# Patient Record
Sex: MALE | Race: WHITE | Employment: OTHER | URBAN - METROPOLITAN AREA
[De-identification: names, ages, dates, MRNs, and addresses within clinical notes are randomized per-mention and may not be internally consistent; named-entity substitution may affect disease eponyms.]

---

## 2017-01-05 RX ORDER — ISOSORBIDE MONONITRATE 30 MG/1
TABLET, EXTENDED RELEASE ORAL
Qty: 15 TABLET | Refills: 3 | Status: SHIPPED | OUTPATIENT
Start: 2017-01-05 | End: 2017-05-01 | Stop reason: SDUPTHER

## 2017-03-01 RX ORDER — PRASUGREL 10 MG/1
10 TABLET, FILM COATED ORAL DAILY
Qty: 30 TABLET | Refills: 3 | Status: SHIPPED | OUTPATIENT
Start: 2017-03-01 | End: 2018-01-04 | Stop reason: SDUPTHER

## 2017-03-30 DIAGNOSIS — N40.1 BENIGN NON-NODULAR PROSTATIC HYPERPLASIA WITH LOWER URINARY TRACT SYMPTOMS: ICD-10-CM

## 2017-03-30 RX ORDER — TAMSULOSIN HYDROCHLORIDE 0.4 MG/1
CAPSULE ORAL
Qty: 30 CAPSULE | Refills: 0 | Status: ON HOLD | OUTPATIENT
Start: 2017-03-30 | End: 2017-11-12

## 2017-05-01 RX ORDER — ISOSORBIDE MONONITRATE 30 MG/1
TABLET, EXTENDED RELEASE ORAL
Qty: 15 TABLET | Refills: 3 | Status: SHIPPED | OUTPATIENT
Start: 2017-05-01 | End: 2018-03-05 | Stop reason: SDUPTHER

## 2017-05-01 RX ORDER — RANOLAZINE 500 MG/1
TABLET, FILM COATED, EXTENDED RELEASE ORAL
Qty: 180 TABLET | Refills: 3 | Status: SHIPPED | OUTPATIENT
Start: 2017-05-01 | End: 2017-05-03 | Stop reason: SDUPTHER

## 2017-05-03 ENCOUNTER — OFFICE VISIT (OUTPATIENT)
Dept: CARDIOLOGY | Age: 62
End: 2017-05-03
Payer: MEDICARE

## 2017-05-03 VITALS
HEART RATE: 76 BPM | HEIGHT: 74 IN | SYSTOLIC BLOOD PRESSURE: 112 MMHG | BODY MASS INDEX: 27.72 KG/M2 | WEIGHT: 216 LBS | DIASTOLIC BLOOD PRESSURE: 73 MMHG

## 2017-05-03 DIAGNOSIS — I25.2 HISTORY OF MYOCARDIAL INFARCTION: ICD-10-CM

## 2017-05-03 DIAGNOSIS — I25.10 ASHD (ARTERIOSCLEROTIC HEART DISEASE): ICD-10-CM

## 2017-05-03 DIAGNOSIS — I73.9 PVD (PERIPHERAL VASCULAR DISEASE) (HCC): Primary | ICD-10-CM

## 2017-05-03 DIAGNOSIS — Z95.820 S/P ANGIOPLASTY WITH STENT: ICD-10-CM

## 2017-05-03 DIAGNOSIS — E78.5 DYSLIPIDEMIA: ICD-10-CM

## 2017-05-03 DIAGNOSIS — I10 ESSENTIAL HYPERTENSION: ICD-10-CM

## 2017-05-03 PROCEDURE — 99214 OFFICE O/P EST MOD 30 MIN: CPT | Performed by: INTERNAL MEDICINE

## 2017-05-03 RX ORDER — ROSUVASTATIN CALCIUM 40 MG/1
40 TABLET, COATED ORAL NIGHTLY
Qty: 90 TABLET | Refills: 3 | Status: SHIPPED | OUTPATIENT
Start: 2017-05-03 | End: 2018-06-04 | Stop reason: SDUPTHER

## 2017-05-03 RX ORDER — RANOLAZINE 500 MG/1
500 TABLET, EXTENDED RELEASE ORAL 2 TIMES DAILY
Qty: 180 TABLET | Refills: 3 | Status: SHIPPED | OUTPATIENT
Start: 2017-05-03 | End: 2018-06-11 | Stop reason: CLARIF

## 2017-05-12 ENCOUNTER — HOSPITAL ENCOUNTER (OUTPATIENT)
Dept: VASCULAR LAB | Age: 62
Discharge: HOME | End: 2017-05-12
Payer: MEDICARE

## 2017-05-12 ENCOUNTER — TELEPHONE (OUTPATIENT)
Dept: CARDIOLOGY | Age: 62
End: 2017-05-12

## 2017-05-12 DIAGNOSIS — I73.9 PVD (PERIPHERAL VASCULAR DISEASE) (HCC): ICD-10-CM

## 2017-05-12 PROCEDURE — 93923 UPR/LXTR ART STDY 3+ LVLS: CPT

## 2017-05-16 ENCOUNTER — TELEPHONE (OUTPATIENT)
Dept: CARDIOLOGY | Age: 62
End: 2017-05-16

## 2017-09-06 ENCOUNTER — HOSPITAL ENCOUNTER (OUTPATIENT)
Age: 62
Discharge: HOME | End: 2017-09-06
Payer: MEDICARE

## 2017-09-06 LAB
ABSOLUTE EOS #: 0.2 K/UL (ref 0–0.4)
ABSOLUTE LYMPH #: 1.5 K/UL (ref 1–4.8)
ABSOLUTE MONO #: 0.6 K/UL (ref 0–1)
ALBUMIN SERPL-MCNC: 3.9 G/DL (ref 3.5–5.2)
ALBUMIN/GLOBULIN RATIO: 1.3 (ref 1–2.5)
ALP BLD-CCNC: 86 U/L (ref 40–129)
ALT SERPL-CCNC: 9 U/L (ref 5–41)
ANION GAP SERPL CALCULATED.3IONS-SCNC: 10 MMOL/L (ref 9–17)
AST SERPL-CCNC: 14 U/L
BASOPHILS # BLD: 0 %
BASOPHILS ABSOLUTE: 0 K/UL (ref 0–0.2)
BILIRUB SERPL-MCNC: 0.64 MG/DL (ref 0.3–1.2)
BUN BLDV-MCNC: 12 MG/DL (ref 8–23)
BUN/CREAT BLD: 15 (ref 9–20)
CALCIUM SERPL-MCNC: 8.9 MG/DL (ref 8.6–10.4)
CHLORIDE BLD-SCNC: 103 MMOL/L (ref 98–107)
CHOLESTEROL/HDL RATIO: 2.4
CHOLESTEROL: 134 MG/DL
CO2: 29 MMOL/L (ref 20–31)
CREAT SERPL-MCNC: 0.8 MG/DL (ref 0.7–1.2)
CREATININE URINE: 109.3 MG/DL (ref 39–259)
DIFFERENTIAL TYPE: NORMAL
EOSINOPHILS RELATIVE PERCENT: 2 %
ESTIMATED AVERAGE GLUCOSE: 114 MG/DL
GFR AFRICAN AMERICAN: >60 ML/MIN
GFR NON-AFRICAN AMERICAN: >60 ML/MIN
GFR SERPL CREATININE-BSD FRML MDRD: ABNORMAL ML/MIN/{1.73_M2}
GFR SERPL CREATININE-BSD FRML MDRD: ABNORMAL ML/MIN/{1.73_M2}
GLUCOSE BLD-MCNC: 102 MG/DL (ref 70–99)
HBA1C MFR BLD: 5.6 % (ref 4.8–5.9)
HCT VFR BLD CALC: 46.6 % (ref 41–53)
HDLC SERPL-MCNC: 55 MG/DL
HEMOGLOBIN: 15.6 G/DL (ref 13.5–17)
LDL CHOLESTEROL: 68 MG/DL (ref 0–130)
LYMPHOCYTES # BLD: 20 %
MCH RBC QN AUTO: 32.1 PG (ref 26–34)
MCHC RBC AUTO-ENTMCNC: 33.4 G/DL (ref 31–37)
MCV RBC AUTO: 96.1 FL (ref 80–100)
MICROALBUMIN/CREAT 24H UR: 37 MG/L
MICROALBUMIN/CREAT UR-RTO: 34 MCG/MG CREAT
MONOCYTES # BLD: 8 %
PDW BLD-RTO: 14.3 % (ref 12.1–15.2)
PLATELET # BLD: 204 K/UL (ref 140–450)
PLATELET ESTIMATE: NORMAL
PMV BLD AUTO: 8.1 FL (ref 6–12)
POTASSIUM SERPL-SCNC: 5.2 MMOL/L (ref 3.7–5.3)
RBC # BLD: 4.85 M/UL (ref 4.5–5.9)
RBC # BLD: NORMAL 10*6/UL
SEG NEUTROPHILS: 70 %
SEGMENTED NEUTROPHILS ABSOLUTE COUNT: 5.3 K/UL (ref 1.8–7.7)
SODIUM BLD-SCNC: 142 MMOL/L (ref 135–144)
TOTAL PROTEIN: 7 G/DL (ref 6.4–8.3)
TRIGL SERPL-MCNC: 55 MG/DL
VLDLC SERPL CALC-MCNC: NORMAL MG/DL (ref 1–30)
WBC # BLD: 7.7 K/UL (ref 3.5–11)
WBC # BLD: NORMAL 10*3/UL

## 2017-09-06 PROCEDURE — 83036 HEMOGLOBIN GLYCOSYLATED A1C: CPT

## 2017-09-06 PROCEDURE — 85025 COMPLETE CBC W/AUTO DIFF WBC: CPT

## 2017-09-06 PROCEDURE — 36415 COLL VENOUS BLD VENIPUNCTURE: CPT

## 2017-09-06 PROCEDURE — 82043 UR ALBUMIN QUANTITATIVE: CPT

## 2017-09-06 PROCEDURE — 82570 ASSAY OF URINE CREATININE: CPT

## 2017-09-06 PROCEDURE — 80061 LIPID PANEL: CPT

## 2017-09-06 PROCEDURE — 80053 COMPREHEN METABOLIC PANEL: CPT

## 2017-10-02 ENCOUNTER — HOSPITAL ENCOUNTER (OUTPATIENT)
Age: 62
Discharge: HOME | End: 2017-10-02
Payer: MEDICARE

## 2017-10-02 ENCOUNTER — HOSPITAL ENCOUNTER (OUTPATIENT)
Dept: GENERAL RADIOLOGY | Age: 62
Discharge: HOME | End: 2017-10-02
Payer: MEDICARE

## 2017-10-02 DIAGNOSIS — R05.9 COUGH: ICD-10-CM

## 2017-10-02 PROCEDURE — 71020 XR CHEST STANDARD TWO VW: CPT

## 2017-10-18 ENCOUNTER — HOSPITAL ENCOUNTER (OUTPATIENT)
Dept: NON INVASIVE DIAGNOSTICS | Age: 62
Discharge: HOME | End: 2017-10-18
Payer: MEDICARE

## 2017-11-07 ENCOUNTER — HOSPITAL ENCOUNTER (OUTPATIENT)
Dept: VASCULAR LAB | Age: 62
Discharge: HOME | End: 2017-11-07
Payer: MEDICARE

## 2017-11-07 DIAGNOSIS — I77.9 BILATERAL CAROTID ARTERY DISEASE (HCC): ICD-10-CM

## 2017-11-07 DIAGNOSIS — Z98.890 STATUS POST CAROTID SURGERY: ICD-10-CM

## 2017-11-07 DIAGNOSIS — R22.1 PULSATILE NECK MASS: ICD-10-CM

## 2017-11-07 PROCEDURE — 93880 EXTRACRANIAL BILAT STUDY: CPT

## 2017-11-08 ENCOUNTER — TELEPHONE (OUTPATIENT)
Dept: CARDIOLOGY | Age: 62
End: 2017-11-08

## 2017-11-08 NOTE — TELEPHONE ENCOUNTER
Dr. Rob Weeks wanted me to call Kiara Esteban to see how he is doing and if they are going to do surgery? Spoke with Kiara Esteban and he stated he is doing pretty good,surgery is going to be later today with Dr. Jaimie Crowe. Dr. Randa Aguilar and he also stated that Dr. Randa Aguilar told him this morning that he will be contacting Dr. Rob Weeks. I gave Dr. Rob Weeks the information that was given to me by Kiara Esteban.

## 2017-11-08 NOTE — PROGRESS NOTES
Re: Preoperative cardiovascular evaluation    Venida Section is 57 y.o. male with past medical history of bilateral carotid artery disease status post CEA 1995 and 2002 and history of prior stroke who initially presented for evaluation of abnormal stress test back in 2015. This was followed by cardiac catheterization which showed severe obstructive disease of the right coronary artery. Patient was sent to [facility] for intervention, unfortunately the procedure complicated by inferior wall myocardial infarction and transient heart block on 8/24/2015.       He had a stress test on 8/3/2016 which showed evidence of old myocardial infarction with a very small hayden-infarction ischemia, medical management. He did ok since his cardiac cath. No evidence of unstable CAD, heart failure or significant heart rhythm problems. His last reported ejection fraction on gated SPECT was 44%. 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 I was asked to assess his preoperative cardiac risk. Based on my evaluation of Mr. Jarad Carrero , I believe that his risk for developing a perioperative cardiac complications is intermediate to high but not prohibitive of the planned surgery. I not believe that any further testing or intervention would be likely to decrease this risk and therefore recommend that you proceed with surgery as clinically indicated. Ideally, I would also suggest continuing his aspirin, beta-blocker and statin throughout the perioperative period to help decrease his risk of stroke and heart attack. However, if any of these medications need to be stopped, I would suggest that they be restarted as soon as safety possible. Marelyn Fabry MD, [facility]. [facility] Cardiology.

## 2017-11-12 ENCOUNTER — ANESTHESIA EVENT (OUTPATIENT)
Dept: OPERATING ROOM | Age: 62
DRG: 254 | End: 2017-11-12
Payer: MEDICARE

## 2017-11-12 ENCOUNTER — HOSPITAL ENCOUNTER (INPATIENT)
Age: 62
LOS: 3 days | Discharge: HOME | DRG: 254 | End: 2017-11-15
Attending: SURGERY | Admitting: SURGERY
Payer: MEDICARE

## 2017-11-12 PROCEDURE — 06BQ0ZZ EXCISION OF LEFT SAPHENOUS VEIN, OPEN APPROACH: ICD-10-PCS | Performed by: SURGERY

## 2017-11-12 PROCEDURE — 6370000000 HC RX 637 (ALT 250 FOR IP): Performed by: STUDENT IN AN ORGANIZED HEALTH CARE EDUCATION/TRAINING PROGRAM

## 2017-11-12 PROCEDURE — 2580000003 HC RX 258: Performed by: STUDENT IN AN ORGANIZED HEALTH CARE EDUCATION/TRAINING PROGRAM

## 2017-11-12 PROCEDURE — 1200000000 HC SEMI PRIVATE

## 2017-11-12 PROCEDURE — 031J09K BYPASS LEFT COMMON CAROTID ARTERY TO LEFT EXTRACRANIAL ARTERY WITH AUTOLOGOUS VENOUS TISSUE, OPEN APPROACH: ICD-10-PCS | Performed by: SURGERY

## 2017-11-12 PROCEDURE — 03UJ0JZ SUPPLEMENT LEFT COMMON CAROTID ARTERY WITH SYNTHETIC SUBSTITUTE, OPEN APPROACH: ICD-10-PCS | Performed by: SURGERY

## 2017-11-12 RX ORDER — ISOSORBIDE MONONITRATE 30 MG/1
30 TABLET, EXTENDED RELEASE ORAL DAILY
Status: DISCONTINUED | OUTPATIENT
Start: 2017-11-12 | End: 2017-11-15 | Stop reason: HOSPADM

## 2017-11-12 RX ORDER — LISINOPRIL 10 MG/1
10 TABLET ORAL DAILY
Status: DISCONTINUED | OUTPATIENT
Start: 2017-11-13 | End: 2017-11-15 | Stop reason: HOSPADM

## 2017-11-12 RX ORDER — ACETAMINOPHEN 325 MG/1
650 TABLET ORAL EVERY 4 HOURS PRN
Status: DISCONTINUED | OUTPATIENT
Start: 2017-11-12 | End: 2017-11-15 | Stop reason: HOSPADM

## 2017-11-12 RX ORDER — SODIUM CHLORIDE 0.9 % (FLUSH) 0.9 %
10 SYRINGE (ML) INJECTION EVERY 12 HOURS SCHEDULED
Status: DISCONTINUED | OUTPATIENT
Start: 2017-11-12 | End: 2017-11-15 | Stop reason: HOSPADM

## 2017-11-12 RX ORDER — ONDANSETRON 2 MG/ML
4 INJECTION INTRAMUSCULAR; INTRAVENOUS EVERY 6 HOURS PRN
Status: DISCONTINUED | OUTPATIENT
Start: 2017-11-12 | End: 2017-11-15 | Stop reason: HOSPADM

## 2017-11-12 RX ORDER — ATORVASTATIN CALCIUM 80 MG/1
80 TABLET, FILM COATED ORAL NIGHTLY
Status: DISCONTINUED | OUTPATIENT
Start: 2017-11-12 | End: 2017-11-15 | Stop reason: HOSPADM

## 2017-11-12 RX ORDER — CARVEDILOL 6.25 MG/1
6.25 TABLET ORAL 2 TIMES DAILY WITH MEALS
Status: DISCONTINUED | OUTPATIENT
Start: 2017-11-12 | End: 2017-11-15 | Stop reason: HOSPADM

## 2017-11-12 RX ORDER — SODIUM CHLORIDE 9 MG/ML
INJECTION, SOLUTION INTRAVENOUS CONTINUOUS
Status: DISCONTINUED | OUTPATIENT
Start: 2017-11-12 | End: 2017-11-15 | Stop reason: HOSPADM

## 2017-11-12 RX ORDER — SODIUM CHLORIDE 0.9 % (FLUSH) 0.9 %
10 SYRINGE (ML) INJECTION PRN
Status: DISCONTINUED | OUTPATIENT
Start: 2017-11-12 | End: 2017-11-15 | Stop reason: HOSPADM

## 2017-11-12 RX ADMIN — Medication 10 ML: at 22:28

## 2017-11-12 RX ADMIN — ATORVASTATIN CALCIUM 80 MG: 80 TABLET, FILM COATED ORAL at 22:20

## 2017-11-12 RX ADMIN — ISOSORBIDE MONONITRATE 30 MG: 30 TABLET ORAL at 22:20

## 2017-11-12 RX ADMIN — CARVEDILOL 6.25 MG: 6.25 TABLET, FILM COATED ORAL at 22:26

## 2017-11-12 ASSESSMENT — PAIN SCALES - GENERAL
PAINLEVEL_OUTOF10: 0

## 2017-11-13 ENCOUNTER — ANESTHESIA (OUTPATIENT)
Dept: OPERATING ROOM | Age: 62
DRG: 254 | End: 2017-11-13
Payer: MEDICARE

## 2017-11-13 ENCOUNTER — APPOINTMENT (OUTPATIENT)
Dept: GENERAL RADIOLOGY | Age: 62
DRG: 254 | End: 2017-11-13
Attending: SURGERY
Payer: MEDICARE

## 2017-11-13 VITALS — TEMPERATURE: 97 F | DIASTOLIC BLOOD PRESSURE: 53 MMHG | OXYGEN SATURATION: 94 % | SYSTOLIC BLOOD PRESSURE: 83 MMHG

## 2017-11-13 LAB
ABSOLUTE EOS #: 0.24 K/UL (ref 0–0.44)
ABSOLUTE IMMATURE GRANULOCYTE: <0.03 K/UL (ref 0–0.3)
ABSOLUTE LYMPH #: 1.75 K/UL (ref 1.1–3.7)
ABSOLUTE MONO #: 0.67 K/UL (ref 0.1–1.2)
ALLEN TEST: ABNORMAL
ANION GAP SERPL CALCULATED.3IONS-SCNC: 13 MMOL/L (ref 9–17)
BASOPHILS # BLD: 1 % (ref 0–2)
BASOPHILS ABSOLUTE: 0.05 K/UL (ref 0–0.2)
BUN BLDV-MCNC: 15 MG/DL (ref 8–23)
BUN/CREAT BLD: ABNORMAL (ref 9–20)
CALCIUM IONIZED: 1.16 MMOL/L (ref 1.13–1.33)
CALCIUM SERPL-MCNC: 8.5 MG/DL (ref 8.6–10.4)
CARBOXYHEMOGLOBIN: 3.8 % (ref 0–5)
CHLORIDE BLD-SCNC: 106 MMOL/L (ref 98–107)
CHLORIDE, WHOLE BLOOD: 107 MMOL/L (ref 98–110)
CO2: 26 MMOL/L (ref 20–31)
CREAT SERPL-MCNC: 0.81 MG/DL (ref 0.7–1.2)
DIFFERENTIAL TYPE: NORMAL
EKG ATRIAL RATE: 63 BPM
EKG P AXIS: 81 DEGREES
EKG P-R INTERVAL: 166 MS
EKG Q-T INTERVAL: 436 MS
EKG QRS DURATION: 96 MS
EKG QTC CALCULATION (BAZETT): 446 MS
EKG R AXIS: 75 DEGREES
EKG T AXIS: 105 DEGREES
EKG VENTRICULAR RATE: 63 BPM
EOSINOPHILS RELATIVE PERCENT: 4 % (ref 1–4)
FIO2: ABNORMAL
GFR AFRICAN AMERICAN: >60 ML/MIN
GFR NON-AFRICAN AMERICAN: >60 ML/MIN
GFR SERPL CREATININE-BSD FRML MDRD: ABNORMAL ML/MIN/{1.73_M2}
GFR SERPL CREATININE-BSD FRML MDRD: ABNORMAL ML/MIN/{1.73_M2}
GLUCOSE BLD-MCNC: 110 MG/DL (ref 75–110)
GLUCOSE BLD-MCNC: 96 MG/DL (ref 70–99)
HCO3 ARTERIAL: 27.3 MMOL/L (ref 22–27)
HCT VFR BLD CALC: 42 %
HCT VFR BLD CALC: 44.8 % (ref 40.7–50.3)
HEMOGLOBIN: 13.7 GM/DL
HEMOGLOBIN: 14 G/DL (ref 13–17)
IMMATURE GRANULOCYTES: 0 %
LYMPHOCYTES # BLD: 29 % (ref 24–43)
MCH RBC QN AUTO: 31.2 PG (ref 25.2–33.5)
MCHC RBC AUTO-ENTMCNC: 31.3 G/DL (ref 28.4–34.8)
MCV RBC AUTO: 99.8 FL (ref 82.6–102.9)
METHEMOGLOBIN: ABNORMAL % (ref 0–1.5)
MODE: ABNORMAL
MONOCYTES # BLD: 11 % (ref 3–12)
NEGATIVE BASE EXCESS, ART: ABNORMAL MMOL/L (ref 0–2)
NOTIFICATION TIME: ABNORMAL
NOTIFICATION: ABNORMAL
O2 DEVICE/FLOW/%: ABNORMAL
O2 SAT, ARTERIAL: 99.3 % (ref 94–100)
OXYHEMOGLOBIN: ABNORMAL % (ref 95–98)
PATIENT TEMP: 35.5
PCO2 ARTERIAL: 52.4 MMHG (ref 32–45)
PCO2, ART, TEMP ADJ: 48.7 (ref 32–45)
PDW BLD-RTO: 13.2 % (ref 11.8–14.4)
PEEP/CPAP: ABNORMAL
PH ARTERIAL: 7.34 (ref 7.35–7.45)
PH, ART, TEMP ADJ: 7.36 (ref 7.35–7.45)
PLATELET # BLD: 176 K/UL (ref 138–453)
PLATELET ESTIMATE: NORMAL
PMV BLD AUTO: 10.1 FL (ref 8.1–13.5)
PO2 ARTERIAL: 179 MMHG (ref 75–95)
PO2, ART, TEMP ADJ: 171 MMHG (ref 75–95)
POSITIVE BASE EXCESS, ART: 1.1 MMOL/L (ref 0–2)
POTASSIUM SERPL-SCNC: 4.6 MMOL/L (ref 3.7–5.3)
POTASSIUM, WHOLE BLOOD: 4.4 MMOL/L (ref 3.6–5)
PSV: ABNORMAL
PT. POSITION: ABNORMAL
RBC # BLD: 4.49 M/UL (ref 4.21–5.77)
RBC # BLD: NORMAL 10*6/UL
RESPIRATORY RATE: ABNORMAL
SAMPLE SITE: ABNORMAL
SEG NEUTROPHILS: 55 % (ref 36–65)
SEGMENTED NEUTROPHILS ABSOLUTE COUNT: 3.33 K/UL (ref 1.5–8.1)
SET RATE: ABNORMAL
SODIUM BLD-SCNC: 145 MMOL/L (ref 135–144)
SODIUM, WHOLE BLOOD: 137 MMOL/L (ref 136–145)
TEXT FOR RESPIRATORY: ABNORMAL
TOTAL HB: ABNORMAL G/DL (ref 12–16)
TOTAL RATE: ABNORMAL
VT: ABNORMAL
WBC # BLD: 6.1 K/UL (ref 3.5–11.3)
WBC # BLD: NORMAL 10*3/UL

## 2017-11-13 PROCEDURE — 87176 TISSUE HOMOGENIZATION CULTR: CPT

## 2017-11-13 PROCEDURE — 2780000010 HC IMPLANT OTHER: Performed by: SURGERY

## 2017-11-13 PROCEDURE — 2580000003 HC RX 258: Performed by: SPECIALIST

## 2017-11-13 PROCEDURE — 86901 BLOOD TYPING SEROLOGIC RH(D): CPT

## 2017-11-13 PROCEDURE — 85025 COMPLETE CBC W/AUTO DIFF WBC: CPT

## 2017-11-13 PROCEDURE — 7100000000 HC PACU RECOVERY - FIRST 15 MIN: Performed by: SURGERY

## 2017-11-13 PROCEDURE — 2720000010 HC SURG SUPPLY STERILE: Performed by: SURGERY

## 2017-11-13 PROCEDURE — 3700000001 HC ADD 15 MINUTES (ANESTHESIA): Performed by: SURGERY

## 2017-11-13 PROCEDURE — 2500000003 HC RX 250 WO HCPCS: Performed by: SURGERY

## 2017-11-13 PROCEDURE — 82330 ASSAY OF CALCIUM: CPT

## 2017-11-13 PROCEDURE — 87070 CULTURE OTHR SPECIMN AEROBIC: CPT

## 2017-11-13 PROCEDURE — 86920 COMPATIBILITY TEST SPIN: CPT

## 2017-11-13 PROCEDURE — 87086 URINE CULTURE/COLONY COUNT: CPT

## 2017-11-13 PROCEDURE — 3600000002 HC SURGERY LEVEL 2 BASE: Performed by: SURGERY

## 2017-11-13 PROCEDURE — 6360000002 HC RX W HCPCS: Performed by: SPECIALIST

## 2017-11-13 PROCEDURE — 2580000003 HC RX 258: Performed by: STUDENT IN AN ORGANIZED HEALTH CARE EDUCATION/TRAINING PROGRAM

## 2017-11-13 PROCEDURE — 2000000000 HC ICU R&B

## 2017-11-13 PROCEDURE — 6360000004 HC RX CONTRAST MEDICATION: Performed by: SURGERY

## 2017-11-13 PROCEDURE — 36415 COLL VENOUS BLD VENIPUNCTURE: CPT

## 2017-11-13 PROCEDURE — 6370000000 HC RX 637 (ALT 250 FOR IP): Performed by: STUDENT IN AN ORGANIZED HEALTH CARE EDUCATION/TRAINING PROGRAM

## 2017-11-13 PROCEDURE — 75774 ARTERY X-RAY EACH VESSEL: CPT

## 2017-11-13 PROCEDURE — 2500000003 HC RX 250 WO HCPCS: Performed by: SPECIALIST

## 2017-11-13 PROCEDURE — 84132 ASSAY OF SERUM POTASSIUM: CPT

## 2017-11-13 PROCEDURE — 86850 RBC ANTIBODY SCREEN: CPT

## 2017-11-13 PROCEDURE — 87075 CULTR BACTERIA EXCEPT BLOOD: CPT

## 2017-11-13 PROCEDURE — 6360000002 HC RX W HCPCS: Performed by: SURGERY

## 2017-11-13 PROCEDURE — 87205 SMEAR GRAM STAIN: CPT

## 2017-11-13 PROCEDURE — 85018 HEMOGLOBIN: CPT

## 2017-11-13 PROCEDURE — 7100000001 HC PACU RECOVERY - ADDTL 15 MIN: Performed by: SURGERY

## 2017-11-13 PROCEDURE — 6360000002 HC RX W HCPCS: Performed by: ANESTHESIOLOGY

## 2017-11-13 PROCEDURE — 3600000012 HC SURGERY LEVEL 2 ADDTL 15MIN: Performed by: SURGERY

## 2017-11-13 PROCEDURE — 3700000000 HC ANESTHESIA ATTENDED CARE: Performed by: SURGERY

## 2017-11-13 PROCEDURE — 86900 BLOOD TYPING SEROLOGIC ABO: CPT

## 2017-11-13 PROCEDURE — 85014 HEMATOCRIT: CPT

## 2017-11-13 PROCEDURE — 82805 BLOOD GASES W/O2 SATURATION: CPT

## 2017-11-13 PROCEDURE — 93005 ELECTROCARDIOGRAM TRACING: CPT

## 2017-11-13 PROCEDURE — 87076 CULTURE ANAEROBE IDENT EACH: CPT

## 2017-11-13 PROCEDURE — 6360000002 HC RX W HCPCS: Performed by: STUDENT IN AN ORGANIZED HEALTH CARE EDUCATION/TRAINING PROGRAM

## 2017-11-13 PROCEDURE — C1874 STENT, COATED/COV W/DEL SYS: HCPCS | Performed by: SURGERY

## 2017-11-13 PROCEDURE — 2580000003 HC RX 258: Performed by: SURGERY

## 2017-11-13 PROCEDURE — 80048 BASIC METABOLIC PNL TOTAL CA: CPT

## 2017-11-13 DEVICE — IMPLANTABLE DEVICE: Type: IMPLANTABLE DEVICE | Site: CAROTID | Status: FUNCTIONAL

## 2017-11-13 DEVICE — STENT PERIPH L5CM DIA7MM CATH L120CM 0.018IN FEM IL ART: Type: IMPLANTABLE DEVICE | Site: CAROTID | Status: FUNCTIONAL

## 2017-11-13 RX ORDER — MAGNESIUM HYDROXIDE 1200 MG/15ML
LIQUID ORAL CONTINUOUS PRN
Status: DISCONTINUED | OUTPATIENT
Start: 2017-11-13 | End: 2017-11-13 | Stop reason: HOSPADM

## 2017-11-13 RX ORDER — FENTANYL CITRATE 50 UG/ML
50 INJECTION, SOLUTION INTRAMUSCULAR; INTRAVENOUS
Status: DISCONTINUED | OUTPATIENT
Start: 2017-11-13 | End: 2017-11-15 | Stop reason: HOSPADM

## 2017-11-13 RX ORDER — DEXAMETHASONE SODIUM PHOSPHATE 10 MG/ML
INJECTION INTRAMUSCULAR; INTRAVENOUS PRN
Status: DISCONTINUED | OUTPATIENT
Start: 2017-11-13 | End: 2017-11-13 | Stop reason: SDUPTHER

## 2017-11-13 RX ORDER — LABETALOL HYDROCHLORIDE 5 MG/ML
10 INJECTION, SOLUTION INTRAVENOUS EVERY 10 MIN PRN
Status: COMPLETED | OUTPATIENT
Start: 2017-11-13 | End: 2017-11-13

## 2017-11-13 RX ORDER — PROTAMINE SULFATE 10 MG/ML
INJECTION, SOLUTION INTRAVENOUS PRN
Status: DISCONTINUED | OUTPATIENT
Start: 2017-11-13 | End: 2017-11-13 | Stop reason: SDUPTHER

## 2017-11-13 RX ORDER — OXYCODONE HYDROCHLORIDE AND ACETAMINOPHEN 5; 325 MG/1; MG/1
1 TABLET ORAL EVERY 4 HOURS PRN
Status: DISCONTINUED | OUTPATIENT
Start: 2017-11-13 | End: 2017-11-15 | Stop reason: HOSPADM

## 2017-11-13 RX ORDER — EPHEDRINE SULFATE 50 MG/ML
INJECTION, SOLUTION INTRAVENOUS PRN
Status: DISCONTINUED | OUTPATIENT
Start: 2017-11-13 | End: 2017-11-13 | Stop reason: SDUPTHER

## 2017-11-13 RX ORDER — ROCURONIUM BROMIDE 10 MG/ML
INJECTION, SOLUTION INTRAVENOUS PRN
Status: DISCONTINUED | OUTPATIENT
Start: 2017-11-13 | End: 2017-11-13 | Stop reason: SDUPTHER

## 2017-11-13 RX ORDER — HEPARIN SODIUM 1000 [USP'U]/ML
INJECTION, SOLUTION INTRAVENOUS; SUBCUTANEOUS PRN
Status: DISCONTINUED | OUTPATIENT
Start: 2017-11-13 | End: 2017-11-13 | Stop reason: HOSPADM

## 2017-11-13 RX ORDER — NITROGLYCERIN 20 MG/100ML
INJECTION INTRAVENOUS PRN
Status: DISCONTINUED | OUTPATIENT
Start: 2017-11-13 | End: 2017-11-13 | Stop reason: SDUPTHER

## 2017-11-13 RX ORDER — HEPARIN SODIUM 1000 [USP'U]/ML
INJECTION, SOLUTION INTRAVENOUS; SUBCUTANEOUS PRN
Status: DISCONTINUED | OUTPATIENT
Start: 2017-11-13 | End: 2017-11-13 | Stop reason: SDUPTHER

## 2017-11-13 RX ORDER — MORPHINE SULFATE 2 MG/ML
2 INJECTION, SOLUTION INTRAMUSCULAR; INTRAVENOUS EVERY 5 MIN PRN
Status: DISCONTINUED | OUTPATIENT
Start: 2017-11-13 | End: 2017-11-13 | Stop reason: HOSPADM

## 2017-11-13 RX ORDER — DIPHENHYDRAMINE HYDROCHLORIDE 50 MG/ML
12.5 INJECTION INTRAMUSCULAR; INTRAVENOUS
Status: DISCONTINUED | OUTPATIENT
Start: 2017-11-13 | End: 2017-11-13 | Stop reason: HOSPADM

## 2017-11-13 RX ORDER — GLYCOPYRROLATE 0.2 MG/ML
INJECTION INTRAMUSCULAR; INTRAVENOUS PRN
Status: DISCONTINUED | OUTPATIENT
Start: 2017-11-13 | End: 2017-11-13 | Stop reason: SDUPTHER

## 2017-11-13 RX ORDER — OXYCODONE HYDROCHLORIDE AND ACETAMINOPHEN 5; 325 MG/1; MG/1
2 TABLET ORAL PRN
Status: DISCONTINUED | OUTPATIENT
Start: 2017-11-13 | End: 2017-11-13 | Stop reason: HOSPADM

## 2017-11-13 RX ORDER — OXYCODONE HYDROCHLORIDE AND ACETAMINOPHEN 5; 325 MG/1; MG/1
1 TABLET ORAL PRN
Status: DISCONTINUED | OUTPATIENT
Start: 2017-11-13 | End: 2017-11-13 | Stop reason: HOSPADM

## 2017-11-13 RX ORDER — LIDOCAINE HYDROCHLORIDE 10 MG/ML
INJECTION, SOLUTION EPIDURAL; INFILTRATION; INTRACAUDAL; PERINEURAL PRN
Status: DISCONTINUED | OUTPATIENT
Start: 2017-11-13 | End: 2017-11-13 | Stop reason: HOSPADM

## 2017-11-13 RX ORDER — ONDANSETRON 2 MG/ML
INJECTION INTRAMUSCULAR; INTRAVENOUS PRN
Status: DISCONTINUED | OUTPATIENT
Start: 2017-11-13 | End: 2017-11-13 | Stop reason: SDUPTHER

## 2017-11-13 RX ORDER — ONDANSETRON 2 MG/ML
4 INJECTION INTRAMUSCULAR; INTRAVENOUS
Status: DISCONTINUED | OUTPATIENT
Start: 2017-11-13 | End: 2017-11-13 | Stop reason: HOSPADM

## 2017-11-13 RX ORDER — PROPOFOL 10 MG/ML
INJECTION, EMULSION INTRAVENOUS PRN
Status: DISCONTINUED | OUTPATIENT
Start: 2017-11-13 | End: 2017-11-13 | Stop reason: SDUPTHER

## 2017-11-13 RX ORDER — SODIUM CHLORIDE, SODIUM LACTATE, POTASSIUM CHLORIDE, CALCIUM CHLORIDE 600; 310; 30; 20 MG/100ML; MG/100ML; MG/100ML; MG/100ML
INJECTION, SOLUTION INTRAVENOUS CONTINUOUS PRN
Status: DISCONTINUED | OUTPATIENT
Start: 2017-11-13 | End: 2017-11-13 | Stop reason: SDUPTHER

## 2017-11-13 RX ORDER — LIDOCAINE HYDROCHLORIDE 10 MG/ML
INJECTION, SOLUTION EPIDURAL; INFILTRATION; INTRACAUDAL; PERINEURAL PRN
Status: DISCONTINUED | OUTPATIENT
Start: 2017-11-13 | End: 2017-11-13 | Stop reason: SDUPTHER

## 2017-11-13 RX ORDER — LABETALOL HYDROCHLORIDE 5 MG/ML
5 INJECTION, SOLUTION INTRAVENOUS EVERY 10 MIN PRN
Status: DISCONTINUED | OUTPATIENT
Start: 2017-11-13 | End: 2017-11-13 | Stop reason: HOSPADM

## 2017-11-13 RX ORDER — FENTANYL CITRATE 50 UG/ML
25 INJECTION, SOLUTION INTRAMUSCULAR; INTRAVENOUS EVERY 5 MIN PRN
Status: COMPLETED | OUTPATIENT
Start: 2017-11-13 | End: 2017-11-13

## 2017-11-13 RX ORDER — FENTANYL CITRATE 50 UG/ML
INJECTION, SOLUTION INTRAMUSCULAR; INTRAVENOUS PRN
Status: DISCONTINUED | OUTPATIENT
Start: 2017-11-13 | End: 2017-11-13 | Stop reason: SDUPTHER

## 2017-11-13 RX ORDER — OXYCODONE HYDROCHLORIDE AND ACETAMINOPHEN 5; 325 MG/1; MG/1
2 TABLET ORAL EVERY 4 HOURS PRN
Status: DISCONTINUED | OUTPATIENT
Start: 2017-11-13 | End: 2017-11-15 | Stop reason: HOSPADM

## 2017-11-13 RX ORDER — NEOSTIGMINE METHYLSULFATE 1 MG/ML
INJECTION, SOLUTION INTRAVENOUS PRN
Status: DISCONTINUED | OUTPATIENT
Start: 2017-11-13 | End: 2017-11-13 | Stop reason: SDUPTHER

## 2017-11-13 RX ADMIN — PROPOFOL 200 MG: 10 INJECTION, EMULSION INTRAVENOUS at 07:41

## 2017-11-13 RX ADMIN — EPHEDRINE SULFATE 5 MG: 50 INJECTION INTRAMUSCULAR; INTRAVENOUS; SUBCUTANEOUS at 10:55

## 2017-11-13 RX ADMIN — NICARDIPINE HYDROCHLORIDE 2.5 MG/HR: 0.1 INJECTION, SOLUTION INTRAVENOUS at 13:27

## 2017-11-13 RX ADMIN — Medication 10 ML: at 19:57

## 2017-11-13 RX ADMIN — HEPARIN SODIUM 4000 UNITS: 1000 INJECTION, SOLUTION INTRAVENOUS; SUBCUTANEOUS at 08:41

## 2017-11-13 RX ADMIN — EPHEDRINE SULFATE 5 MG: 50 INJECTION INTRAMUSCULAR; INTRAVENOUS; SUBCUTANEOUS at 08:29

## 2017-11-13 RX ADMIN — Medication 2 G: at 08:15

## 2017-11-13 RX ADMIN — PHENYLEPHRINE HYDROCHLORIDE 100 MCG: 10 INJECTION INTRAMUSCULAR; INTRAVENOUS; SUBCUTANEOUS at 12:11

## 2017-11-13 RX ADMIN — ROCURONIUM BROMIDE 10 MG: 10 INJECTION INTRAVENOUS at 11:36

## 2017-11-13 RX ADMIN — OXYCODONE HYDROCHLORIDE AND ACETAMINOPHEN 1 TABLET: 5; 325 TABLET ORAL at 19:57

## 2017-11-13 RX ADMIN — PHENYLEPHRINE HYDROCHLORIDE 50 MCG: 10 INJECTION INTRAMUSCULAR; INTRAVENOUS; SUBCUTANEOUS at 11:46

## 2017-11-13 RX ADMIN — EPHEDRINE SULFATE 10 MG: 50 INJECTION INTRAMUSCULAR; INTRAVENOUS; SUBCUTANEOUS at 08:21

## 2017-11-13 RX ADMIN — LABETALOL HYDROCHLORIDE 10 MG: 5 INJECTION, SOLUTION INTRAVENOUS at 22:00

## 2017-11-13 RX ADMIN — FENTANYL CITRATE 25 MCG: 50 INJECTION INTRAMUSCULAR; INTRAVENOUS at 14:01

## 2017-11-13 RX ADMIN — FENTANYL CITRATE 25 MCG: 50 INJECTION INTRAMUSCULAR; INTRAVENOUS at 14:06

## 2017-11-13 RX ADMIN — ROCURONIUM BROMIDE 50 MG: 10 INJECTION INTRAVENOUS at 07:41

## 2017-11-13 RX ADMIN — ISOSORBIDE MONONITRATE 30 MG: 30 TABLET ORAL at 19:55

## 2017-11-13 RX ADMIN — PHENYLEPHRINE HYDROCHLORIDE 50 MCG: 10 INJECTION INTRAMUSCULAR; INTRAVENOUS; SUBCUTANEOUS at 11:47

## 2017-11-13 RX ADMIN — HEPARIN SODIUM 5000 UNITS: 1000 INJECTION, SOLUTION INTRAVENOUS; SUBCUTANEOUS at 10:39

## 2017-11-13 RX ADMIN — FENTANYL CITRATE 50 MCG: 50 INJECTION INTRAMUSCULAR; INTRAVENOUS at 11:39

## 2017-11-13 RX ADMIN — ROCURONIUM BROMIDE 10 MG: 10 INJECTION INTRAVENOUS at 12:00

## 2017-11-13 RX ADMIN — ROCURONIUM BROMIDE 10 MG: 10 INJECTION INTRAVENOUS at 10:17

## 2017-11-13 RX ADMIN — NITROGLYCERIN 10 MCG: 20 INJECTION INTRAVENOUS at 09:03

## 2017-11-13 RX ADMIN — EPHEDRINE SULFATE 10 MG: 50 INJECTION INTRAMUSCULAR; INTRAVENOUS; SUBCUTANEOUS at 08:35

## 2017-11-13 RX ADMIN — FENTANYL CITRATE 100 MCG: 50 INJECTION INTRAMUSCULAR; INTRAVENOUS at 08:50

## 2017-11-13 RX ADMIN — PROPOFOL 30 MG: 10 INJECTION, EMULSION INTRAVENOUS at 12:49

## 2017-11-13 RX ADMIN — PROPOFOL 20 MG: 10 INJECTION, EMULSION INTRAVENOUS at 12:43

## 2017-11-13 RX ADMIN — LISINOPRIL 10 MG: 10 TABLET ORAL at 19:56

## 2017-11-13 RX ADMIN — FENTANYL CITRATE 25 MCG: 50 INJECTION INTRAMUSCULAR; INTRAVENOUS at 14:18

## 2017-11-13 RX ADMIN — PHENYLEPHRINE HYDROCHLORIDE 50 MCG/MIN: 10 INJECTION INTRAMUSCULAR; INTRAVENOUS; SUBCUTANEOUS at 08:45

## 2017-11-13 RX ADMIN — PHENYLEPHRINE HYDROCHLORIDE 50 MCG: 10 INJECTION INTRAMUSCULAR; INTRAVENOUS; SUBCUTANEOUS at 12:54

## 2017-11-13 RX ADMIN — SODIUM CHLORIDE: 9 INJECTION, SOLUTION INTRAVENOUS at 12:23

## 2017-11-13 RX ADMIN — ONDANSETRON 4 MG: 2 INJECTION, SOLUTION INTRAMUSCULAR; INTRAVENOUS at 12:29

## 2017-11-13 RX ADMIN — NEOSTIGMINE METHYLSULFATE 5 MG: 1 INJECTION, SOLUTION INTRAVENOUS at 13:04

## 2017-11-13 RX ADMIN — SODIUM CHLORIDE: 9 INJECTION, SOLUTION INTRAVENOUS at 00:38

## 2017-11-13 RX ADMIN — PROTAMINE SULFATE 20 MG: 10 INJECTION, SOLUTION INTRAVENOUS at 12:44

## 2017-11-13 RX ADMIN — PHENYLEPHRINE HYDROCHLORIDE 100 MCG: 10 INJECTION INTRAMUSCULAR; INTRAVENOUS; SUBCUTANEOUS at 07:51

## 2017-11-13 RX ADMIN — FENTANYL CITRATE 25 MCG: 50 INJECTION INTRAMUSCULAR; INTRAVENOUS at 14:12

## 2017-11-13 RX ADMIN — DEXAMETHASONE SODIUM PHOSPHATE 10 MG: 10 INJECTION INTRAMUSCULAR; INTRAVENOUS at 07:53

## 2017-11-13 RX ADMIN — FENTANYL CITRATE 100 MCG: 50 INJECTION INTRAMUSCULAR; INTRAVENOUS at 08:00

## 2017-11-13 RX ADMIN — FENTANYL CITRATE 50 MCG: 50 INJECTION, SOLUTION INTRAMUSCULAR; INTRAVENOUS at 17:21

## 2017-11-13 RX ADMIN — GLYCOPYRROLATE 0.8 MG: 0.2 INJECTION INTRAMUSCULAR; INTRAVENOUS at 13:04

## 2017-11-13 RX ADMIN — PHENYLEPHRINE HYDROCHLORIDE 100 MCG: 10 INJECTION INTRAMUSCULAR; INTRAVENOUS; SUBCUTANEOUS at 11:57

## 2017-11-13 RX ADMIN — CARVEDILOL 6.25 MG: 6.25 TABLET, FILM COATED ORAL at 17:21

## 2017-11-13 RX ADMIN — ROCURONIUM BROMIDE 30 MG: 10 INJECTION INTRAVENOUS at 08:28

## 2017-11-13 RX ADMIN — PROPOFOL 20 MG: 10 INJECTION, EMULSION INTRAVENOUS at 12:48

## 2017-11-13 RX ADMIN — SODIUM CHLORIDE: 9 INJECTION, SOLUTION INTRAVENOUS at 21:07

## 2017-11-13 RX ADMIN — PROPOFOL 20 MG: 10 INJECTION, EMULSION INTRAVENOUS at 12:32

## 2017-11-13 RX ADMIN — ROCURONIUM BROMIDE 10 MG: 10 INJECTION INTRAVENOUS at 09:54

## 2017-11-13 RX ADMIN — FENTANYL CITRATE 50 MCG: 50 INJECTION INTRAMUSCULAR; INTRAVENOUS at 09:40

## 2017-11-13 RX ADMIN — Medication 2 G: at 11:15

## 2017-11-13 RX ADMIN — ROCURONIUM BROMIDE 10 MG: 10 INJECTION INTRAVENOUS at 09:22

## 2017-11-13 RX ADMIN — LIDOCAINE HYDROCHLORIDE 50 MG: 10 INJECTION, SOLUTION EPIDURAL; INFILTRATION; INTRACAUDAL; PERINEURAL at 07:41

## 2017-11-13 RX ADMIN — LABETALOL HYDROCHLORIDE 10 MG: 5 INJECTION, SOLUTION INTRAVENOUS at 21:06

## 2017-11-13 RX ADMIN — ROCURONIUM BROMIDE 10 MG: 10 INJECTION INTRAVENOUS at 09:09

## 2017-11-13 RX ADMIN — PHENYLEPHRINE HYDROCHLORIDE 100 MCG: 10 INJECTION INTRAMUSCULAR; INTRAVENOUS; SUBCUTANEOUS at 08:10

## 2017-11-13 RX ADMIN — ROCURONIUM BROMIDE 10 MG: 10 INJECTION INTRAVENOUS at 11:12

## 2017-11-13 RX ADMIN — PROPOFOL 20 MG: 10 INJECTION, EMULSION INTRAVENOUS at 13:08

## 2017-11-13 RX ADMIN — ROCURONIUM BROMIDE 10 MG: 10 INJECTION INTRAVENOUS at 10:41

## 2017-11-13 RX ADMIN — SODIUM CHLORIDE, POTASSIUM CHLORIDE, SODIUM LACTATE AND CALCIUM CHLORIDE: 600; 310; 30; 20 INJECTION, SOLUTION INTRAVENOUS at 08:15

## 2017-11-13 RX ADMIN — PHENYLEPHRINE HYDROCHLORIDE 100 MCG: 10 INJECTION INTRAMUSCULAR; INTRAVENOUS; SUBCUTANEOUS at 08:25

## 2017-11-13 RX ADMIN — EPHEDRINE SULFATE 10 MG: 50 INJECTION INTRAMUSCULAR; INTRAVENOUS; SUBCUTANEOUS at 08:45

## 2017-11-13 RX ADMIN — ATORVASTATIN CALCIUM 80 MG: 80 TABLET, FILM COATED ORAL at 19:58

## 2017-11-13 RX ADMIN — FENTANYL CITRATE 50 MCG: 50 INJECTION INTRAMUSCULAR; INTRAVENOUS at 07:41

## 2017-11-13 RX ADMIN — PHENYLEPHRINE HYDROCHLORIDE 100 MCG: 10 INJECTION INTRAMUSCULAR; INTRAVENOUS; SUBCUTANEOUS at 08:15

## 2017-11-13 ASSESSMENT — PAIN SCALES - GENERAL
PAINLEVEL_OUTOF10: 0
PAINLEVEL_OUTOF10: 5
PAINLEVEL_OUTOF10: 0
PAINLEVEL_OUTOF10: 5
PAINLEVEL_OUTOF10: 0
PAINLEVEL_OUTOF10: 0
PAINLEVEL_OUTOF10: 5
PAINLEVEL_OUTOF10: 8
PAINLEVEL_OUTOF10: 0
PAINLEVEL_OUTOF10: 6
PAINLEVEL_OUTOF10: 0
PAINLEVEL_OUTOF10: 0
PAINLEVEL_OUTOF10: 5
PAINLEVEL_OUTOF10: 0
PAINLEVEL_OUTOF10: 5
PAINLEVEL_OUTOF10: 0

## 2017-11-13 ASSESSMENT — PAIN - FUNCTIONAL ASSESSMENT: PAIN_FUNCTIONAL_ASSESSMENT: 0-10

## 2017-11-13 ASSESSMENT — LIFESTYLE VARIABLES: SMOKING_STATUS: 1

## 2017-11-13 NOTE — PROGRESS NOTES
Vascular Surgery Progress Note            PATIENT NAME: Giacomo Dias     TODAY'S DATE: 2017, 5:18 PM    SUBJECTIVE:  POD# 0 s/p left repair of carotid aneurysm with L GSV interposition graft    Pt seen and examined at bedside. No acute events post-op per pt and nurse. Ptc/o pain in left neck. Denies nausea, vomiting, fever, and chills.       OBJECTIVE:   VITALS:  BP (!) 147/82 Comment: cardene gtt  Pulse 87   Temp 97.2 °F (36.2 °C) (Axillary)   Resp 16   Ht 6' 2\" (1.88 m)   Wt 220 lb 10.9 oz (100.1 kg)   SpO2 100%   BMI 28.33 kg/m²  I Temperature Max - Temp  Av °F (35.6 °C)  Min: 94.2 °F (34.6 °C)  Max: 98.3 °F (36.8 °C)    TOTAL INTAKE OVER 24 HOURS:  In: 2471.6 [I.V.:2451.6; Other:20]  Out: 1470   TOTAL OUTPUT OVER 24 HOURS:  In: 2471.6   Out: 1470 [Urine:1150; Drains:20]    URINARY CATHETER OUTPUT (Long):         LABS:  Lab Results   Component Value Date    WBC 6.1 2017    HGB 13.7 2017    HCT 42.0 2017     2017     2012       Lab Results   Component Value Date     2017     2017    K 4.4 2017    K 4.6 2017     2017    CO2 26 2017    BUN 15 2017    CREATININE 0.81 2017    GLUCOSE 96 2017    GLUCOSE 97 2012       Lab Results   Component Value Date    ALKPHOS 86 2017    ALT 9 2017    AST 14 2017    PROT 7.0 2017    BILITOT 0.64 2017    LABALBU 3.9 2017       Lab Results   Component Value Date    PROTIME 10.6 2012    INR 1.0 2012       Calcium:    Lab Results   Component Value Date    CALCIUM 8.5 2017     Ionized Calcium:  No results found for: IONCA  Magnesium:    Lab Results   Component Value Date    MG 1.7 2015     Phosphorus:  No results found for: PHOS  Albumin:    Lab Results   Component Value Date    LABALBU 3.9 2017       PHYSICAL EXAM:  General: AOx3, NAD  Heart: Regular rate and rhythm   Abdomen: Soft, nontender, nondistended. Extremity: No peripheral edema. Neuro:  Cranial nerves grossly in tact, no focal neuro deficits  Neck: incision CDI w/ drain in place. No hematoma, trachea midline      ASSESSMENT       POD# 0 s/p left repair of carotid aneurysm with L GSV interposition graft    PLAN  1. Continue supportive care and medical mgmt   2. Diet:  CLD, advance as tolerated  3. Analgesia:  Percocet PRN, fentanyl PRN  4. Continue TI drain care  5. Continue w/ neuro checks  6.  Page vascular surgery resident w/ any changes      Electronically signed by Johann Bach DO  on 11/13/2017 at 5:18 PM

## 2017-11-13 NOTE — PROGRESS NOTES
PT TO OR WITH RING ON LEFT RING FINGER, REMOVED AFTER INDUCTION. PLACED IN SPECIMEN BAG WITH PT STICKER ON BAG. SPECIMEN BAG PLACED IN CHART, SENT WITH PT POST OP.

## 2017-11-13 NOTE — PROGRESS NOTES
Patient left unit to got to Pre op across the street. Hibiclens shower given last night prior to bedtime. Hair clipped, chlorhexidine wiped this morning. No Consents were signed when patient left he unit. PAC states that those will be obtained across the street. No family members present prior to patient leaving for OR.

## 2017-11-13 NOTE — H&P
evidence of trauma  CVS: Regular rate and rhythm, no murmurs, no rubs or gallops  Resp: Good bilateral air entry, clear to auscultation b/l, no wheeze or rhonchi  Abd: soft, non-tender, non-distended, no hepatosplenomegaly or palpable masses, bowel sounds present. Ext: No clubbing, cyanosis, edema, peripheral pulses 2+ Rad/Fem/DP/PT  CNS: Moves all extremities, no gross focal motor deficits  Skin: No erythema or ulcerations     Labs:   Lab Results   Component Value Date    WBC 7.7 09/06/2017    HGB 15.6 09/06/2017    HCT 46.6 09/06/2017    MCV 96.1 09/06/2017     09/06/2017     03/28/2012     Lab Results   Component Value Date     09/06/2017    K 5.2 09/06/2017     09/06/2017    CO2 29 09/06/2017    BUN 12 09/06/2017    CREATININE 0.80 09/06/2017    GLUCOSE 102 09/06/2017    GLUCOSE 97 03/29/2012    CALCIUM 8.9 09/06/2017     Lab Results   Component Value Date    INR 1.0 03/29/2012       Imaging:        Impression:    1. 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 OR for repair of left carotid aneurysm  2. Will monitor in ICU post operatively      Nico Carmen  11/13/2017     The patient was seen and examined with the resident. Labs and data were reviewed. Agree with resident note as documented.     Sundeep Albrecht DO  8:39 AM, 11/14/2017

## 2017-11-13 NOTE — ANESTHESIA PRE PROCEDURE
Department of Anesthesiology  Preprocedure Note       Name:  Haven Soler   Age:  57 y.o.  :  1955                                          MRN:  [id number]         Date:  2017      Surgeon: Leotis Paget):  Esthela Murguia DO    Procedure: Procedure(s):  REPAIR CAROTID ANEURYSM WITH POSSIBLE SAPHENOUS VEIN BYPASS    Medications prior to admission:   Prior to Admission medications    Medication Sig Start Date End Date Taking? Authorizing Provider   rosuvastatin (CRESTOR) 40 MG tablet Take 1 tablet by mouth nightly 5/3/17  Yes Consuelo Donald MD   ranolazine (RANEXA) 500 MG extended release tablet Take 1 tablet by mouth 2 times daily 5/3/17  Yes Consuelo Donald MD   isosorbide mononitrate (IMDUR) 30 MG extended release tablet TAKE 1/2 TABLET DAILY.  17  Yes Consuelo Donald MD   prasugrel (EFFIENT) 10 MG TABS Take 1 tablet by mouth daily 3/1/17  Yes Consuelo Donald MD   lisinopril (PRINIVIL;ZESTRIL) 10 MG tablet Take 1 tablet by mouth daily 11/23/15  Yes Consuelo Donald MD   carvedilol (COREG) 6.25 MG tablet Take 1 tablet by mouth 2 times daily (with meals) 11/23/15  Yes Consuelo Donald MD   aspirin 81 MG tablet Take 81 mg by mouth daily   Yes Historical Provider, MD   DiphenhydrAMINE HCl (BENADRYL ALLERGY PO) Take by mouth Pt takes at night for his sinuses    Historical Provider, MD   nitroGLYCERIN (NITROSTAT) 0.4 MG SL tablet Place 1 tablet under the tongue every 5 minutes as needed for Chest pain 8/24/15   Stefany Hay MD       Current medications:    Current Facility-Administered Medications   Medication Dose Route Frequency Provider Last Rate Last Dose    [MAR Hold] ceFAZolin (ANCEF) 2 g in sterile water 20 mL IV syringe  2 g Intravenous Once Estheal Murguia DO        [MAR Hold] carvedilol (COREG) tablet 6.25 mg  6.25 mg Oral BID  Nico Draper DO   6.25 mg at 17 2226    [MAR Hold] isosorbide mononitrate (IMDUR) extended release tablet 30 mg  30 mg Oral Daily Nico Draper DO   30 1184 11/13/17 0645   BP:    105/62   Pulse:    65   Resp:    16   Temp:    36.5 °C (97.7 °F)   TempSrc:    Temporal   SpO2: (!) 88% (S) 95%  98%   Weight:   220 lb 10.9 oz (100.1 kg)    Height:                                                  BP Readings from Last 3 Encounters:   11/13/17 105/62   05/03/17 112/73   10/31/16 116/72       NPO Status: Time of last liquid consumption: 2200                        Time of last solid consumption: 2200                        Date of last liquid consumption: 11/12/17                        Date of last solid food consumption: 11/12/17    BMI:   Wt Readings from Last 3 Encounters:   11/13/17 220 lb 10.9 oz (100.1 kg)   05/03/17 216 lb (98 kg)   10/31/16 214 lb 6.4 oz (97.3 kg)     Body mass index is 28.33 kg/m². CBC:   Lab Results   Component Value Date    WBC 6.1 11/13/2017    RBC 4.49 11/13/2017    RBC 4.44 03/28/2012    HGB 14.0 11/13/2017    HCT 44.8 11/13/2017    MCV 99.8 11/13/2017    RDW 13.2 11/13/2017     11/13/2017     03/28/2012       CMP:   Lab Results   Component Value Date     09/06/2017    K 5.2 09/06/2017     09/06/2017    CO2 29 09/06/2017    BUN 12 09/06/2017    CREATININE 0.80 09/06/2017    GFRAA >60 09/06/2017    LABGLOM >60 09/06/2017    GLUCOSE 102 09/06/2017    GLUCOSE 97 03/29/2012    PROT 7.0 09/06/2017    CALCIUM 8.9 09/06/2017    BILITOT 0.64 09/06/2017    ALKPHOS 86 09/06/2017    AST 14 09/06/2017    ALT 9 09/06/2017       POC Tests: No results for input(s): POCGLU, POCNA, POCK, POCCL, POCBUN, POCHEMO, POCHCT in the last 72 hours.     Coags:   Lab Results   Component Value Date    PROTIME 10.6 03/29/2012    INR 1.0 03/29/2012    APTT 42.1 08/21/2015       HCG (If Applicable): No results found for: PREGTESTUR, PREGSERUM, HCG, HCGQUANT     ABGs: No results found for: PHART, PO2ART, IFT5ASC, RON4TDL, BEART, J4ISCUOS     Type & Screen (If Applicable):  No results found for: LABABO, [address]    Anesthesia Evaluation  Patient summary reviewed and Nursing notes reviewed  Airway: Mallampati: II  TM distance: >3 FB   Neck ROM: full  Mouth opening: > = 3 FB Dental:          Pulmonary:   (+) COPD:  rhonchi,  current smoker    (-) sleep apnea          Patient did not smoke on day of surgery. ROS comment: 40+ yr smoking history   Cardiovascular:  Exercise tolerance: good (>4 METS),   (+) hypertension: moderate, CAD: obstructive,     (-) past MI, dysrhythmias and  JOHN    ECG reviewed  Rhythm: regular  Rate: normal  Echocardiogram reviewed  Stress test reviewed       Beta Blocker:  Dose within 24 Hrs      ROS comment: Stress test, ECHO and CATH from 2015    PE comment: Bulging left carotid aneurysm   Neuro/Psych:   (+) CVA: no interval change,    (-) seizures, neuromuscular disease and headaches           GI/Hepatic/Renal: Neg GI/Hepatic/Renal ROS            Endo/Other: Negative Endo/Other ROS                    Abdominal:           Vascular:   + PVD, aortic or cerebral, .  - DVT and PE. Anesthesia Plan      general     ASA 4       Induction: intravenous. arterial line  MIPS: Postoperative opioids intended and Prophylactic antiemetics administered. Anesthetic plan and risks discussed with patient. Use of blood products discussed with patient whom consented to blood products. Plan discussed with CRNA and attending.                   Ana Adorno CRNA   11/13/2017

## 2017-11-14 LAB
ABO/RH: NORMAL
ABSOLUTE EOS #: <0.03 K/UL (ref 0–0.44)
ABSOLUTE IMMATURE GRANULOCYTE: 0.04 K/UL (ref 0–0.3)
ABSOLUTE LYMPH #: 0.83 K/UL (ref 1.1–3.7)
ABSOLUTE MONO #: 0.89 K/UL (ref 0.1–1.2)
ANION GAP SERPL CALCULATED.3IONS-SCNC: 10 MMOL/L (ref 9–17)
ANTIBODY SCREEN: NEGATIVE
ARM BAND NUMBER: NORMAL
BASOPHILS # BLD: 0 % (ref 0–2)
BASOPHILS ABSOLUTE: <0.03 K/UL (ref 0–0.2)
BLD PROD TYP BPU: NORMAL
BLD PROD TYP BPU: NORMAL
BUN BLDV-MCNC: 10 MG/DL (ref 8–23)
BUN/CREAT BLD: ABNORMAL (ref 9–20)
CALCIUM SERPL-MCNC: 7.9 MG/DL (ref 8.6–10.4)
CHLORIDE BLD-SCNC: 100 MMOL/L (ref 98–107)
CO2: 27 MMOL/L (ref 20–31)
CREAT SERPL-MCNC: 0.51 MG/DL (ref 0.7–1.2)
CROSSMATCH RESULT: NORMAL
CROSSMATCH RESULT: NORMAL
CULTURE: NO GROWTH
CULTURE: NORMAL
DIFFERENTIAL TYPE: ABNORMAL
DISPENSE STATUS BLOOD BANK: NORMAL
DISPENSE STATUS BLOOD BANK: NORMAL
EOSINOPHILS RELATIVE PERCENT: 0 % (ref 1–4)
EXPIRATION DATE: NORMAL
GFR AFRICAN AMERICAN: >60 ML/MIN
GFR NON-AFRICAN AMERICAN: >60 ML/MIN
GFR SERPL CREATININE-BSD FRML MDRD: ABNORMAL ML/MIN/{1.73_M2}
GFR SERPL CREATININE-BSD FRML MDRD: ABNORMAL ML/MIN/{1.73_M2}
GLUCOSE BLD-MCNC: 136 MG/DL (ref 70–99)
HCT VFR BLD CALC: 37.9 % (ref 40.7–50.3)
HEMOGLOBIN: 12.1 G/DL (ref 13–17)
IMMATURE GRANULOCYTES: 0 %
LYMPHOCYTES # BLD: 8 % (ref 24–43)
Lab: NORMAL
MCH RBC QN AUTO: 31.7 PG (ref 25.2–33.5)
MCHC RBC AUTO-ENTMCNC: 31.9 G/DL (ref 28.4–34.8)
MCV RBC AUTO: 99.2 FL (ref 82.6–102.9)
MONOCYTES # BLD: 8 % (ref 3–12)
PDW BLD-RTO: 13 % (ref 11.8–14.4)
PLATELET # BLD: 177 K/UL (ref 138–453)
PLATELET ESTIMATE: ABNORMAL
PMV BLD AUTO: 9.7 FL (ref 8.1–13.5)
POTASSIUM SERPL-SCNC: 4.3 MMOL/L (ref 3.7–5.3)
RBC # BLD: 3.82 M/UL (ref 4.21–5.77)
RBC # BLD: ABNORMAL 10*6/UL
SEG NEUTROPHILS: 84 % (ref 36–65)
SEGMENTED NEUTROPHILS ABSOLUTE COUNT: 9.35 K/UL (ref 1.5–8.1)
SODIUM BLD-SCNC: 137 MMOL/L (ref 135–144)
SPECIMEN DESCRIPTION: NORMAL
STATUS: NORMAL
TRANSFUSION STATUS: NORMAL
TRANSFUSION STATUS: NORMAL
UNIT DIVISION: 0
UNIT DIVISION: 0
UNIT NUMBER: NORMAL
UNIT NUMBER: NORMAL
WBC # BLD: 11.1 K/UL (ref 3.5–11.3)
WBC # BLD: ABNORMAL 10*3/UL

## 2017-11-14 PROCEDURE — 6360000002 HC RX W HCPCS: Performed by: STUDENT IN AN ORGANIZED HEALTH CARE EDUCATION/TRAINING PROGRAM

## 2017-11-14 PROCEDURE — 6370000000 HC RX 637 (ALT 250 FOR IP): Performed by: STUDENT IN AN ORGANIZED HEALTH CARE EDUCATION/TRAINING PROGRAM

## 2017-11-14 PROCEDURE — 85025 COMPLETE CBC W/AUTO DIFF WBC: CPT

## 2017-11-14 PROCEDURE — 80048 BASIC METABOLIC PNL TOTAL CA: CPT

## 2017-11-14 PROCEDURE — 2580000003 HC RX 258: Performed by: STUDENT IN AN ORGANIZED HEALTH CARE EDUCATION/TRAINING PROGRAM

## 2017-11-14 PROCEDURE — 99406 BEHAV CHNG SMOKING 3-10 MIN: CPT

## 2017-11-14 PROCEDURE — 36415 COLL VENOUS BLD VENIPUNCTURE: CPT

## 2017-11-14 PROCEDURE — 2500000003 HC RX 250 WO HCPCS

## 2017-11-14 PROCEDURE — 2060000000 HC ICU INTERMEDIATE R&B

## 2017-11-14 RX ADMIN — CARVEDILOL 6.25 MG: 6.25 TABLET, FILM COATED ORAL at 08:30

## 2017-11-14 RX ADMIN — OXYCODONE HYDROCHLORIDE AND ACETAMINOPHEN 1 TABLET: 5; 325 TABLET ORAL at 18:56

## 2017-11-14 RX ADMIN — SODIUM CHLORIDE: 9 INJECTION, SOLUTION INTRAVENOUS at 05:46

## 2017-11-14 RX ADMIN — CARVEDILOL 6.25 MG: 6.25 TABLET, FILM COATED ORAL at 17:57

## 2017-11-14 RX ADMIN — ENOXAPARIN SODIUM 40 MG: 40 INJECTION SUBCUTANEOUS at 08:31

## 2017-11-14 RX ADMIN — ATORVASTATIN CALCIUM 80 MG: 80 TABLET, FILM COATED ORAL at 20:26

## 2017-11-14 RX ADMIN — OXYCODONE HYDROCHLORIDE AND ACETAMINOPHEN 1 TABLET: 5; 325 TABLET ORAL at 10:46

## 2017-11-14 RX ADMIN — ISOSORBIDE MONONITRATE 30 MG: 30 TABLET ORAL at 08:30

## 2017-11-14 RX ADMIN — Medication 10 ML: at 20:27

## 2017-11-14 RX ADMIN — LISINOPRIL 10 MG: 10 TABLET ORAL at 08:30

## 2017-11-14 RX ADMIN — OXYCODONE HYDROCHLORIDE AND ACETAMINOPHEN 1 TABLET: 5; 325 TABLET ORAL at 05:36

## 2017-11-14 ASSESSMENT — PAIN SCALES - GENERAL
PAINLEVEL_OUTOF10: 8
PAINLEVEL_OUTOF10: 2
PAINLEVEL_OUTOF10: 7
PAINLEVEL_OUTOF10: 7

## 2017-11-14 NOTE — PROGRESS NOTES
Dr Edgar Munoz notified via perfect serve of pt increased SBP 180s. RN awaiting response from physician at this time.

## 2017-11-14 NOTE — PROGRESS NOTES
Dr Flakita Shah returned call for Dr Evon Valle about pt elevated SBP 180s despite administration of pt home medications (coreg, lisinopril and imdur). Orders received to administer 10mg labetalol IVP, may repeat x1 dose in ten minutes if pt SBP remains over 160; if after 2 doses of labetalol pt SBP remains over 160, start cardene gtt. RN will continue monitor pt status and update physician as needed.

## 2017-11-14 NOTE — PROGRESS NOTES
Smoking Cessation - topics covered   [x]  Health Risks  [x]  Benefits of Quitting   [x]  Smoking Cessation  []  Patient has no history of tobacco use  []  Patient is former smoker. []  No need for tobacco cessation education. [x]  Booklet given  [x]  Patient verbalizes understanding. []  Patient denies need for tobacco cessation education.   Jose Eduardo Segura  10:36 AM

## 2017-11-14 NOTE — PLAN OF CARE
Problem: Risk for Falls  Goal: Able to ambulate independently  Able to ambulate independently     Outcome: Ongoing  Pt remains free of falls this shift. Pt in bed. Bed in lowest position. Bed rails up 2/4. Bed wheels locked. Bed alarm on. Call light with in reach.

## 2017-11-15 VITALS
OXYGEN SATURATION: 94 % | BODY MASS INDEX: 28.43 KG/M2 | HEART RATE: 92 BPM | DIASTOLIC BLOOD PRESSURE: 72 MMHG | WEIGHT: 221.56 LBS | RESPIRATION RATE: 18 BRPM | HEIGHT: 74 IN | TEMPERATURE: 97.5 F | SYSTOLIC BLOOD PRESSURE: 145 MMHG

## 2017-11-15 PROCEDURE — 6370000000 HC RX 637 (ALT 250 FOR IP): Performed by: STUDENT IN AN ORGANIZED HEALTH CARE EDUCATION/TRAINING PROGRAM

## 2017-11-15 PROCEDURE — 2580000003 HC RX 258: Performed by: STUDENT IN AN ORGANIZED HEALTH CARE EDUCATION/TRAINING PROGRAM

## 2017-11-15 PROCEDURE — 6360000002 HC RX W HCPCS: Performed by: STUDENT IN AN ORGANIZED HEALTH CARE EDUCATION/TRAINING PROGRAM

## 2017-11-15 RX ORDER — CEPHALEXIN 500 MG/1
500 CAPSULE ORAL 3 TIMES DAILY
Qty: 15 CAPSULE | Refills: 0 | Status: SHIPPED | OUTPATIENT
Start: 2017-11-15 | End: 2017-11-20

## 2017-11-15 RX ORDER — OXYCODONE HYDROCHLORIDE AND ACETAMINOPHEN 5; 325 MG/1; MG/1
1 TABLET ORAL EVERY 6 HOURS PRN
Qty: 28 TABLET | Refills: 0 | Status: SHIPPED | OUTPATIENT
Start: 2017-11-15 | End: 2017-11-22

## 2017-11-15 RX ADMIN — CARVEDILOL 6.25 MG: 6.25 TABLET, FILM COATED ORAL at 08:39

## 2017-11-15 RX ADMIN — ISOSORBIDE MONONITRATE 30 MG: 30 TABLET ORAL at 08:39

## 2017-11-15 RX ADMIN — Medication 10 ML: at 08:42

## 2017-11-15 RX ADMIN — ENOXAPARIN SODIUM 40 MG: 40 INJECTION SUBCUTANEOUS at 08:39

## 2017-11-15 RX ADMIN — LISINOPRIL 10 MG: 10 TABLET ORAL at 08:39

## 2017-11-15 NOTE — PROGRESS NOTES
results found for: PHOS  Albumin:    Lab Results   Component Value Date    LABALBU 3.9 09/06/2017       PHYSICAL EXAM:  General: AOx3, NAD  Heart: Regular rate and rhythm  Abdomen: Soft, nontender, nondistended. Extremity: No peripheral edema. Neuro:  Cranial nerves grossly in tact, no focal neuro deficits  Neck: incision CDI w/ drain in place (serosang drainage). No hematoma, trachea midline. Edema improved. ASSESSMENT       POD# 2 s/p left repair of carotid aneurysm with L GSV interposition graft    PLAN  1. Continue supportive care and medical mgmt   2. Diet: general as tolerated  3. Analgesia:  Percocet PRN  4. Continue TI drain care  5. Continue home meds  6.  Ambulate   7. DC planning for home     Electronically signed by Ramona Stallings DO  on 11/15/2017 at 6:53 AM

## 2017-11-15 NOTE — PLAN OF CARE
Problem: Risk for Falls  Goal: Able to ambulate independently  Able to ambulate independently     Outcome: Ongoing      Problem: Pain:  Goal: Pain level will decrease  Pain level will decrease   Outcome: Ongoing

## 2017-11-18 LAB
CULTURE: ABNORMAL
DIRECT EXAM: ABNORMAL
DIRECT EXAM: ABNORMAL
Lab: ABNORMAL
SPECIMEN DESCRIPTION: ABNORMAL
STATUS: ABNORMAL

## 2017-11-18 NOTE — OP NOTE
[address] [facility] [address]                                 OPERATIVE REPORT    PATIENT NAME: Ricarda Lesch                  :        1955  MED REC NO:   [id number]                             ROOM:       [id number]  ACCOUNT NO:   [de-identified]                           ADMIT DATE: 2017  PROVIDER:     Brown Serve    ADDENDUM    DATE OF PROCEDURE:  2017    OPERATIVE FINDINGS:  The pseudoaneurysm was freed on all sides to  completely mobilize this. The internal jugular vein was identified and  skeletonized. We could palpate the internal carotid pulse just medial to  this and sharp dissection in this area allowed us to visualize the internal  carotid artery above the pseudoaneurysm. There was a short segment, but  enough room to work with the internal carotid artery above the  pseudoaneurysm. The artery here appeared healthy. I was able to get a  vessel loop around this level. At this time, we then had proximal and  distal control above and below the pseudoaneurysm. An additional dose of  heparin was administered, and the pseudoaneurysm was opened. There was no  back bleeding because of the VIABAHN stent in place. A significant amount  of laminated thrombus was removed from the pseudoaneurysm sac. Reconstruction was planned because we had adequate healthy vessels above  and below. A longitudinal incision was made in the left groin and  dissection through the subcutaneous tissue was carried out with  electrocautery. The saphenous vein was identified and sharply  skeletonized. Branches were ligated. A long enough segment was used to  bridge the length needed to replace the carotid artery. It was ligated  proximally and distally, and this was removed, flushed with heparinized  saline, there was no leakage from any side branches, and it appeared to be  of adequate size.   I did elect not to reverse the vein so that we could use  the larger end for the common carotid and the distal end was a good size  match for the internal carotid. At this point, the carotid was occluded,  the VIABAHN stent was cut in half, and we placed an [place] shunt to shunt  the blood around this area, and flow through the shunt was confirmed with  Doppler. We did feed the vein graft over the 12 [place] shunt, and the  proximal anastomosis was completed end-to-end using running 5-0 Prolene  suture. At the distal end, I had some difficult visualization. He did  have good backbleeding from the distal internal carotid artery, so I  elected to have Anesthesia increase his blood pressure to the 140 range,  and we removed the shunt. There was a LeMaitre valvulotome passed into the  saphenous vein graft, and 1 section of valves were lysed completely and  there was good flow through the vein graft. The graft and native artery  were then trimmed and spatulated to length and an end-to-end anastomosis  was performed with running 6-0 Prolene suture. Just prior to completion of  the suture line, the arteries were allowed to back bleed from both  directions flushing any air or debris. The suture line was then completed,  and flow was restored to the distal internal carotid. At this point, we  performed an angiogram which showed excellent flow through the replacement  graft and good flow distally into the internal carotid artery. I was very  satisfied with our results. The pseudoaneurysm sac was debrided, this was  partially closed over the graft using interrupted Vicryl sutures. A 10-mm  flat TI drain was then placed in the space above the pseudoaneurysm cavity,  and the neck was closed in multiple layers using Vicryl sutures, and a 4-0  Monocryl was used to close the skin. The drain was sutured in place with  3-0 nylon sutures, and the drain placed to bulb suction. A sterile bandage  was applied. The patient tolerated the procedure well. He was awoken in  the operating room and extubated. He was moving all 4 extremities, and his  tongue was midline. He was taken to an intensive care for postoperative  recovery. I directly performed and/or supervised the procedure.         Bailey James    D: 11/18/2017 12:34:28       T: 11/18/2017 17:47:46     [email or web address]  Job#: [id number]     Doc#: [id number]

## 2017-11-20 NOTE — OP NOTE
angiogram shows  good exclusion of the pseudoaneurysm with good position of the stent. We then deepened our incision on the more distal aspect and freed up the  pseudoaneurysm. The pseudoaneurysm cavity itself was quite large. We were  able to dissect free without getting into this, and the pseudoaneurysm was  mobilized. We stayed directly on the pseudoaneurysm itself and dissected  the surrounding tissue free from the pseudoaneurysm. The distal internal  ... dictation end. Report completed in document# [id number] by Dr. Silvia Vargas.         Luther Zhao    D: 11/18/2017 12:27:43       T: 11/18/2017 17:22:19     [email or web address]  Job#: [id number]     Doc#: [id number]

## 2017-11-21 NOTE — DISCHARGE SUMMARY
VASCULAR SURGERY DISCHARGE SUMMARY:    Disposition: DISCHARGE TO home    PATIENT NAME: Harshad Magaña    YOB: 1955  MEDICAL RECORD NO. [id number]  DATE: 11/21/2017  ADMITTING PHYSICIAN: Dr. Levin Public: Shelia Adams  DISCHARGE DATE:  11/15/2017  2:00 PM  DISPOSITION: to home  ADMITTING DIAGNOSIS: L carotid artery pseudoaneurysm  DISCHARGE DIAGNOSIS:   Patient Active Problem List   Diagnosis Code    Gross hematuria R31.0    Benign non-nodular prostatic hyperplasia with lower urinary tract symptoms N40.1     CONSULTANTS:  none    PROCEDURES:  11/13/17:    1. Left common carotid artery covered stent placement. 2.  Excision of left carotid artery pseudoaneurysm. 3.  Reconstruction of left common carotid to left internal carotid artery  with left saphenous vein graft. 4.  Completion arteriogram of left common carotid artery. HOSPITAL COURSE:   Harshad Magaña is a 58 yo male w/ h/o L carotid artery pseudoaneurysm who underwent excision and reconstruction of left carotid artery on 11/13/17. Pt was admitted post-operatively for further evaluation and mgmt. Labs and images were followed daily throughout his admission. Post-operatively pt's pain was controlled w/ IV pain medications and transitioned to PO as tolerated. CLD started and advanced as tolerated. Pt's  BP initially controlled w/ cardene gtt that was weaned to DC once PO home meds were restarted. Pt was ambulatory on own and was able to urinate s/p Long removal.      Pt was deemed medically stable for DC on 11/15/17. He was DC home w/ instructions to f/u in office in 1-2 wks. Pt expressed understanding of and agreement w/ DC instructions. At time of discharge, Harshad Magaña was tolerating a regular diet, ambulating on his own accord, had adequate analgesia on oral pain medications, and had no signs of symptoms of complications.       LABS:     No results for input(s): WBC, HGB, HCT, PLT, NA, K, CL, CO2, BUN, CREATININE in the last 72 hours. DIAGNOSTIC TESTS:     Fl Angio Each Add Vessel S&i    Result Date: 11/13/2017  EXAMINATION: ANGIOGRAM IN SURGERY 11/13/2017 1:36 pm COMPARISON: None. HISTORY: ORDERING SYSTEM PROVIDED HISTORY: left carotid aneurysm repair TECHNOLOGIST PROVIDED HISTORY: Reason for exam:->left carotid aneurysm repair FINDINGS: 2 MINUTES 35.3 SECONDS OF FLUOROSCOPY TIME UTILIZED IN SURGERY. D AP 20.71. 4 spot views obtained in surgery show multiple clips on both sides of the neck. The patient is intubated. Protractor in the left neck. Aneurysmal dilatation of the left carotid artery with subsequent placement of stents. Please correlate with intraoperative procedure report for additional details. Intraoperative fluoroscopy for left carotid surgery. DISCHARGE INSTRUCTIONS:      Discharge Medications:        Medication List      START taking these medications    oxyCODONE-acetaminophen 5-325 MG per tablet  Commonly known as:  PERCOCET  Take 1 tablet by mouth every 6 hours as needed for Pain . CONTINUE taking these medications    aspirin 81 MG tablet     BENADRYL ALLERGY PO     carvedilol 6.25 MG tablet  Commonly known as:  COREG  Take 1 tablet by mouth 2 times daily (with meals)     isosorbide mononitrate 30 MG extended release tablet  Commonly known as:  IMDUR  TAKE 1/2 TABLET DAILY.      lisinopril 10 MG tablet  Commonly known as:  PRINIVIL;ZESTRIL  Take 1 tablet by mouth daily     nitroGLYCERIN 0.4 MG SL tablet  Commonly known as:  NITROSTAT  Place 1 tablet under the tongue every 5 minutes as needed for Chest pain     prasugrel 10 MG Tabs  Commonly known as:  EFFIENT  Take 1 tablet by mouth daily     ranolazine 500 MG extended release tablet  Commonly known as:  RANEXA  Take 1 tablet by mouth 2 times daily     rosuvastatin 40 MG tablet  Commonly known as:  CRESTOR  Take 1 tablet by mouth nightly        STOP taking these medications    tamsulosin 0.4 MG capsule  Commonly known asNieves Sample        ASK your doctor about these medications    cephALEXin 500 MG capsule  Commonly known as:  KEFLEX  Take 1 capsule by mouth 3 times daily for 5 days  Ask about: Should I take this medication? Where to Get Your Medications      You can get these medications from any pharmacy    Bring a paper prescription for each of these medications  · cephALEXin 500 MG capsule  · oxyCODONE-acetaminophen 5-325 MG per tablet       Diet:   diet as tolerated  Activity: - Avoid strenuous activity or exercise until cleared during follow-up appointment  - No driving or operating heavy machinery while taking narcotics   Wound Care: Daily and as needed  Follow-up:   1. [facility] [phone] for follow up appointment with Dr. Johnson Apgar in: 1-2 weeks  2.  Follow up in the next few weeks with PCP: Jane Hayward Maffucci  2017, 2:43 PM

## 2017-12-20 ENCOUNTER — OFFICE VISIT (OUTPATIENT)
Dept: CARDIOLOGY | Age: 62
End: 2017-12-20
Payer: MEDICARE

## 2017-12-20 VITALS
HEART RATE: 80 BPM | RESPIRATION RATE: 20 BRPM | BODY MASS INDEX: 28.88 KG/M2 | OXYGEN SATURATION: 93 % | SYSTOLIC BLOOD PRESSURE: 122 MMHG | WEIGHT: 225 LBS | HEIGHT: 74 IN | DIASTOLIC BLOOD PRESSURE: 75 MMHG

## 2017-12-20 DIAGNOSIS — E78.5 DYSLIPIDEMIA: ICD-10-CM

## 2017-12-20 DIAGNOSIS — R06.02 SOB (SHORTNESS OF BREATH) ON EXERTION: Primary | ICD-10-CM

## 2017-12-20 DIAGNOSIS — I77.9 BILATERAL CAROTID ARTERY DISEASE (HCC): ICD-10-CM

## 2017-12-20 DIAGNOSIS — I25.10 ASHD (ARTERIOSCLEROTIC HEART DISEASE): ICD-10-CM

## 2017-12-20 DIAGNOSIS — I25.2 HISTORY OF MYOCARDIAL INFARCTION: ICD-10-CM

## 2017-12-20 DIAGNOSIS — I10 ESSENTIAL HYPERTENSION: ICD-10-CM

## 2017-12-20 PROCEDURE — 99214 OFFICE O/P EST MOD 30 MIN: CPT | Performed by: INTERNAL MEDICINE

## 2017-12-20 PROCEDURE — 93000 ELECTROCARDIOGRAM COMPLETE: CPT | Performed by: INTERNAL MEDICINE

## 2017-12-20 RX ORDER — METOPROLOL SUCCINATE 25 MG/1
12.5 TABLET, EXTENDED RELEASE ORAL DAILY
Qty: 30 TABLET | Refills: 3 | Status: SHIPPED | OUTPATIENT
Start: 2017-12-20 | End: 2018-09-24 | Stop reason: SDUPTHER

## 2017-12-21 NOTE — PROGRESS NOTES
and distal disease. Successful PCI with stent to the proximal  lesion and balloon angioplasty of the distal lesion. Procedure complicated by dissection, total distal occlusion and transient heart block requiring temporary pacemaker placement. Past Medical History:    Past Medical History:   Diagnosis Date    CAD (coronary artery disease)     Carotid artery stenosis     H/O cardiac catheterization 8/20/15    LMCA:Lesion on LMCA: Distal subsection. 40% stenosis. LCx:Lesion on Prox CX: Ostial. 50% stenosis. RCA: Lesion on Prox RCA: Ostial. 95% stenosis. Lesion on Prox RCA: Proximal subsection. 75% stenosis. Lesion on R PDA: Ostial. 705 stenosis. EF 60%.  H/O cardiovascular stress test 8/17/15    Abnormal. Moderate perfusion defect of moderate intensity in the inferolateral, inferior, and inferoapical regions during stress imaging, most consistent with ischemia. Global LV systolic function was abnormal with EF: 42% w/regional wall motion abnormalities. Results are most consistent with an intermediate to high risk for significant CAD.     H/O cardiovascular stress test 08/03/2016    Abnormal myocardial perfusion study, large perfusion defect of moderate-severe intensity in inferolateral, inferior, inferoseptal, apical and inferoapical regions during stress and rest imaging, most consistant with old myocardial infarction with hayden-infarct ischemia, EF 44%, Overall results most consistent with intermediate-high risk for CAD, Add. testing including cardiac cath maybe indicated     H/O echocardiogram 12/4/15    EF 55%. Mildly increased LV wall thickness. Left atrium is mildly dilated (29-33) left atrial volume index of 32 ml/m2. aortic leaflet calcification with mild aortic stenosis. Moderate aortic regurgitation.  Mild (grade l) diastolic dysfunction    History of Holter monitoring 10/30/2017    Rare PAC's with 2 atrial runs, the longest being 7 beats in duration at 171 bpm and most consistent with a short run of atrial fibrillation.  Hyperlipidemia     Hypertension     Myocardial infarction     Peripheral vascular disease ([facility] [address]     S/P angioplasty with stent 8/24/15    PCI to ostial lesion and balloon angioplasy to proximal lesion. Distal RCA lesion had total occlusion after balloon angioplasty    Unspecified cerebral artery occlusion with cerebral infarction     TIA     Past Surgical History:  Past Surgical History:   Procedure Laterality Date    CARDIAC CATHETERIZATION Left 8/20/2015    right radial/Kimberly Munizfin/Dr Cohen    CAROTID ENDARTERECTOMY Bilateral 1995, 2002    CAROTID ENDARTERECTOMY Left 11/13/2017    LEFT CAROTID PSEUDOANEURYSM REPAIR WITH [id number] AND [id number] VIABAHN STENTS, LEFT CAROTID ANGIOGRAM, SAPHENOUS VEIN GRAFT BYPASS performed by Esthela Murguia DO at [place] [address]  08/24/15    balloon, stentx1    DIAGNOSTIC CARDIAC CATH LAB PROCEDURE  08/20/15    KNEE SURGERY  2013    LEG TENDON SURGERY      R    OTHER SURGICAL HISTORY Left 11/13/2017    REPAIR CAROTID ANEURYSM WITH LT SAPHENOUS VEIN GRAFT     Social History:    History   Smoking Status    Current Every Day Smoker    Packs/day: 0.50    Years: 37.1    Start date: 8/17/1970   Smokeless Tobacco    Former User    Types: Hattie Moy Quit date: 8/17/1975     Current Medications:  Outpatient Prescriptions Marked as Taking for the 12/20/17 encounter (Office Visit) with Consuelo Donald MD   Medication Sig Dispense Refill    tiotropium ([place] ) 18 MCG inhalation capsule Inhale 1 capsule into the lungs daily 30 capsule 3    metoprolol succinate (TOPROL XL) 25 MG extended release tablet Take 0.5 tablets by mouth daily 30 tablet 3    rosuvastatin (CRESTOR) 40 MG tablet Take 1 tablet by mouth nightly 90 tablet 3    ranolazine (RANEXA) 500 MG extended release tablet Take 1 tablet by mouth 2 times daily 180 tablet 3    isosorbide mononitrate (IMDUR) 30 MG extended release tablet TAKE 1/2 TABLET DAILY.  15 Disease:  Antiplatelet Agent: Continue Aspirin 81 mg daily. and effient  · Beta Blocker Therapy: Continue Carvedilol 6.25 mg twice daily I discussed the potential side effects of this medication including lightheadedness and dizziness and told him to call the office if this occurs. · Other Anti-anginal medications: Continue Isosorbide mononitrate (Imdur) 30 mg once daily I discussed the potential side effects of this medication including lightheadedness and dizziness and told him to call the office if this occurs. · Continue Ranexa  · Statin Therapy: Continue rosuvastatin (Crestor) 40 mg nightly. I discussed the potential benefits of statin therapy as well as the potential risks including myalgia as well as the rare but potentially serious complication of liver or kidney damage. Although rare, I told him that this could be serious and therefore told him to stop the medication immediately and call if he developed any severe muscle aches or pains and he agreed to do so. · Additional Testing: I ordered a Lexiscan Stress test with [facility] imaging to help risk stratify for the likelihood of cardiac ischemia as the source of his symptoms. · Pulmonary function testing with and without bronchodilation. · I will start him on Spiriva for now giving clinical picture suggestive of COPD. I plan to see him back after 6 months or sooner if needed. Return in about 6 months (around 6/20/2018) for Follow up.     Electronically signed by Consuelo Donald MD on 12/20/2017

## 2017-12-21 NOTE — PATIENT INSTRUCTIONS
your doctor advises it, take 1 low-dose aspirin a day to prevent heart attack. ? · Be safe with medicines. Take your medicines exactly as prescribed. Call your doctor if you think you are having a problem with your medicine. You will get more details on the specific medicines your doctor prescribes. ? Lifestyle changes  ? · Do not smoke. If you need help quitting, talk to your doctor about stop-smoking programs and medicines. These can increase your chances of quitting for good. ? · Eat a heart-healthy diet that is low in saturated fat and salt, and is high in fiber. Talk to your doctor or a dietitian about healthy eating. ? · Stay at a healthy weight. Or lose weight if you need to. Activity  ? · Talk to your doctor about a level of activity that is safe for you. ? · If an activity causes angina symptoms, stop and rest.   When should you call for help? Call 911 anytime you think you may need emergency care. For example, call if:  ? · You passed out (lost consciousness). ? · You have symptoms of a heart attack. These may include:  ¨ Chest pain or pressure, or a strange feeling in the chest.  ¨ Sweating. ¨ Shortness of breath. ¨ Nausea or vomiting. ¨ Pain, pressure, or a strange feeling in the back, neck, jaw, or upper belly or in one or both shoulders or arms. ¨ Lightheadedness or sudden weakness. ¨ A fast or irregular heartbeat. After you call 911, the  may tell you to chew 1 adult-strength or 2 to 4 low-dose aspirin. Wait for an ambulance. Do not try to drive yourself. ? · You have angina symptoms that do not go away with rest or are not getting better within 5 minutes after you take a dose of nitroglycerin. ?Call your doctor now or seek immediate medical care if:  ? · You are having angina symptoms more often than usual, or they are different or worse than usual.   ? · You feel dizzy or lightheaded, or you feel like you may faint. ? Watch closely for changes in your health, and be

## 2017-12-26 ENCOUNTER — HOSPITAL ENCOUNTER (OUTPATIENT)
Dept: PULMONOLOGY | Age: 62
Discharge: HOME | End: 2017-12-26
Payer: MEDICARE

## 2017-12-26 DIAGNOSIS — R06.02 SOB (SHORTNESS OF BREATH) ON EXERTION: Primary | ICD-10-CM

## 2017-12-26 LAB
ALLEN TEST: ABNORMAL
CARBOXYHEMOGLOBIN: ABNORMAL % (ref 0–5)
FIO2: ABNORMAL
HCO3 ARTERIAL: 29.5 MMOL/L (ref 22–26)
METHEMOGLOBIN: ABNORMAL % (ref 0–1.9)
MODE: ABNORMAL
NEGATIVE BASE EXCESS, ART: ABNORMAL MMOL/L (ref 0–2)
NOTIFICATION TIME: ABNORMAL
NOTIFICATION: ABNORMAL
O2 DEVICE/FLOW/%: ABNORMAL
O2 SAT, ARTERIAL: 83 % (ref 95–98)
OXYHEMOGLOBIN: ABNORMAL % (ref 95–98)
PATIENT TEMP: 37
PCO2 ARTERIAL: 48.3 MMHG (ref 35–45)
PCO2, ART, TEMP ADJ: ABNORMAL
PEEP/CPAP: ABNORMAL
PH ARTERIAL: 7.4 (ref 7.35–7.45)
PH, ART, TEMP ADJ: ABNORMAL (ref 7.35–7.45)
PO2 ARTERIAL: 47.4 MMHG (ref 80–100)
PO2, ART, TEMP ADJ: ABNORMAL MMHG (ref 80–100)
POSITIVE BASE EXCESS, ART: 3.7 MMOL/L (ref 0–2)
PSV: ABNORMAL
PT. POSITION: ABNORMAL
RESPIRATORY RATE: ABNORMAL
SAMPLE SITE: ABNORMAL
SET RATE: ABNORMAL
TEXT FOR RESPIRATORY: ABNORMAL
TOTAL HB: ABNORMAL G/DL (ref 12–16)
TOTAL RATE: ABNORMAL
VT: ABNORMAL

## 2017-12-26 PROCEDURE — 94664 DEMO&/EVAL PT USE INHALER: CPT

## 2017-12-26 PROCEDURE — 36600 WITHDRAWAL OF ARTERIAL BLOOD: CPT

## 2017-12-26 PROCEDURE — 6360000002 HC RX W HCPCS: Performed by: INTERNAL MEDICINE

## 2017-12-26 PROCEDURE — 94060 EVALUATION OF WHEEZING: CPT

## 2017-12-26 PROCEDURE — 82805 BLOOD GASES W/O2 SATURATION: CPT

## 2017-12-26 PROCEDURE — 94726 PLETHYSMOGRAPHY LUNG VOLUMES: CPT

## 2017-12-26 PROCEDURE — 94729 DIFFUSING CAPACITY: CPT

## 2017-12-26 RX ORDER — ALBUTEROL SULFATE 2.5 MG/3ML
2.5 SOLUTION RESPIRATORY (INHALATION) ONCE
Status: COMPLETED | OUTPATIENT
Start: 2017-12-26 | End: 2017-12-26

## 2017-12-26 RX ADMIN — ALBUTEROL SULFATE 2.5 MG: 2.5 SOLUTION RESPIRATORY (INHALATION) at 09:30

## 2017-12-29 ENCOUNTER — TELEPHONE (OUTPATIENT)
Dept: CARDIOLOGY | Age: 62
End: 2017-12-29

## 2017-12-29 NOTE — TELEPHONE ENCOUNTER
----- Message from Cabrera Merlos MD sent at 12/26/2017 12:20 PM EST -----  I want him to see pulmonologist for follow up. I will put an order.  Thank you

## 2018-01-04 RX ORDER — LISINOPRIL 10 MG/1
10 TABLET ORAL DAILY
Qty: 30 TABLET | Refills: 3 | Status: SHIPPED | OUTPATIENT
Start: 2018-01-04 | End: 2018-06-11 | Stop reason: SDUPTHER

## 2018-01-04 RX ORDER — CARVEDILOL 6.25 MG/1
6.25 TABLET ORAL 2 TIMES DAILY WITH MEALS
Qty: 60 TABLET | Refills: 3 | Status: SHIPPED | OUTPATIENT
Start: 2018-01-04 | End: 2018-09-07 | Stop reason: ALTCHOICE

## 2018-01-04 RX ORDER — PRASUGREL 10 MG/1
10 TABLET, FILM COATED ORAL DAILY
Qty: 30 TABLET | Refills: 3 | Status: SHIPPED | OUTPATIENT
Start: 2018-01-04 | End: 2018-07-10 | Stop reason: SDUPTHER

## 2018-02-07 ENCOUNTER — HOSPITAL ENCOUNTER (OUTPATIENT)
Dept: VASCULAR LAB | Age: 63
Discharge: HOME | End: 2018-02-09
Payer: MEDICARE

## 2018-02-07 DIAGNOSIS — I65.23 BILATERAL CAROTID ARTERY STENOSIS: ICD-10-CM

## 2018-02-07 PROCEDURE — 93880 EXTRACRANIAL BILAT STUDY: CPT

## 2018-02-09 ENCOUNTER — HOSPITAL ENCOUNTER (OUTPATIENT)
Dept: LAB | Age: 63
Discharge: HOME | End: 2018-02-09
Payer: MEDICARE

## 2018-02-09 ENCOUNTER — HOSPITAL ENCOUNTER (OUTPATIENT)
Dept: CT IMAGING | Age: 63
Discharge: HOME | End: 2018-02-11
Payer: MEDICARE

## 2018-02-09 DIAGNOSIS — I65.23 BILATERAL CAROTID ARTERY STENOSIS: ICD-10-CM

## 2018-02-09 LAB
CREAT SERPL-MCNC: 0.88 MG/DL (ref 0.7–1.2)
GFR AFRICAN AMERICAN: >60 ML/MIN
GFR NON-AFRICAN AMERICAN: >60 ML/MIN
GFR SERPL CREATININE-BSD FRML MDRD: NORMAL ML/MIN/{1.73_M2}
GFR SERPL CREATININE-BSD FRML MDRD: NORMAL ML/MIN/{1.73_M2}

## 2018-02-09 PROCEDURE — 6360000004 HC RX CONTRAST MEDICATION: Performed by: SURGERY

## 2018-02-09 PROCEDURE — 36415 COLL VENOUS BLD VENIPUNCTURE: CPT

## 2018-02-09 PROCEDURE — 82565 ASSAY OF CREATININE: CPT

## 2018-02-09 PROCEDURE — 70498 CT ANGIOGRAPHY NECK: CPT

## 2018-02-09 RX ADMIN — IOPAMIDOL 100 ML: 612 INJECTION, SOLUTION INTRAVENOUS at 11:06

## 2018-02-15 ENCOUNTER — OFFICE VISIT (OUTPATIENT)
Dept: PULMONOLOGY | Age: 63
End: 2018-02-15
Payer: MEDICARE

## 2018-02-15 VITALS
HEART RATE: 92 BPM | RESPIRATION RATE: 16 BRPM | BODY MASS INDEX: 28.23 KG/M2 | WEIGHT: 220 LBS | DIASTOLIC BLOOD PRESSURE: 70 MMHG | SYSTOLIC BLOOD PRESSURE: 126 MMHG | OXYGEN SATURATION: 91 % | HEIGHT: 74 IN | TEMPERATURE: 98.2 F

## 2018-02-15 DIAGNOSIS — J01.90 ACUTE NON-RECURRENT SINUSITIS, UNSPECIFIED LOCATION: ICD-10-CM

## 2018-02-15 DIAGNOSIS — J44.9 CHRONIC OBSTRUCTIVE PULMONARY DISEASE, UNSPECIFIED COPD TYPE (HCC): Primary | ICD-10-CM

## 2018-02-15 DIAGNOSIS — R05.3 CHRONIC COUGH: ICD-10-CM

## 2018-02-15 DIAGNOSIS — R06.09 DYSPNEA ON EXERTION: ICD-10-CM

## 2018-02-15 PROCEDURE — 99205 OFFICE O/P NEW HI 60 MIN: CPT | Performed by: INTERNAL MEDICINE

## 2018-02-15 RX ORDER — BUDESONIDE AND FORMOTEROL FUMARATE DIHYDRATE 160; 4.5 UG/1; UG/1
2 AEROSOL RESPIRATORY (INHALATION) 2 TIMES DAILY
Qty: 1 INHALER | Refills: 5 | Status: ON HOLD | OUTPATIENT
Start: 2018-02-15 | End: 2018-07-23

## 2018-02-15 RX ORDER — ALBUTEROL SULFATE 90 UG/1
2 AEROSOL, METERED RESPIRATORY (INHALATION) EVERY 6 HOURS PRN
Qty: 1 INHALER | Refills: 11 | Status: SHIPPED | OUTPATIENT
Start: 2018-02-15 | End: 2020-01-01

## 2018-02-15 RX ORDER — PREDNISONE 20 MG/1
40 TABLET ORAL DAILY
Qty: 10 TABLET | Refills: 0 | Status: SHIPPED | OUTPATIENT
Start: 2018-02-15 | End: 2018-02-20

## 2018-02-15 RX ORDER — LEVOFLOXACIN 500 MG/1
500 TABLET, FILM COATED ORAL DAILY
Qty: 7 TABLET | Refills: 0 | Status: SHIPPED | OUTPATIENT
Start: 2018-02-15 | End: 2018-02-22

## 2018-02-15 NOTE — PROGRESS NOTES
OUTPATIENT PULMONARY CONSULT NOTE      Patient:  Inez Valencia  MRN: [id number]    Consulting Physician: Louise Pappas  Reason for Consult: COPD, dyspnea, chronic cough  Primacy Care Physician: Casey Adams    HISTORY OF PRESENT ILLNESS:   The patient is a 57 y.o. male   He has history of shortness of breath which has been gradually getting worse for last 3 years or more, he claims that initially when shortness of breath is started he was able to do more activities and able to do his regular activities and now up to the point that his shortness of breath is interfering with his regular activities he gets short of breath by going up stairs and down and also with regular activities, he was seen by his primary care physician and was diagnosed with COPD and about a month ago he was started on his Spiriva once a day he is not on any other inhalers as did not been taking inhalers before and was never diagnosed with asthma or COPD in the past.  He is complaining of cough which is therefore a month or more he claimed that he has a sputum which is productive of yellow to greenish color, he denies any chest pain denies dizziness weakness also of appetite or loss of weight. He also complained of wheezing associated with this cough and it is more for about a month  He also complained of postnasal dripping and nasal congestion shin and nasal obstruction which is there also for a month she denies chronic postnasal dripping and nasal obstruction or epistaxis. His long history of his smoking and he used to smoke one and half pack per day and now is smoking 8 cigarettes a day and trying to quit smoking. 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   He has history of coronary artery disease history of stent placement and history of carotid stenosis and had 5 carotid surgery done in the past.  Denies acid reflux symptoms and heartburn    Past Medical History:        Diagnosis Date    CAD (coronary artery disease)     Carotid artery stenosis     H/O cardiac catheterization 8/20/15    LMCA:Lesion on LMCA: Distal subsection. 40% stenosis. LCx:Lesion on Prox CX: Ostial. 50% stenosis. RCA: Lesion on Prox RCA: Ostial. 95% stenosis. Lesion on Prox RCA: Proximal subsection. 75% stenosis. Lesion on R PDA: Ostial. 705 stenosis. EF 60%.  H/O cardiovascular stress test 8/17/15    Abnormal. Moderate perfusion defect of moderate intensity in the inferolateral, inferior, and inferoapical regions during stress imaging, most consistent with ischemia. Global LV systolic function was abnormal with EF: 42% w/regional wall motion abnormalities. Results are most consistent with an intermediate to high risk for significant CAD.     H/O cardiovascular stress test 08/03/2016    Abnormal myocardial perfusion study, large perfusion defect of moderate-severe intensity in inferolateral, inferior, inferoseptal, apical and inferoapical regions during stress and rest imaging, most consistant with old myocardial infarction with hayden-infarct ischemia, EF 44%, Overall results most consistent with intermediate-high risk for CAD, Add. testing including cardiac cath maybe indicated     H/O echocardiogram 12/4/15    EF 55%. Mildly increased LV wall thickness. Left atrium is mildly dilated (29-33) left atrial volume index of 32 ml/m2. aortic leaflet calcification with mild aortic stenosis. Moderate aortic regurgitation. Mild (grade l) diastolic dysfunction    History of Holter monitoring 10/30/2017    Rare PAC's with 2 atrial runs, the longest being 7 beats in duration at 171 bpm and most consistent with a short run of atrial fibrillation.  Hyperlipidemia     Hypertension     Myocardial infarction     Peripheral vascular disease ([address]     S/P angioplasty with stent 8/24/15    PCI to ostial lesion and balloon angioplasy to proximal lesion.  Distal RCA lesion had total occlusion after balloon angioplasty    Unspecified tablet Take 1 tablet by mouth 2 times daily 180 tablet 3    isosorbide mononitrate (IMDUR) 30 MG extended release tablet TAKE 1/2 TABLET DAILY. 15 tablet 3    DiphenhydrAMINE HCl (BENADRYL ALLERGY PO) Take by mouth Pt takes at night for his sinuses      nitroGLYCERIN (NITROSTAT) 0.4 MG SL tablet Place 1 tablet under the tongue every 5 minutes as needed for Chest pain 25 tablet 3    aspirin 81 MG tablet Take 81 mg by mouth daily       No facility-administered encounter medications on file as of 2/15/2018. Social History:   TOBACCO:   reports that he has been smoking. He started smoking about 47 years ago and smoked 1.5 PPD for 47 years. . He quit smokeless tobacco use about 42 years ago. His smokeless tobacco use included Chew. ETOH:   reports that he does not drink alcohol. 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       REVIEW OF SYSTEMS:  CONSTITUTIONAL:  negative for  fevers, chills, sweats, fatigue, malaise, anorexia and weight loss  EYES:  negative for  double vision, blurred vision, dry eyes, blind spots, eye discharge, visual disturbance, irritation, redness and icterus  HEENT:  negative for  tinnitus, ear drainage, earaches, epistaxis, sore mouth, sore throat, hoarseness and voice change  RESPIRATORY:  positive for  cough with sputum, dyspnea and wheezing  negative for  hemoptysis, chest pain, pleuritic pain and cyanosis  CARDIOVASCULAR:  negative for  chest pain, palpitations, orthopnea, PND, exertional chest pressure/discomfort, fatigue, early saiety, edema, syncope  GASTROINTESTINAL:  negative for nausea, vomiting, change in bowel habits, diarrhea, constipation, abdominal pain, abdominal distention, jaundice, dysphagia, reflux, regurgitation, odynophagia, hematemesis and have crackles present mostly at the right base and they are dry and respiratory difficulty hearing any crackles on the left side  Heart - normal rate, regular rhythm, normal S1, S2, no murmurs, rubs, clicks or gallops  Abdomen - soft, nontender, nondistended, no masses or organomegaly  Neurological - alert, oriented, normal speech, no focal findings or movement disorder noted}  Extremities - peripheral pulses normal, no pedal edema, no clubbing or cyanosis  Skin - normal coloration and turgor, no rashes, no suspicious skin lesions noted       LABS:    CBC:   WBC   Date Value Ref Range Status   11/14/2017 11.1 3.5 - 11.3 k/uL Final   11/13/2017 6.1 3.5 - 11.3 k/uL Final   09/06/2017 7.7 3.5 - 11.0 k/uL Final     Hemoglobin   Date Value Ref Range Status   11/14/2017 12.1 (L) 13.0 - 17.0 g/dL Final   11/13/2017 13.7 gm/dL Final   11/13/2017 14.0 13.0 - 17.0 g/dL Final     Platelet Count   Date Value Ref Range Status   03/28/2012 232 140 - 450 k/uL Final   03/28/2012 274 140 - 450 k/uL Final     Platelets   Date Value Ref Range Status   11/14/2017 177 138 - 453 k/uL Final   11/13/2017 176 138 - 453 k/uL Final   09/06/2017 204 140 - 450 k/uL Final     BMP:   Sodium   Date Value Ref Range Status   11/14/2017 137 135 - 144 mmol/L Final   11/13/2017 145 (H) 135 - 144 mmol/L Final   09/06/2017 142 135 - 144 mmol/L Final     Sodium, Whole Blood   Date Value Ref Range Status   11/13/2017 137 136 - 145 mmol/L Final     Potassium   Date Value Ref Range Status   11/14/2017 4.3 3.7 - 5.3 mmol/L Final   11/13/2017 4.6 3.7 - 5.3 mmol/L Final   09/06/2017 5.2 3.7 - 5.3 mmol/L Final     Potassium, Whole Blood   Date Value Ref Range Status   11/13/2017 4.4 3.6 - 5.0 mmol/L Final     Chloride   Date Value Ref Range Status   11/14/2017 100 98 - 107 mmol/L Final   11/13/2017 106 98 - 107 mmol/L Final   09/06/2017 103 98 - 107 mmol/L Final     CO2   Date Value Ref Range Status   11/14/2017 27 20 - 31 mmol/L Final   11/13/2017 26 20 - 31 mmol/L Final   09/06/2017 29 20 - 31 mmol/L Final     BUN   Date Value Ref Range Status   11/14/2017 10 8 - 23 mg/dL Final   11/13/2017 15 8 - 23 mg/dL Final   09/06/2017 12 8 - 23 mg/dL Final     CREATININE   Date Value Ref Range Status   02/09/2018 0.88 0.70 - 1.20 mg/dL Final   11/14/2017 0.51 (L) 0.70 - 1.20 mg/dL Final   11/13/2017 0.81 0.70 - 1.20 mg/dL Final     Glucose   Date Value Ref Range Status   11/14/2017 136 (H) 70 - 99 mg/dL Final   11/13/2017 96 70 - 99 mg/dL Final   09/06/2017 102 (H) 70 - 99 mg/dL Final   03/29/2012 97 74 - 106 mg/dL Final   03/28/2012 99 74 - 100 mg/dL Final     Hepatic:   AST   Date Value Ref Range Status   09/06/2017 14 <40 U/L Final   01/02/2017 15 <40 U/L Final   11/23/2015 15 <40 U/L Final     ALT   Date Value Ref Range Status   09/06/2017 9 5 - 41 U/L Final   01/02/2017 10 5 - 41 U/L Final   11/23/2015 11 5 - 41 U/L Final     Total Bilirubin   Date Value Ref Range Status   09/06/2017 0.64 0.3 - 1.2 mg/dL Final   01/02/2017 0.64 0.3 - 1.2 mg/dL Final   11/23/2015 0.68 0.3 - 1.2 mg/dL Final     Alkaline Phosphatase   Date Value Ref Range Status   09/06/2017 86 40 - 129 U/L Final   01/02/2017 88 40 - 129 U/L Final   11/23/2015 100 40 - 129 U/L Final     Amylase: No results found for: AMYLASE  Lipase: No results found for: LIPASE  CARDIAC ENZYMES:   Total CK   Date Value Ref Range Status   08/24/2015 158 39 - 308 U/L Final   08/23/2015 178 39 - 308 U/L Final   08/23/2015 217 39 - 308 U/L Final     CK-MB   Date Value Ref Range Status   08/24/2015 6.7 <10.5 ng/mL Final   08/23/2015 8.4 <10.5 ng/mL Final   08/23/2015 13.1 (H) <10.5 ng/mL Final     BNP: No results found for: BNP  Lipids:   Cholesterol   Date Value Ref Range Status   09/06/2017 134 <200 mg/dL Final     Comment:        Cholesterol Guidelines:      <200  Desirable   200-240  Borderline      >240  Undesirable          HDL   Date Value Ref Range Status   09/06/2017 55 >40 mg/dL Final     Comment:        HDL

## 2018-02-27 ENCOUNTER — HOSPITAL ENCOUNTER (OUTPATIENT)
Dept: PULMONOLOGY | Age: 63
Discharge: HOME | End: 2018-02-27
Payer: MEDICARE

## 2018-02-27 DIAGNOSIS — J44.9 CHRONIC OBSTRUCTIVE PULMONARY DISEASE, UNSPECIFIED COPD TYPE (HCC): ICD-10-CM

## 2018-02-27 DIAGNOSIS — R06.09 DYSPNEA ON EXERTION: ICD-10-CM

## 2018-02-27 PROCEDURE — 94618 PULMONARY STRESS TESTING: CPT

## 2018-03-05 RX ORDER — ISOSORBIDE MONONITRATE 30 MG/1
30 TABLET, EXTENDED RELEASE ORAL DAILY
Qty: 45 TABLET | Refills: 3 | Status: SHIPPED | OUTPATIENT
Start: 2018-03-05 | End: 2018-07-11 | Stop reason: SDUPTHER

## 2018-05-15 ENCOUNTER — HOSPITAL ENCOUNTER (OUTPATIENT)
Dept: VASCULAR LAB | Age: 63
Discharge: HOME | End: 2018-05-17
Payer: MEDICARE

## 2018-05-15 DIAGNOSIS — I65.22 CAROTID STENOSIS, ASYMPTOMATIC, LEFT: ICD-10-CM

## 2018-05-15 PROCEDURE — 93880 EXTRACRANIAL BILAT STUDY: CPT

## 2018-05-24 RX ORDER — ROSUVASTATIN CALCIUM 40 MG/1
40 TABLET, COATED ORAL NIGHTLY
Qty: 90 TABLET | Refills: 3 | OUTPATIENT
Start: 2018-05-24

## 2018-06-04 RX ORDER — ROSUVASTATIN CALCIUM 40 MG/1
40 TABLET, COATED ORAL NIGHTLY
Qty: 90 TABLET | Refills: 3 | Status: SHIPPED | OUTPATIENT
Start: 2018-06-04 | End: 2019-06-12 | Stop reason: SDUPTHER

## 2018-06-11 RX ORDER — LISINOPRIL 10 MG/1
TABLET ORAL
Qty: 30 TABLET | Refills: 0 | Status: SHIPPED | OUTPATIENT
Start: 2018-06-11 | End: 2018-06-26 | Stop reason: SDUPTHER

## 2018-06-11 RX ORDER — RANOLAZINE 500 MG/1
TABLET, FILM COATED, EXTENDED RELEASE ORAL
Qty: 60 TABLET | Refills: 0 | Status: SHIPPED | OUTPATIENT
Start: 2018-06-11 | End: 2018-09-14 | Stop reason: SDUPTHER

## 2018-06-26 ENCOUNTER — OFFICE VISIT (OUTPATIENT)
Dept: CARDIOLOGY | Age: 63
End: 2018-06-26
Payer: MEDICARE

## 2018-06-26 VITALS
HEIGHT: 74 IN | RESPIRATION RATE: 20 BRPM | DIASTOLIC BLOOD PRESSURE: 82 MMHG | WEIGHT: 224 LBS | BODY MASS INDEX: 28.75 KG/M2 | OXYGEN SATURATION: 96 % | HEART RATE: 82 BPM | SYSTOLIC BLOOD PRESSURE: 161 MMHG

## 2018-06-26 DIAGNOSIS — E78.5 DYSLIPIDEMIA: ICD-10-CM

## 2018-06-26 DIAGNOSIS — I25.10 ASHD (ARTERIOSCLEROTIC HEART DISEASE): ICD-10-CM

## 2018-06-26 DIAGNOSIS — Z71.6 TOBACCO ABUSE COUNSELING: ICD-10-CM

## 2018-06-26 DIAGNOSIS — Z01.810 PREOPERATIVE CARDIOVASCULAR EXAMINATION: Primary | ICD-10-CM

## 2018-06-26 DIAGNOSIS — I25.2 HISTORY OF MYOCARDIAL INFARCTION: ICD-10-CM

## 2018-06-26 DIAGNOSIS — I10 UNCONTROLLED HYPERTENSION: ICD-10-CM

## 2018-06-26 DIAGNOSIS — I77.9 BILATERAL CAROTID ARTERY DISEASE (HCC): ICD-10-CM

## 2018-06-26 DIAGNOSIS — I73.9 PERIPHERAL VASCULAR DISEASE (HCC): ICD-10-CM

## 2018-06-26 DIAGNOSIS — Z72.0 TOBACCO ABUSE: ICD-10-CM

## 2018-06-26 PROCEDURE — 99214 OFFICE O/P EST MOD 30 MIN: CPT | Performed by: INTERNAL MEDICINE

## 2018-06-26 PROCEDURE — 93000 ELECTROCARDIOGRAM COMPLETE: CPT | Performed by: INTERNAL MEDICINE

## 2018-06-26 RX ORDER — LISINOPRIL 20 MG/1
20 TABLET ORAL DAILY
Qty: 30 TABLET | Refills: 3 | Status: SHIPPED | OUTPATIENT
Start: 2018-06-26 | End: 2018-09-24 | Stop reason: SDUPTHER

## 2018-07-05 ENCOUNTER — HOSPITAL ENCOUNTER (OUTPATIENT)
Dept: NON INVASIVE DIAGNOSTICS | Age: 63
Discharge: HOME | End: 2018-07-05
Payer: MEDICARE

## 2018-07-05 DIAGNOSIS — Z01.810 PREOPERATIVE CARDIOVASCULAR EXAMINATION: ICD-10-CM

## 2018-07-05 LAB
LV EF: 55 %
LVEF MODALITY: NORMAL

## 2018-07-05 PROCEDURE — A9500 TC99M SESTAMIBI: HCPCS | Performed by: INTERNAL MEDICINE

## 2018-07-05 PROCEDURE — 93306 TTE W/DOPPLER COMPLETE: CPT

## 2018-07-05 PROCEDURE — 3430000000 HC RX DIAGNOSTIC RADIOPHARMACEUTICAL: Performed by: INTERNAL MEDICINE

## 2018-07-05 PROCEDURE — 6360000002 HC RX W HCPCS: Performed by: INTERNAL MEDICINE

## 2018-07-05 PROCEDURE — 78452 HT MUSCLE IMAGE SPECT MULT: CPT

## 2018-07-05 PROCEDURE — 93017 CV STRESS TEST TRACING ONLY: CPT

## 2018-07-05 RX ADMIN — REGADENOSON 0.4 MG: 0.08 INJECTION, SOLUTION INTRAVENOUS at 10:46

## 2018-07-05 RX ADMIN — Medication 30 MILLICURIE: at 10:46

## 2018-07-05 RX ADMIN — Medication 10 MILLICURIE: at 09:15

## 2018-07-09 NOTE — PROCEDURES
disease. Depending on the patient symptoms and level of clinical suspicion,  aggressive medical management vs. additional testing by coronary  angiography may be indicated. The sensitivity for detecting ischemia on this test may have been reduced  due to the patient being on a beta blocker. Kassi East    D: 07/09/2018 6:36:31       T: 07/09/2018 6:37:52     [email or web address]  Job#: [id number]     Doc#: Unknown    CC:  Chet Bustillo.  Raven Adams

## 2018-07-10 RX ORDER — PRASUGREL 10 MG/1
10 TABLET, FILM COATED ORAL DAILY
Qty: 90 TABLET | Refills: 3 | Status: SHIPPED | OUTPATIENT
Start: 2018-07-10 | End: 2019-06-17 | Stop reason: SDUPTHER

## 2018-07-11 ENCOUNTER — OFFICE VISIT (OUTPATIENT)
Dept: CARDIOLOGY | Age: 63
End: 2018-07-11
Payer: MEDICARE

## 2018-07-11 VITALS
OXYGEN SATURATION: 96 % | HEIGHT: 74 IN | HEART RATE: 75 BPM | RESPIRATION RATE: 20 BRPM | BODY MASS INDEX: 28.62 KG/M2 | WEIGHT: 223 LBS | DIASTOLIC BLOOD PRESSURE: 91 MMHG | SYSTOLIC BLOOD PRESSURE: 179 MMHG

## 2018-07-11 DIAGNOSIS — Z71.6 TOBACCO ABUSE COUNSELING: ICD-10-CM

## 2018-07-11 DIAGNOSIS — I25.2 HISTORY OF MYOCARDIAL INFARCTION: ICD-10-CM

## 2018-07-11 DIAGNOSIS — I10 UNCONTROLLED HYPERTENSION: ICD-10-CM

## 2018-07-11 DIAGNOSIS — Z72.0 TOBACCO ABUSE: ICD-10-CM

## 2018-07-11 DIAGNOSIS — E78.5 DYSLIPIDEMIA: ICD-10-CM

## 2018-07-11 DIAGNOSIS — I77.9 BILATERAL CAROTID ARTERY DISEASE (HCC): ICD-10-CM

## 2018-07-11 DIAGNOSIS — I25.10 ASHD (ARTERIOSCLEROTIC HEART DISEASE): Primary | ICD-10-CM

## 2018-07-11 DIAGNOSIS — I51.9 LEFT VENTRICULAR SYSTOLIC DYSFUNCTION: ICD-10-CM

## 2018-07-11 PROCEDURE — 99214 OFFICE O/P EST MOD 30 MIN: CPT | Performed by: INTERNAL MEDICINE

## 2018-07-11 RX ORDER — AMLODIPINE BESYLATE 10 MG/1
10 TABLET ORAL DAILY
Qty: 30 TABLET | Refills: 3 | Status: SHIPPED | OUTPATIENT
Start: 2018-07-11 | End: 2018-09-24 | Stop reason: SDUPTHER

## 2018-07-11 RX ORDER — ISOSORBIDE MONONITRATE 60 MG/1
60 TABLET, EXTENDED RELEASE ORAL DAILY
Qty: 30 TABLET | Refills: 3 | Status: SHIPPED | OUTPATIENT
Start: 2018-07-11 | End: 2019-03-29 | Stop reason: SDUPTHER

## 2018-07-11 NOTE — PATIENT INSTRUCTIONS
SURVEY:    You may be receiving a survey from [employer] system regarding your visit today. Please complete the survey to enable us to provide the highest quality of care to you and your family. If you cannot score us a very good on any question, please call the office to discuss how we could have made your experience a very good one. Thank you.

## 2018-07-12 NOTE — PROGRESS NOTES
Subjective:     CHIEF COMPLAINT / HPI:    Chief Complaint   Patient presents with    Follow-up     HX: CAD, PVD, MI, HTN, Dyslipidemia. Pt had a stress test done on 7/5 and an Echo done on 7/5. Pt states he is doing good. C/o: SOB has COPD. Denies: CP, Palpitatinos, Lighteaded/dizziness. Dear  Kerrie Parker had the pleasure of seeing Mr. Giles Rubinstein Prenzlin for follow up today. Shikha Newton is 70 y.o. male with past medical history of bilateral carotid artery disease status post CEA and history of prior stroke who initially presented for evaluation of abnormal stress test. This was followed by cardiac catheterization which showed severe obstructive disease of the right coronary artery. Patient was sent to [facility] for intervention, unfortunately the procedure was complicated by inferior wall myocardial infarction and transient heart block on 8/24/2015. He had a stress test on 8/3/2016 which showed evidence of old myocardial infarction with a very small hayden-infarction ischemia, medical management. 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 Sushma Nunez on 11/15/2017, patient tolerated the surgery very well. No complications. Patient is here today for follow-up and requesting cardiac clearance prior to his planned carotid artery surgery. He tells me that he is doing well overall. No significant chest pain, pressure or tightness. He has stable chronic shortness of breath that has not gotten any worse. He attributes this to his COPD. He continues to smoke. No fever or cough. No orthopnea or PND. No palpitations but admit having occasional dizziness. No history of loss of consciousness. No significant change in weight. No problems with medications.     As far as his functional capacity, he considers himself fairly active but limited by bilateral lower extremity burning calf pain ( he has moderate bilateral lower extremity arterial insufficiency by noninvasive testing in May 2017) and shortness of breath. Overall he can walk for 2-3 blocks without significant symptoms if he takes his time. His blood pressure is uncontrolled today. Recent stress test again showed inferior and inferoseptal infarction with hayden-infarction ischemia, ejection fraction is 43% on gated SPECT. Echo showed preserved with ejection fraction, mild hypokinesis of the inferolateral wall. Mild aortic stenosis and mild to moderate aortic regurgitation. No significant pulmonary hypertension. Past Medical History:    Past Medical History:   Diagnosis Date    CAD (coronary artery disease)     Carotid artery stenosis     H/O cardiac catheterization 8/20/15    LMCA:Lesion on LMCA: Distal subsection. 40% stenosis. LCx:Lesion on Prox CX: Ostial. 50% stenosis. RCA: Lesion on Prox RCA: Ostial. 95% stenosis. Lesion on Prox RCA: Proximal subsection. 75% stenosis. Lesion on R PDA: Ostial. 705 stenosis. EF 60%.  H/O cardiovascular stress test 8/17/15    Abnormal. Moderate perfusion defect of moderate intensity in the inferolateral, inferior, and inferoapical regions during stress imaging, most consistent with ischemia. Global LV systolic function was abnormal with EF: 42% w/regional wall motion abnormalities.  Results are most consistent with an intermediate to high risk for significant CAD.     H/O cardiovascular stress test 08/03/2016    Abnormal myocardial perfusion study, large perfusion defect of moderate-severe intensity in inferolateral, inferior, inferoseptal, apical and inferoapical regions during stress and rest imaging, most consistant with old myocardial infarction with hayden-infarct ischemia, EF 44%, Overall results most consistent with intermediate-high risk for CAD, Add. testing including cardiac cath maybe indicated     H/O cardiovascular stress test 07/05/2018    Abnormal 40 mg nightly. I discussed the potential benefits of statin therapy as well as the potential risks including myalgia as well as the rare but potentially serious complication of liver or kidney damage. Although rare, I told him that this could be serious and therefore told him to stop the medication immediately and call if he developed any severe muscle aches or pains and he agreed to do so. Essential Hypertension: Uncontrolled  ACE Inibitor/ARB: Continue lisinopril 20 mg once daily. I also discussed the potential side effects of this medication including lightheadedness and dizziness and told him to stop the medication of this occurs and call our office if this occurs. Beta Blocker: Continue carvedilol (Coreg) As above     Increase Imdur to 60 mg daily. Start amlodipine 10 mg daily, I asked him to come back to his office on Friday to have his blood pressure rechecked. Counseled regarding low salt diet. · Hyperlipidemia: Mixed  · Statin Therapy: Continue rosuvastatin (Crestor) 40 mg nightly. I discussed the potential benefits of statin therapy as well as the potential risks including myalgia as well as the rare but potentially serious complication of liver or kidney damage. Although rare, I told him that this could be serious and therefore told him to stop the medication immediately and call if he developed any severe muscle aches or pains and he agreed to do so. · PCSK9 inhibitors: Not indicated at this time    · Pre-Op Clearance:   · There is no evidence of unstable CAD, heart failure or significant arrhythmia. · No significant valvular abnormalities. · Acceptable functional capacity > 4 METs  · Discussed the results of his echo and stress test, stress test is largely unchanged  Compared to the one in 2016 ( inferior infarction with hayden-infarction ischemia). This is consistent with history of RCA dissection and chronic total occlusion.   · I don't think repeat cardiac cath will change his

## 2018-07-13 ENCOUNTER — TELEPHONE (OUTPATIENT)
Dept: CARDIOLOGY | Age: 63
End: 2018-07-13

## 2018-07-19 RX ORDER — FLUTICASONE FUROATE AND VILANTEROL 100; 25 UG/1; UG/1
1 POWDER RESPIRATORY (INHALATION) DAILY
COMMUNITY

## 2018-07-20 ENCOUNTER — ANESTHESIA EVENT (OUTPATIENT)
Dept: OPERATING ROOM | Age: 63
DRG: 254 | End: 2018-07-20
Payer: MEDICARE

## 2018-07-23 ENCOUNTER — ANESTHESIA (OUTPATIENT)
Dept: OPERATING ROOM | Age: 63
DRG: 254 | End: 2018-07-23
Payer: MEDICARE

## 2018-07-23 ENCOUNTER — HOSPITAL ENCOUNTER (INPATIENT)
Age: 63
LOS: 2 days | Discharge: HOME | DRG: 254 | End: 2018-07-25
Attending: SURGERY | Admitting: SURGERY
Payer: MEDICARE

## 2018-07-23 VITALS — DIASTOLIC BLOOD PRESSURE: 64 MMHG | TEMPERATURE: 96.4 F | OXYGEN SATURATION: 99 % | SYSTOLIC BLOOD PRESSURE: 85 MMHG

## 2018-07-23 DIAGNOSIS — I72.0 CAROTID PSEUDOANEURYSM (HCC): Primary | ICD-10-CM

## 2018-07-23 LAB
ABO/RH: NORMAL
ANTIBODY SCREEN: NEGATIVE
ARM BAND NUMBER: NORMAL
EXPIRATION DATE: NORMAL
GFR NON-AFRICAN AMERICAN: >60 ML/MIN
GFR SERPL CREATININE-BSD FRML MDRD: >60 ML/MIN
GFR SERPL CREATININE-BSD FRML MDRD: NORMAL ML/MIN/{1.73_M2}
GLUCOSE BLD-MCNC: 99 MG/DL (ref 74–100)
POC CREATININE: 0.98 MG/DL (ref 0.51–1.19)

## 2018-07-23 PROCEDURE — 94640 AIRWAY INHALATION TREATMENT: CPT

## 2018-07-23 PROCEDURE — 2000000000 HC ICU R&B

## 2018-07-23 PROCEDURE — 6370000000 HC RX 637 (ALT 250 FOR IP): Performed by: STUDENT IN AN ORGANIZED HEALTH CARE EDUCATION/TRAINING PROGRAM

## 2018-07-23 PROCEDURE — 2580000003 HC RX 258: Performed by: SURGERY

## 2018-07-23 PROCEDURE — 2580000003 HC RX 258: Performed by: NURSE ANESTHETIST, CERTIFIED REGISTERED

## 2018-07-23 PROCEDURE — 3700000000 HC ANESTHESIA ATTENDED CARE: Performed by: SURGERY

## 2018-07-23 PROCEDURE — 2500000003 HC RX 250 WO HCPCS: Performed by: SURGERY

## 2018-07-23 PROCEDURE — 7100000001 HC PACU RECOVERY - ADDTL 15 MIN: Performed by: SURGERY

## 2018-07-23 PROCEDURE — 86900 BLOOD TYPING SEROLOGIC ABO: CPT

## 2018-07-23 PROCEDURE — 94664 DEMO&/EVAL PT USE INHALER: CPT

## 2018-07-23 PROCEDURE — 6360000002 HC RX W HCPCS: Performed by: STUDENT IN AN ORGANIZED HEALTH CARE EDUCATION/TRAINING PROGRAM

## 2018-07-23 PROCEDURE — 88304 TISSUE EXAM BY PATHOLOGIST: CPT

## 2018-07-23 PROCEDURE — 03BK0ZZ EXCISION OF RIGHT INTERNAL CAROTID ARTERY, OPEN APPROACH: ICD-10-PCS | Performed by: SURGERY

## 2018-07-23 PROCEDURE — 2709999900 HC NON-CHARGEABLE SUPPLY: Performed by: SURGERY

## 2018-07-23 PROCEDURE — 2500000003 HC RX 250 WO HCPCS: Performed by: NURSE ANESTHETIST, CERTIFIED REGISTERED

## 2018-07-23 PROCEDURE — 7100000000 HC PACU RECOVERY - FIRST 15 MIN: Performed by: SURGERY

## 2018-07-23 PROCEDURE — 3600000002 HC SURGERY LEVEL 2 BASE: Performed by: SURGERY

## 2018-07-23 PROCEDURE — 6360000002 HC RX W HCPCS: Performed by: NURSE ANESTHETIST, CERTIFIED REGISTERED

## 2018-07-23 PROCEDURE — 87205 SMEAR GRAM STAIN: CPT

## 2018-07-23 PROCEDURE — 82565 ASSAY OF CREATININE: CPT

## 2018-07-23 PROCEDURE — 05QM0ZZ REPAIR RIGHT INTERNAL JUGULAR VEIN, OPEN APPROACH: ICD-10-PCS | Performed by: SURGERY

## 2018-07-23 PROCEDURE — 86850 RBC ANTIBODY SCREEN: CPT

## 2018-07-23 PROCEDURE — 87070 CULTURE OTHR SPECIMN AEROBIC: CPT

## 2018-07-23 PROCEDURE — 87075 CULTR BACTERIA EXCEPT BLOOD: CPT

## 2018-07-23 PROCEDURE — 03PY0JZ REMOVAL OF SYNTHETIC SUBSTITUTE FROM UPPER ARTERY, OPEN APPROACH: ICD-10-PCS | Performed by: SURGERY

## 2018-07-23 PROCEDURE — 03UK0KZ SUPPLEMENT RIGHT INTERNAL CAROTID ARTERY WITH NONAUTOLOGOUS TISSUE SUBSTITUTE, OPEN APPROACH: ICD-10-PCS | Performed by: SURGERY

## 2018-07-23 PROCEDURE — 2780000010 HC IMPLANT OTHER: Performed by: SURGERY

## 2018-07-23 PROCEDURE — C1768 GRAFT, VASCULAR: HCPCS | Performed by: SURGERY

## 2018-07-23 PROCEDURE — 87076 CULTURE ANAEROBE IDENT EACH: CPT

## 2018-07-23 PROCEDURE — 3600000012 HC SURGERY LEVEL 2 ADDTL 15MIN: Performed by: SURGERY

## 2018-07-23 PROCEDURE — 86901 BLOOD TYPING SEROLOGIC RH(D): CPT

## 2018-07-23 PROCEDURE — 3700000001 HC ADD 15 MINUTES (ANESTHESIA): Performed by: SURGERY

## 2018-07-23 PROCEDURE — 82947 ASSAY GLUCOSE BLOOD QUANT: CPT

## 2018-07-23 PROCEDURE — 2580000003 HC RX 258: Performed by: ANESTHESIOLOGY

## 2018-07-23 PROCEDURE — 2580000003 HC RX 258: Performed by: STUDENT IN AN ORGANIZED HEALTH CARE EDUCATION/TRAINING PROGRAM

## 2018-07-23 PROCEDURE — 03CK0ZZ EXTIRPATION OF MATTER FROM RIGHT INTERNAL CAROTID ARTERY, OPEN APPROACH: ICD-10-PCS | Performed by: SURGERY

## 2018-07-23 PROCEDURE — 88311 DECALCIFY TISSUE: CPT

## 2018-07-23 PROCEDURE — 87176 TISSUE HOMOGENIZATION CULTR: CPT

## 2018-07-23 DEVICE — XENOSURE BIOLOGIC PATCH, 0.8CM X 8CM, EIFU
Type: IMPLANTABLE DEVICE | Site: NECK | Status: FUNCTIONAL
Brand: XENOSURE BIOLOGIC PATCH

## 2018-07-23 RX ORDER — LIDOCAINE HYDROCHLORIDE 10 MG/ML
INJECTION, SOLUTION EPIDURAL; INFILTRATION; INTRACAUDAL; PERINEURAL PRN
Status: DISCONTINUED | OUTPATIENT
Start: 2018-07-23 | End: 2018-07-23 | Stop reason: SDUPTHER

## 2018-07-23 RX ORDER — MORPHINE SULFATE 2 MG/ML
INJECTION, SOLUTION INTRAMUSCULAR; INTRAVENOUS
Status: DISPENSED
Start: 2018-07-23 | End: 2018-07-24

## 2018-07-23 RX ORDER — ALBUTEROL SULFATE 90 UG/1
2 AEROSOL, METERED RESPIRATORY (INHALATION) EVERY 6 HOURS PRN
Status: DISCONTINUED | OUTPATIENT
Start: 2018-07-23 | End: 2018-07-25 | Stop reason: HOSPADM

## 2018-07-23 RX ORDER — EPHEDRINE SULFATE/0.9% NACL/PF 50 MG/5 ML
SYRINGE (ML) INTRAVENOUS PRN
Status: DISCONTINUED | OUTPATIENT
Start: 2018-07-23 | End: 2018-07-23 | Stop reason: SDUPTHER

## 2018-07-23 RX ORDER — METOPROLOL SUCCINATE 25 MG/1
12.5 TABLET, EXTENDED RELEASE ORAL DAILY
Status: DISCONTINUED | OUTPATIENT
Start: 2018-07-24 | End: 2018-07-25 | Stop reason: HOSPADM

## 2018-07-23 RX ORDER — OXYCODONE HYDROCHLORIDE 5 MG/1
10 TABLET ORAL EVERY 4 HOURS PRN
Status: DISCONTINUED | OUTPATIENT
Start: 2018-07-23 | End: 2018-07-25 | Stop reason: HOSPADM

## 2018-07-23 RX ORDER — ONDANSETRON 2 MG/ML
4 INJECTION INTRAMUSCULAR; INTRAVENOUS EVERY 6 HOURS PRN
Status: DISCONTINUED | OUTPATIENT
Start: 2018-07-23 | End: 2018-07-25 | Stop reason: HOSPADM

## 2018-07-23 RX ORDER — LIDOCAINE HYDROCHLORIDE 10 MG/ML
1 INJECTION, SOLUTION EPIDURAL; INFILTRATION; INTRACAUDAL; PERINEURAL
Status: DISCONTINUED | OUTPATIENT
Start: 2018-07-23 | End: 2018-07-23 | Stop reason: HOSPADM

## 2018-07-23 RX ORDER — CEFAZOLIN SODIUM 1 G/3ML
INJECTION, POWDER, FOR SOLUTION INTRAMUSCULAR; INTRAVENOUS PRN
Status: DISCONTINUED | OUTPATIENT
Start: 2018-07-23 | End: 2018-07-23 | Stop reason: SDUPTHER

## 2018-07-23 RX ORDER — LABETALOL HYDROCHLORIDE 5 MG/ML
INJECTION, SOLUTION INTRAVENOUS PRN
Status: DISCONTINUED | OUTPATIENT
Start: 2018-07-23 | End: 2018-07-23 | Stop reason: SDUPTHER

## 2018-07-23 RX ORDER — ROCURONIUM BROMIDE 10 MG/ML
INJECTION, SOLUTION INTRAVENOUS PRN
Status: DISCONTINUED | OUTPATIENT
Start: 2018-07-23 | End: 2018-07-23 | Stop reason: SDUPTHER

## 2018-07-23 RX ORDER — ATORVASTATIN CALCIUM 80 MG/1
80 TABLET, FILM COATED ORAL NIGHTLY
Status: DISCONTINUED | OUTPATIENT
Start: 2018-07-23 | End: 2018-07-25 | Stop reason: HOSPADM

## 2018-07-23 RX ORDER — ACETAMINOPHEN 325 MG/1
650 TABLET ORAL
Status: DISCONTINUED | OUTPATIENT
Start: 2018-07-23 | End: 2018-07-25 | Stop reason: HOSPADM

## 2018-07-23 RX ORDER — SODIUM CHLORIDE 0.9 % (FLUSH) 0.9 %
10 SYRINGE (ML) INJECTION PRN
Status: DISCONTINUED | OUTPATIENT
Start: 2018-07-23 | End: 2018-07-25 | Stop reason: HOSPADM

## 2018-07-23 RX ORDER — PROPOFOL 10 MG/ML
INJECTION, EMULSION INTRAVENOUS PRN
Status: DISCONTINUED | OUTPATIENT
Start: 2018-07-23 | End: 2018-07-23 | Stop reason: SDUPTHER

## 2018-07-23 RX ORDER — ISOSORBIDE MONONITRATE 60 MG/1
60 TABLET, EXTENDED RELEASE ORAL DAILY
Status: DISCONTINUED | OUTPATIENT
Start: 2018-07-24 | End: 2018-07-25 | Stop reason: HOSPADM

## 2018-07-23 RX ORDER — FENTANYL CITRATE 50 UG/ML
INJECTION, SOLUTION INTRAMUSCULAR; INTRAVENOUS PRN
Status: DISCONTINUED | OUTPATIENT
Start: 2018-07-23 | End: 2018-07-23 | Stop reason: SDUPTHER

## 2018-07-23 RX ORDER — SODIUM CHLORIDE, SODIUM LACTATE, POTASSIUM CHLORIDE, CALCIUM CHLORIDE 600; 310; 30; 20 MG/100ML; MG/100ML; MG/100ML; MG/100ML
INJECTION, SOLUTION INTRAVENOUS CONTINUOUS
Status: DISCONTINUED | OUTPATIENT
Start: 2018-07-23 | End: 2018-07-24

## 2018-07-23 RX ORDER — MORPHINE SULFATE 2 MG/ML
2 INJECTION, SOLUTION INTRAMUSCULAR; INTRAVENOUS EVERY 5 MIN PRN
Status: DISCONTINUED | OUTPATIENT
Start: 2018-07-23 | End: 2018-07-23 | Stop reason: HOSPADM

## 2018-07-23 RX ORDER — MIDAZOLAM HYDROCHLORIDE 1 MG/ML
2 INJECTION INTRAMUSCULAR; INTRAVENOUS
Status: CANCELLED | OUTPATIENT
Start: 2018-07-23 | End: 2018-07-23

## 2018-07-23 RX ORDER — MAGNESIUM HYDROXIDE 1200 MG/15ML
LIQUID ORAL CONTINUOUS PRN
Status: COMPLETED | OUTPATIENT
Start: 2018-07-23 | End: 2018-07-23

## 2018-07-23 RX ORDER — ONDANSETRON 2 MG/ML
4 INJECTION INTRAMUSCULAR; INTRAVENOUS
Status: DISCONTINUED | OUTPATIENT
Start: 2018-07-23 | End: 2018-07-23 | Stop reason: HOSPADM

## 2018-07-23 RX ORDER — LABETALOL HYDROCHLORIDE 5 MG/ML
10 INJECTION, SOLUTION INTRAVENOUS EVERY 6 HOURS PRN
Status: DISCONTINUED | OUTPATIENT
Start: 2018-07-23 | End: 2018-07-25 | Stop reason: HOSPADM

## 2018-07-23 RX ORDER — OXYCODONE HYDROCHLORIDE 5 MG/1
5 TABLET ORAL EVERY 4 HOURS PRN
Status: DISCONTINUED | OUTPATIENT
Start: 2018-07-23 | End: 2018-07-25 | Stop reason: HOSPADM

## 2018-07-23 RX ORDER — ONDANSETRON 2 MG/ML
INJECTION INTRAMUSCULAR; INTRAVENOUS PRN
Status: DISCONTINUED | OUTPATIENT
Start: 2018-07-23 | End: 2018-07-23 | Stop reason: SDUPTHER

## 2018-07-23 RX ORDER — DEXAMETHASONE SODIUM PHOSPHATE 10 MG/ML
INJECTION INTRAMUSCULAR; INTRAVENOUS PRN
Status: DISCONTINUED | OUTPATIENT
Start: 2018-07-23 | End: 2018-07-23 | Stop reason: SDUPTHER

## 2018-07-23 RX ORDER — SODIUM CHLORIDE, SODIUM LACTATE, POTASSIUM CHLORIDE, CALCIUM CHLORIDE 600; 310; 30; 20 MG/100ML; MG/100ML; MG/100ML; MG/100ML
INJECTION, SOLUTION INTRAVENOUS CONTINUOUS
Status: DISCONTINUED | OUTPATIENT
Start: 2018-07-23 | End: 2018-07-23

## 2018-07-23 RX ORDER — HEPARIN SODIUM 1000 [USP'U]/ML
INJECTION, SOLUTION INTRAVENOUS; SUBCUTANEOUS PRN
Status: DISCONTINUED | OUTPATIENT
Start: 2018-07-23 | End: 2018-07-23 | Stop reason: SDUPTHER

## 2018-07-23 RX ORDER — CARVEDILOL 6.25 MG/1
6.25 TABLET ORAL 2 TIMES DAILY WITH MEALS
Status: DISCONTINUED | OUTPATIENT
Start: 2018-07-23 | End: 2018-07-25 | Stop reason: HOSPADM

## 2018-07-23 RX ORDER — FENTANYL CITRATE 50 UG/ML
50 INJECTION, SOLUTION INTRAMUSCULAR; INTRAVENOUS EVERY 5 MIN PRN
Status: DISCONTINUED | OUTPATIENT
Start: 2018-07-23 | End: 2018-07-23 | Stop reason: HOSPADM

## 2018-07-23 RX ORDER — MORPHINE SULFATE 2 MG/ML
2 INJECTION, SOLUTION INTRAMUSCULAR; INTRAVENOUS
Status: DISCONTINUED | OUTPATIENT
Start: 2018-07-23 | End: 2018-07-24

## 2018-07-23 RX ORDER — LABETALOL HYDROCHLORIDE 5 MG/ML
5 INJECTION, SOLUTION INTRAVENOUS EVERY 10 MIN PRN
Status: DISCONTINUED | OUTPATIENT
Start: 2018-07-23 | End: 2018-07-23 | Stop reason: HOSPADM

## 2018-07-23 RX ORDER — SODIUM CHLORIDE 0.9 % (FLUSH) 0.9 %
10 SYRINGE (ML) INJECTION EVERY 12 HOURS SCHEDULED
Status: DISCONTINUED | OUTPATIENT
Start: 2018-07-23 | End: 2018-07-25 | Stop reason: HOSPADM

## 2018-07-23 RX ORDER — SODIUM CHLORIDE 0.9 % (FLUSH) 0.9 %
10 SYRINGE (ML) INJECTION EVERY 12 HOURS SCHEDULED
Status: DISCONTINUED | OUTPATIENT
Start: 2018-07-23 | End: 2018-07-23 | Stop reason: HOSPADM

## 2018-07-23 RX ORDER — ASPIRIN 81 MG/1
81 TABLET, CHEWABLE ORAL DAILY
Status: DISCONTINUED | OUTPATIENT
Start: 2018-07-23 | End: 2018-07-25 | Stop reason: HOSPADM

## 2018-07-23 RX ORDER — LIDOCAINE HYDROCHLORIDE 10 MG/ML
INJECTION, SOLUTION EPIDURAL; INFILTRATION; INTRACAUDAL; PERINEURAL PRN
Status: DISCONTINUED | OUTPATIENT
Start: 2018-07-23 | End: 2018-07-23 | Stop reason: HOSPADM

## 2018-07-23 RX ORDER — SODIUM CHLORIDE, SODIUM LACTATE, POTASSIUM CHLORIDE, CALCIUM CHLORIDE 600; 310; 30; 20 MG/100ML; MG/100ML; MG/100ML; MG/100ML
INJECTION, SOLUTION INTRAVENOUS CONTINUOUS PRN
Status: DISCONTINUED | OUTPATIENT
Start: 2018-07-23 | End: 2018-07-23 | Stop reason: SDUPTHER

## 2018-07-23 RX ORDER — PROPOFOL 10 MG/ML
INJECTION, EMULSION INTRAVENOUS CONTINUOUS PRN
Status: DISCONTINUED | OUTPATIENT
Start: 2018-07-23 | End: 2018-07-23 | Stop reason: SDUPTHER

## 2018-07-23 RX ORDER — SODIUM CHLORIDE 0.9 % (FLUSH) 0.9 %
10 SYRINGE (ML) INJECTION PRN
Status: DISCONTINUED | OUTPATIENT
Start: 2018-07-23 | End: 2018-07-23 | Stop reason: HOSPADM

## 2018-07-23 RX ORDER — FLUTICASONE FUROATE AND VILANTEROL 100; 25 UG/1; UG/1
1 POWDER RESPIRATORY (INHALATION) DAILY
Status: DISCONTINUED | OUTPATIENT
Start: 2018-07-23 | End: 2018-07-23 | Stop reason: CLARIF

## 2018-07-23 RX ORDER — CEFAZOLIN SODIUM 1 G/50ML
1 INJECTION, SOLUTION INTRAVENOUS EVERY 8 HOURS
Status: DISCONTINUED | OUTPATIENT
Start: 2018-07-23 | End: 2018-07-25 | Stop reason: HOSPADM

## 2018-07-23 RX ADMIN — PHENYLEPHRINE HYDROCHLORIDE 100 MCG: 10 INJECTION INTRAVENOUS at 09:02

## 2018-07-23 RX ADMIN — SODIUM CHLORIDE, POTASSIUM CHLORIDE, SODIUM LACTATE AND CALCIUM CHLORIDE: 600; 310; 30; 20 INJECTION, SOLUTION INTRAVENOUS at 11:44

## 2018-07-23 RX ADMIN — PHENYLEPHRINE HYDROCHLORIDE 100 MCG: 10 INJECTION INTRAVENOUS at 08:05

## 2018-07-23 RX ADMIN — LABETALOL HYDROCHLORIDE 10 MG: 5 INJECTION, SOLUTION INTRAVENOUS at 12:00

## 2018-07-23 RX ADMIN — PROPOFOL 50 MG: 10 INJECTION, EMULSION INTRAVENOUS at 08:24

## 2018-07-23 RX ADMIN — ATORVASTATIN CALCIUM 80 MG: 80 TABLET, FILM COATED ORAL at 20:39

## 2018-07-23 RX ADMIN — MOMETASONE FUROATE AND FORMOTEROL FUMARATE DIHYDRATE 2 PUFF: 200; 5 AEROSOL RESPIRATORY (INHALATION) at 20:00

## 2018-07-23 RX ADMIN — Medication 10 ML: at 20:39

## 2018-07-23 RX ADMIN — ROCURONIUM BROMIDE 10 MG: 10 INJECTION INTRAVENOUS at 08:24

## 2018-07-23 RX ADMIN — LABETALOL HYDROCHLORIDE 7.5 MG: 5 INJECTION, SOLUTION INTRAVENOUS at 11:54

## 2018-07-23 RX ADMIN — LABETALOL HYDROCHLORIDE 10 MG: 5 INJECTION, SOLUTION INTRAVENOUS at 11:58

## 2018-07-23 RX ADMIN — PHENYLEPHRINE HYDROCHLORIDE 100 MCG: 10 INJECTION INTRAVENOUS at 08:16

## 2018-07-23 RX ADMIN — Medication 10 MG: at 08:47

## 2018-07-23 RX ADMIN — Medication 10 MG: at 08:42

## 2018-07-23 RX ADMIN — Medication 10 MG: at 08:45

## 2018-07-23 RX ADMIN — PROPOFOL 100 MCG/KG/MIN: 10 INJECTION, EMULSION INTRAVENOUS at 08:29

## 2018-07-23 RX ADMIN — SODIUM CHLORIDE, POTASSIUM CHLORIDE, SODIUM LACTATE AND CALCIUM CHLORIDE: 600; 310; 30; 20 INJECTION, SOLUTION INTRAVENOUS at 07:24

## 2018-07-23 RX ADMIN — FENTANYL CITRATE 50 MCG: 50 INJECTION INTRAMUSCULAR; INTRAVENOUS at 07:47

## 2018-07-23 RX ADMIN — PHENYLEPHRINE HYDROCHLORIDE 100 MCG: 10 INJECTION INTRAVENOUS at 07:59

## 2018-07-23 RX ADMIN — PHENYLEPHRINE HYDROCHLORIDE 100 MCG: 10 INJECTION INTRAVENOUS at 08:21

## 2018-07-23 RX ADMIN — PHENYLEPHRINE HYDROCHLORIDE 100 MCG: 10 INJECTION INTRAVENOUS at 08:12

## 2018-07-23 RX ADMIN — PROPOFOL 20 MG: 10 INJECTION, EMULSION INTRAVENOUS at 09:57

## 2018-07-23 RX ADMIN — ACETAMINOPHEN 650 MG: 325 TABLET ORAL at 20:39

## 2018-07-23 RX ADMIN — LIDOCAINE HYDROCHLORIDE 50 MG: 10 INJECTION, SOLUTION EPIDURAL; INFILTRATION; INTRACAUDAL at 07:50

## 2018-07-23 RX ADMIN — FENTANYL CITRATE 50 MCG: 50 INJECTION INTRAMUSCULAR; INTRAVENOUS at 07:50

## 2018-07-23 RX ADMIN — PHENYLEPHRINE HYDROCHLORIDE 100 MCG: 10 INJECTION INTRAVENOUS at 08:54

## 2018-07-23 RX ADMIN — CEFAZOLIN 2000 MG: 1 INJECTION, POWDER, FOR SOLUTION INTRAMUSCULAR; INTRAVENOUS at 08:15

## 2018-07-23 RX ADMIN — PHENYLEPHRINE HYDROCHLORIDE 50 MCG: 10 INJECTION INTRAVENOUS at 09:18

## 2018-07-23 RX ADMIN — PHENYLEPHRINE HYDROCHLORIDE 100 MCG: 10 INJECTION INTRAVENOUS at 08:33

## 2018-07-23 RX ADMIN — PHENYLEPHRINE HYDROCHLORIDE 25 MCG/MIN: 10 INJECTION INTRAVENOUS at 09:02

## 2018-07-23 RX ADMIN — ONDANSETRON 4 MG: 2 INJECTION, SOLUTION INTRAMUSCULAR; INTRAVENOUS at 12:05

## 2018-07-23 RX ADMIN — MORPHINE SULFATE 2 MG: 2 INJECTION, SOLUTION INTRAMUSCULAR; INTRAVENOUS at 13:40

## 2018-07-23 RX ADMIN — ROCURONIUM BROMIDE 40 MG: 10 INJECTION INTRAVENOUS at 07:50

## 2018-07-23 RX ADMIN — SODIUM CHLORIDE, POTASSIUM CHLORIDE, SODIUM LACTATE AND CALCIUM CHLORIDE: 600; 310; 30; 20 INJECTION, SOLUTION INTRAVENOUS at 08:00

## 2018-07-23 RX ADMIN — PROPOFOL 150 MG: 10 INJECTION, EMULSION INTRAVENOUS at 07:50

## 2018-07-23 RX ADMIN — Medication 10 MG: at 08:35

## 2018-07-23 RX ADMIN — REMIFENTANIL HYDROCHLORIDE 0.03 MCG/KG/MIN: 1 INJECTION, POWDER, LYOPHILIZED, FOR SOLUTION INTRAVENOUS at 08:18

## 2018-07-23 RX ADMIN — PHENYLEPHRINE HYDROCHLORIDE 50 MCG: 10 INJECTION INTRAVENOUS at 09:21

## 2018-07-23 RX ADMIN — PHENYLEPHRINE HYDROCHLORIDE 100 MCG: 10 INJECTION INTRAVENOUS at 08:50

## 2018-07-23 RX ADMIN — PHENYLEPHRINE HYDROCHLORIDE 200 MCG: 10 INJECTION INTRAVENOUS at 08:30

## 2018-07-23 RX ADMIN — Medication 10 MG: at 08:40

## 2018-07-23 RX ADMIN — ACETAMINOPHEN 650 MG: 325 TABLET ORAL at 15:33

## 2018-07-23 RX ADMIN — OXYCODONE HYDROCHLORIDE 5 MG: 5 TABLET ORAL at 13:45

## 2018-07-23 RX ADMIN — PHENYLEPHRINE HYDROCHLORIDE 100 MCG: 10 INJECTION INTRAVENOUS at 08:59

## 2018-07-23 RX ADMIN — DEXAMETHASONE SODIUM PHOSPHATE 10 MG: 10 INJECTION INTRAMUSCULAR; INTRAVENOUS at 08:42

## 2018-07-23 RX ADMIN — CEFAZOLIN SODIUM 1 G: 1 INJECTION, SOLUTION INTRAVENOUS at 15:33

## 2018-07-23 RX ADMIN — CARVEDILOL 6.25 MG: 6.25 TABLET, FILM COATED ORAL at 16:55

## 2018-07-23 RX ADMIN — HEPARIN SODIUM 6000 UNITS: 1000 INJECTION, SOLUTION INTRAVENOUS; SUBCUTANEOUS at 10:16

## 2018-07-23 RX ADMIN — PROPOFOL 20 MG: 10 INJECTION, EMULSION INTRAVENOUS at 09:59

## 2018-07-23 ASSESSMENT — PULMONARY FUNCTION TESTS
PIF_VALUE: 18
PIF_VALUE: 21
PIF_VALUE: 20
PIF_VALUE: 18
PIF_VALUE: 18
PIF_VALUE: 3
PIF_VALUE: 18
PIF_VALUE: 19
PIF_VALUE: 17
PIF_VALUE: 18
PIF_VALUE: 18
PIF_VALUE: 17
PIF_VALUE: 0
PIF_VALUE: 17
PIF_VALUE: 20
PIF_VALUE: 16
PIF_VALUE: 19
PIF_VALUE: 17
PIF_VALUE: 20
PIF_VALUE: 18
PIF_VALUE: 7
PIF_VALUE: 18
PIF_VALUE: 17
PIF_VALUE: 17
PIF_VALUE: 18
PIF_VALUE: 18
PIF_VALUE: 17
PIF_VALUE: 16
PIF_VALUE: 18
PIF_VALUE: 17
PIF_VALUE: 18
PIF_VALUE: 16
PIF_VALUE: 18
PIF_VALUE: 5
PIF_VALUE: 18
PIF_VALUE: 19
PIF_VALUE: 28
PIF_VALUE: 19
PIF_VALUE: 18
PIF_VALUE: 18
PIF_VALUE: 19
PIF_VALUE: 1
PIF_VALUE: 1
PIF_VALUE: 17
PIF_VALUE: 16
PIF_VALUE: 18
PIF_VALUE: 16
PIF_VALUE: 18
PIF_VALUE: 18
PIF_VALUE: 24
PIF_VALUE: 18
PIF_VALUE: 18
PIF_VALUE: 17
PIF_VALUE: 17
PIF_VALUE: 16
PIF_VALUE: 18
PIF_VALUE: 16
PIF_VALUE: 18
PIF_VALUE: 20
PIF_VALUE: 18
PIF_VALUE: 16
PIF_VALUE: 18
PIF_VALUE: 19
PIF_VALUE: 16
PIF_VALUE: 19
PIF_VALUE: 17
PIF_VALUE: 17
PIF_VALUE: 16
PIF_VALUE: 1
PIF_VALUE: 19
PIF_VALUE: 19
PIF_VALUE: 18
PIF_VALUE: 17
PIF_VALUE: 18
PIF_VALUE: 17
PIF_VALUE: 18
PIF_VALUE: 17
PIF_VALUE: 33
PIF_VALUE: 17
PIF_VALUE: 18
PIF_VALUE: 18
PIF_VALUE: 29
PIF_VALUE: 3
PIF_VALUE: 18
PIF_VALUE: 18
PIF_VALUE: 20
PIF_VALUE: 16
PIF_VALUE: 17
PIF_VALUE: 20
PIF_VALUE: 16
PIF_VALUE: 21
PIF_VALUE: 17
PIF_VALUE: 18
PIF_VALUE: 1
PIF_VALUE: 16
PIF_VALUE: 23
PIF_VALUE: 18
PIF_VALUE: 22
PIF_VALUE: 17
PIF_VALUE: 18
PIF_VALUE: 18
PIF_VALUE: 20
PIF_VALUE: 17
PIF_VALUE: 19
PIF_VALUE: 18
PIF_VALUE: 19
PIF_VALUE: 6
PIF_VALUE: 18
PIF_VALUE: 16
PIF_VALUE: 18
PIF_VALUE: 19
PIF_VALUE: 18
PIF_VALUE: 16
PIF_VALUE: 19
PIF_VALUE: 19
PIF_VALUE: 18
PIF_VALUE: 22
PIF_VALUE: 17
PIF_VALUE: 17
PIF_VALUE: 1
PIF_VALUE: 18
PIF_VALUE: 18
PIF_VALUE: 25
PIF_VALUE: 16
PIF_VALUE: 20
PIF_VALUE: 18
PIF_VALUE: 18
PIF_VALUE: 17
PIF_VALUE: 1
PIF_VALUE: 17
PIF_VALUE: 18
PIF_VALUE: 19
PIF_VALUE: 18
PIF_VALUE: 20
PIF_VALUE: 18
PIF_VALUE: 17
PIF_VALUE: 17
PIF_VALUE: 18
PIF_VALUE: 20
PIF_VALUE: 16
PIF_VALUE: 18
PIF_VALUE: 1
PIF_VALUE: 4
PIF_VALUE: 18
PIF_VALUE: 1
PIF_VALUE: 18
PIF_VALUE: 16
PIF_VALUE: 18
PIF_VALUE: 36
PIF_VALUE: 1
PIF_VALUE: 18
PIF_VALUE: 18
PIF_VALUE: 3
PIF_VALUE: 18
PIF_VALUE: 18
PIF_VALUE: 33
PIF_VALUE: 18
PIF_VALUE: 17
PIF_VALUE: 18
PIF_VALUE: 18
PIF_VALUE: 17
PIF_VALUE: 16
PIF_VALUE: 18
PIF_VALUE: 16
PIF_VALUE: 17
PIF_VALUE: 19
PIF_VALUE: 18
PIF_VALUE: 17
PIF_VALUE: 18
PIF_VALUE: 17
PIF_VALUE: 16
PIF_VALUE: 16
PIF_VALUE: 18
PIF_VALUE: 17
PIF_VALUE: 18
PIF_VALUE: 18
PIF_VALUE: 16
PIF_VALUE: 18
PIF_VALUE: 16
PIF_VALUE: 20
PIF_VALUE: 21
PIF_VALUE: 18
PIF_VALUE: 17
PIF_VALUE: 18
PIF_VALUE: 18
PIF_VALUE: 17
PIF_VALUE: 18
PIF_VALUE: 19
PIF_VALUE: 16
PIF_VALUE: 18
PIF_VALUE: 17
PIF_VALUE: 18
PIF_VALUE: 1
PIF_VALUE: 18
PIF_VALUE: 20
PIF_VALUE: 16
PIF_VALUE: 17
PIF_VALUE: 19
PIF_VALUE: 18
PIF_VALUE: 19
PIF_VALUE: 18
PIF_VALUE: 17
PIF_VALUE: 16
PIF_VALUE: 18
PIF_VALUE: 22
PIF_VALUE: 18
PIF_VALUE: 19
PIF_VALUE: 22
PIF_VALUE: 5
PIF_VALUE: 16
PIF_VALUE: 18
PIF_VALUE: 22
PIF_VALUE: 19
PIF_VALUE: 12
PIF_VALUE: 18
PIF_VALUE: 18
PIF_VALUE: 19

## 2018-07-23 ASSESSMENT — PAIN SCALES - GENERAL
PAINLEVEL_OUTOF10: 0
PAINLEVEL_OUTOF10: 4
PAINLEVEL_OUTOF10: 0
PAINLEVEL_OUTOF10: 2
PAINLEVEL_OUTOF10: 4
PAINLEVEL_OUTOF10: 4
PAINLEVEL_OUTOF10: 0
PAINLEVEL_OUTOF10: 0
PAINLEVEL_OUTOF10: 3

## 2018-07-23 ASSESSMENT — PAIN DESCRIPTION - PAIN TYPE
TYPE: SURGICAL PAIN
TYPE: SURGICAL PAIN

## 2018-07-23 ASSESSMENT — PAIN DESCRIPTION - LOCATION
LOCATION: NECK
LOCATION: NECK

## 2018-07-23 ASSESSMENT — PAIN - FUNCTIONAL ASSESSMENT: PAIN_FUNCTIONAL_ASSESSMENT: 0-10

## 2018-07-23 ASSESSMENT — PAIN DESCRIPTION - PROGRESSION: CLINICAL_PROGRESSION: GRADUALLY WORSENING

## 2018-07-23 ASSESSMENT — COPD QUESTIONNAIRES: CAT_SEVERITY: MODERATE

## 2018-07-23 ASSESSMENT — PAIN DESCRIPTION - DESCRIPTORS: DESCRIPTORS: BURNING

## 2018-07-23 ASSESSMENT — PAIN DESCRIPTION - ONSET: ONSET: PROGRESSIVE

## 2018-07-23 ASSESSMENT — PAIN DESCRIPTION - ORIENTATION
ORIENTATION: RIGHT
ORIENTATION: RIGHT

## 2018-07-23 NOTE — ANESTHESIA POSTPROCEDURE EVALUATION
Department of Anesthesiology  Postprocedure Note    Patient: Millie Morelos  MRN: [id number]  YOB: 1955  Date of evaluation: 2018  Time:  1:18 PM     Procedure Summary     Date:  18 Room / Location:  [address] OR    Anesthesia Start:   Anesthesia Stop:  1214    Procedure:  RSECTION OF RIGHT CAROTID PSEUDOANEURYSM, WITH PATCH GRAFT (Sera Bishop) ANGIOPLASTY (Right Neck) Diagnosis:  (RIGHT CAROTID PSEUDOANEURYSM)    Surgeon:  Cori Multani DO Responsible Provider:  Tiffanie Weir MD    Anesthesia Type:  general ASA Status:  4          Anesthesia Type: general    Matt Phase I: Matt Score: 9    Matt Phase II:      Last vitals: Reviewed and per EMR flowsheets.        Anesthesia Post Evaluation   Vital Signs (Current)   Vitals:    18 1245   BP: 100/61   Pulse: 81   Resp: 12   Temp:    SpO2: 97%     Vital Signs Statistics (for past 48 hrs)     Temp  Av.8 °F (35.4 °C)  Min: 95.4 °F (35.2 °C)   Min taken time: 18 0923  Max: 98.4 °F (36.9 °C)   Max taken time: 18 0711  Pulse  Av  Min: 78   Min taken time: 18 1215  Max: 81   Max taken time: 18 1245  Resp  Avg: 10.7  Min: 0   Min taken time: 18 1204  Max: 20   Max taken time: 18 0711  BP  Min: 85/64   Min taken time: 18 0754  Max: 142/67   Max taken time: 18 0748  ABP (Arterial line BP)  Min: 6/1   Min taken time: 18 0754  Max: 215/110   Max taken time: 18 1154  SpO2  Av.5 %  Min: 92 %   Min taken time: 18 0744  Max: 100 %   Max taken time: 18 1200    BP Readings from Last 3 Encounters:   18 100/61   18 (!) 179/91   18 (!) 161/82       Level of Consciousness:  Awake    Respiratory:  Stable    Airway :   Patent    Oxygen Saturation:  Stable    Cardiovascular:  Stable    Hydration:  Adequate    PONV:  Stable    Post-op Pain:  Adequate analgesia    Post-op Assessment:  No apparent anesthetic complications : patient was trasferred

## 2018-07-23 NOTE — ANESTHESIA PRE PROCEDURE
Department of Anesthesiology  Preprocedure Note       Name:  Kev Benavidez   Age:  70 y.o.  :  1955                                          MRN:  [id number]         Date:  2018      Surgeon: Barry Fragoso):  Marianela Reyes DO    Procedure: Procedure(s):  REPAIR RIGHT CAROTID PSEUDOANEURYSM WITH CAROTID BYPASS    Medications prior to admission:   Prior to Admission medications    Medication Sig Start Date End Date Taking?  Authorizing Provider   fluticasone-vilanterol (BREO ELLIPTA) 100-25 MCG/INH AEPB inhaler Inhale 1 puff into the lungs daily   Yes Historical Provider, MD   isosorbide mononitrate (IMDUR) 60 MG extended release tablet Take 1 tablet by mouth daily 18  Yes Ned Maynard MD   amLODIPine (NORVASC) 10 MG tablet Take 1 tablet by mouth daily 18  Yes Ned Maynard MD   prasugrel (EFFIENT) 10 MG TABS Take 1 tablet by mouth daily 7/10/18  Yes Ned Maynard MD   lisinopril (PRINIVIL;ZESTRIL) 20 MG tablet Take 1 tablet by mouth daily 18  Yes Ned Maynard MD   RANEXA 500 MG extended release tablet TAKE ONE TABLET TWICE A DAY. 18  Yes Ned Maynard MD   rosuvastatin (CRESTOR) 40 MG tablet Take 1 tablet by mouth nightly 18  Yes Ned Maynard MD   albuterol sulfate  (90 Base) MCG/ACT inhaler Inhale 2 puffs into the lungs every 6 hours as needed for Wheezing or Shortness of Breath 2/15/18 7/19/18 Yes Marlen Mariano MD   carvedilol (COREG) 6.25 MG tablet Take 1 tablet by mouth 2 times daily (with meals) 18  Yes Ned Maynard MD   metoprolol succinate (TOPROL XL) 25 MG extended release tablet Take 0.5 tablets by mouth daily 17  Yes Ned Maynard MD   DiphenhydrAMINE HCl (BENADRYL ALLERGY PO) Take by mouth Pt takes at night for his sinuses   Yes Historical Provider, MD   aspirin 81 MG tablet Take 81 mg by mouth daily   Yes Historical Provider, MD   nitroGLYCERIN (NITROSTAT) 0.4 MG SL tablet Place 1 tablet under the tongue every 5 minutes as needed for Chest R    OTHER SURGICAL HISTORY Left 11/13/2017    REPAIR CAROTID ANEURYSM WITH LT SAPHENOUS VEIN GRAFT       Social History:    Social History   Substance Use Topics    Smoking status: Current Every Day Smoker     Packs/day: 0.50     Years: 45.00     Start date: 8/17/1970    Smokeless tobacco: Former User     Types: Chew     Quit date: 8/17/1975    Alcohol use No                                Ready to quit: Not Answered  Counseling given: Not Answered      Vital Signs (Current):   Vitals:    07/19/18 1427 07/23/18 0700 07/23/18 0711   BP:   (!) 116/55   Pulse:   79   Resp:   20   Temp:   98.4 °F (36.9 °C)   TempSrc:   Temporal   SpO2:   96%   Weight: 223 lb (101.2 kg) 223 lb (101.2 kg)    Height: 6' 2\" (1.88 m) 6' 2\" (1.88 m)                                               BP Readings from Last 3 Encounters:   07/23/18 (!) 116/55   07/11/18 (!) 179/91   06/26/18 (!) 161/82       NPO Status: Time of last liquid consumption: 2330                        Time of last solid consumption: 2330                        Date of last liquid consumption: 07/22/18                        Date of last solid food consumption: 07/22/18    BMI:   Wt Readings from Last 3 Encounters:   07/23/18 223 lb (101.2 kg)   07/11/18 223 lb (101.2 kg)   06/26/18 224 lb (101.6 kg)     Body mass index is 28.63 kg/m².     CBC:   Lab Results   Component Value Date    WBC 11.1 11/14/2017    RBC 3.82 11/14/2017    RBC 4.44 03/28/2012    HGB 12.1 11/14/2017    HCT 37.9 11/14/2017    MCV 99.2 11/14/2017    RDW 13.0 11/14/2017     11/14/2017     03/28/2012       CMP:   Lab Results   Component Value Date     11/14/2017    K 4.3 11/14/2017     11/14/2017    CO2 27 11/14/2017    BUN 10 11/14/2017    CREATININE 0.98 07/23/2018    CREATININE 0.88 02/09/2018    GFRAA >60 02/09/2018    LABGLOM >60 07/23/2018    GLUCOSE 136 11/14/2017    GLUCOSE 97 03/29/2012    PROT 7.0 09/06/2017    CALCIUM 7.9 11/14/2017    BILITOT 0.64 09/06/2017 ALKPHOS 86 09/06/2017    AST 14 09/06/2017    ALT 9 09/06/2017       POC Tests:   Recent Labs      07/23/18   0720   POCGLU  99       Coags:   Lab Results   Component Value Date    PROTIME 10.6 03/29/2012    INR 1.0 03/29/2012    APTT 42.1 08/21/2015       HCG (If Applicable): No results found for: PREGTESTUR, PREGSERUM, HCG, HCGQUANT     ABGs:   Lab Results   Component Value Date    PHART 7.403 12/26/2017    PO2ART 47.4 12/26/2017    YTB4DLU 48.3 12/26/2017    QIE2JBH 29.5 12/26/2017    X1JCYOMI 83.0 12/26/2017        Type & Screen (If Applicable):  No results found for: [facility]    Anesthesia Evaluation  Patient summary reviewed no history of anesthetic complications:   Airway: Mallampati: III        Dental:    (+) edentulous      Pulmonary:   (+) COPD: moderate,  decreased breath sounds,                             Cardiovascular:    (+) hypertension:, past MI:, CAD:,         Rhythm: regular                   ROS comment: Significant fixed defect from MI in the past.  Denies rest angina. H/O PVCs on Holter  Activity limited. 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 vertebral good flow. Bilateral carotid stenosis. GI/Hepatic/Renal: Neg GI/Hepatic/Renal ROS            Endo/Other: Negative Endo/Other ROS                    Abdominal:           Vascular:                                        Anesthesia Plan      general     ASA 4     (ASA 4   Markedly elevated CV risks and possible post op vent discussed with the patient)  Induction: intravenous. Anesthetic plan and risks discussed with patient.                       Jagdeep Pedro MD   7/23/2018

## 2018-07-23 NOTE — H&P
Cancer in his brother. Review of Systems:   General: Denies fever, chills, night sweats, weight loss, malaise, fatigue  HEENT: Denies sore throat, sinus problems, allergic rhinosinusitis  Card: Denies chest pain, palpitations, orthopnea/PND  Pulm: Denies shortness of breath  GI: denies history of constipation, diarrhea, hematochezia or melena  : Denies polyuria, dysuria, hematuria  Endo: Denies diabetes, thyroid problems. Heme: Denies anemia, h/o bleeding or clotting problems. Neuro: Denies h/o CVA, TIA  Skin: Denies rashes, ulcers  Musculoskeletal: Denies muscle, joint, back pain. Allergies: Patient has no known allergies. Current Meds:  Current Facility-Administered Medications:     [MAR Hold] lactated ringers infusion, , Intravenous, Continuous, Kacie Mansfield MD, Last Rate: 125 mL/hr at 07/23/18 0724    Vital Signs:  Vitals:    07/23/18 0711   BP: (!) 116/55   Pulse: 79   Resp: 20   Temp: 98.4 °F (36.9 °C)   SpO2: 96%       Physical Exam:  Vital signs and Nurse's note reviewed  Gen:  A&Ox3, NAD  HEENT: PERRLA, EOMI, no scleral icterus, oral mucosa moist  Neck: Supple  Chest: Symmetric rise with inhalation, no evidence of trauma  CVS: Regular rate and rhythm  Resp: Good bilateral air entry, clear to auscultation b/l  Abd: soft, non-tender, non-distended, no guarding or rebound.   Ext: No clubbing, cyanosis, edema, peripheral pulses 2+ Rad  CNS: Moves all extremities, no gross focal motor deficits    Labs:   Lab Results   Component Value Date    WBC 11.1 11/14/2017    HGB 12.1 11/14/2017    HCT 37.9 11/14/2017    MCV 99.2 11/14/2017     11/14/2017     03/28/2012     Lab Results   Component Value Date     11/14/2017    K 4.3 11/14/2017     11/14/2017    CO2 27 11/14/2017    BUN 10 11/14/2017    CREATININE 0.98 07/23/2018    CREATININE 0.88 02/09/2018    GLUCOSE 136 11/14/2017    GLUCOSE 97 03/29/2012    CALCIUM 7.9 11/14/2017     Lab Results   Component Value Date ALKPHOS 86 09/06/2017    ALT 9 09/06/2017    AST 14 09/06/2017    PROT 7.0 09/06/2017    BILITOT 0.64 09/06/2017    LABALBU 3.9 09/06/2017     No results found for: LACTA  No results found for: AMYLASE  No results found for: LIPASE  Lab Results   Component Value Date    INR 1.0 03/29/2012       Imaging:  Carotid duplex:   1. Stable known occlusion RIGHT ICA and trace flow RIGHT Vertebral artery    2. Stable heterogeneous extra-vascular collection surrounding the RIGHT    Carotid bulb that is suspected to be residual hematoma    post CEA. No internal blood flow.    3. Stable severe 80-99% stenosis of the LEFT internal carotid artery.             Impression:  1. Right carotid pseudoaneursym    Recommendation:  1. OR for pseudoaneurysm repair with bypass  2. Admit to ICU post operatively  3. Blood pressure control - SBP > 90 <160  4. Further recommendations to follow      Coby Roche D.O.   Surgery Department  Pager Number: [phone]  7/23/2018  7:26 AM

## 2018-07-24 LAB — SURGICAL PATHOLOGY REPORT: NORMAL

## 2018-07-24 PROCEDURE — 1200000000 HC SEMI PRIVATE

## 2018-07-24 PROCEDURE — 2580000003 HC RX 258: Performed by: STUDENT IN AN ORGANIZED HEALTH CARE EDUCATION/TRAINING PROGRAM

## 2018-07-24 PROCEDURE — 6360000002 HC RX W HCPCS: Performed by: STUDENT IN AN ORGANIZED HEALTH CARE EDUCATION/TRAINING PROGRAM

## 2018-07-24 PROCEDURE — 6370000000 HC RX 637 (ALT 250 FOR IP): Performed by: STUDENT IN AN ORGANIZED HEALTH CARE EDUCATION/TRAINING PROGRAM

## 2018-07-24 PROCEDURE — 99406 BEHAV CHNG SMOKING 3-10 MIN: CPT

## 2018-07-24 PROCEDURE — 94640 AIRWAY INHALATION TREATMENT: CPT

## 2018-07-24 RX ORDER — ACETAMINOPHEN 325 MG/1
650 TABLET ORAL
Qty: 30 TABLET | Refills: 0 | Status: SHIPPED | OUTPATIENT
Start: 2018-07-24 | End: 2020-01-01

## 2018-07-24 RX ORDER — LISINOPRIL 20 MG/1
20 TABLET ORAL DAILY
Status: DISCONTINUED | OUTPATIENT
Start: 2018-07-24 | End: 2018-07-25 | Stop reason: HOSPADM

## 2018-07-24 RX ORDER — AMLODIPINE BESYLATE 10 MG/1
10 TABLET ORAL DAILY
Status: DISCONTINUED | OUTPATIENT
Start: 2018-07-24 | End: 2018-07-25 | Stop reason: HOSPADM

## 2018-07-24 RX ORDER — OXYCODONE HYDROCHLORIDE 5 MG/1
5 TABLET ORAL EVERY 6 HOURS PRN
Qty: 15 TABLET | Refills: 0 | Status: SHIPPED | OUTPATIENT
Start: 2018-07-24 | End: 2018-07-31

## 2018-07-24 RX ORDER — PRASUGREL 10 MG/1
10 TABLET, FILM COATED ORAL DAILY
Status: DISCONTINUED | OUTPATIENT
Start: 2018-07-24 | End: 2018-07-25 | Stop reason: HOSPADM

## 2018-07-24 RX ADMIN — CARVEDILOL 6.25 MG: 6.25 TABLET, FILM COATED ORAL at 16:44

## 2018-07-24 RX ADMIN — MOMETASONE FUROATE AND FORMOTEROL FUMARATE DIHYDRATE 2 PUFF: 200; 5 AEROSOL RESPIRATORY (INHALATION) at 19:54

## 2018-07-24 RX ADMIN — CEFAZOLIN SODIUM 1 G: 1 INJECTION, SOLUTION INTRAVENOUS at 09:18

## 2018-07-24 RX ADMIN — LISINOPRIL 20 MG: 20 TABLET ORAL at 08:31

## 2018-07-24 RX ADMIN — CEFAZOLIN SODIUM 1 G: 1 INJECTION, SOLUTION INTRAVENOUS at 16:44

## 2018-07-24 RX ADMIN — ISOSORBIDE MONONITRATE 60 MG: 60 TABLET ORAL at 08:30

## 2018-07-24 RX ADMIN — ASPIRIN 81 MG: 81 TABLET, CHEWABLE ORAL at 08:30

## 2018-07-24 RX ADMIN — Medication 10 ML: at 09:19

## 2018-07-24 RX ADMIN — CEFAZOLIN SODIUM 1 G: 1 INJECTION, SOLUTION INTRAVENOUS at 00:01

## 2018-07-24 RX ADMIN — ATORVASTATIN CALCIUM 80 MG: 80 TABLET, FILM COATED ORAL at 20:22

## 2018-07-24 RX ADMIN — ACETAMINOPHEN 650 MG: 325 TABLET ORAL at 08:30

## 2018-07-24 RX ADMIN — MOMETASONE FUROATE AND FORMOTEROL FUMARATE DIHYDRATE 2 PUFF: 200; 5 AEROSOL RESPIRATORY (INHALATION) at 08:34

## 2018-07-24 RX ADMIN — ENOXAPARIN SODIUM 40 MG: 40 INJECTION SUBCUTANEOUS at 08:30

## 2018-07-24 RX ADMIN — METOPROLOL SUCCINATE 12.5 MG: 25 TABLET, FILM COATED, EXTENDED RELEASE ORAL at 08:32

## 2018-07-24 RX ADMIN — AMLODIPINE BESYLATE 10 MG: 10 TABLET ORAL at 08:31

## 2018-07-24 RX ADMIN — Medication 10 ML: at 08:32

## 2018-07-24 RX ADMIN — PRASUGREL HYDROCHLORIDE 10 MG: 10 TABLET, FILM COATED ORAL at 09:18

## 2018-07-24 RX ADMIN — CARVEDILOL 6.25 MG: 6.25 TABLET, FILM COATED ORAL at 08:30

## 2018-07-24 RX ADMIN — ACETAMINOPHEN 650 MG: 325 TABLET ORAL at 20:22

## 2018-07-24 ASSESSMENT — PAIN DESCRIPTION - PAIN TYPE
TYPE: SURGICAL PAIN
TYPE: ACUTE PAIN;SURGICAL PAIN
TYPE: SURGICAL PAIN

## 2018-07-24 ASSESSMENT — PAIN SCALES - GENERAL
PAINLEVEL_OUTOF10: 1
PAINLEVEL_OUTOF10: 0
PAINLEVEL_OUTOF10: 2
PAINLEVEL_OUTOF10: 0

## 2018-07-24 ASSESSMENT — PAIN DESCRIPTION - PROGRESSION: CLINICAL_PROGRESSION: GRADUALLY IMPROVING

## 2018-07-24 ASSESSMENT — PAIN DESCRIPTION - ORIENTATION: ORIENTATION: RIGHT

## 2018-07-24 ASSESSMENT — PAIN DESCRIPTION - LOCATION: LOCATION: NECK

## 2018-07-24 ASSESSMENT — PAIN DESCRIPTION - ONSET: ONSET: ON-GOING

## 2018-07-24 ASSESSMENT — PAIN DESCRIPTION - DESCRIPTORS: DESCRIPTORS: ACHING;BURNING

## 2018-07-24 NOTE — PROGRESS NOTES
Smoking Cessation - topics covered   [x]  Health Risks  [x]  Benefits of Quitting   [x]  Smoking Cessation  []  Patient has no history of tobacco use  []  Patient is former smoker. []  No need for tobacco cessation education. [x]  Booklet given  [x]  Patient verbalizes understanding. []  Patient denies need for tobacco cessation education.   JENNIFER MALDONADO  1:32 PM

## 2018-07-24 NOTE — PLAN OF CARE
Problem: Pain:  Goal: Pain level will decrease  Pain level will decrease  Outcome: Ongoing  Receiving pain medication

## 2018-07-25 VITALS
TEMPERATURE: 97.7 F | HEIGHT: 74 IN | HEART RATE: 72 BPM | SYSTOLIC BLOOD PRESSURE: 142 MMHG | OXYGEN SATURATION: 96 % | WEIGHT: 223 LBS | BODY MASS INDEX: 28.62 KG/M2 | RESPIRATION RATE: 17 BRPM | DIASTOLIC BLOOD PRESSURE: 75 MMHG

## 2018-07-25 PROCEDURE — 2500000003 HC RX 250 WO HCPCS: Performed by: STUDENT IN AN ORGANIZED HEALTH CARE EDUCATION/TRAINING PROGRAM

## 2018-07-25 PROCEDURE — 94640 AIRWAY INHALATION TREATMENT: CPT

## 2018-07-25 PROCEDURE — 6370000000 HC RX 637 (ALT 250 FOR IP): Performed by: STUDENT IN AN ORGANIZED HEALTH CARE EDUCATION/TRAINING PROGRAM

## 2018-07-25 PROCEDURE — 94762 N-INVAS EAR/PLS OXIMTRY CONT: CPT

## 2018-07-25 PROCEDURE — 6360000002 HC RX W HCPCS: Performed by: STUDENT IN AN ORGANIZED HEALTH CARE EDUCATION/TRAINING PROGRAM

## 2018-07-25 PROCEDURE — 2580000003 HC RX 258: Performed by: STUDENT IN AN ORGANIZED HEALTH CARE EDUCATION/TRAINING PROGRAM

## 2018-07-25 RX ADMIN — Medication 10 ML: at 03:30

## 2018-07-25 RX ADMIN — ISOSORBIDE MONONITRATE 60 MG: 60 TABLET ORAL at 07:47

## 2018-07-25 RX ADMIN — Medication 10 ML: at 08:25

## 2018-07-25 RX ADMIN — LISINOPRIL 20 MG: 20 TABLET ORAL at 07:46

## 2018-07-25 RX ADMIN — MOMETASONE FUROATE AND FORMOTEROL FUMARATE DIHYDRATE 2 PUFF: 200; 5 AEROSOL RESPIRATORY (INHALATION) at 08:00

## 2018-07-25 RX ADMIN — LABETALOL HYDROCHLORIDE 10 MG: 5 INJECTION, SOLUTION INTRAVENOUS at 03:31

## 2018-07-25 RX ADMIN — CEFAZOLIN SODIUM 1 G: 1 INJECTION, SOLUTION INTRAVENOUS at 00:49

## 2018-07-25 RX ADMIN — METOPROLOL SUCCINATE 12.5 MG: 25 TABLET, FILM COATED, EXTENDED RELEASE ORAL at 07:46

## 2018-07-25 RX ADMIN — ASPIRIN 81 MG: 81 TABLET, CHEWABLE ORAL at 07:46

## 2018-07-25 RX ADMIN — CARVEDILOL 6.25 MG: 6.25 TABLET, FILM COATED ORAL at 07:46

## 2018-07-25 RX ADMIN — ENOXAPARIN SODIUM 40 MG: 40 INJECTION SUBCUTANEOUS at 08:37

## 2018-07-25 RX ADMIN — CEFAZOLIN SODIUM 1 G: 1 INJECTION, SOLUTION INTRAVENOUS at 08:24

## 2018-07-25 RX ADMIN — AMLODIPINE BESYLATE 10 MG: 10 TABLET ORAL at 07:46

## 2018-07-25 RX ADMIN — ACETAMINOPHEN 650 MG: 325 TABLET ORAL at 07:47

## 2018-07-25 RX ADMIN — PRASUGREL HYDROCHLORIDE 10 MG: 10 TABLET, FILM COATED ORAL at 08:27

## 2018-07-25 ASSESSMENT — PAIN SCALES - GENERAL
PAINLEVEL_OUTOF10: 2
PAINLEVEL_OUTOF10: 0

## 2018-07-25 NOTE — OP NOTE
Additional plaque was removed from the back wall of the carotid and the prior repair site was excised completely including all the patch and suture. A Batista-Inahara Shunt was then inserted in the standard fashion taking care to remove any air within the system. Flow was then restored to the distal external carotid via the shunt. This was confirmed with doppler. The plaque was amputated flush with the common carotid artery. The plaque was feathered distally into the external carotid without raising any flaps. All small pieces of plaque tissue were removed form the endarterectomy site. Copious irrigation with heparinized saline was used to clean the site. Once the endarterectomy was completed to my satisfaction, a patch angioplasty was performed using a bovine patch. This was sutured in place using running 6-0 Prolene suture. Just prior to completion of the suture line the shunt was removed. The vessels were allowed to back bleed. The area was flushed with heparinized saline. The suture line was then completed. Flow was restored to the external carotid. Hemostasis was obtained with Fibrillar Surgicell and manual pressure. Flow in the distal external carotid was confirmed with doppler. Once hemostasis was assured, the incision was closed in layers. The platysma was closed with 3-0 Vicryl. The skin was closed with 4-0 Monocryl subcuticular sutures and steri-strips. A sterile bandage was applied. The patient awoke with no gross neurologic deficits and was taken to recovery in stable condition. I directly performed and/or supervised the entire procedure.          El Sorensen DO 7/25/2018 9:51 AM

## 2018-07-25 NOTE — PLAN OF CARE
Problem: RESPIRATORY  Intervention: Respiratory assessment  PRN FOR BRONCHOSPASM/BRONCHOCONSTRICTION     [x]         IMPROVE AERATION/BREATH SOUNDS  [x]   ADMINISTER BRONCHODILATOR THERAPY AS APPROPRIATE  [x]   ASSESS BREATH SOUNDS  []   IMPLEMENT AEROSOL/MDI PROTOCOL  [x]   PATIENT EDUCATION AS NEEDED    PROVIDE ADEQUATE OXYGENATION WITH ACCEPTABLE SP02/ABG'S    [x]  IDENTIFY APPROPRIATE OXYGEN THERAPY  [x]   MONITOR SP02/ABG'S AS NEEDED   [x]   PATIENT EDUCATION AS NEEDED

## 2018-07-25 NOTE — PLAN OF CARE
Problem: Pain:  Goal: Control of acute pain  Control of acute pain   Outcome: Completed Date Met: 07/25/18    Goal: Control of chronic pain  Control of chronic pain   Outcome: Completed Date Met: 07/25/18      Problem: Falls - Risk of:  Goal: Absence of physical injury  Absence of physical injury   Outcome: Completed Date Met: 07/25/18

## 2018-07-25 NOTE — DISCHARGE SUMMARY
Chest pain             oxyCODONE (ROXICODONE) 5 MG immediate release tablet  Take 1 tablet by mouth every 6 hours as needed for Pain for up to 7 days. .             prasugrel (EFFIENT) 10 MG TABS  Take 1 tablet by mouth daily             RANEXA 500 MG extended release tablet  TAKE ONE TABLET TWICE A DAY. rosuvastatin (CRESTOR) 40 MG tablet  Take 1 tablet by mouth nightly                  HPI and Hospital Course:   70 y.o. male presented on 7/23/2018 for Resection and repair of right carotid pseudoaneurysm. Patient underwent a resection of the pseudoaneurysm with repair primarily with the bovine patch. He tolerated the surgery well and was admitted postoperatively for blood pressure control. Postop day 1 his art line was removed and patient was ambulating the walter, tolerating diet, pain was controlled. Patient was treated conservatively and kept 1 additional night for evaluation. He was restarted on his Effient postop day 1. Patient's care was progressed. Antibiotics from cultures sent from the pseudoaneurysm had no growth at day 2. Plan is to continue to follow up cultures and prescribe antibiotics if indicated. Hospital course was unremarkable. On day of discharge pt was tolerating regular diet, pain controlled with oral medications and ambulating without difficulty.       Electronically signed by Vinayak Keenan DO on 7/25/2018 at 1:40 PM

## 2018-07-28 LAB
CULTURE: ABNORMAL
CULTURE: ABNORMAL
DIRECT EXAM: ABNORMAL
DIRECT EXAM: ABNORMAL
Lab: ABNORMAL
SPECIMEN DESCRIPTION: ABNORMAL
STATUS: ABNORMAL

## 2018-09-07 ENCOUNTER — HOSPITAL ENCOUNTER (EMERGENCY)
Age: 63
Discharge: HOME | End: 2018-09-07
Attending: EMERGENCY MEDICINE
Payer: MEDICARE

## 2018-09-07 VITALS
RESPIRATION RATE: 16 BRPM | DIASTOLIC BLOOD PRESSURE: 78 MMHG | HEART RATE: 85 BPM | OXYGEN SATURATION: 99 % | WEIGHT: 220 LBS | TEMPERATURE: 98.4 F | SYSTOLIC BLOOD PRESSURE: 131 MMHG | BODY MASS INDEX: 28.25 KG/M2

## 2018-09-07 DIAGNOSIS — S91.312A LACERATION OF LEFT FOOT, INITIAL ENCOUNTER: Primary | ICD-10-CM

## 2018-09-07 PROCEDURE — 12001 RPR S/N/AX/GEN/TRNK 2.5CM/<: CPT

## 2018-09-07 PROCEDURE — 99282 EMERGENCY DEPT VISIT SF MDM: CPT

## 2018-09-07 RX ORDER — LIDOCAINE HYDROCHLORIDE 10 MG/ML
5 INJECTION, SOLUTION INFILTRATION; PERINEURAL ONCE
Status: DISCONTINUED | OUTPATIENT
Start: 2018-09-07 | End: 2018-09-07 | Stop reason: HOSPADM

## 2018-09-07 ASSESSMENT — PAIN DESCRIPTION - LOCATION: LOCATION: FOOT

## 2018-09-07 ASSESSMENT — PAIN DESCRIPTION - PAIN TYPE: TYPE: ACUTE PAIN

## 2018-09-07 ASSESSMENT — PAIN SCALES - GENERAL: PAINLEVEL_OUTOF10: 3

## 2018-09-07 ASSESSMENT — PAIN DESCRIPTION - DESCRIPTORS: DESCRIPTORS: ACHING

## 2018-09-07 ASSESSMENT — PAIN DESCRIPTION - ORIENTATION: ORIENTATION: LEFT

## 2018-09-07 NOTE — ED PROVIDER NOTES
11/13/2017    LEFT CAROTID PSEUDOANEURYSM REPAIR WITH [id number] AND [id number] VIABAHN STENTS, LEFT CAROTID ANGIOGRAM, SAPHENOUS VEIN GRAFT BYPASS performed by Kulwant Le DO at [place] [address]  08/24/15    balloon, stentx1 dr Rosi Chinchilla 7/11/18    DIAGNOSTIC CARDIAC CATH LAB PROCEDURE  08/20/15    ENDOSCOPY, COLON, DIAGNOSTIC      KNEE SURGERY  2013    LEG TENDON SURGERY      R    OTHER SURGICAL HISTORY Left 11/13/2017    REPAIR CAROTID ANEURYSM WITH LT SAPHENOUS VEIN GRAFT    OTHER SURGICAL HISTORY Right 07/23/2018     CAROTID ARTERY RESECTION OF  PSEUDO ANEURISM    GA THROMBOENDARTECTMY Kyawwilbur Kumar Right 7/23/2018    RSECTION OF RIGHT CAROTID PSEUDOANEURYSM, WITH PATCH GRAFT (Tracey Rodriguez) ANGIOPLASTY performed by Kulwant Le DO at [address] [place] [address]       Previous Medications    ACETAMINOPHEN (TYLENOL) 325 MG TABLET    Take 2 tablets by mouth every 6 hours while awake for 5 days    ALBUTEROL SULFATE  (90 BASE) MCG/ACT INHALER    Inhale 2 puffs into the lungs every 6 hours as needed for Wheezing or Shortness of Breath    AMLODIPINE (NORVASC) 10 MG TABLET    Take 1 tablet by mouth daily    ASPIRIN 81 MG TABLET    Take 81 mg by mouth daily    DIPHENHYDRAMINE HCL (BENADRYL ALLERGY PO)    Take by mouth Pt takes at night for his sinuses    FLUTICASONE-VILANTEROL (BREO ELLIPTA) 100-25 MCG/INH AEPB INHALER    Inhale 1 puff into the lungs daily    ISOSORBIDE MONONITRATE (IMDUR) 60 MG EXTENDED RELEASE TABLET    Take 1 tablet by mouth daily    LISINOPRIL (PRINIVIL;ZESTRIL) 20 MG TABLET    Take 1 tablet by mouth daily    METOPROLOL SUCCINATE (TOPROL XL) 25 MG EXTENDED RELEASE TABLET    Take 0.5 tablets by mouth daily    NITROGLYCERIN (NITROSTAT) 0.4 MG SL TABLET    Place 1 tablet under the tongue every 5 minutes as needed for Chest pain    PRASUGREL (EFFIENT) 10 MG TABS    Take 1 tablet by mouth daily    RANEXA 500 MG EXTENDED RELEASE TABLET    TAKE ONE with no issues of the wrist.   Neurological: He is alert and oriented to person, place, and time. Patient is alert and oriented ×3. Extraocular movements intact. Pupils are equal and reactive to light bilaterally. Speech is normal.  No word finding difficulties noted. Eyebrows raise symmetrically. Patient is able to puff out cheeks symmetrically with no issues. Tongue protrudes to midline. Shoulder shrug is symmetrical.  Right upper extremity: 5/5 strength with finger abduction, flexion and extension of elbow. Left upper extremity: 5/5 strength with finger abduction, flexion and extension of elbow. Sensation intact to upper extremities. Patient able to lift both legs off the bed and hold for 5 seconds without issues. 5/5 strength with big toe dorsiflexion bilaterally. Sensation to light touch intact to bilateral lower extremities. Skin: Skin is warm and dry. No erythema. Psychiatric: He has a normal mood and affect. Nursing note and vitals reviewed. DIAGNOSTIC RESULTS     RADIOLOGY: (none if blank)   Interpretation per the Radiologist below, if available at the time of this note:    No orders to display       LABS:  Labs Reviewed - No data to display    All other labs were within normal range or not returned as of this dictation. EMERGENCY DEPARTMENT COURSE and Medical Decision Making:     Procedure: Laceration Repair  Wound Location left foot. Patient's wound was prepped and draped in a sterile fashion. Wound was anesthetized with 1cc lidocaine 1%. Wound was copiously irrigated with NS. Wound was explored for foreign bodies. Laceration length 1 cm. Wound edges approximated with 2#, 4- 0 sutures. Bacitracin ointment applied. Patient was given specific wound care and follow-up instructions. Patient tolerated procedure well without complication.        ED Medications administered this visit:    Medications   lidocaine 1 % injection 5 mL (not administered)       CLINICAL IMPRESSION      1.

## 2018-09-10 ENCOUNTER — HOSPITAL ENCOUNTER (OUTPATIENT)
Age: 63
Discharge: HOME | End: 2018-09-10
Payer: MEDICARE

## 2018-09-10 LAB
ABSOLUTE EOS #: 0.13 K/UL (ref 0–0.44)
ABSOLUTE IMMATURE GRANULOCYTE: 0.03 K/UL (ref 0–0.3)
ABSOLUTE LYMPH #: 1.3 K/UL (ref 1.1–3.7)
ABSOLUTE MONO #: 0.78 K/UL (ref 0.1–1.2)
ALBUMIN SERPL-MCNC: 4 G/DL (ref 3.5–5.2)
ALBUMIN/GLOBULIN RATIO: 1.1 (ref 1–2.5)
ALP BLD-CCNC: 94 U/L (ref 40–129)
ALT SERPL-CCNC: 12 U/L (ref 5–41)
ANION GAP SERPL CALCULATED.3IONS-SCNC: 11 MMOL/L (ref 9–17)
AST SERPL-CCNC: 19 U/L
BASOPHILS # BLD: 1 % (ref 0–2)
BASOPHILS ABSOLUTE: 0.05 K/UL (ref 0–0.2)
BILIRUB SERPL-MCNC: 0.44 MG/DL (ref 0.3–1.2)
BUN BLDV-MCNC: 12 MG/DL (ref 8–23)
BUN/CREAT BLD: 13 (ref 9–20)
C-REACTIVE PROTEIN: 2.8 MG/L (ref 0–5)
CALCIUM SERPL-MCNC: 9.4 MG/DL (ref 8.6–10.4)
CHLORIDE BLD-SCNC: 100 MMOL/L (ref 98–107)
CHOLESTEROL/HDL RATIO: 2.3
CHOLESTEROL: 123 MG/DL
CO2: 27 MMOL/L (ref 20–31)
CREAT SERPL-MCNC: 0.94 MG/DL (ref 0.7–1.2)
DIFFERENTIAL TYPE: ABNORMAL
EOSINOPHILS RELATIVE PERCENT: 2 % (ref 1–4)
GFR AFRICAN AMERICAN: >60 ML/MIN
GFR NON-AFRICAN AMERICAN: >60 ML/MIN
GFR SERPL CREATININE-BSD FRML MDRD: ABNORMAL ML/MIN/{1.73_M2}
GFR SERPL CREATININE-BSD FRML MDRD: ABNORMAL ML/MIN/{1.73_M2}
GLUCOSE BLD-MCNC: 105 MG/DL (ref 70–99)
HCT VFR BLD CALC: 48.1 % (ref 40.7–50.3)
HDLC SERPL-MCNC: 53 MG/DL
HEMOGLOBIN: 15.7 G/DL (ref 13–17)
IMMATURE GRANULOCYTES: 0 %
LDL CHOLESTEROL: 57 MG/DL (ref 0–130)
LYMPHOCYTES # BLD: 15 % (ref 24–43)
MCH RBC QN AUTO: 31.7 PG (ref 25.2–33.5)
MCHC RBC AUTO-ENTMCNC: 32.6 G/DL (ref 28.4–34.8)
MCV RBC AUTO: 97.2 FL (ref 82.6–102.9)
MONOCYTES # BLD: 9 % (ref 3–12)
NRBC AUTOMATED: 0 PER 100 WBC
PDW BLD-RTO: 13.5 % (ref 11.8–14.4)
PLATELET # BLD: 255 K/UL (ref 138–453)
PLATELET ESTIMATE: ABNORMAL
PMV BLD AUTO: 9.6 FL (ref 8.1–13.5)
POTASSIUM SERPL-SCNC: 5.3 MMOL/L (ref 3.7–5.3)
RBC # BLD: 4.95 M/UL (ref 4.21–5.77)
RBC # BLD: ABNORMAL 10*6/UL
SEDIMENTATION RATE, ERYTHROCYTE: 23 MM (ref 0–20)
SEG NEUTROPHILS: 73 % (ref 36–65)
SEGMENTED NEUTROPHILS ABSOLUTE COUNT: 6.31 K/UL (ref 1.5–8.1)
SODIUM BLD-SCNC: 138 MMOL/L (ref 135–144)
TOTAL PROTEIN: 7.5 G/DL (ref 6.4–8.3)
TRIGL SERPL-MCNC: 67 MG/DL
VLDLC SERPL CALC-MCNC: NORMAL MG/DL (ref 1–30)
WBC # BLD: 8.6 K/UL (ref 3.5–11.3)
WBC # BLD: ABNORMAL 10*3/UL

## 2018-09-10 PROCEDURE — 85025 COMPLETE CBC W/AUTO DIFF WBC: CPT

## 2018-09-10 PROCEDURE — 36415 COLL VENOUS BLD VENIPUNCTURE: CPT

## 2018-09-10 PROCEDURE — 87040 BLOOD CULTURE FOR BACTERIA: CPT

## 2018-09-10 PROCEDURE — 80053 COMPREHEN METABOLIC PANEL: CPT

## 2018-09-10 PROCEDURE — 85651 RBC SED RATE NONAUTOMATED: CPT

## 2018-09-10 PROCEDURE — 80061 LIPID PANEL: CPT

## 2018-09-10 PROCEDURE — 86140 C-REACTIVE PROTEIN: CPT

## 2018-09-14 RX ORDER — RANOLAZINE 500 MG/1
TABLET, FILM COATED, EXTENDED RELEASE ORAL
Qty: 180 TABLET | Refills: 3 | Status: SHIPPED | OUTPATIENT
Start: 2018-09-14 | End: 2019-06-12

## 2018-09-15 LAB
CULTURE: NORMAL
CULTURE: NORMAL
Lab: NORMAL
Lab: NORMAL
SPECIMEN DESCRIPTION: NORMAL
SPECIMEN DESCRIPTION: NORMAL
STATUS: NORMAL
STATUS: NORMAL

## 2018-09-24 RX ORDER — LISINOPRIL 20 MG/1
20 TABLET ORAL DAILY
Qty: 90 TABLET | Refills: 3 | Status: SHIPPED | OUTPATIENT
Start: 2018-09-24 | End: 2019-01-01 | Stop reason: SDUPTHER

## 2018-09-24 RX ORDER — AMLODIPINE BESYLATE 10 MG/1
10 TABLET ORAL DAILY
Qty: 90 TABLET | Refills: 3 | Status: SHIPPED | OUTPATIENT
Start: 2018-09-24 | End: 2019-01-01 | Stop reason: SDUPTHER

## 2018-09-24 RX ORDER — METOPROLOL SUCCINATE 25 MG/1
12.5 TABLET, EXTENDED RELEASE ORAL DAILY
Qty: 90 TABLET | Refills: 3 | Status: SHIPPED | OUTPATIENT
Start: 2018-09-24 | End: 2019-01-01 | Stop reason: SDUPTHER

## 2018-10-03 NOTE — BRIEF OP NOTE
Brief Postoperative Note  ______________________________________________________________    Patient: Sandy Davison  YOB: 1955  MRN: [id number]  Date of Procedure: 11/13/2017    Pre-Op Diagnosis: LEFT CAROTID ANEURSYM     Post-Op Diagnosis: Same       Procedure(s):  LEFT CAROTID PSEUDOANEURYSM REPAIR WITH [id number] AND [id number] VIABAHN STENTS, LEFT CAROTID ANGIOGRAM, SAPHENOUS VEIN GRAFT BYPASS    Anesthesia: General    Surgeon(s):  Art Andrade DO     Assistant:    Akanksha Flores, PGY-4    Staff:  Hui Scrub: Marilynn Wayne RN  Scrub Person First: Shane Barrientos     Estimated Blood Loss: 300 (units unknown)    Complications: None    Specimens:   ID Type Source Tests Collected by Time Destination   1 : TYPE AND SCREEN Body Fluid CSF/Blood TYPE AND SCREEN Art Andrade DO 11/13/2017 0815    2 : URINE Urine Bladder URINE CULTURE Art Andrade DO 11/13/2017 0745    3 : LEFT CAROTID PSEUDOANEURYSM WALL Tissue Carotid Arteries TISSUE CULTURE Art Andrade DO 11/13/2017 1050        Implants:    Implant Name Type Inv.  Item Serial No.  Lot No. LRB No. Used   GRAFT STENT ENDOPROSTH [facility] - [id number] Vascular/Graft/Patch/Filter GRAFT STENT ENDOPROSTH [id number]  [employer]  Left 1   GRAFT STENT ENDOPROSTH [id number] - [id number] Vascular/Graft/Patch/Filter GRAFT STENT ENDOPROSTH [id number]  [employer]   Left 1         Drains:    10F TI drain    Findings: Large left carotid artery pseudoaneurysm repaired with left GSV interposition graft, patency confirmed with completion angiogram    Akanksha Flores DO  Date: 11/13/2017  Time: 1:38 PM none

## 2018-10-11 ENCOUNTER — OFFICE VISIT (OUTPATIENT)
Dept: CARDIOLOGY | Age: 63
End: 2018-10-11
Payer: MEDICARE

## 2018-10-11 VITALS
BODY MASS INDEX: 28.77 KG/M2 | SYSTOLIC BLOOD PRESSURE: 114 MMHG | HEIGHT: 74 IN | WEIGHT: 224.2 LBS | HEART RATE: 70 BPM | DIASTOLIC BLOOD PRESSURE: 61 MMHG | OXYGEN SATURATION: 96 %

## 2018-10-11 DIAGNOSIS — E78.2 MIXED HYPERLIPIDEMIA: ICD-10-CM

## 2018-10-11 DIAGNOSIS — I77.9 BILATERAL CAROTID ARTERY DISEASE, UNSPECIFIED TYPE (HCC): ICD-10-CM

## 2018-10-11 DIAGNOSIS — I25.10 CORONARY ARTERY DISEASE INVOLVING NATIVE CORONARY ARTERY OF NATIVE HEART WITHOUT ANGINA PECTORIS: Primary | ICD-10-CM

## 2018-10-11 DIAGNOSIS — I10 ESSENTIAL HYPERTENSION: ICD-10-CM

## 2018-10-11 DIAGNOSIS — I73.9 PVD (PERIPHERAL VASCULAR DISEASE) (HCC): ICD-10-CM

## 2018-10-11 PROCEDURE — 99214 OFFICE O/P EST MOD 30 MIN: CPT | Performed by: INTERNAL MEDICINE

## 2018-10-11 NOTE — PROGRESS NOTES
I, Tomy Dodson am scribing for and in the presence of Rangel Dominguez MD.    Subjective:     [facility] / HPI:    Chief Complaint   Patient presents with    3 Month Follow-Up     HX: CAD, PVD, MI, HTN, Dyslipidemia, Stent. Pt states he has been doing well since his last visit. He has one sharp CP that he puts at a 9/10 on the pain scale that lasted 20 minutes to which he took 2 Nitos 15 minutes apart that relieved it that occured 3 weeks ago. Minor lightheadedness/dizziness that only had happened a few times. He had fallen down his stairs and had to get stitches in you foot due to slipping. Denies: SOB, palpitations. Dear  Hank Buitrago, DO    I had the pleasure of seeing Mr. Jet Sanabria for follow up today. Neelam Calvin is 70 y.o. male with past medical history of bilateral carotid artery disease status post CEA and history of prior stroke who initially presented for evaluation of abnormal stress test. This was followed by cardiac catheterization which showed severe obstructive disease of the right coronary artery. Patient was sent to [facility] for intervention, unfortunately the procedure was complicated by inferior wall myocardial infarction and transient heart block on 8/24/2015. He had a stress test on 8/3/2016 which showed evidence of old myocardial infarction with a very small hayden-infarction ischemia, medical management. 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 Yariel Bush on 11/15/2017, patient tolerated the surgery very well. No complications. Resection of right carotid pseudoaneurysm done on 07/23/2018. Mr. Bar Lin says this went well and is feeling good now. He did say this took longer to heal than previous surgeries. Mr. Bar Lin is here for a follow up.  He said on one occasion he had an episode of chest pain that was sharp that lasted 20 minutes. He denies any radiation, sweating, or any other issues that accompanied this. He says lightheadedness/dizziness is nothing to talk about. He has stable chronic shortness of breath that has not gotten any worse. He attributes this to his COPD. He continues to smoke, but is ready to quit. He denies any changes in his weight or appetite or any issues with medications. He denies any current chest pain, pressure, or tightness. No palpitations, shortness of breath, orthopnea, or PND. He says he is fairly active and did a lot of walking on vacation in [place]. Stress test on 07/05/2018: showed inferior and inferoseptal infarction with hayden-infarction ischemia, ejection fraction is 43% on gated SPECT. Echo on 07/05/2018: showed preserved with ejection fraction, mild hypokinesis of the inferolateral wall. Mild aortic stenosis and mild to moderate aortic regurgitation. No significant pulmonary hypertension. Past Medical History:    Past Medical History:   Diagnosis Date    CAD (coronary artery disease)     dr Precious Crowe cc 7/11/18    Carotid artery stenosis     COPD (chronic obstructive pulmonary disease) ([facility] [address]     H/O cardiac catheterization 8/20/15    LMCA:Lesion on LMCA: Distal subsection. 40% stenosis. LCx:Lesion on Prox CX: Ostial. 50% stenosis. RCA: Lesion on Prox RCA: Ostial. 95% stenosis. Lesion on Prox RCA: Proximal subsection. 75% stenosis. Lesion on R PDA: Ostial. 705 stenosis. EF 60%.  H/O cardiovascular stress test 8/17/15    Abnormal. Moderate perfusion defect of moderate intensity in the inferolateral, inferior, and inferoapical regions during stress imaging, most consistent with ischemia. Global LV systolic function was abnormal with EF: 42% w/regional wall motion abnormalities.  Results are most consistent with an intermediate to high risk for significant CAD.     H/O cardiovascular stress test 08/03/2016    Abnormal myocardial perfusion study, large perfusion defect of ELLIPTA) 100-25 MCG/INH AEPB inhaler Inhale 1 puff into the lungs daily      isosorbide mononitrate (IMDUR) 60 MG extended release tablet Take 1 tablet by mouth daily (Patient taking differently: Take 30 mg by mouth daily ) 30 tablet 3    prasugrel (EFFIENT) 10 MG TABS Take 1 tablet by mouth daily 90 tablet 3    rosuvastatin (CRESTOR) 40 MG tablet Take 1 tablet by mouth nightly 90 tablet 3    albuterol sulfate  (90 Base) MCG/ACT inhaler Inhale 2 puffs into the lungs every 6 hours as needed for Wheezing or Shortness of Breath 1 Inhaler 11    DiphenhydrAMINE HCl (BENADRYL ALLERGY PO) Take by mouth Pt takes at night for his sinuses      nitroGLYCERIN (NITROSTAT) 0.4 MG SL tablet Place 1 tablet under the tongue every 5 minutes as needed for Chest pain 25 tablet 3    aspirin 81 MG tablet Take 81 mg by mouth daily         REVIEW OF SYSTEMS:    CONSTITUTIONAL: No major weight gain or loss, fatigue, weakness, night sweats or fever. HEENT: No new vision difficulties or ringing in the ears. RESPIRATORY: see HPI  CARDIOVASCULAR: See HPI  GI: No nausea, vomiting, diarrhea, constipation, abdominal pain or changes in bowel habits. : No urinary frequency, urgency, incontinence hematuria or dysuria. SKIN: No cyanosis or skin lesions. MUSCULOSKELETAL: intermittent claudications. NEUROLOGICAL: Occasional dizziness. PSYCHIATRIC: No anxiety, pain, insomnia or depression    Objective:     PHYSICAL EXAM:      VITALS:    /61 (Site: Left Upper Arm, Position: Sitting, Cuff Size: Medium Adult)   Pulse 70   Ht 6' 2\" (1.88 m)   Wt 224 lb 3.2 oz (101.7 kg)   SpO2 96%   BMI 28.79 kg/m²   CONSTITUTIONAL: Cooperative, no apparent distress, and appears well nourished / developed. NEUROLOGIC:  Awake and orientated to person, place and time. PSYCH: Calm affect. SKIN: Warm and dry.   HEENT: Sclera non-icteric, normocephalic, neck supple, no elevation of JVP, normal carotid pulses with no bruits and thyroid normal

## 2018-11-20 RX ORDER — RANOLAZINE 500 MG/1
500 TABLET, EXTENDED RELEASE ORAL 2 TIMES DAILY
COMMUNITY
Start: 2018-11-20 | End: 2019-04-11

## 2019-01-01 ENCOUNTER — HOSPITAL ENCOUNTER (OUTPATIENT)
Dept: VASCULAR LAB | Age: 64
Discharge: HOME | End: 2019-08-14
Payer: MEDICARE

## 2019-01-01 ENCOUNTER — OFFICE VISIT (OUTPATIENT)
Dept: CARDIOLOGY | Age: 64
End: 2019-01-01
Payer: MEDICARE

## 2019-01-01 VITALS
SYSTOLIC BLOOD PRESSURE: 120 MMHG | HEIGHT: 74 IN | WEIGHT: 226 LBS | RESPIRATION RATE: 18 BRPM | OXYGEN SATURATION: 98 % | HEART RATE: 68 BPM | DIASTOLIC BLOOD PRESSURE: 61 MMHG | BODY MASS INDEX: 29 KG/M2

## 2019-01-01 DIAGNOSIS — I73.9 PVD (PERIPHERAL VASCULAR DISEASE) (HCC): ICD-10-CM

## 2019-01-01 DIAGNOSIS — I25.5 ISCHEMIC CARDIOMYOPATHY: ICD-10-CM

## 2019-01-01 DIAGNOSIS — I10 ESSENTIAL HYPERTENSION: ICD-10-CM

## 2019-01-01 DIAGNOSIS — E78.2 MIXED HYPERLIPIDEMIA: ICD-10-CM

## 2019-01-01 DIAGNOSIS — I65.23 BILATERAL CAROTID ARTERY STENOSIS: ICD-10-CM

## 2019-01-01 DIAGNOSIS — I73.9 CLAUDICATION (HCC): ICD-10-CM

## 2019-01-01 DIAGNOSIS — Z71.6 TOBACCO ABUSE COUNSELING: ICD-10-CM

## 2019-01-01 DIAGNOSIS — I25.10 ASHD (ARTERIOSCLEROTIC HEART DISEASE): Primary | ICD-10-CM

## 2019-01-01 PROCEDURE — 4004F PT TOBACCO SCREEN RCVD TLK: CPT | Performed by: INTERNAL MEDICINE

## 2019-01-01 PROCEDURE — 99214 OFFICE O/P EST MOD 30 MIN: CPT | Performed by: INTERNAL MEDICINE

## 2019-01-01 PROCEDURE — G8484 FLU IMMUNIZE NO ADMIN: HCPCS | Performed by: INTERNAL MEDICINE

## 2019-01-01 PROCEDURE — G8598 ASA/ANTIPLAT THER USED: HCPCS | Performed by: INTERNAL MEDICINE

## 2019-01-01 PROCEDURE — 3017F COLORECTAL CA SCREEN DOC REV: CPT | Performed by: INTERNAL MEDICINE

## 2019-01-01 PROCEDURE — 4040F PNEUMOC VAC/ADMIN/RCVD: CPT | Performed by: INTERNAL MEDICINE

## 2019-01-01 PROCEDURE — 93880 EXTRACRANIAL BILAT STUDY: CPT

## 2019-01-01 PROCEDURE — 93923 UPR/LXTR ART STDY 3+ LVLS: CPT

## 2019-01-01 PROCEDURE — G8419 CALC BMI OUT NRM PARAM NOF/U: HCPCS | Performed by: INTERNAL MEDICINE

## 2019-01-01 PROCEDURE — G8427 DOCREV CUR MEDS BY ELIG CLIN: HCPCS | Performed by: INTERNAL MEDICINE

## 2019-01-01 PROCEDURE — 1123F ACP DISCUSS/DSCN MKR DOCD: CPT | Performed by: INTERNAL MEDICINE

## 2019-01-01 RX ORDER — LISINOPRIL 20 MG/1
TABLET ORAL
Qty: 30 TABLET | Refills: 0 | Status: SHIPPED | OUTPATIENT
Start: 2019-01-01 | End: 2019-01-01 | Stop reason: SDUPTHER

## 2019-01-01 RX ORDER — AMLODIPINE BESYLATE 10 MG/1
TABLET ORAL
Qty: 30 TABLET | Refills: 0 | Status: SHIPPED | OUTPATIENT
Start: 2019-01-01 | End: 2019-01-01 | Stop reason: SDUPTHER

## 2019-01-01 RX ORDER — AMLODIPINE BESYLATE 10 MG/1
10 TABLET ORAL DAILY
Qty: 90 TABLET | Refills: 3 | Status: SHIPPED | OUTPATIENT
Start: 2019-01-01

## 2019-01-01 RX ORDER — LISINOPRIL 20 MG/1
20 TABLET ORAL DAILY
Qty: 90 TABLET | Refills: 3 | Status: SHIPPED | OUTPATIENT
Start: 2019-01-01

## 2019-01-01 RX ORDER — METOPROLOL SUCCINATE 25 MG/1
25 TABLET, EXTENDED RELEASE ORAL DAILY
Qty: 90 TABLET | Refills: 3 | Status: ON HOLD | OUTPATIENT
Start: 2019-01-01 | End: 2020-01-01

## 2019-01-01 RX ORDER — METOPROLOL SUCCINATE 25 MG/1
TABLET, EXTENDED RELEASE ORAL
Qty: 15 TABLET | Refills: 0 | Status: SHIPPED | OUTPATIENT
Start: 2019-01-01 | End: 2019-01-01 | Stop reason: SDUPTHER

## 2019-02-05 ENCOUNTER — HOSPITAL ENCOUNTER (OUTPATIENT)
Dept: VASCULAR LAB | Age: 64
Discharge: HOME | End: 2019-02-07
Payer: MEDICARE

## 2019-02-05 DIAGNOSIS — I65.23 BILATERAL CAROTID ARTERY STENOSIS: ICD-10-CM

## 2019-02-05 PROCEDURE — 93880 EXTRACRANIAL BILAT STUDY: CPT

## 2019-03-11 ENCOUNTER — APPOINTMENT (OUTPATIENT)
Dept: CT IMAGING | Age: 64
DRG: 189 | End: 2019-03-11
Payer: MEDICARE

## 2019-03-11 ENCOUNTER — HOSPITAL ENCOUNTER (INPATIENT)
Age: 64
LOS: 4 days | Discharge: HOME | DRG: 189 | End: 2019-03-15
Attending: INTERNAL MEDICINE | Admitting: INTERNAL MEDICINE
Payer: MEDICARE

## 2019-03-11 ENCOUNTER — APPOINTMENT (OUTPATIENT)
Dept: GENERAL RADIOLOGY | Age: 64
DRG: 189 | End: 2019-03-11
Payer: MEDICARE

## 2019-03-11 DIAGNOSIS — I73.9 PERIPHERAL VASCULAR DISEASE (HCC): ICD-10-CM

## 2019-03-11 DIAGNOSIS — J44.1 COPD EXACERBATION (HCC): ICD-10-CM

## 2019-03-11 DIAGNOSIS — J96.01 ACUTE RESPIRATORY FAILURE WITH HYPOXIA (HCC): Primary | ICD-10-CM

## 2019-03-11 DIAGNOSIS — J44.9 CHRONIC OBSTRUCTIVE PULMONARY DISEASE, UNSPECIFIED COPD TYPE (HCC): ICD-10-CM

## 2019-03-11 LAB
ABSOLUTE EOS #: <0.03 K/UL (ref 0–0.44)
ABSOLUTE IMMATURE GRANULOCYTE: 0.05 K/UL (ref 0–0.3)
ABSOLUTE LYMPH #: 0.41 K/UL (ref 1.1–3.7)
ABSOLUTE MONO #: 0.75 K/UL (ref 0.1–1.2)
ALLEN TEST: ABNORMAL
ANION GAP SERPL CALCULATED.3IONS-SCNC: 15 MMOL/L (ref 9–17)
BASOPHILS # BLD: 0 % (ref 0–2)
BASOPHILS ABSOLUTE: <0.03 K/UL (ref 0–0.2)
BNP INTERPRETATION: ABNORMAL
BUN BLDV-MCNC: 26 MG/DL (ref 8–23)
BUN/CREAT BLD: 29 (ref 9–20)
CALCIUM SERPL-MCNC: 8.8 MG/DL (ref 8.6–10.4)
CARBOXYHEMOGLOBIN: ABNORMAL % (ref 0–5)
CHLORIDE BLD-SCNC: 93 MMOL/L (ref 98–107)
CO2: 22 MMOL/L (ref 20–31)
CREAT SERPL-MCNC: 0.91 MG/DL (ref 0.7–1.2)
DIFFERENTIAL TYPE: ABNORMAL
DIRECT EXAM: NORMAL
EOSINOPHILS RELATIVE PERCENT: 0 % (ref 1–4)
FIO2: ABNORMAL
GFR AFRICAN AMERICAN: >60 ML/MIN
GFR NON-AFRICAN AMERICAN: >60 ML/MIN
GFR SERPL CREATININE-BSD FRML MDRD: ABNORMAL ML/MIN/{1.73_M2}
GFR SERPL CREATININE-BSD FRML MDRD: ABNORMAL ML/MIN/{1.73_M2}
GLUCOSE BLD-MCNC: 117 MG/DL (ref 70–99)
HCO3 VENOUS: 22.3 MMOL/L (ref 24–30)
HCT VFR BLD CALC: 43.1 % (ref 40.7–50.3)
HEMOGLOBIN: 14.2 G/DL (ref 13–17)
IMMATURE GRANULOCYTES: 0 %
LYMPHOCYTES # BLD: 4 % (ref 24–43)
Lab: NORMAL
MAGNESIUM: 2 MG/DL (ref 1.6–2.6)
MCH RBC QN AUTO: 31 PG (ref 25.2–33.5)
MCHC RBC AUTO-ENTMCNC: 32.9 G/DL (ref 28.4–34.8)
MCV RBC AUTO: 94.1 FL (ref 82.6–102.9)
METHEMOGLOBIN: ABNORMAL % (ref 0–1.9)
MODE: ABNORMAL
MONOCYTES # BLD: 7 % (ref 3–12)
NEGATIVE BASE EXCESS, VEN: 2.4 MMOL/L (ref 0–2)
NOTIFICATION TIME: ABNORMAL
NOTIFICATION: ABNORMAL
NRBC AUTOMATED: 0 PER 100 WBC
O2 DEVICE/FLOW/%: ABNORMAL
O2 SAT, VEN: 88 % (ref 60–85)
OXYHEMOGLOBIN: ABNORMAL % (ref 95–98)
PATIENT TEMP: 37
PCO2, VEN, TEMP ADJ: ABNORMAL MMHG (ref 39–55)
PCO2, VEN: 38.5 (ref 39–55)
PDW BLD-RTO: 13.7 % (ref 11.8–14.4)
PEEP/CPAP: ABNORMAL
PH VENOUS: 7.38 (ref 7.32–7.42)
PH, VEN, TEMP ADJ: ABNORMAL (ref 7.32–7.42)
PLATELET # BLD: 186 K/UL (ref 138–453)
PLATELET ESTIMATE: ABNORMAL
PMV BLD AUTO: 9.6 FL (ref 8.1–13.5)
PO2, VEN, TEMP ADJ: ABNORMAL MMHG (ref 30–50)
PO2, VEN: 54.5 (ref 30–50)
POSITIVE BASE EXCESS, VEN: ABNORMAL MMOL/L (ref 0–2)
POTASSIUM SERPL-SCNC: 4.6 MMOL/L (ref 3.7–5.3)
PRO-BNP: 1034 PG/ML
PSV: ABNORMAL
PT. POSITION: ABNORMAL
RBC # BLD: 4.58 M/UL (ref 4.21–5.77)
RBC # BLD: ABNORMAL 10*6/UL
RESPIRATORY RATE: 19
SAMPLE SITE: ABNORMAL
SEG NEUTROPHILS: 89 % (ref 36–65)
SEGMENTED NEUTROPHILS ABSOLUTE COUNT: 10.02 K/UL (ref 1.5–8.1)
SET RATE: ABNORMAL
SODIUM BLD-SCNC: 130 MMOL/L (ref 135–144)
SPECIMEN DESCRIPTION: NORMAL
TEXT FOR RESPIRATORY: ABNORMAL
TOTAL HB: ABNORMAL G/DL (ref 12–16)
TOTAL RATE: ABNORMAL
TROPONIN INTERP: NORMAL
TROPONIN T: <0.03 NG/ML
TROPONIN, HIGH SENSITIVITY: NORMAL NG/L (ref 0–22)
VT: ABNORMAL
WBC # BLD: 11.2 K/UL (ref 3.5–11.3)
WBC # BLD: ABNORMAL 10*3/UL

## 2019-03-11 PROCEDURE — 36415 COLL VENOUS BLD VENIPUNCTURE: CPT

## 2019-03-11 PROCEDURE — 84484 ASSAY OF TROPONIN QUANT: CPT

## 2019-03-11 PROCEDURE — 2000000000 HC ICU R&B

## 2019-03-11 PROCEDURE — 80048 BASIC METABOLIC PNL TOTAL CA: CPT

## 2019-03-11 PROCEDURE — 6370000000 HC RX 637 (ALT 250 FOR IP): Performed by: PHYSICIAN ASSISTANT

## 2019-03-11 PROCEDURE — 83735 ASSAY OF MAGNESIUM: CPT

## 2019-03-11 PROCEDURE — 2580000003 HC RX 258: Performed by: PHYSICIAN ASSISTANT

## 2019-03-11 PROCEDURE — 87804 INFLUENZA ASSAY W/OPTIC: CPT

## 2019-03-11 PROCEDURE — 71260 CT THORAX DX C+: CPT

## 2019-03-11 PROCEDURE — 87040 BLOOD CULTURE FOR BACTERIA: CPT

## 2019-03-11 PROCEDURE — 83880 ASSAY OF NATRIURETIC PEPTIDE: CPT

## 2019-03-11 PROCEDURE — 93005 ELECTROCARDIOGRAM TRACING: CPT

## 2019-03-11 PROCEDURE — 6360000002 HC RX W HCPCS: Performed by: PHYSICIAN ASSISTANT

## 2019-03-11 PROCEDURE — 96365 THER/PROPH/DIAG IV INF INIT: CPT

## 2019-03-11 PROCEDURE — 82805 BLOOD GASES W/O2 SATURATION: CPT

## 2019-03-11 PROCEDURE — 85025 COMPLETE CBC W/AUTO DIFF WBC: CPT

## 2019-03-11 PROCEDURE — 94664 DEMO&/EVAL PT USE INHALER: CPT

## 2019-03-11 PROCEDURE — 71045 X-RAY EXAM CHEST 1 VIEW: CPT

## 2019-03-11 PROCEDURE — 94640 AIRWAY INHALATION TREATMENT: CPT

## 2019-03-11 PROCEDURE — 96375 TX/PRO/DX INJ NEW DRUG ADDON: CPT

## 2019-03-11 PROCEDURE — 6360000004 HC RX CONTRAST MEDICATION: Performed by: PHYSICIAN ASSISTANT

## 2019-03-11 PROCEDURE — 99285 EMERGENCY DEPT VISIT HI MDM: CPT

## 2019-03-11 RX ORDER — 0.9 % SODIUM CHLORIDE 0.9 %
1000 INTRAVENOUS SOLUTION INTRAVENOUS ONCE
Status: COMPLETED | OUTPATIENT
Start: 2019-03-11 | End: 2019-03-12

## 2019-03-11 RX ORDER — BUDESONIDE 0.5 MG/2ML
0.5 INHALANT ORAL ONCE
Status: COMPLETED | OUTPATIENT
Start: 2019-03-11 | End: 2019-03-11

## 2019-03-11 RX ORDER — LEVOFLOXACIN 5 MG/ML
750 INJECTION, SOLUTION INTRAVENOUS EVERY 24 HOURS
Status: DISCONTINUED | OUTPATIENT
Start: 2019-03-11 | End: 2019-03-12

## 2019-03-11 RX ORDER — METHYLPREDNISOLONE SODIUM SUCCINATE 125 MG/2ML
125 INJECTION, POWDER, LYOPHILIZED, FOR SOLUTION INTRAMUSCULAR; INTRAVENOUS ONCE
Status: COMPLETED | OUTPATIENT
Start: 2019-03-11 | End: 2019-03-11

## 2019-03-11 RX ORDER — IPRATROPIUM BROMIDE AND ALBUTEROL SULFATE 2.5; .5 MG/3ML; MG/3ML
1 SOLUTION RESPIRATORY (INHALATION) ONCE
Status: COMPLETED | OUTPATIENT
Start: 2019-03-11 | End: 2019-03-11

## 2019-03-11 RX ADMIN — LEVOFLOXACIN 750 MG: 5 INJECTION, SOLUTION INTRAVENOUS at 23:28

## 2019-03-11 RX ADMIN — IPRATROPIUM BROMIDE AND ALBUTEROL SULFATE 1 AMPULE: .5; 3 SOLUTION RESPIRATORY (INHALATION) at 21:21

## 2019-03-11 RX ADMIN — SODIUM CHLORIDE 1000 ML: 9 INJECTION, SOLUTION INTRAVENOUS at 23:28

## 2019-03-11 RX ADMIN — IOPAMIDOL 75 ML: 755 INJECTION, SOLUTION INTRAVENOUS at 22:46

## 2019-03-11 RX ADMIN — METHYLPREDNISOLONE SODIUM SUCCINATE 125 MG: 125 INJECTION, POWDER, FOR SOLUTION INTRAMUSCULAR; INTRAVENOUS at 21:27

## 2019-03-11 RX ADMIN — BUDESONIDE 500 MCG: 0.5 SUSPENSION RESPIRATORY (INHALATION) at 21:21

## 2019-03-11 ASSESSMENT — ENCOUNTER SYMPTOMS
BACK PAIN: 0
SHORTNESS OF BREATH: 1
COUGH: 1
EYE DISCHARGE: 0
SORE THROAT: 0
CONSTIPATION: 0
CHEST TIGHTNESS: 0
ABDOMINAL PAIN: 0
WHEEZING: 1
VOMITING: 0
NAUSEA: 0
EYE REDNESS: 0
RHINORRHEA: 0
DIARRHEA: 0
BLOOD IN STOOL: 0

## 2019-03-12 PROBLEM — J15.9 BACTERIAL PNEUMONIA: Status: ACTIVE | Noted: 2019-03-12

## 2019-03-12 LAB
-: NORMAL
ABSOLUTE EOS #: 0 K/UL (ref 0–0.44)
ABSOLUTE IMMATURE GRANULOCYTE: 0.09 K/UL (ref 0–0.3)
ABSOLUTE LYMPH #: 0.53 K/UL (ref 1.1–3.7)
ABSOLUTE MONO #: 0.27 K/UL (ref 0.1–1.2)
ALLEN TEST: NORMAL
AMORPHOUS: NORMAL
BACTERIA: NORMAL
BASOPHILS # BLD: 0 % (ref 0–2)
BASOPHILS ABSOLUTE: 0 K/UL (ref 0–0.2)
BILIRUBIN URINE: NEGATIVE
CARBOXYHEMOGLOBIN: NORMAL % (ref 0–5)
CASTS UA: NORMAL /LPF
COLOR: YELLOW
COMMENT UA: ABNORMAL
CRYSTALS, UA: NORMAL /HPF
DIFFERENTIAL TYPE: ABNORMAL
EKG ATRIAL RATE: 85 BPM
EKG ATRIAL RATE: 91 BPM
EKG P AXIS: 82 DEGREES
EKG P AXIS: 88 DEGREES
EKG P-R INTERVAL: 152 MS
EKG P-R INTERVAL: 164 MS
EKG Q-T INTERVAL: 360 MS
EKG Q-T INTERVAL: 382 MS
EKG QRS DURATION: 96 MS
EKG QRS DURATION: 98 MS
EKG QTC CALCULATION (BAZETT): 442 MS
EKG QTC CALCULATION (BAZETT): 454 MS
EKG R AXIS: 70 DEGREES
EKG R AXIS: 74 DEGREES
EKG T AXIS: 87 DEGREES
EKG T AXIS: 92 DEGREES
EKG VENTRICULAR RATE: 85 BPM
EKG VENTRICULAR RATE: 91 BPM
EOSINOPHILS RELATIVE PERCENT: 0 % (ref 1–4)
EPITHELIAL CELLS UA: NORMAL /HPF (ref 0–5)
FIO2: NORMAL
GLUCOSE URINE: NEGATIVE
HCO3 ARTERIAL: 25 MMOL/L (ref 22–26)
HCT VFR BLD CALC: 42 % (ref 40.7–50.3)
HEMOGLOBIN: 13.8 G/DL (ref 13–17)
IMMATURE GRANULOCYTES: 1 %
KETONES, URINE: NEGATIVE
LEUKOCYTE ESTERASE, URINE: NEGATIVE
LYMPHOCYTES # BLD: 6 % (ref 24–43)
MCH RBC QN AUTO: 30.7 PG (ref 25.2–33.5)
MCHC RBC AUTO-ENTMCNC: 32.9 G/DL (ref 28.4–34.8)
MCV RBC AUTO: 93.5 FL (ref 82.6–102.9)
METHEMOGLOBIN: NORMAL % (ref 0–1.9)
MODE: NORMAL
MONOCYTES # BLD: 3 % (ref 3–12)
MORPHOLOGY: NORMAL
MUCUS: NORMAL
NEGATIVE BASE EXCESS, ART: 0.3 MMOL/L (ref 0–2)
NITRITE, URINE: NEGATIVE
NOTIFICATION TIME: NORMAL
NOTIFICATION: NORMAL
NRBC AUTOMATED: 0 PER 100 WBC
O2 DEVICE/FLOW/%: NORMAL
O2 SAT, ARTERIAL: 96.3 % (ref 95–98)
OTHER OBSERVATIONS UA: NORMAL
OXYHEMOGLOBIN: NORMAL % (ref 95–98)
PATIENT TEMP: 37
PCO2 ARTERIAL: 43.3 MMHG (ref 35–45)
PCO2, ART, TEMP ADJ: NORMAL
PDW BLD-RTO: 13.5 % (ref 11.8–14.4)
PEEP/CPAP: NORMAL
PH ARTERIAL: 7.38 (ref 7.35–7.45)
PH UA: 6 (ref 5–9)
PH, ART, TEMP ADJ: NORMAL (ref 7.35–7.45)
PLATELET # BLD: 171 K/UL (ref 138–453)
PLATELET ESTIMATE: ABNORMAL
PMV BLD AUTO: 9.5 FL (ref 8.1–13.5)
PO2 ARTERIAL: 85.4 MMHG (ref 80–100)
PO2, ART, TEMP ADJ: NORMAL MMHG (ref 80–100)
POSITIVE BASE EXCESS, ART: NORMAL MMOL/L (ref 0–2)
PROTEIN UA: ABNORMAL
PSV: NORMAL
PT. POSITION: NORMAL
RBC # BLD: 4.49 M/UL (ref 4.21–5.77)
RBC # BLD: ABNORMAL 10*6/UL
RBC UA: NORMAL /HPF (ref 0–2)
RENAL EPITHELIAL, UA: NORMAL /HPF
RESPIRATORY RATE: NORMAL
SAMPLE SITE: NORMAL
SEG NEUTROPHILS: 90 % (ref 36–65)
SEGMENTED NEUTROPHILS ABSOLUTE COUNT: 8.01 K/UL (ref 1.5–8.1)
SET RATE: NORMAL
SPECIFIC GRAVITY UA: 1.01 (ref 1.01–1.02)
TEXT FOR RESPIRATORY: NORMAL
TOTAL HB: NORMAL G/DL (ref 12–16)
TOTAL RATE: NORMAL
TRICHOMONAS: NORMAL
TURBIDITY: CLEAR
URINE HGB: ABNORMAL
UROBILINOGEN, URINE: NORMAL
VT: NORMAL
WBC # BLD: 8.9 K/UL (ref 3.5–11.3)
WBC # BLD: ABNORMAL 10*3/UL
WBC UA: NORMAL /HPF (ref 0–5)
YEAST: NORMAL

## 2019-03-12 PROCEDURE — 94664 DEMO&/EVAL PT USE INHALER: CPT

## 2019-03-12 PROCEDURE — 81001 URINALYSIS AUTO W/SCOPE: CPT

## 2019-03-12 PROCEDURE — 2000000000 HC ICU R&B

## 2019-03-12 PROCEDURE — 93005 ELECTROCARDIOGRAM TRACING: CPT

## 2019-03-12 PROCEDURE — 2580000003 HC RX 258: Performed by: INTERNAL MEDICINE

## 2019-03-12 PROCEDURE — 36415 COLL VENOUS BLD VENIPUNCTURE: CPT

## 2019-03-12 PROCEDURE — 36600 WITHDRAWAL OF ARTERIAL BLOOD: CPT

## 2019-03-12 PROCEDURE — 6370000000 HC RX 637 (ALT 250 FOR IP): Performed by: INTERNAL MEDICINE

## 2019-03-12 PROCEDURE — 85025 COMPLETE CBC W/AUTO DIFF WBC: CPT

## 2019-03-12 PROCEDURE — 97162 PT EVAL MOD COMPLEX 30 MIN: CPT

## 2019-03-12 PROCEDURE — 94640 AIRWAY INHALATION TREATMENT: CPT

## 2019-03-12 PROCEDURE — 6360000002 HC RX W HCPCS: Performed by: INTERNAL MEDICINE

## 2019-03-12 PROCEDURE — 82805 BLOOD GASES W/O2 SATURATION: CPT

## 2019-03-12 PROCEDURE — 2700000000 HC OXYGEN THERAPY PER DAY

## 2019-03-12 PROCEDURE — 94660 CPAP INITIATION&MGMT: CPT

## 2019-03-12 PROCEDURE — 97166 OT EVAL MOD COMPLEX 45 MIN: CPT

## 2019-03-12 RX ORDER — AMLODIPINE BESYLATE 10 MG/1
10 TABLET ORAL DAILY
Status: DISCONTINUED | OUTPATIENT
Start: 2019-03-12 | End: 2019-03-15 | Stop reason: HOSPADM

## 2019-03-12 RX ORDER — ONDANSETRON 2 MG/ML
4 INJECTION INTRAMUSCULAR; INTRAVENOUS EVERY 6 HOURS PRN
Status: DISCONTINUED | OUTPATIENT
Start: 2019-03-12 | End: 2019-03-15 | Stop reason: HOSPADM

## 2019-03-12 RX ORDER — ISOSORBIDE MONONITRATE 30 MG/1
30 TABLET, EXTENDED RELEASE ORAL DAILY
Status: DISCONTINUED | OUTPATIENT
Start: 2019-03-12 | End: 2019-03-15 | Stop reason: HOSPADM

## 2019-03-12 RX ORDER — SODIUM CHLORIDE 0.9 % (FLUSH) 0.9 %
10 SYRINGE (ML) INJECTION PRN
Status: DISCONTINUED | OUTPATIENT
Start: 2019-03-12 | End: 2019-03-15 | Stop reason: HOSPADM

## 2019-03-12 RX ORDER — RANOLAZINE 500 MG/1
500 TABLET, EXTENDED RELEASE ORAL 2 TIMES DAILY
Status: DISCONTINUED | OUTPATIENT
Start: 2019-03-12 | End: 2019-03-15 | Stop reason: HOSPADM

## 2019-03-12 RX ORDER — LISINOPRIL 20 MG/1
20 TABLET ORAL DAILY
Status: DISCONTINUED | OUTPATIENT
Start: 2019-03-12 | End: 2019-03-15 | Stop reason: HOSPADM

## 2019-03-12 RX ORDER — ASPIRIN 81 MG/1
81 TABLET ORAL DAILY
Status: DISCONTINUED | OUTPATIENT
Start: 2019-03-12 | End: 2019-03-15 | Stop reason: HOSPADM

## 2019-03-12 RX ORDER — PREDNISONE 20 MG/1
40 TABLET ORAL DAILY
Status: DISCONTINUED | OUTPATIENT
Start: 2019-03-14 | End: 2019-03-13

## 2019-03-12 RX ORDER — METOPROLOL SUCCINATE 25 MG/1
12.5 TABLET, EXTENDED RELEASE ORAL DAILY
Status: DISCONTINUED | OUTPATIENT
Start: 2019-03-12 | End: 2019-03-15 | Stop reason: HOSPADM

## 2019-03-12 RX ORDER — ROSUVASTATIN CALCIUM 20 MG/1
40 TABLET, COATED ORAL NIGHTLY
Status: DISCONTINUED | OUTPATIENT
Start: 2019-03-12 | End: 2019-03-15 | Stop reason: HOSPADM

## 2019-03-12 RX ORDER — DOXYCYCLINE HYCLATE 100 MG
100 TABLET ORAL 2 TIMES DAILY
Status: ON HOLD | COMMUNITY
End: 2019-03-15 | Stop reason: HOSPADM

## 2019-03-12 RX ORDER — PRASUGREL 10 MG/1
10 TABLET, FILM COATED ORAL DAILY
Status: DISCONTINUED | OUTPATIENT
Start: 2019-03-12 | End: 2019-03-15 | Stop reason: HOSPADM

## 2019-03-12 RX ORDER — IPRATROPIUM BROMIDE AND ALBUTEROL SULFATE 2.5; .5 MG/3ML; MG/3ML
1 SOLUTION RESPIRATORY (INHALATION) 3 TIMES DAILY
Status: DISCONTINUED | OUTPATIENT
Start: 2019-03-12 | End: 2019-03-14

## 2019-03-12 RX ORDER — IPRATROPIUM BROMIDE AND ALBUTEROL SULFATE 2.5; .5 MG/3ML; MG/3ML
1 SOLUTION RESPIRATORY (INHALATION)
Status: DISCONTINUED | OUTPATIENT
Start: 2019-03-12 | End: 2019-03-12

## 2019-03-12 RX ORDER — ACETAMINOPHEN 325 MG/1
650 TABLET ORAL EVERY 4 HOURS PRN
Status: DISCONTINUED | OUTPATIENT
Start: 2019-03-12 | End: 2019-03-15 | Stop reason: HOSPADM

## 2019-03-12 RX ORDER — FAMOTIDINE 20 MG/1
20 TABLET, FILM COATED ORAL 2 TIMES DAILY
Status: DISCONTINUED | OUTPATIENT
Start: 2019-03-12 | End: 2019-03-15 | Stop reason: HOSPADM

## 2019-03-12 RX ORDER — SODIUM CHLORIDE 0.9 % (FLUSH) 0.9 %
10 SYRINGE (ML) INJECTION EVERY 12 HOURS SCHEDULED
Status: DISCONTINUED | OUTPATIENT
Start: 2019-03-12 | End: 2019-03-15 | Stop reason: HOSPADM

## 2019-03-12 RX ORDER — METHYLPREDNISOLONE SODIUM SUCCINATE 40 MG/ML
40 INJECTION, POWDER, LYOPHILIZED, FOR SOLUTION INTRAMUSCULAR; INTRAVENOUS EVERY 6 HOURS
Status: DISCONTINUED | OUTPATIENT
Start: 2019-03-12 | End: 2019-03-13

## 2019-03-12 RX ORDER — NITROGLYCERIN 0.4 MG/1
0.4 TABLET SUBLINGUAL EVERY 5 MIN PRN
Status: DISCONTINUED | OUTPATIENT
Start: 2019-03-12 | End: 2019-03-15 | Stop reason: HOSPADM

## 2019-03-12 RX ORDER — SODIUM CHLORIDE 9 MG/ML
INJECTION, SOLUTION INTRAVENOUS CONTINUOUS
Status: DISCONTINUED | OUTPATIENT
Start: 2019-03-12 | End: 2019-03-13

## 2019-03-12 RX ADMIN — AZITHROMYCIN MONOHYDRATE 500 MG: 500 INJECTION, POWDER, LYOPHILIZED, FOR SOLUTION INTRAVENOUS at 09:11

## 2019-03-12 RX ADMIN — ENOXAPARIN SODIUM 40 MG: 40 INJECTION SUBCUTANEOUS at 09:11

## 2019-03-12 RX ADMIN — METHYLPREDNISOLONE SODIUM SUCCINATE 40 MG: 40 INJECTION, POWDER, FOR SOLUTION INTRAMUSCULAR; INTRAVENOUS at 20:50

## 2019-03-12 RX ADMIN — LISINOPRIL 20 MG: 20 TABLET ORAL at 09:12

## 2019-03-12 RX ADMIN — AMLODIPINE BESYLATE 10 MG: 10 TABLET ORAL at 09:11

## 2019-03-12 RX ADMIN — RANOLAZINE 500 MG: 500 TABLET, EXTENDED RELEASE ORAL at 11:09

## 2019-03-12 RX ADMIN — IPRATROPIUM BROMIDE AND ALBUTEROL SULFATE 1 AMPULE: .5; 3 SOLUTION RESPIRATORY (INHALATION) at 21:20

## 2019-03-12 RX ADMIN — SODIUM CHLORIDE: 9 INJECTION, SOLUTION INTRAVENOUS at 01:34

## 2019-03-12 RX ADMIN — ROSUVASTATIN CALCIUM 40 MG: 20 TABLET, FILM COATED ORAL at 20:50

## 2019-03-12 RX ADMIN — Medication 10 ML: at 09:13

## 2019-03-12 RX ADMIN — SODIUM CHLORIDE: 9 INJECTION, SOLUTION INTRAVENOUS at 15:56

## 2019-03-12 RX ADMIN — CEFTRIAXONE 1 G: 1 INJECTION, POWDER, FOR SOLUTION INTRAMUSCULAR; INTRAVENOUS at 01:34

## 2019-03-12 RX ADMIN — ASPIRIN 81 MG: 81 TABLET ORAL at 09:12

## 2019-03-12 RX ADMIN — IPRATROPIUM BROMIDE AND ALBUTEROL SULFATE 1 AMPULE: .5; 3 SOLUTION RESPIRATORY (INHALATION) at 10:44

## 2019-03-12 RX ADMIN — RANOLAZINE 500 MG: 500 TABLET, EXTENDED RELEASE ORAL at 20:50

## 2019-03-12 RX ADMIN — Medication 10 ML: at 20:50

## 2019-03-12 RX ADMIN — METHYLPREDNISOLONE SODIUM SUCCINATE 40 MG: 40 INJECTION, POWDER, FOR SOLUTION INTRAMUSCULAR; INTRAVENOUS at 14:34

## 2019-03-12 RX ADMIN — FAMOTIDINE 20 MG: 20 TABLET ORAL at 09:12

## 2019-03-12 RX ADMIN — ISOSORBIDE MONONITRATE 30 MG: 30 TABLET, EXTENDED RELEASE ORAL at 09:12

## 2019-03-12 RX ADMIN — MOMETASONE FUROATE AND FORMOTEROL FUMARATE DIHYDRATE 2 PUFF: 200; 5 AEROSOL RESPIRATORY (INHALATION) at 21:20

## 2019-03-12 RX ADMIN — IPRATROPIUM BROMIDE AND ALBUTEROL SULFATE 1 AMPULE: .5; 3 SOLUTION RESPIRATORY (INHALATION) at 18:13

## 2019-03-12 RX ADMIN — FAMOTIDINE 20 MG: 20 TABLET ORAL at 20:50

## 2019-03-12 RX ADMIN — METHYLPREDNISOLONE SODIUM SUCCINATE 40 MG: 40 INJECTION, POWDER, FOR SOLUTION INTRAMUSCULAR; INTRAVENOUS at 04:27

## 2019-03-12 RX ADMIN — METHYLPREDNISOLONE SODIUM SUCCINATE 40 MG: 40 INJECTION, POWDER, FOR SOLUTION INTRAMUSCULAR; INTRAVENOUS at 09:11

## 2019-03-12 RX ADMIN — PRASUGREL 10 MG: 10 TABLET, FILM COATED ORAL at 09:12

## 2019-03-12 RX ADMIN — METOPROLOL SUCCINATE 12.5 MG: 25 TABLET, EXTENDED RELEASE ORAL at 09:11

## 2019-03-12 RX ADMIN — MOMETASONE FUROATE AND FORMOTEROL FUMARATE DIHYDRATE 2 PUFF: 200; 5 AEROSOL RESPIRATORY (INHALATION) at 10:44

## 2019-03-12 ASSESSMENT — PAIN SCALES - GENERAL
PAINLEVEL_OUTOF10: 0

## 2019-03-13 PROBLEM — E87.1 HYPONATREMIA: Status: ACTIVE | Noted: 2019-03-13

## 2019-03-13 LAB
ABSOLUTE EOS #: 0 K/UL (ref 0–0.44)
ABSOLUTE IMMATURE GRANULOCYTE: 0 K/UL (ref 0–0.3)
ABSOLUTE LYMPH #: 0.1 K/UL (ref 1.1–3.7)
ABSOLUTE MONO #: 0.3 K/UL (ref 0.1–1.2)
ALBUMIN SERPL-MCNC: 2.7 G/DL (ref 3.5–5.2)
ALBUMIN/GLOBULIN RATIO: 0.8 (ref 1–2.5)
ALP BLD-CCNC: 57 U/L (ref 40–129)
ALT SERPL-CCNC: 10 U/L (ref 5–41)
ANION GAP SERPL CALCULATED.3IONS-SCNC: 10 MMOL/L (ref 9–17)
AST SERPL-CCNC: 19 U/L
BASOPHILS # BLD: 0 % (ref 0–2)
BASOPHILS ABSOLUTE: 0 K/UL (ref 0–0.2)
BILIRUB SERPL-MCNC: 0.25 MG/DL (ref 0.3–1.2)
BUN BLDV-MCNC: 19 MG/DL (ref 8–23)
BUN/CREAT BLD: 29 (ref 9–20)
CALCIUM SERPL-MCNC: 8.3 MG/DL (ref 8.6–10.4)
CHLORIDE BLD-SCNC: 103 MMOL/L (ref 98–107)
CO2: 25 MMOL/L (ref 20–31)
CREAT SERPL-MCNC: 0.65 MG/DL (ref 0.7–1.2)
DIFFERENTIAL TYPE: ABNORMAL
EOSINOPHILS RELATIVE PERCENT: 0 % (ref 1–4)
GFR AFRICAN AMERICAN: >60 ML/MIN
GFR NON-AFRICAN AMERICAN: >60 ML/MIN
GFR SERPL CREATININE-BSD FRML MDRD: ABNORMAL ML/MIN/{1.73_M2}
GFR SERPL CREATININE-BSD FRML MDRD: ABNORMAL ML/MIN/{1.73_M2}
GLUCOSE BLD-MCNC: 156 MG/DL (ref 70–99)
HCT VFR BLD CALC: 37.7 % (ref 40.7–50.3)
HEMOGLOBIN: 12.3 G/DL (ref 13–17)
IMMATURE GRANULOCYTES: 0 %
LYMPHOCYTES # BLD: 1 % (ref 24–43)
MCH RBC QN AUTO: 30.8 PG (ref 25.2–33.5)
MCHC RBC AUTO-ENTMCNC: 32.6 G/DL (ref 28.4–34.8)
MCV RBC AUTO: 94.5 FL (ref 82.6–102.9)
MONOCYTES # BLD: 3 % (ref 3–12)
NRBC AUTOMATED: 0 PER 100 WBC
PDW BLD-RTO: 13.4 % (ref 11.8–14.4)
PLATELET # BLD: 195 K/UL (ref 138–453)
PLATELET ESTIMATE: ABNORMAL
PMV BLD AUTO: 9.2 FL (ref 8.1–13.5)
POTASSIUM SERPL-SCNC: 4.2 MMOL/L (ref 3.7–5.3)
RBC # BLD: 3.99 M/UL (ref 4.21–5.77)
RBC # BLD: ABNORMAL 10*6/UL
SEG NEUTROPHILS: 96 % (ref 36–65)
SEGMENTED NEUTROPHILS ABSOLUTE COUNT: 9.6 K/UL (ref 1.5–8.1)
SODIUM BLD-SCNC: 138 MMOL/L (ref 135–144)
TOTAL PROTEIN: 5.9 G/DL (ref 6.4–8.3)
WBC # BLD: 10 K/UL (ref 3.5–11.3)
WBC # BLD: ABNORMAL 10*3/UL

## 2019-03-13 PROCEDURE — 94640 AIRWAY INHALATION TREATMENT: CPT

## 2019-03-13 PROCEDURE — G0009 ADMIN PNEUMOCOCCAL VACCINE: HCPCS | Performed by: INTERNAL MEDICINE

## 2019-03-13 PROCEDURE — 2700000000 HC OXYGEN THERAPY PER DAY

## 2019-03-13 PROCEDURE — 90670 PCV13 VACCINE IM: CPT | Performed by: INTERNAL MEDICINE

## 2019-03-13 PROCEDURE — 85025 COMPLETE CBC W/AUTO DIFF WBC: CPT

## 2019-03-13 PROCEDURE — 6370000000 HC RX 637 (ALT 250 FOR IP): Performed by: INTERNAL MEDICINE

## 2019-03-13 PROCEDURE — 2000000000 HC ICU R&B

## 2019-03-13 PROCEDURE — 97116 GAIT TRAINING THERAPY: CPT

## 2019-03-13 PROCEDURE — 36415 COLL VENOUS BLD VENIPUNCTURE: CPT

## 2019-03-13 PROCEDURE — 97110 THERAPEUTIC EXERCISES: CPT

## 2019-03-13 PROCEDURE — 2580000003 HC RX 258: Performed by: INTERNAL MEDICINE

## 2019-03-13 PROCEDURE — 6360000002 HC RX W HCPCS: Performed by: PHYSICIAN ASSISTANT

## 2019-03-13 PROCEDURE — 80053 COMPREHEN METABOLIC PANEL: CPT

## 2019-03-13 PROCEDURE — 6360000002 HC RX W HCPCS: Performed by: INTERNAL MEDICINE

## 2019-03-13 PROCEDURE — 6370000000 HC RX 637 (ALT 250 FOR IP): Performed by: PHYSICIAN ASSISTANT

## 2019-03-13 RX ORDER — GUAIFENESIN 600 MG/1
600 TABLET, EXTENDED RELEASE ORAL 2 TIMES DAILY
Status: DISCONTINUED | OUTPATIENT
Start: 2019-03-13 | End: 2019-03-15 | Stop reason: HOSPADM

## 2019-03-13 RX ORDER — ZOLPIDEM TARTRATE 5 MG/1
5 TABLET ORAL NIGHTLY PRN
Status: DISCONTINUED | OUTPATIENT
Start: 2019-03-13 | End: 2019-03-15 | Stop reason: HOSPADM

## 2019-03-13 RX ORDER — METHYLPREDNISOLONE SODIUM SUCCINATE 125 MG/2ML
60 INJECTION, POWDER, LYOPHILIZED, FOR SOLUTION INTRAMUSCULAR; INTRAVENOUS EVERY 12 HOURS
Status: DISCONTINUED | OUTPATIENT
Start: 2019-03-13 | End: 2019-03-15 | Stop reason: HOSPADM

## 2019-03-13 RX ADMIN — GUAIFENESIN 600 MG: 600 TABLET, EXTENDED RELEASE ORAL at 09:58

## 2019-03-13 RX ADMIN — IPRATROPIUM BROMIDE AND ALBUTEROL SULFATE 1 AMPULE: .5; 3 SOLUTION RESPIRATORY (INHALATION) at 22:06

## 2019-03-13 RX ADMIN — IPRATROPIUM BROMIDE AND ALBUTEROL SULFATE 1 AMPULE: .5; 3 SOLUTION RESPIRATORY (INHALATION) at 10:32

## 2019-03-13 RX ADMIN — METHYLPREDNISOLONE SODIUM SUCCINATE 40 MG: 40 INJECTION, POWDER, FOR SOLUTION INTRAMUSCULAR; INTRAVENOUS at 02:58

## 2019-03-13 RX ADMIN — RANOLAZINE 500 MG: 500 TABLET, EXTENDED RELEASE ORAL at 20:06

## 2019-03-13 RX ADMIN — PNEUMOCOCCAL 13-VALENT CONJUGATE VACCINE 0.5 ML: 2.2; 2.2; 2.2; 2.2; 2.2; 4.4; 2.2; 2.2; 2.2; 2.2; 2.2; 2.2; 2.2 INJECTION, SUSPENSION INTRAMUSCULAR at 08:31

## 2019-03-13 RX ADMIN — Medication 10 ML: at 08:33

## 2019-03-13 RX ADMIN — IPRATROPIUM BROMIDE AND ALBUTEROL SULFATE 1 AMPULE: .5; 3 SOLUTION RESPIRATORY (INHALATION) at 16:45

## 2019-03-13 RX ADMIN — ISOSORBIDE MONONITRATE 30 MG: 30 TABLET, EXTENDED RELEASE ORAL at 08:33

## 2019-03-13 RX ADMIN — AZITHROMYCIN MONOHYDRATE 500 MG: 500 INJECTION, POWDER, LYOPHILIZED, FOR SOLUTION INTRAVENOUS at 08:32

## 2019-03-13 RX ADMIN — Medication 10 ML: at 20:06

## 2019-03-13 RX ADMIN — FAMOTIDINE 20 MG: 20 TABLET ORAL at 20:06

## 2019-03-13 RX ADMIN — CEFTRIAXONE 1 G: 1 INJECTION, POWDER, FOR SOLUTION INTRAMUSCULAR; INTRAVENOUS at 00:40

## 2019-03-13 RX ADMIN — FAMOTIDINE 20 MG: 20 TABLET ORAL at 08:33

## 2019-03-13 RX ADMIN — RANOLAZINE 500 MG: 500 TABLET, EXTENDED RELEASE ORAL at 08:32

## 2019-03-13 RX ADMIN — ASPIRIN 81 MG: 81 TABLET ORAL at 08:33

## 2019-03-13 RX ADMIN — AMLODIPINE BESYLATE 10 MG: 10 TABLET ORAL at 08:33

## 2019-03-13 RX ADMIN — GUAIFENESIN 600 MG: 600 TABLET, EXTENDED RELEASE ORAL at 20:06

## 2019-03-13 RX ADMIN — MOMETASONE FUROATE AND FORMOTEROL FUMARATE DIHYDRATE 2 PUFF: 200; 5 AEROSOL RESPIRATORY (INHALATION) at 22:12

## 2019-03-13 RX ADMIN — ROSUVASTATIN CALCIUM 40 MG: 20 TABLET, FILM COATED ORAL at 20:06

## 2019-03-13 RX ADMIN — ENOXAPARIN SODIUM 40 MG: 40 INJECTION SUBCUTANEOUS at 08:32

## 2019-03-13 RX ADMIN — MOMETASONE FUROATE AND FORMOTEROL FUMARATE DIHYDRATE 2 PUFF: 200; 5 AEROSOL RESPIRATORY (INHALATION) at 10:33

## 2019-03-13 RX ADMIN — SODIUM CHLORIDE: 9 INJECTION, SOLUTION INTRAVENOUS at 05:04

## 2019-03-13 RX ADMIN — LISINOPRIL 20 MG: 20 TABLET ORAL at 08:33

## 2019-03-13 RX ADMIN — METHYLPREDNISOLONE SODIUM SUCCINATE 60 MG: 125 INJECTION, POWDER, FOR SOLUTION INTRAMUSCULAR; INTRAVENOUS at 20:06

## 2019-03-13 RX ADMIN — METOPROLOL SUCCINATE 12.5 MG: 25 TABLET, EXTENDED RELEASE ORAL at 08:33

## 2019-03-13 RX ADMIN — METHYLPREDNISOLONE SODIUM SUCCINATE 40 MG: 40 INJECTION, POWDER, FOR SOLUTION INTRAMUSCULAR; INTRAVENOUS at 08:32

## 2019-03-13 RX ADMIN — PRASUGREL 10 MG: 10 TABLET, FILM COATED ORAL at 08:33

## 2019-03-14 LAB
ABSOLUTE EOS #: 0 K/UL (ref 0–0.44)
ABSOLUTE IMMATURE GRANULOCYTE: 0 K/UL (ref 0–0.3)
ABSOLUTE LYMPH #: 0 K/UL (ref 1.1–3.7)
ABSOLUTE MONO #: 0.35 K/UL (ref 0.1–1.2)
ALBUMIN SERPL-MCNC: 2.8 G/DL (ref 3.5–5.2)
ALBUMIN/GLOBULIN RATIO: 0.8 (ref 1–2.5)
ALP BLD-CCNC: 60 U/L (ref 40–129)
ALT SERPL-CCNC: 13 U/L (ref 5–41)
ANION GAP SERPL CALCULATED.3IONS-SCNC: 8 MMOL/L (ref 9–17)
AST SERPL-CCNC: 19 U/L
BASOPHILS # BLD: 0 % (ref 0–2)
BASOPHILS ABSOLUTE: 0 K/UL (ref 0–0.2)
BILIRUB SERPL-MCNC: 0.32 MG/DL (ref 0.3–1.2)
BUN BLDV-MCNC: 19 MG/DL (ref 8–23)
BUN/CREAT BLD: 30 (ref 9–20)
CALCIUM SERPL-MCNC: 8.4 MG/DL (ref 8.6–10.4)
CHLORIDE BLD-SCNC: 100 MMOL/L (ref 98–107)
CO2: 28 MMOL/L (ref 20–31)
CREAT SERPL-MCNC: 0.64 MG/DL (ref 0.7–1.2)
DIFFERENTIAL TYPE: ABNORMAL
EOSINOPHILS RELATIVE PERCENT: 0 % (ref 1–4)
GFR AFRICAN AMERICAN: >60 ML/MIN
GFR NON-AFRICAN AMERICAN: >60 ML/MIN
GFR SERPL CREATININE-BSD FRML MDRD: ABNORMAL ML/MIN/{1.73_M2}
GFR SERPL CREATININE-BSD FRML MDRD: ABNORMAL ML/MIN/{1.73_M2}
GLUCOSE BLD-MCNC: 137 MG/DL (ref 70–99)
HCT VFR BLD CALC: 38.6 % (ref 40.7–50.3)
HEMOGLOBIN: 12.6 G/DL (ref 13–17)
IMMATURE GRANULOCYTES: 0 %
LYMPHOCYTES # BLD: 0 % (ref 24–43)
MCH RBC QN AUTO: 31.2 PG (ref 25.2–33.5)
MCHC RBC AUTO-ENTMCNC: 32.6 G/DL (ref 28.4–34.8)
MCV RBC AUTO: 95.5 FL (ref 82.6–102.9)
MONOCYTES # BLD: 3 % (ref 3–12)
MORPHOLOGY: NORMAL
NRBC AUTOMATED: 0.3 PER 100 WBC
PDW BLD-RTO: 13.6 % (ref 11.8–14.4)
PLATELET # BLD: 203 K/UL (ref 138–453)
PLATELET ESTIMATE: ABNORMAL
PMV BLD AUTO: 9.2 FL (ref 8.1–13.5)
POTASSIUM SERPL-SCNC: 4.7 MMOL/L (ref 3.7–5.3)
RBC # BLD: 4.04 M/UL (ref 4.21–5.77)
RBC # BLD: ABNORMAL 10*6/UL
SEG NEUTROPHILS: 97 % (ref 36–65)
SEGMENTED NEUTROPHILS ABSOLUTE COUNT: 11.35 K/UL (ref 1.5–8.1)
SODIUM BLD-SCNC: 136 MMOL/L (ref 135–144)
TOTAL PROTEIN: 6.2 G/DL (ref 6.4–8.3)
WBC # BLD: 11.7 K/UL (ref 3.5–11.3)
WBC # BLD: ABNORMAL 10*3/UL

## 2019-03-14 PROCEDURE — 97535 SELF CARE MNGMENT TRAINING: CPT

## 2019-03-14 PROCEDURE — 1200000000 HC SEMI PRIVATE

## 2019-03-14 PROCEDURE — 97116 GAIT TRAINING THERAPY: CPT

## 2019-03-14 PROCEDURE — 94640 AIRWAY INHALATION TREATMENT: CPT

## 2019-03-14 PROCEDURE — 6360000002 HC RX W HCPCS: Performed by: INTERNAL MEDICINE

## 2019-03-14 PROCEDURE — 2580000003 HC RX 258: Performed by: INTERNAL MEDICINE

## 2019-03-14 PROCEDURE — 97110 THERAPEUTIC EXERCISES: CPT

## 2019-03-14 PROCEDURE — 36415 COLL VENOUS BLD VENIPUNCTURE: CPT

## 2019-03-14 PROCEDURE — 94667 MNPJ CHEST WALL 1ST: CPT

## 2019-03-14 PROCEDURE — 6370000000 HC RX 637 (ALT 250 FOR IP): Performed by: INTERNAL MEDICINE

## 2019-03-14 PROCEDURE — 6370000000 HC RX 637 (ALT 250 FOR IP): Performed by: PHYSICIAN ASSISTANT

## 2019-03-14 PROCEDURE — 2700000000 HC OXYGEN THERAPY PER DAY

## 2019-03-14 PROCEDURE — 85025 COMPLETE CBC W/AUTO DIFF WBC: CPT

## 2019-03-14 PROCEDURE — 94760 N-INVAS EAR/PLS OXIMETRY 1: CPT

## 2019-03-14 PROCEDURE — 80053 COMPREHEN METABOLIC PANEL: CPT

## 2019-03-14 PROCEDURE — 94664 DEMO&/EVAL PT USE INHALER: CPT

## 2019-03-14 PROCEDURE — 6360000002 HC RX W HCPCS: Performed by: PHYSICIAN ASSISTANT

## 2019-03-14 RX ORDER — ALBUTEROL SULFATE 2.5 MG/3ML
2.5 SOLUTION RESPIRATORY (INHALATION) EVERY 4 HOURS PRN
Status: DISCONTINUED | OUTPATIENT
Start: 2019-03-14 | End: 2019-03-15 | Stop reason: HOSPADM

## 2019-03-14 RX ORDER — IPRATROPIUM BROMIDE AND ALBUTEROL SULFATE 2.5; .5 MG/3ML; MG/3ML
1 SOLUTION RESPIRATORY (INHALATION) 4 TIMES DAILY
Status: DISCONTINUED | OUTPATIENT
Start: 2019-03-14 | End: 2019-03-15 | Stop reason: HOSPADM

## 2019-03-14 RX ADMIN — ENOXAPARIN SODIUM 40 MG: 40 INJECTION SUBCUTANEOUS at 08:43

## 2019-03-14 RX ADMIN — Medication 10 ML: at 21:21

## 2019-03-14 RX ADMIN — AMLODIPINE BESYLATE 10 MG: 10 TABLET ORAL at 08:43

## 2019-03-14 RX ADMIN — ISOSORBIDE MONONITRATE 30 MG: 30 TABLET, EXTENDED RELEASE ORAL at 08:42

## 2019-03-14 RX ADMIN — IPRATROPIUM BROMIDE AND ALBUTEROL SULFATE 1 AMPULE: .5; 3 SOLUTION RESPIRATORY (INHALATION) at 20:36

## 2019-03-14 RX ADMIN — GUAIFENESIN 600 MG: 600 TABLET, EXTENDED RELEASE ORAL at 08:42

## 2019-03-14 RX ADMIN — RANOLAZINE 500 MG: 500 TABLET, EXTENDED RELEASE ORAL at 08:42

## 2019-03-14 RX ADMIN — MAGNESIUM HYDROXIDE 30 ML: 400 SUSPENSION ORAL at 10:23

## 2019-03-14 RX ADMIN — FAMOTIDINE 20 MG: 20 TABLET ORAL at 08:43

## 2019-03-14 RX ADMIN — METHYLPREDNISOLONE SODIUM SUCCINATE 60 MG: 125 INJECTION, POWDER, FOR SOLUTION INTRAMUSCULAR; INTRAVENOUS at 21:20

## 2019-03-14 RX ADMIN — PRASUGREL 10 MG: 10 TABLET, FILM COATED ORAL at 08:43

## 2019-03-14 RX ADMIN — MOMETASONE FUROATE AND FORMOTEROL FUMARATE DIHYDRATE 2 PUFF: 200; 5 AEROSOL RESPIRATORY (INHALATION) at 12:03

## 2019-03-14 RX ADMIN — GUAIFENESIN 600 MG: 600 TABLET, EXTENDED RELEASE ORAL at 21:21

## 2019-03-14 RX ADMIN — METOPROLOL SUCCINATE 12.5 MG: 25 TABLET, EXTENDED RELEASE ORAL at 08:43

## 2019-03-14 RX ADMIN — ZOLPIDEM TARTRATE 5 MG: 5 TABLET ORAL at 21:21

## 2019-03-14 RX ADMIN — LISINOPRIL 20 MG: 20 TABLET ORAL at 08:42

## 2019-03-14 RX ADMIN — IPRATROPIUM BROMIDE AND ALBUTEROL SULFATE 1 AMPULE: .5; 3 SOLUTION RESPIRATORY (INHALATION) at 12:03

## 2019-03-14 RX ADMIN — AZITHROMYCIN MONOHYDRATE 500 MG: 500 INJECTION, POWDER, LYOPHILIZED, FOR SOLUTION INTRAVENOUS at 07:48

## 2019-03-14 RX ADMIN — ASPIRIN 81 MG: 81 TABLET ORAL at 08:43

## 2019-03-14 RX ADMIN — MOMETASONE FUROATE AND FORMOTEROL FUMARATE DIHYDRATE 2 PUFF: 200; 5 AEROSOL RESPIRATORY (INHALATION) at 20:36

## 2019-03-14 RX ADMIN — CEFTRIAXONE 1 G: 1 INJECTION, POWDER, FOR SOLUTION INTRAMUSCULAR; INTRAVENOUS at 00:58

## 2019-03-14 RX ADMIN — IPRATROPIUM BROMIDE AND ALBUTEROL SULFATE 1 AMPULE: .5; 3 SOLUTION RESPIRATORY (INHALATION) at 06:10

## 2019-03-14 RX ADMIN — Medication 10 ML: at 08:43

## 2019-03-14 RX ADMIN — ROSUVASTATIN CALCIUM 40 MG: 20 TABLET, FILM COATED ORAL at 21:20

## 2019-03-14 RX ADMIN — METHYLPREDNISOLONE SODIUM SUCCINATE 60 MG: 125 INJECTION, POWDER, FOR SOLUTION INTRAMUSCULAR; INTRAVENOUS at 08:43

## 2019-03-14 RX ADMIN — FAMOTIDINE 20 MG: 20 TABLET ORAL at 21:21

## 2019-03-14 RX ADMIN — RANOLAZINE 500 MG: 500 TABLET, EXTENDED RELEASE ORAL at 21:21

## 2019-03-14 ASSESSMENT — PAIN SCALES - GENERAL
PAINLEVEL_OUTOF10: 0

## 2019-03-15 VITALS
BODY MASS INDEX: 28.42 KG/M2 | WEIGHT: 221.4 LBS | TEMPERATURE: 98.7 F | DIASTOLIC BLOOD PRESSURE: 74 MMHG | HEART RATE: 92 BPM | OXYGEN SATURATION: 85 % | HEIGHT: 74 IN | RESPIRATION RATE: 18 BRPM | SYSTOLIC BLOOD PRESSURE: 129 MMHG

## 2019-03-15 LAB
ALBUMIN SERPL-MCNC: 2.9 G/DL (ref 3.5–5.2)
ALBUMIN/GLOBULIN RATIO: 0.8 (ref 1–2.5)
ALP BLD-CCNC: 65 U/L (ref 40–129)
ALT SERPL-CCNC: 14 U/L (ref 5–41)
ANION GAP SERPL CALCULATED.3IONS-SCNC: 8 MMOL/L (ref 9–17)
AST SERPL-CCNC: 18 U/L
BILIRUB SERPL-MCNC: 0.49 MG/DL (ref 0.3–1.2)
BUN BLDV-MCNC: 16 MG/DL (ref 8–23)
BUN/CREAT BLD: 24 (ref 9–20)
CALCIUM SERPL-MCNC: 8.7 MG/DL (ref 8.6–10.4)
CHLORIDE BLD-SCNC: 96 MMOL/L (ref 98–107)
CO2: 32 MMOL/L (ref 20–31)
CREAT SERPL-MCNC: 0.68 MG/DL (ref 0.7–1.2)
GFR AFRICAN AMERICAN: >60 ML/MIN
GFR NON-AFRICAN AMERICAN: >60 ML/MIN
GFR SERPL CREATININE-BSD FRML MDRD: ABNORMAL ML/MIN/{1.73_M2}
GFR SERPL CREATININE-BSD FRML MDRD: ABNORMAL ML/MIN/{1.73_M2}
GLUCOSE BLD-MCNC: 133 MG/DL (ref 70–99)
POTASSIUM SERPL-SCNC: 4.6 MMOL/L (ref 3.7–5.3)
SODIUM BLD-SCNC: 136 MMOL/L (ref 135–144)
TOTAL PROTEIN: 6.4 G/DL (ref 6.4–8.3)

## 2019-03-15 PROCEDURE — 6370000000 HC RX 637 (ALT 250 FOR IP): Performed by: INTERNAL MEDICINE

## 2019-03-15 PROCEDURE — 97116 GAIT TRAINING THERAPY: CPT

## 2019-03-15 PROCEDURE — 94640 AIRWAY INHALATION TREATMENT: CPT

## 2019-03-15 PROCEDURE — 6360000002 HC RX W HCPCS: Performed by: INTERNAL MEDICINE

## 2019-03-15 PROCEDURE — 36415 COLL VENOUS BLD VENIPUNCTURE: CPT

## 2019-03-15 PROCEDURE — 2700000000 HC OXYGEN THERAPY PER DAY

## 2019-03-15 PROCEDURE — 80053 COMPREHEN METABOLIC PANEL: CPT

## 2019-03-15 PROCEDURE — 2580000003 HC RX 258: Performed by: INTERNAL MEDICINE

## 2019-03-15 RX ORDER — GUAIFENESIN 600 MG/1
600 TABLET, EXTENDED RELEASE ORAL 2 TIMES DAILY
COMMUNITY
Start: 2019-03-15

## 2019-03-15 RX ORDER — ALBUTEROL SULFATE 2.5 MG/3ML
2.5 SOLUTION RESPIRATORY (INHALATION) EVERY 6 HOURS PRN
Qty: 120 EACH | Refills: 0 | Status: SHIPPED | OUTPATIENT
Start: 2019-03-15

## 2019-03-15 RX ORDER — PREDNISONE 10 MG/1
TABLET ORAL
Qty: 40 TABLET | Refills: 0 | Status: SHIPPED | OUTPATIENT
Start: 2019-03-15 | End: 2020-01-01

## 2019-03-15 RX ORDER — AMOXICILLIN AND CLAVULANATE POTASSIUM 875; 125 MG/1; MG/1
1 TABLET, FILM COATED ORAL 2 TIMES DAILY
Qty: 16 TABLET | Refills: 0 | Status: SHIPPED | OUTPATIENT
Start: 2019-03-15 | End: 2019-03-23

## 2019-03-15 RX ADMIN — METHYLPREDNISOLONE SODIUM SUCCINATE 60 MG: 125 INJECTION, POWDER, FOR SOLUTION INTRAMUSCULAR; INTRAVENOUS at 08:28

## 2019-03-15 RX ADMIN — ISOSORBIDE MONONITRATE 30 MG: 30 TABLET, EXTENDED RELEASE ORAL at 08:28

## 2019-03-15 RX ADMIN — ENOXAPARIN SODIUM 40 MG: 40 INJECTION SUBCUTANEOUS at 08:29

## 2019-03-15 RX ADMIN — ASPIRIN 81 MG: 81 TABLET ORAL at 08:28

## 2019-03-15 RX ADMIN — GUAIFENESIN 600 MG: 600 TABLET, EXTENDED RELEASE ORAL at 08:28

## 2019-03-15 RX ADMIN — CEFTRIAXONE 1 G: 1 INJECTION, POWDER, FOR SOLUTION INTRAMUSCULAR; INTRAVENOUS at 01:46

## 2019-03-15 RX ADMIN — IPRATROPIUM BROMIDE AND ALBUTEROL SULFATE 1 AMPULE: .5; 3 SOLUTION RESPIRATORY (INHALATION) at 14:39

## 2019-03-15 RX ADMIN — RANOLAZINE 500 MG: 500 TABLET, EXTENDED RELEASE ORAL at 08:37

## 2019-03-15 RX ADMIN — Medication 10 ML: at 08:29

## 2019-03-15 RX ADMIN — MAGNESIUM HYDROXIDE 30 ML: 400 SUSPENSION ORAL at 10:06

## 2019-03-15 RX ADMIN — AZITHROMYCIN MONOHYDRATE 500 MG: 500 INJECTION, POWDER, LYOPHILIZED, FOR SOLUTION INTRAVENOUS at 08:28

## 2019-03-15 RX ADMIN — PRASUGREL 10 MG: 10 TABLET, FILM COATED ORAL at 08:36

## 2019-03-15 RX ADMIN — LISINOPRIL 20 MG: 20 TABLET ORAL at 08:29

## 2019-03-15 RX ADMIN — AMLODIPINE BESYLATE 10 MG: 10 TABLET ORAL at 08:28

## 2019-03-15 RX ADMIN — FAMOTIDINE 20 MG: 20 TABLET ORAL at 08:29

## 2019-03-15 RX ADMIN — METOPROLOL SUCCINATE 12.5 MG: 25 TABLET, EXTENDED RELEASE ORAL at 08:28

## 2019-03-15 ASSESSMENT — PAIN SCALES - GENERAL: PAINLEVEL_OUTOF10: 0

## 2019-03-16 LAB
CULTURE: NORMAL
CULTURE: NORMAL
Lab: NORMAL
Lab: NORMAL
SPECIMEN DESCRIPTION: NORMAL
SPECIMEN DESCRIPTION: NORMAL

## 2019-03-22 ENCOUNTER — HOSPITAL ENCOUNTER (OUTPATIENT)
Age: 64
Discharge: HOME | End: 2019-03-24
Payer: MEDICARE

## 2019-03-22 ENCOUNTER — HOSPITAL ENCOUNTER (OUTPATIENT)
Dept: GENERAL RADIOLOGY | Age: 64
Discharge: HOME | End: 2019-03-24
Payer: MEDICARE

## 2019-03-22 DIAGNOSIS — J44.0 ACUTE BRONCHITIS WITH CHRONIC OBSTRUCTIVE PULMONARY DISEASE (COPD) (HCC): ICD-10-CM

## 2019-03-22 DIAGNOSIS — J20.9 ACUTE BRONCHITIS WITH CHRONIC OBSTRUCTIVE PULMONARY DISEASE (COPD) (HCC): ICD-10-CM

## 2019-03-22 PROCEDURE — 71046 X-RAY EXAM CHEST 2 VIEWS: CPT

## 2019-03-29 RX ORDER — ISOSORBIDE MONONITRATE 60 MG/1
TABLET, EXTENDED RELEASE ORAL
Qty: 30 TABLET | Refills: 0 | Status: SHIPPED | OUTPATIENT
Start: 2019-03-29 | End: 2019-04-12

## 2019-04-11 ENCOUNTER — OFFICE VISIT (OUTPATIENT)
Dept: CARDIOLOGY | Age: 64
End: 2019-04-11
Payer: MEDICARE

## 2019-04-11 ENCOUNTER — TELEPHONE (OUTPATIENT)
Dept: CARDIOLOGY | Age: 64
End: 2019-04-11

## 2019-04-11 VITALS
HEIGHT: 74 IN | DIASTOLIC BLOOD PRESSURE: 68 MMHG | BODY MASS INDEX: 27.21 KG/M2 | SYSTOLIC BLOOD PRESSURE: 101 MMHG | HEART RATE: 76 BPM | WEIGHT: 212 LBS | OXYGEN SATURATION: 94 % | RESPIRATION RATE: 16 BRPM

## 2019-04-11 DIAGNOSIS — I10 ESSENTIAL HYPERTENSION: ICD-10-CM

## 2019-04-11 DIAGNOSIS — I65.23 BILATERAL CAROTID ARTERY STENOSIS: ICD-10-CM

## 2019-04-11 DIAGNOSIS — E78.2 MIXED HYPERLIPIDEMIA: ICD-10-CM

## 2019-04-11 DIAGNOSIS — Z71.6 TOBACCO ABUSE COUNSELING: ICD-10-CM

## 2019-04-11 DIAGNOSIS — I73.9 PVD (PERIPHERAL VASCULAR DISEASE) (HCC): ICD-10-CM

## 2019-04-11 DIAGNOSIS — I25.10 ASHD (ARTERIOSCLEROTIC HEART DISEASE): Primary | ICD-10-CM

## 2019-04-11 PROCEDURE — 1123F ACP DISCUSS/DSCN MKR DOCD: CPT | Performed by: INTERNAL MEDICINE

## 2019-04-11 PROCEDURE — 4004F PT TOBACCO SCREEN RCVD TLK: CPT | Performed by: INTERNAL MEDICINE

## 2019-04-11 PROCEDURE — 3017F COLORECTAL CA SCREEN DOC REV: CPT | Performed by: INTERNAL MEDICINE

## 2019-04-11 PROCEDURE — G8419 CALC BMI OUT NRM PARAM NOF/U: HCPCS | Performed by: INTERNAL MEDICINE

## 2019-04-11 PROCEDURE — G8427 DOCREV CUR MEDS BY ELIG CLIN: HCPCS | Performed by: INTERNAL MEDICINE

## 2019-04-11 PROCEDURE — 4040F PNEUMOC VAC/ADMIN/RCVD: CPT | Performed by: INTERNAL MEDICINE

## 2019-04-11 PROCEDURE — G8598 ASA/ANTIPLAT THER USED: HCPCS | Performed by: INTERNAL MEDICINE

## 2019-04-11 PROCEDURE — 1111F DSCHRG MED/CURRENT MED MERGE: CPT | Performed by: INTERNAL MEDICINE

## 2019-04-11 PROCEDURE — 99214 OFFICE O/P EST MOD 30 MIN: CPT | Performed by: INTERNAL MEDICINE

## 2019-04-11 RX ORDER — RANOLAZINE 500 MG/1
500 TABLET, EXTENDED RELEASE ORAL 2 TIMES DAILY
Qty: 56 TABLET | Refills: 0 | COMMUNITY
Start: 2019-04-11 | End: 2019-05-09

## 2019-04-11 NOTE — PROGRESS NOTES
I, Larry Lira am scribing for and in the presence of Maryjo Ornelas MD, F.A.C.C..      Subjective:     CHIEF COMPLAINT / HPI:    Chief Complaint   Patient presents with    6 Month Follow-Up     HX:CAD, HTN, Hyperlipidemia, Bilateral Carotid artery disease, PVD, MI Pt is here for 6 month follow up he had recent admission 3-11 to 3-15 for pneumonia COPD, SOB and respitory failure. He states he is doing much better. He does have SOB and lightheaded/dizziness at times. Denies: CP, palpitations     Dear  Constanza Weiss, DO    I had the pleasure of seeing Mr. Jet Sanabria for follow up today. Roberta Bailey is 70 y.o. male with past medical history of bilateral carotid artery disease status post CEA and history of prior stroke who initially presented for evaluation of abnormal stress test. This was followed by cardiac catheterization which showed severe obstructive disease of the right coronary artery. Patient was sent to [facility] for intervention, unfortunately the procedure was complicated by inferior wall myocardial infarction and transient heart block on 8/24/2015. He had a stress test on 8/3/2016 which showed evidence of old myocardial infarction with a very small hayden-infarction ischemia, medical management. 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 Braden Pascal on 11/15/2017, patient tolerated the surgery very well. No complications. Resection of right carotid pseudoaneurysm done on 07/23/2018. Mr. Matthew Davis says this went well and is feeling good now. He did say this took longer to heal than previous surgeries. He had recent admission to [facility] 3/11/19- 3/15/19 for pneumonia, COPD and respiratory failure (Treated with BiPAP, antibiotics and steroids). Mr. Matthew Davis is here for a hospital follow up. He had recent admission for pneumonia.  He still has a cough with little green phlegm. His dizziness is stable. He has stable chronic shortness of breath that has not gotten any worse. He attributes this to his COPD. He continues to smoke, but is ready to quit. He denies any changes in his weight or appetite or any issues with medications. He denies any current chest pain, pressure, or tightness. No palpitations, worsening shortness of breath or orthopnea. No PND. He gets bilateral leg pain on walking and is going to see Dr. Kike Long for his peripheral vascular disease. Echo on 07/05/2018: showed preserved with ejection fraction, mild hypokinesis of the inferolateral wall. Mild aortic stenosis and mild to moderate aortic regurgitation. No significant pulmonary hypertension. Past Medical History:    Past Medical History:   Diagnosis Date    CAD (coronary artery disease)     dr Khoa Zendejas cc 7/11/18    Carotid artery stenosis     COPD (chronic obstructive pulmonary disease) ([facility] [address]     H/O cardiac catheterization 8/20/15    LMCA:Lesion on LMCA: Distal subsection. 40% stenosis. LCx:Lesion on Prox CX: Ostial. 50% stenosis. RCA: Lesion on Prox RCA: Ostial. 95% stenosis. Lesion on Prox RCA: Proximal subsection. 75% stenosis. Lesion on R PDA: Ostial. 705 stenosis. EF 60%.  H/O cardiovascular stress test 8/17/15    Abnormal. Moderate perfusion defect of moderate intensity in the inferolateral, inferior, and inferoapical regions during stress imaging, most consistent with ischemia. Global LV systolic function was abnormal with EF: 42% w/regional wall motion abnormalities.  Results are most consistent with an intermediate to high risk for significant CAD.     H/O cardiovascular stress test 08/03/2016    Abnormal myocardial perfusion study, large perfusion defect of moderate-severe intensity in inferolateral, inferior, inferoseptal, apical and inferoapical regions during stress and rest imaging, most consistant with old myocardial infarction with hayden-infarct ischemia, EF 44%, Overall results most consistent with intermediate-high risk for CAD, Add. testing including cardiac cath maybe indicated     H/O cardiovascular stress test 07/05/2018    Abnormal myocardial perfusion study. There is a moderate perfusion defect of moderaete intensity in the inferolateral, inferior and inferoseptal regions during stress and rest imaging which is most consistent with an old MI with hayden-infact ischema. EF 43%. Results are most consistant with an intermediate risk for reverdaibel CAD    H/O echocardiogram 12/4/15    EF 55%. Mildly increased LV wall thickness. Left atrium is mildly dilated (29-33) left atrial volume index of 32 ml/m2. aortic leaflet calcification with mild aortic stenosis. Moderate aortic regurgitation. Mild (grade l) diastolic dysfunction    H/O echocardiogram 07/05/2018    EF 55%. The LV wall thickness is mildly increased. Mild hopkinesis of the inferolateral wall. The left atrium is mildly dilated (29-33) with a left atrial volume index of 29ml/m2. Mild aortic stenosis, mean gradient 17 mmHg. Mild to moderate aortic regurg. Mild diastiolic dysfucntion.  History of Holter monitoring 10/30/2017    Rare PAC's with 2 atrial runs, the longest being 7 beats in duration at 171 bpm and most consistent with a short run of atrial fibrillation.  Hyperlipidemia     Hypertension     MI (myocardial infarction) ([address]     Peripheral vascular disease ([address]     S/P angioplasty with stent 8/24/15    PCI to ostial lesion and balloon angioplasy to proximal lesion.  Distal RCA lesion had total occlusion after balloon angioplasty    Unspecified cerebral artery occlusion with cerebral infarction     TIA     Past Surgical History:  Past Surgical History:   Procedure Laterality Date    CARDIAC CATHETERIZATION Left 8/20/2015    right radial/Kimberly Salazar/Dr Cohen    CAROTID ENDARTERECTOMY Bilateral 1995, 2002    CAROTID ENDARTERECTOMY Left 11/13/2017    LEFT CAROTID PSEUDOANEURYSM REPAIR WITH [id number] AND [id number] VIABAHN STENTS, LEFT CAROTID ANGIOGRAM, SAPHENOUS VEIN GRAFT BYPASS performed by Galdino Serrano DO at [place] [address]  08/24/15    balloon, stentx1 dr Bong Gifford 7/11/18    DIAGNOSTIC CARDIAC CATH LAB PROCEDURE  08/20/15    ENDOSCOPY, COLON, DIAGNOSTIC      KNEE SURGERY  2013    LEG TENDON SURGERY      R    OTHER SURGICAL HISTORY Left 11/13/2017    REPAIR CAROTID ANEURYSM WITH LT SAPHENOUS VEIN GRAFT    OTHER SURGICAL HISTORY Right 07/23/2018     CAROTID ARTERY RESECTION OF  PSEUDO ANEURISM    [place] Right 7/23/2018    RSECTION OF RIGHT CAROTID PSEUDOANEURYSM, WITH PATCH GRAFT ([place]) ANGIOPLASTY performed by Galdino Serrano DO at [address] [place] [address] History:    Social History     Tobacco Use   Smoking Status Current Every Day Smoker    Packs/day: 0.50    Years: 45.00    Pack years: 22.50    Start date: 8/17/1970   Smokeless Tobacco Never Used     Current Medications:  Outpatient Medications Marked as Taking for the 4/11/19 encounter (Office Visit) with Melissa Cole MD   Medication Sig Dispense Refill    isosorbide mononitrate (IMDUR) 60 MG extended release tablet TAKE ONE TABLET DAILY.  30 tablet 0    albuterol (PROVENTIL) (2.5 MG/3ML) 0.083% nebulizer solution Take 3 mLs by nebulization every 6 hours as needed for Wheezing or Shortness of Breath 120 each 0    guaiFENesin (MUCINEX) 600 MG extended release tablet Take 1 tablet by mouth 2 times daily      predniSONE (DELTASONE) 10 MG tablet Take 3 tabs po bid X 3 days, then 2 tabs po bid X 3 days, then 1 tab po bid X 3 days, then 1 tab po daily until script complete 40 tablet 0    ranolazine (RANEXA) 500 MG extended release tablet Take 500 mg by mouth 2 times daily SAMPLES Ranexa 500 mg Lot #[id number] Exp 4/2021 x 4      metoprolol succinate (TOPROL XL) 25 MG extended release tablet Take 0.5 tablets by mouth daily 90 tablet 3    lisinopril (PRINIVIL;ZESTRIL) 20 MG tablet Take 1 tablet by mouth daily 90 tablet 3    amLODIPine (NORVASC) 10 MG tablet Take 1 tablet by mouth daily 90 tablet 3    RANEXA 500 MG extended release tablet TAKE ONE TABLET TWICE A DAY. 180 tablet 3    tiotropium (SPIRIVA) 18 MCG inhalation capsule Inhale 18 mcg into the lungs daily      acetaminophen (TYLENOL) 325 MG tablet Take 2 tablets by mouth every 6 hours while awake for 5 days 30 tablet 0    fluticasone-vilanterol (BREO ELLIPTA) 100-25 MCG/INH AEPB inhaler Inhale 1 puff into the lungs daily      prasugrel (EFFIENT) 10 MG TABS Take 1 tablet by mouth daily 90 tablet 3    rosuvastatin (CRESTOR) 40 MG tablet Take 1 tablet by mouth nightly 90 tablet 3    albuterol sulfate  (90 Base) MCG/ACT inhaler Inhale 2 puffs into the lungs every 6 hours as needed for Wheezing or Shortness of Breath 1 Inhaler 11    DiphenhydrAMINE HCl (BENADRYL ALLERGY PO) Take by mouth Pt takes at night for his sinuses      nitroGLYCERIN (NITROSTAT) 0.4 MG SL tablet Place 1 tablet under the tongue every 5 minutes as needed for Chest pain 25 tablet 3    aspirin 81 MG tablet Take 81 mg by mouth daily         REVIEW OF SYSTEMS:    CONSTITUTIONAL: No major weight gain or loss, fatigue, weakness, night sweats or fever. HEENT: No new vision difficulties or ringing in the ears. RESPIRATORY: see HPI  CARDIOVASCULAR: See HPI  GI: No nausea, vomiting, diarrhea, constipation, abdominal pain or changes in bowel habits. : No urinary frequency, urgency, incontinence hematuria or dysuria. SKIN: No cyanosis or skin lesions. MUSCULOSKELETAL: intermittent claudications. NEUROLOGICAL: Occasional dizziness.     PSYCHIATRIC: No anxiety, pain, insomnia or depression    Objective:     PHYSICAL EXAM:      VITALS:    /68 (Site: Left Upper Arm, Position: Sitting, Cuff Size: Medium Adult)   Pulse 76   Resp 16   Ht 6' 2\" (1.88 m)   Wt 212 lb (96.2 kg)   SpO2 94%   BMI 27.22 kg/m²   CONSTITUTIONAL: Cooperative, no and follow up with you as previously scheduled. FOLLOW UP:  I told Mr. Aliyah Shelby to call my office if he had any problems, but otherwise told him to Return in about 6 months (around 10/11/2019). However, I would be happy to see him sooner should the need arise. Sincerely,   Doug Ward MD, MS, F.A.C.C. [facility], [address] [place] [address], [address]  Phone: [phone], Fax: [phone]      I believe that the risk of significant morbidity and mortality related to the patient's current medical conditions are: Intermediate. 25 minutes were spent with the patient and all of his questions were answered. The documentation recorded by the scribe, accurately and completely reflects the services I personally performed and the decisions made by me. Doug Ward MD, F.A.C.C.  April 11, 2019

## 2019-04-12 ENCOUNTER — HOSPITAL ENCOUNTER (OUTPATIENT)
Dept: ULTRASOUND IMAGING | Age: 64
Discharge: HOME | End: 2019-04-14
Payer: MEDICARE

## 2019-04-12 DIAGNOSIS — I25.119 ATHEROSCLEROSIS OF NATIVE CORONARY ARTERY WITH ANGINA PECTORIS, UNSPECIFIED WHETHER NATIVE OR TRANSPLANTED HEART (HCC): ICD-10-CM

## 2019-04-12 DIAGNOSIS — Z13.6 SCREENING FOR AAA (ABDOMINAL AORTIC ANEURYSM): ICD-10-CM

## 2019-04-12 PROCEDURE — 76706 US ABDL AORTA SCREEN AAA: CPT

## 2019-04-12 RX ORDER — ISOSORBIDE MONONITRATE 60 MG/1
60 TABLET, EXTENDED RELEASE ORAL DAILY
Qty: 30 TABLET | Refills: 5 | Status: SHIPPED | OUTPATIENT
Start: 2019-04-12 | End: 2020-01-01

## 2019-06-07 ENCOUNTER — TELEPHONE (OUTPATIENT)
Dept: CARDIOLOGY | Age: 64
End: 2019-06-07

## 2019-06-11 NOTE — TELEPHONE ENCOUNTER
I let pt know Ranexa is generic now and we are not getting samples can we send in rx and see if insurance will cover.  Please advise    Thank you Sarah Macedo

## 2019-06-12 RX ORDER — RANOLAZINE 500 MG/1
500 TABLET, EXTENDED RELEASE ORAL 2 TIMES DAILY
Qty: 180 TABLET | Refills: 3 | Status: SHIPPED | OUTPATIENT
Start: 2019-06-12

## 2019-06-12 RX ORDER — ROSUVASTATIN CALCIUM 40 MG/1
TABLET, COATED ORAL
Qty: 30 TABLET | Refills: 0 | Status: SHIPPED | OUTPATIENT
Start: 2019-06-12 | End: 2019-06-17 | Stop reason: SDUPTHER

## 2019-06-17 RX ORDER — PRASUGREL 10 MG/1
10 TABLET, FILM COATED ORAL DAILY
Qty: 30 TABLET | Refills: 11 | Status: SHIPPED | OUTPATIENT
Start: 2019-06-17 | End: 2020-01-01

## 2019-06-17 RX ORDER — ROSUVASTATIN CALCIUM 40 MG/1
40 TABLET, COATED ORAL EVERY EVENING
Qty: 30 TABLET | Refills: 11 | Status: SHIPPED | OUTPATIENT
Start: 2019-06-17 | End: 2020-01-01

## 2020-01-01 ENCOUNTER — ANESTHESIA EVENT (OUTPATIENT)
Dept: SURGICAL ICU | Age: 65
DRG: 982 | End: 2020-01-01
Payer: MEDICARE

## 2020-01-01 ENCOUNTER — HOSPITAL ENCOUNTER (INPATIENT)
Age: 65
LOS: 13 days | DRG: 982 | End: 2020-07-08
Attending: INTERNAL MEDICINE | Admitting: INTERNAL MEDICINE
Payer: MEDICARE

## 2020-01-01 ENCOUNTER — APPOINTMENT (OUTPATIENT)
Dept: CT IMAGING | Age: 65
DRG: 982 | End: 2020-01-01
Attending: INTERNAL MEDICINE
Payer: MEDICARE

## 2020-01-01 ENCOUNTER — APPOINTMENT (OUTPATIENT)
Dept: GENERAL RADIOLOGY | Age: 65
DRG: 982 | End: 2020-01-01
Attending: INTERNAL MEDICINE
Payer: MEDICARE

## 2020-01-01 ENCOUNTER — ANESTHESIA EVENT (OUTPATIENT)
Dept: OPERATING ROOM | Age: 65
DRG: 982 | End: 2020-01-01
Payer: MEDICARE

## 2020-01-01 ENCOUNTER — ANESTHESIA (OUTPATIENT)
Dept: ENDOSCOPY | Age: 65
DRG: 982 | End: 2020-01-01
Payer: MEDICARE

## 2020-01-01 ENCOUNTER — TELEPHONE (OUTPATIENT)
Dept: CARDIOLOGY | Age: 65
End: 2020-01-01

## 2020-01-01 ENCOUNTER — HOSPITAL ENCOUNTER (OUTPATIENT)
Dept: NON INVASIVE DIAGNOSTICS | Age: 65
Discharge: HOME | End: 2020-06-16
Payer: MEDICARE

## 2020-01-01 ENCOUNTER — HOSPITAL ENCOUNTER (EMERGENCY)
Age: 65
Discharge: ANOTHER HOSPITAL | End: 2020-06-25
Attending: EMERGENCY MEDICINE
Payer: MEDICARE

## 2020-01-01 ENCOUNTER — ANESTHESIA (OUTPATIENT)
Dept: OPERATING ROOM | Age: 65
DRG: 982 | End: 2020-01-01
Payer: MEDICARE

## 2020-01-01 ENCOUNTER — HOSPITAL ENCOUNTER (OUTPATIENT)
Dept: VASCULAR LAB | Age: 65
Discharge: HOME | End: 2020-06-06
Payer: MEDICARE

## 2020-01-01 ENCOUNTER — APPOINTMENT (OUTPATIENT)
Dept: CARDIAC CATH/INVASIVE PROCEDURES | Age: 65
DRG: 982 | End: 2020-01-01
Attending: INTERNAL MEDICINE
Payer: MEDICARE

## 2020-01-01 ENCOUNTER — OFFICE VISIT (OUTPATIENT)
Dept: CARDIOLOGY | Age: 65
End: 2020-01-01
Payer: MEDICARE

## 2020-01-01 ENCOUNTER — ANESTHESIA EVENT (OUTPATIENT)
Dept: ENDOSCOPY | Age: 65
DRG: 982 | End: 2020-01-01
Payer: MEDICARE

## 2020-01-01 ENCOUNTER — ANESTHESIA (OUTPATIENT)
Dept: SURGICAL ICU | Age: 65
DRG: 982 | End: 2020-01-01
Payer: MEDICARE

## 2020-01-01 VITALS
HEIGHT: 74 IN | BODY MASS INDEX: 29.85 KG/M2 | HEART RATE: 116 BPM | WEIGHT: 232.6 LBS | OXYGEN SATURATION: 94 % | RESPIRATION RATE: 25 BRPM | DIASTOLIC BLOOD PRESSURE: 56 MMHG | TEMPERATURE: 95 F | SYSTOLIC BLOOD PRESSURE: 87 MMHG

## 2020-01-01 VITALS
RESPIRATION RATE: 13 BRPM | SYSTOLIC BLOOD PRESSURE: 102 MMHG | RESPIRATION RATE: 14 BRPM | TEMPERATURE: 99.1 F | OXYGEN SATURATION: 95 % | DIASTOLIC BLOOD PRESSURE: 70 MMHG | OXYGEN SATURATION: 100 % | DIASTOLIC BLOOD PRESSURE: 64 MMHG | SYSTOLIC BLOOD PRESSURE: 90 MMHG

## 2020-01-01 VITALS
DIASTOLIC BLOOD PRESSURE: 57 MMHG | OXYGEN SATURATION: 95 % | TEMPERATURE: 96.3 F | SYSTOLIC BLOOD PRESSURE: 87 MMHG | RESPIRATION RATE: 10 BRPM

## 2020-01-01 VITALS
SYSTOLIC BLOOD PRESSURE: 96 MMHG | BODY MASS INDEX: 29.77 KG/M2 | RESPIRATION RATE: 16 BRPM | WEIGHT: 232 LBS | DIASTOLIC BLOOD PRESSURE: 49 MMHG | TEMPERATURE: 97.4 F | OXYGEN SATURATION: 100 % | HEART RATE: 76 BPM | HEIGHT: 74 IN

## 2020-01-01 VITALS
BODY MASS INDEX: 29.88 KG/M2 | HEART RATE: 69 BPM | HEIGHT: 74 IN | OXYGEN SATURATION: 98 % | SYSTOLIC BLOOD PRESSURE: 119 MMHG | RESPIRATION RATE: 16 BRPM | WEIGHT: 232.8 LBS | DIASTOLIC BLOOD PRESSURE: 67 MMHG

## 2020-01-01 LAB
-: NORMAL
-: NORMAL
ABO/RH: NORMAL
ABSOLUTE EOS #: 0 K/UL (ref 0–0.4)
ABSOLUTE EOS #: 0 K/UL (ref 0–0.4)
ABSOLUTE EOS #: 0.12 K/UL (ref 0–0.44)
ABSOLUTE EOS #: 0.13 K/UL (ref 0–0.44)
ABSOLUTE EOS #: 0.16 K/UL (ref 0–0.44)
ABSOLUTE EOS #: 0.16 K/UL (ref 0–0.44)
ABSOLUTE EOS #: 0.21 K/UL (ref 0–0.44)
ABSOLUTE EOS #: 0.22 K/UL (ref 0–0.44)
ABSOLUTE EOS #: 0.24 K/UL (ref 0–0.44)
ABSOLUTE EOS #: 0.24 K/UL (ref 0–0.44)
ABSOLUTE EOS #: 0.25 K/UL (ref 0–0.44)
ABSOLUTE IMMATURE GRANULOCYTE: 0 K/UL (ref 0–0.3)
ABSOLUTE IMMATURE GRANULOCYTE: 0 K/UL (ref 0–0.3)
ABSOLUTE IMMATURE GRANULOCYTE: 0.03 K/UL (ref 0–0.3)
ABSOLUTE IMMATURE GRANULOCYTE: 0.04 K/UL (ref 0–0.3)
ABSOLUTE IMMATURE GRANULOCYTE: 0.12 K/UL (ref 0–0.3)
ABSOLUTE IMMATURE GRANULOCYTE: <0.03 K/UL (ref 0–0.3)
ABSOLUTE LYMPH #: 0 K/UL (ref 1–4.8)
ABSOLUTE LYMPH #: 0.57 K/UL (ref 1–4.8)
ABSOLUTE LYMPH #: 0.82 K/UL (ref 1.1–3.7)
ABSOLUTE LYMPH #: 0.92 K/UL (ref 1.1–3.7)
ABSOLUTE LYMPH #: 0.92 K/UL (ref 1.1–3.7)
ABSOLUTE LYMPH #: 1 K/UL (ref 1.1–3.7)
ABSOLUTE LYMPH #: 1.04 K/UL (ref 1.1–3.7)
ABSOLUTE LYMPH #: 1.09 K/UL (ref 1.1–3.7)
ABSOLUTE LYMPH #: 1.17 K/UL (ref 1.1–3.7)
ABSOLUTE LYMPH #: 1.39 K/UL (ref 1.1–3.7)
ABSOLUTE LYMPH #: 1.51 K/UL (ref 1.1–3.7)
ABSOLUTE LYMPH #: 1.59 K/UL (ref 1.1–3.7)
ABSOLUTE LYMPH #: 1.96 K/UL (ref 1.1–3.7)
ABSOLUTE LYMPH #: 1.96 K/UL (ref 1.1–3.7)
ABSOLUTE LYMPH #: 1.99 K/UL (ref 1.1–3.7)
ABSOLUTE MONO #: 0.48 K/UL (ref 0.1–1.2)
ABSOLUTE MONO #: 0.5 K/UL (ref 0.1–1.2)
ABSOLUTE MONO #: 0.55 K/UL (ref 0.1–1.2)
ABSOLUTE MONO #: 0.62 K/UL (ref 0.1–1.2)
ABSOLUTE MONO #: 0.64 K/UL (ref 0.1–1.2)
ABSOLUTE MONO #: 0.64 K/UL (ref 0.1–1.2)
ABSOLUTE MONO #: 0.67 K/UL (ref 0.1–1.2)
ABSOLUTE MONO #: 0.69 K/UL (ref 0.1–1.2)
ABSOLUTE MONO #: 0.72 K/UL (ref 0.1–1.2)
ABSOLUTE MONO #: 0.73 K/UL (ref 0.1–1.2)
ABSOLUTE MONO #: 0.76 K/UL (ref 0.1–0.8)
ABSOLUTE MONO #: 0.76 K/UL (ref 0.1–1.2)
ABSOLUTE MONO #: 0.79 K/UL (ref 0.1–1.2)
ABSOLUTE MONO #: 0.94 K/UL (ref 0.1–1.2)
ABSOLUTE MONO #: 1.03 K/UL (ref 0.1–0.8)
ALBUMIN SERPL-MCNC: 2.9 G/DL (ref 3.5–5.2)
ALBUMIN SERPL-MCNC: 3 G/DL (ref 3.5–5.2)
ALBUMIN SERPL-MCNC: 3.5 G/DL (ref 3.5–5.2)
ALBUMIN/GLOBULIN RATIO: 1.4 (ref 1–2.5)
ALBUMIN/GLOBULIN RATIO: 1.5 (ref 1–2.5)
ALBUMIN/GLOBULIN RATIO: 1.5 (ref 1–2.5)
ALLEN TEST: ABNORMAL
ALP BLD-CCNC: 53 U/L (ref 40–129)
ALP BLD-CCNC: 53 U/L (ref 40–129)
ALP BLD-CCNC: 56 U/L (ref 40–129)
ALT SERPL-CCNC: 5 U/L (ref 5–41)
ALT SERPL-CCNC: 6 U/L (ref 5–41)
ALT SERPL-CCNC: <5 U/L (ref 5–41)
ANGLE TEG W HEPARIN: 68.5 DEG (ref 53–72)
ANGLE TEG W HEPARIN: 73.6 DEG (ref 53–72)
ANION GAP SERPL CALCULATED.3IONS-SCNC: 10 MMOL/L (ref 9–17)
ANION GAP SERPL CALCULATED.3IONS-SCNC: 11 MMOL/L (ref 9–17)
ANION GAP SERPL CALCULATED.3IONS-SCNC: 11 MMOL/L (ref 9–17)
ANION GAP SERPL CALCULATED.3IONS-SCNC: 12 MMOL/L (ref 9–17)
ANION GAP SERPL CALCULATED.3IONS-SCNC: 12 MMOL/L (ref 9–17)
ANION GAP SERPL CALCULATED.3IONS-SCNC: 13 MMOL/L (ref 9–17)
ANION GAP SERPL CALCULATED.3IONS-SCNC: 6 MMOL/L (ref 9–17)
ANION GAP SERPL CALCULATED.3IONS-SCNC: 7 MMOL/L (ref 9–17)
ANION GAP SERPL CALCULATED.3IONS-SCNC: 7 MMOL/L (ref 9–17)
ANION GAP SERPL CALCULATED.3IONS-SCNC: 9 MMOL/L (ref 9–17)
ANION GAP SERPL CALCULATED.3IONS-SCNC: 9 MMOL/L (ref 9–17)
ANION GAP: 10 MMOL/L (ref 7–16)
ANION GAP: 10 MMOL/L (ref 7–16)
ANION GAP: 6 MMOL/L (ref 7–16)
ANTIBODY SCREEN: NEGATIVE
ARM BAND NUMBER: NORMAL
AST SERPL-CCNC: 10 U/L
AST SERPL-CCNC: 10 U/L
AST SERPL-CCNC: 9 U/L
BASOPHILS # BLD: 0 % (ref 0–2)
BASOPHILS # BLD: 1 % (ref 0–2)
BASOPHILS ABSOLUTE: 0 K/UL (ref 0–0.2)
BASOPHILS ABSOLUTE: 0.03 K/UL (ref 0–0.2)
BASOPHILS ABSOLUTE: 0.04 K/UL (ref 0–0.2)
BASOPHILS ABSOLUTE: 0.04 K/UL (ref 0–0.2)
BASOPHILS ABSOLUTE: 0.05 K/UL (ref 0–0.2)
BILIRUB SERPL-MCNC: 0.36 MG/DL (ref 0.3–1.2)
BILIRUB SERPL-MCNC: 0.49 MG/DL (ref 0.3–1.2)
BILIRUB SERPL-MCNC: 0.64 MG/DL (ref 0.3–1.2)
BILIRUBIN DIRECT: 0.13 MG/DL
BILIRUBIN DIRECT: 0.16 MG/DL
BILIRUBIN, INDIRECT: 0.36 MG/DL (ref 0–1)
BILIRUBIN, INDIRECT: 0.48 MG/DL (ref 0–1)
BLD PROD TYP BPU: NORMAL
BLD PROD TYP BPU: NORMAL
BLOOD BANK SPECIMEN: NORMAL
BUN BLDV-MCNC: 10 MG/DL (ref 8–23)
BUN BLDV-MCNC: 11 MG/DL (ref 8–23)
BUN BLDV-MCNC: 13 MG/DL (ref 8–23)
BUN BLDV-MCNC: 13 MG/DL (ref 8–23)
BUN BLDV-MCNC: 15 MG/DL (ref 8–23)
BUN BLDV-MCNC: 20 MG/DL (ref 8–23)
BUN BLDV-MCNC: 32 MG/DL (ref 8–23)
BUN BLDV-MCNC: 50 MG/DL (ref 8–23)
BUN BLDV-MCNC: 6 MG/DL (ref 8–23)
BUN BLDV-MCNC: 6 MG/DL (ref 8–23)
BUN BLDV-MCNC: 64 MG/DL (ref 8–23)
BUN BLDV-MCNC: 7 MG/DL (ref 8–23)
BUN BLDV-MCNC: 8 MG/DL (ref 8–23)
BUN BLDV-MCNC: 9 MG/DL (ref 8–23)
BUN BLDV-MCNC: 9 MG/DL (ref 8–23)
BUN/CREAT BLD: 42 (ref 9–20)
BUN/CREAT BLD: ABNORMAL (ref 9–20)
BUN/CREAT BLD: NORMAL (ref 9–20)
CALCIUM IONIZED: 1.05 MMOL/L (ref 1.13–1.33)
CALCIUM IONIZED: 1.1 MMOL/L (ref 1.13–1.33)
CALCIUM IONIZED: 1.11 MMOL/L (ref 1.13–1.33)
CALCIUM IONIZED: 1.42 MMOL/L (ref 1.13–1.33)
CALCIUM SERPL-MCNC: 7.2 MG/DL (ref 8.6–10.4)
CALCIUM SERPL-MCNC: 7.3 MG/DL (ref 8.6–10.4)
CALCIUM SERPL-MCNC: 7.4 MG/DL (ref 8.6–10.4)
CALCIUM SERPL-MCNC: 7.4 MG/DL (ref 8.6–10.4)
CALCIUM SERPL-MCNC: 7.5 MG/DL (ref 8.6–10.4)
CALCIUM SERPL-MCNC: 7.5 MG/DL (ref 8.6–10.4)
CALCIUM SERPL-MCNC: 7.7 MG/DL (ref 8.6–10.4)
CALCIUM SERPL-MCNC: 7.8 MG/DL (ref 8.6–10.4)
CALCIUM SERPL-MCNC: 7.8 MG/DL (ref 8.6–10.4)
CALCIUM SERPL-MCNC: 8 MG/DL (ref 8.6–10.4)
CALCIUM SERPL-MCNC: 8.2 MG/DL (ref 8.6–10.4)
CALCIUM SERPL-MCNC: 8.3 MG/DL (ref 8.6–10.4)
CALCIUM SERPL-MCNC: 8.4 MG/DL (ref 8.6–10.4)
CALCIUM SERPL-MCNC: 8.4 MG/DL (ref 8.6–10.4)
CALCIUM SERPL-MCNC: 8.6 MG/DL (ref 8.6–10.4)
CALCIUM SERPL-MCNC: 8.6 MG/DL (ref 8.6–10.4)
CALCIUM SERPL-MCNC: 9.4 MG/DL (ref 8.6–10.4)
CHLORIDE BLD-SCNC: 100 MMOL/L (ref 98–107)
CHLORIDE BLD-SCNC: 102 MMOL/L (ref 98–107)
CHLORIDE BLD-SCNC: 103 MMOL/L (ref 98–107)
CHLORIDE BLD-SCNC: 105 MMOL/L (ref 98–107)
CHLORIDE BLD-SCNC: 106 MMOL/L (ref 98–107)
CHLORIDE BLD-SCNC: 107 MMOL/L (ref 98–107)
CHLORIDE BLD-SCNC: 107 MMOL/L (ref 98–107)
CHLORIDE BLD-SCNC: 108 MMOL/L (ref 98–107)
CHLORIDE BLD-SCNC: 108 MMOL/L (ref 98–107)
CHLORIDE BLD-SCNC: 109 MMOL/L (ref 98–107)
CHLORIDE BLD-SCNC: 109 MMOL/L (ref 98–107)
CHLORIDE BLD-SCNC: 98 MMOL/L (ref 98–107)
CO2: 16 MMOL/L (ref 20–31)
CO2: 19 MMOL/L (ref 20–31)
CO2: 20 MMOL/L (ref 20–31)
CO2: 21 MMOL/L (ref 20–31)
CO2: 21 MMOL/L (ref 20–31)
CO2: 22 MMOL/L (ref 20–31)
CO2: 22 MMOL/L (ref 20–31)
CO2: 24 MMOL/L (ref 20–31)
CO2: 25 MMOL/L (ref 20–31)
CO2: 25 MMOL/L (ref 20–31)
CO2: 28 MMOL/L (ref 20–31)
CO2: 28 MMOL/L (ref 20–31)
CO2: 29 MMOL/L (ref 20–31)
CO2: 30 MMOL/L (ref 20–31)
CREAT SERPL-MCNC: 0.51 MG/DL (ref 0.7–1.2)
CREAT SERPL-MCNC: 0.55 MG/DL (ref 0.7–1.2)
CREAT SERPL-MCNC: 0.56 MG/DL (ref 0.7–1.2)
CREAT SERPL-MCNC: 0.58 MG/DL (ref 0.7–1.2)
CREAT SERPL-MCNC: 0.62 MG/DL (ref 0.7–1.2)
CREAT SERPL-MCNC: 0.63 MG/DL (ref 0.7–1.2)
CREAT SERPL-MCNC: 0.68 MG/DL (ref 0.7–1.2)
CREAT SERPL-MCNC: 0.71 MG/DL (ref 0.7–1.2)
CREAT SERPL-MCNC: 0.71 MG/DL (ref 0.7–1.2)
CREAT SERPL-MCNC: 0.75 MG/DL (ref 0.7–1.2)
CREAT SERPL-MCNC: 0.78 MG/DL (ref 0.7–1.2)
CREAT SERPL-MCNC: 0.78 MG/DL (ref 0.7–1.2)
CREAT SERPL-MCNC: 0.88 MG/DL (ref 0.7–1.2)
CREAT SERPL-MCNC: 0.88 MG/DL (ref 0.7–1.2)
CREAT SERPL-MCNC: 0.95 MG/DL (ref 0.7–1.2)
CREAT SERPL-MCNC: 1.09 MG/DL (ref 0.7–1.2)
CREAT SERPL-MCNC: 1.54 MG/DL (ref 0.7–1.2)
CROSSMATCH RESULT: NORMAL
CROSSMATCH RESULT: NORMAL
CULTURE: NO GROWTH
CULTURE: NORMAL
CULTURE: NORMAL
DIFFERENTIAL TYPE: ABNORMAL
DIRECT EXAM: NORMAL
DISPENSE STATUS BLOOD BANK: NORMAL
DISPENSE STATUS BLOOD BANK: NORMAL
EKG ATRIAL RATE: 75 BPM
EKG ATRIAL RATE: 77 BPM
EKG ATRIAL RATE: 79 BPM
EKG ATRIAL RATE: 81 BPM
EKG ATRIAL RATE: 89 BPM
EKG ATRIAL RATE: 95 BPM
EKG P AXIS: 30 DEGREES
EKG P AXIS: 73 DEGREES
EKG P AXIS: 73 DEGREES
EKG P AXIS: 77 DEGREES
EKG P AXIS: 84 DEGREES
EKG P AXIS: 86 DEGREES
EKG P-R INTERVAL: 152 MS
EKG P-R INTERVAL: 166 MS
EKG P-R INTERVAL: 168 MS
EKG P-R INTERVAL: 168 MS
EKG P-R INTERVAL: 178 MS
EKG P-R INTERVAL: 186 MS
EKG Q-T INTERVAL: 378 MS
EKG Q-T INTERVAL: 388 MS
EKG Q-T INTERVAL: 396 MS
EKG Q-T INTERVAL: 396 MS
EKG Q-T INTERVAL: 402 MS
EKG Q-T INTERVAL: 406 MS
EKG QRS DURATION: 100 MS
EKG QRS DURATION: 102 MS
EKG QRS DURATION: 92 MS
EKG QRS DURATION: 94 MS
EKG QRS DURATION: 94 MS
EKG QRS DURATION: 98 MS
EKG QTC CALCULATION (BAZETT): 444 MS
EKG QTC CALCULATION (BAZETT): 448 MS
EKG QTC CALCULATION (BAZETT): 448 MS
EKG QTC CALCULATION (BAZETT): 471 MS
EKG QTC CALCULATION (BAZETT): 475 MS
EKG QTC CALCULATION (BAZETT): 481 MS
EKG R AXIS: 60 DEGREES
EKG R AXIS: 63 DEGREES
EKG R AXIS: 65 DEGREES
EKG R AXIS: 66 DEGREES
EKG R AXIS: 75 DEGREES
EKG R AXIS: 75 DEGREES
EKG T AXIS: 101 DEGREES
EKG T AXIS: 106 DEGREES
EKG T AXIS: 110 DEGREES
EKG T AXIS: 111 DEGREES
EKG T AXIS: 118 DEGREES
EKG T AXIS: 82 DEGREES
EKG VENTRICULAR RATE: 75 BPM
EKG VENTRICULAR RATE: 77 BPM
EKG VENTRICULAR RATE: 79 BPM
EKG VENTRICULAR RATE: 81 BPM
EKG VENTRICULAR RATE: 89 BPM
EKG VENTRICULAR RATE: 95 BPM
EOSINOPHILS RELATIVE PERCENT: 0 % (ref 1–4)
EOSINOPHILS RELATIVE PERCENT: 0 % (ref 1–4)
EOSINOPHILS RELATIVE PERCENT: 1 % (ref 1–4)
EOSINOPHILS RELATIVE PERCENT: 2 % (ref 1–4)
EOSINOPHILS RELATIVE PERCENT: 2 % (ref 1–4)
EOSINOPHILS RELATIVE PERCENT: 3 % (ref 1–4)
EOSINOPHILS RELATIVE PERCENT: 4 % (ref 1–4)
EOSINOPHILS RELATIVE PERCENT: 5 % (ref 1–4)
EOSINOPHILS RELATIVE PERCENT: 5 % (ref 1–4)
EPL TEG, W/HEP: 0.2 % (ref 0–15)
EPL TEG, W/HEP: ABNORMAL % (ref 0–15)
EXPIRATION DATE: NORMAL
FIBRINOGEN: 275 MG/DL (ref 140–420)
FIBRINOGEN: 287 MG/DL (ref 140–420)
FIBRINOGEN: 344 MG/DL (ref 140–420)
FIO2: 40
FIO2: 50
FIO2: 60
FIO2: 60
FIO2: 70
FIO2: 80
FIO2: ABNORMAL
GFR AFRICAN AMERICAN: 55 ML/MIN
GFR AFRICAN AMERICAN: >60 ML/MIN
GFR NON-AFRICAN AMERICAN: 45 ML/MIN
GFR NON-AFRICAN AMERICAN: 51 ML/MIN
GFR NON-AFRICAN AMERICAN: >60 ML/MIN
GFR SERPL CREATININE-BSD FRML MDRD: >60 ML/MIN
GFR SERPL CREATININE-BSD FRML MDRD: ABNORMAL ML/MIN/{1.73_M2}
GFR SERPL CREATININE-BSD FRML MDRD: NORMAL ML/MIN/{1.73_M2}
GLOBULIN: ABNORMAL G/DL (ref 1.5–3.8)
GLOBULIN: ABNORMAL G/DL (ref 1.5–3.8)
GLUCOSE BLD-MCNC: 100 MG/DL (ref 70–99)
GLUCOSE BLD-MCNC: 101 MG/DL (ref 70–99)
GLUCOSE BLD-MCNC: 101 MG/DL (ref 70–99)
GLUCOSE BLD-MCNC: 103 MG/DL (ref 70–99)
GLUCOSE BLD-MCNC: 103 MG/DL (ref 70–99)
GLUCOSE BLD-MCNC: 104 MG/DL (ref 70–99)
GLUCOSE BLD-MCNC: 106 MG/DL (ref 75–110)
GLUCOSE BLD-MCNC: 108 MG/DL (ref 70–99)
GLUCOSE BLD-MCNC: 111 MG/DL (ref 70–99)
GLUCOSE BLD-MCNC: 113 MG/DL (ref 75–110)
GLUCOSE BLD-MCNC: 114 MG/DL (ref 74–100)
GLUCOSE BLD-MCNC: 121 MG/DL (ref 75–110)
GLUCOSE BLD-MCNC: 123 MG/DL (ref 74–100)
GLUCOSE BLD-MCNC: 123 MG/DL (ref 74–100)
GLUCOSE BLD-MCNC: 129 MG/DL (ref 75–110)
GLUCOSE BLD-MCNC: 131 MG/DL (ref 74–100)
GLUCOSE BLD-MCNC: 135 MG/DL (ref 70–99)
GLUCOSE BLD-MCNC: 138 MG/DL (ref 70–99)
GLUCOSE BLD-MCNC: 142 MG/DL (ref 75–110)
GLUCOSE BLD-MCNC: 145 MG/DL (ref 74–100)
GLUCOSE BLD-MCNC: 145 MG/DL (ref 75–110)
GLUCOSE BLD-MCNC: 151 MG/DL (ref 70–99)
GLUCOSE BLD-MCNC: 152 MG/DL (ref 74–100)
GLUCOSE BLD-MCNC: 168 MG/DL (ref 75–110)
GLUCOSE BLD-MCNC: 172 MG/DL (ref 75–110)
GLUCOSE BLD-MCNC: 177 MG/DL (ref 74–100)
GLUCOSE BLD-MCNC: 178 MG/DL (ref 75–110)
GLUCOSE BLD-MCNC: 185 MG/DL (ref 75–110)
GLUCOSE BLD-MCNC: 191 MG/DL (ref 75–110)
GLUCOSE BLD-MCNC: 195 MG/DL (ref 75–110)
GLUCOSE BLD-MCNC: 198 MG/DL (ref 74–100)
GLUCOSE BLD-MCNC: 203 MG/DL (ref 75–110)
GLUCOSE BLD-MCNC: 221 MG/DL (ref 75–110)
GLUCOSE BLD-MCNC: 79 MG/DL (ref 75–110)
GLUCOSE BLD-MCNC: 82 MG/DL (ref 74–100)
GLUCOSE BLD-MCNC: 82 MG/DL (ref 74–100)
GLUCOSE BLD-MCNC: 83 MG/DL (ref 74–100)
GLUCOSE BLD-MCNC: 87 MG/DL (ref 75–110)
GLUCOSE BLD-MCNC: 87 MG/DL (ref 75–110)
GLUCOSE BLD-MCNC: 93 MG/DL (ref 74–100)
GLUCOSE BLD-MCNC: 94 MG/DL (ref 70–99)
GLUCOSE BLD-MCNC: 94 MG/DL (ref 75–110)
GLUCOSE BLD-MCNC: 97 MG/DL (ref 70–99)
GLUCOSE BLD-MCNC: 99 MG/DL (ref 70–99)
GLUCOSE BLD-MCNC: 99 MG/DL (ref 70–99)
HCT VFR BLD CALC: 26.1 % (ref 40.7–50.3)
HCT VFR BLD CALC: 26.2 % (ref 40.7–50.3)
HCT VFR BLD CALC: 26.6 % (ref 40.7–50.3)
HCT VFR BLD CALC: 27.1 % (ref 40.7–50.3)
HCT VFR BLD CALC: 27.1 % (ref 40.7–50.3)
HCT VFR BLD CALC: 27.2 % (ref 40.7–50.3)
HCT VFR BLD CALC: 30.3 % (ref 40.7–50.3)
HCT VFR BLD CALC: 31.7 % (ref 40.7–50.3)
HCT VFR BLD CALC: 32.6 % (ref 40.7–50.3)
HCT VFR BLD CALC: 32.8 % (ref 40.7–50.3)
HCT VFR BLD CALC: 33 % (ref 40.7–50.3)
HCT VFR BLD CALC: 33 % (ref 40.7–50.3)
HCT VFR BLD CALC: 33.8 % (ref 40.7–50.3)
HCT VFR BLD CALC: 34 % (ref 40.7–50.3)
HCT VFR BLD CALC: 34.1 % (ref 40.7–50.3)
HCT VFR BLD CALC: 34.4 % (ref 40.7–50.3)
HCT VFR BLD CALC: 34.7 % (ref 40.7–50.3)
HCT VFR BLD CALC: 35.2 % (ref 40.7–50.3)
HCT VFR BLD CALC: 35.4 % (ref 40.7–50.3)
HCT VFR BLD CALC: 35.4 % (ref 40.7–50.3)
HCT VFR BLD CALC: 35.8 % (ref 40.7–50.3)
HCT VFR BLD CALC: 35.9 % (ref 40.7–50.3)
HCT VFR BLD CALC: 36 % (ref 40.7–50.3)
HCT VFR BLD CALC: 36.1 % (ref 40.7–50.3)
HCT VFR BLD CALC: 36.3 % (ref 40.7–50.3)
HCT VFR BLD CALC: 36.4 % (ref 40.7–50.3)
HCT VFR BLD CALC: 36.9 % (ref 40.7–50.3)
HCT VFR BLD CALC: 37.3 % (ref 40.7–50.3)
HCT VFR BLD CALC: 37.3 % (ref 40.7–50.3)
HCT VFR BLD CALC: 38.9 % (ref 40.7–50.3)
HEMOGLOBIN: 10.2 G/DL (ref 13–17)
HEMOGLOBIN: 10.3 G/DL (ref 13–17)
HEMOGLOBIN: 10.5 G/DL (ref 13–17)
HEMOGLOBIN: 10.5 G/DL (ref 13–17)
HEMOGLOBIN: 10.7 G/DL (ref 13–17)
HEMOGLOBIN: 10.8 G/DL (ref 13–17)
HEMOGLOBIN: 10.9 G/DL (ref 13–17)
HEMOGLOBIN: 11 G/DL (ref 13–17)
HEMOGLOBIN: 11.2 G/DL (ref 13–17)
HEMOGLOBIN: 11.2 G/DL (ref 13–17)
HEMOGLOBIN: 11.3 G/DL (ref 13–17)
HEMOGLOBIN: 11.4 G/DL (ref 13–17)
HEMOGLOBIN: 11.7 G/DL (ref 13–17)
HEMOGLOBIN: 11.9 G/DL (ref 13–17)
HEMOGLOBIN: 12 G/DL (ref 13–17)
HEMOGLOBIN: 12.3 G/DL (ref 13–17)
HEMOGLOBIN: 12.6 G/DL (ref 13–17)
HEMOGLOBIN: 8.2 G/DL (ref 13–17)
HEMOGLOBIN: 8.2 G/DL (ref 13–17)
HEMOGLOBIN: 8.3 G/DL (ref 13–17)
HEMOGLOBIN: 9.8 G/DL (ref 13–17)
HEMOGLOBIN: 9.9 G/DL (ref 13–17)
HEPARIN THERAPY: ABNORMAL
HEPARIN THERAPY: YES
IMMATURE GRANULOCYTES: 0 %
IMMATURE GRANULOCYTES: 1 %
INHIBITION AA TEG W HEPARIN: 35.4 %
INHIBITION AA TEG W HEPARIN: 66.3 %
INHIBITION ADP TEG W HEPARIN: 12.7 %
INHIBITION ADP TEG W HEPARIN: 7.6 %
INR BLD: 1
IRON SATURATION: 82 % (ref 20–55)
IRON: 194 UG/DL (ref 59–158)
KINETICS TEG W HEPARIN: 1.2 MIN (ref 1–3)
KINETICS TEG W HEPARIN: 1.4 MIN (ref 1–3)
LACTIC ACID, WHOLE BLOOD: 0.5 MMOL/L (ref 0.7–2.1)
LACTIC ACID, WHOLE BLOOD: 0.7 MMOL/L (ref 0.7–2.1)
LACTIC ACID, WHOLE BLOOD: 0.8 MMOL/L (ref 0.7–2.1)
LACTIC ACID, WHOLE BLOOD: 1.1 MMOL/L (ref 0.7–2.1)
LACTIC ACID: ABNORMAL MMOL/L
LACTIC ACID: NORMAL MMOL/L
LACTIC ACID: NORMAL MMOL/L
LV EF: 55 %
LVEF MODALITY: NORMAL
LY30(LYSIS) TEG W HEPARIN: 0.2 % (ref 0–8)
LY30(LYSIS) TEG W HEPARIN: ABNORMAL % (ref 0–8)
LYMPHOCYTES # BLD: 0 % (ref 24–44)
LYMPHOCYTES # BLD: 13 % (ref 24–43)
LYMPHOCYTES # BLD: 14 % (ref 24–43)
LYMPHOCYTES # BLD: 17 % (ref 24–43)
LYMPHOCYTES # BLD: 18 % (ref 24–43)
LYMPHOCYTES # BLD: 18 % (ref 24–43)
LYMPHOCYTES # BLD: 23 % (ref 24–43)
LYMPHOCYTES # BLD: 24 % (ref 24–43)
LYMPHOCYTES # BLD: 27 % (ref 24–43)
LYMPHOCYTES # BLD: 6 % (ref 24–44)
Lab: NORMAL
MA (MAX CLOT) TEG W HEPARIN: 67.6 MM (ref 50–70)
MA (MAX CLOT) TEG W HEPARIN: 71.7 MM (ref 50–70)
MA(AA) TEG W HEPARIN: 52 MM
MA(AA) TEG W HEPARIN: ABNORMAL MM
MA(ACTIVAT) TEG W HEPARIN: 21.9 MM
MA(ACTIVAT) TEG W HEPARIN: 23.5 MM
MA(ADP) TEG W HEPARIN: 62 MM
MA(ADP) TEG W HEPARIN: 67.9 MM
MAGNESIUM: 1.7 MG/DL (ref 1.6–2.6)
MAGNESIUM: 1.8 MG/DL (ref 1.6–2.6)
MAGNESIUM: 2.1 MG/DL (ref 1.6–2.6)
MAGNESIUM: 2.2 MG/DL (ref 1.6–2.6)
MAGNESIUM: 2.2 MG/DL (ref 1.6–2.6)
MAGNESIUM: 2.6 MG/DL (ref 1.6–2.6)
MCH RBC QN AUTO: 30.7 PG (ref 25.2–33.5)
MCH RBC QN AUTO: 30.8 PG (ref 25.2–33.5)
MCH RBC QN AUTO: 30.9 PG (ref 25.2–33.5)
MCH RBC QN AUTO: 31 PG (ref 25.2–33.5)
MCH RBC QN AUTO: 31.1 PG (ref 25.2–33.5)
MCH RBC QN AUTO: 31.2 PG (ref 25.2–33.5)
MCH RBC QN AUTO: 31.3 PG (ref 25.2–33.5)
MCH RBC QN AUTO: 31.5 PG (ref 25.2–33.5)
MCH RBC QN AUTO: 31.5 PG (ref 25.2–33.5)
MCH RBC QN AUTO: 32.2 PG (ref 25.2–33.5)
MCHC RBC AUTO-ENTMCNC: 30.3 G/DL (ref 28.4–34.8)
MCHC RBC AUTO-ENTMCNC: 30.3 G/DL (ref 28.4–34.8)
MCHC RBC AUTO-ENTMCNC: 31 G/DL (ref 28.4–34.8)
MCHC RBC AUTO-ENTMCNC: 31.1 G/DL (ref 28.4–34.8)
MCHC RBC AUTO-ENTMCNC: 31.1 G/DL (ref 28.4–34.8)
MCHC RBC AUTO-ENTMCNC: 31.2 G/DL (ref 28.4–34.8)
MCHC RBC AUTO-ENTMCNC: 31.2 G/DL (ref 28.4–34.8)
MCHC RBC AUTO-ENTMCNC: 31.4 G/DL (ref 28.4–34.8)
MCHC RBC AUTO-ENTMCNC: 31.6 G/DL (ref 28.4–34.8)
MCHC RBC AUTO-ENTMCNC: 31.7 G/DL (ref 28.4–34.8)
MCHC RBC AUTO-ENTMCNC: 31.8 G/DL (ref 28.4–34.8)
MCHC RBC AUTO-ENTMCNC: 31.9 G/DL (ref 28.4–34.8)
MCHC RBC AUTO-ENTMCNC: 32.2 G/DL (ref 28.4–34.8)
MCHC RBC AUTO-ENTMCNC: 32.3 G/DL (ref 28.4–34.8)
MCHC RBC AUTO-ENTMCNC: 32.4 G/DL (ref 28.4–34.8)
MCHC RBC AUTO-ENTMCNC: 32.4 G/DL (ref 28.4–34.8)
MCV RBC AUTO: 100 FL (ref 82.6–102.9)
MCV RBC AUTO: 100 FL (ref 82.6–102.9)
MCV RBC AUTO: 100.6 FL (ref 82.6–102.9)
MCV RBC AUTO: 102.3 FL (ref 82.6–102.9)
MCV RBC AUTO: 103 FL (ref 82.6–102.9)
MCV RBC AUTO: 96.5 FL (ref 82.6–102.9)
MCV RBC AUTO: 96.8 FL (ref 82.6–102.9)
MCV RBC AUTO: 96.8 FL (ref 82.6–102.9)
MCV RBC AUTO: 97.1 FL (ref 82.6–102.9)
MCV RBC AUTO: 97.2 FL (ref 82.6–102.9)
MCV RBC AUTO: 98.1 FL (ref 82.6–102.9)
MCV RBC AUTO: 98.1 FL (ref 82.6–102.9)
MCV RBC AUTO: 98.6 FL (ref 82.6–102.9)
MCV RBC AUTO: 98.6 FL (ref 82.6–102.9)
MCV RBC AUTO: 98.9 FL (ref 82.6–102.9)
MCV RBC AUTO: 99.2 FL (ref 82.6–102.9)
MCV RBC AUTO: 99.6 FL (ref 82.6–102.9)
MCV RBC AUTO: 99.7 FL (ref 82.6–102.9)
MODE: ABNORMAL
MONOCYTES # BLD: 10 % (ref 3–12)
MONOCYTES # BLD: 11 % (ref 1–7)
MONOCYTES # BLD: 11 % (ref 3–12)
MONOCYTES # BLD: 12 % (ref 3–12)
MONOCYTES # BLD: 8 % (ref 1–7)
MONOCYTES # BLD: 8 % (ref 3–12)
MONOCYTES # BLD: 9 % (ref 3–12)
MORPHOLOGY: ABNORMAL
NEGATIVE BASE EXCESS, ART: 2 (ref 0–2)
NEGATIVE BASE EXCESS, ART: 23 (ref 0–2)
NEGATIVE BASE EXCESS, ART: 4 (ref 0–2)
NEGATIVE BASE EXCESS, ART: 8 (ref 0–2)
NEGATIVE BASE EXCESS, ART: ABNORMAL (ref 0–2)
NRBC AUTOMATED: 0 PER 100 WBC
NRBC AUTOMATED: 0.3 PER 100 WBC
O2 DEVICE/FLOW/%: ABNORMAL
PARTIAL THROMBOPLASTIN TIME: 21.5 SEC (ref 24–36)
PARTIAL THROMBOPLASTIN TIME: 23.7 SEC (ref 20.5–30.5)
PARTIAL THROMBOPLASTIN TIME: 23.9 SEC (ref 20.5–30.5)
PARTIAL THROMBOPLASTIN TIME: 26.1 SEC (ref 20.5–30.5)
PARTIAL THROMBOPLASTIN TIME: 27.6 SEC (ref 20.5–30.5)
PARTIAL THROMBOPLASTIN TIME: 29 SEC (ref 20.5–30.5)
PARTIAL THROMBOPLASTIN TIME: 30.2 SEC (ref 20.5–30.5)
PARTIAL THROMBOPLASTIN TIME: 33 SEC (ref 20.5–30.5)
PARTIAL THROMBOPLASTIN TIME: 41.6 SEC (ref 20.5–30.5)
PARTIAL THROMBOPLASTIN TIME: 45.8 SEC (ref 20.5–30.5)
PARTIAL THROMBOPLASTIN TIME: 47.1 SEC (ref 20.5–30.5)
PARTIAL THROMBOPLASTIN TIME: 49.4 SEC (ref 20.5–30.5)
PARTIAL THROMBOPLASTIN TIME: 49.5 SEC (ref 20.5–30.5)
PARTIAL THROMBOPLASTIN TIME: 50.6 SEC (ref 20.5–30.5)
PARTIAL THROMBOPLASTIN TIME: 53.4 SEC (ref 20.5–30.5)
PARTIAL THROMBOPLASTIN TIME: 54.9 SEC (ref 20.5–30.5)
PARTIAL THROMBOPLASTIN TIME: 56.1 SEC (ref 20.5–30.5)
PARTIAL THROMBOPLASTIN TIME: 56.2 SEC (ref 20.5–30.5)
PARTIAL THROMBOPLASTIN TIME: 57.1 SEC (ref 20.5–30.5)
PARTIAL THROMBOPLASTIN TIME: 57.4 SEC (ref 20.5–30.5)
PARTIAL THROMBOPLASTIN TIME: 61.4 SEC (ref 20.5–30.5)
PARTIAL THROMBOPLASTIN TIME: 66.2 SEC (ref 20.5–30.5)
PARTIAL THROMBOPLASTIN TIME: 73.2 SEC (ref 20.5–30.5)
PARTIAL THROMBOPLASTIN TIME: 75.5 SEC (ref 20.5–30.5)
PARTIAL THROMBOPLASTIN TIME: 76.3 SEC (ref 20.5–30.5)
PARTIAL THROMBOPLASTIN TIME: 83.2 SEC (ref 20.5–30.5)
PATIENT TEMP: ABNORMAL
PDW BLD-RTO: 13.9 % (ref 11.8–14.4)
PDW BLD-RTO: 14.4 % (ref 11.8–14.4)
PDW BLD-RTO: 14.5 % (ref 11.8–14.4)
PDW BLD-RTO: 14.6 % (ref 11.8–14.4)
PDW BLD-RTO: 14.7 % (ref 11.8–14.4)
PDW BLD-RTO: 14.8 % (ref 11.8–14.4)
PDW BLD-RTO: 14.8 % (ref 11.8–14.4)
PDW BLD-RTO: 14.9 % (ref 11.8–14.4)
PDW BLD-RTO: 14.9 % (ref 11.8–14.4)
PDW BLD-RTO: 15.1 % (ref 11.8–14.4)
PDW BLD-RTO: 15.2 % (ref 11.8–14.4)
PDW BLD-RTO: 15.5 % (ref 11.8–14.4)
PDW BLD-RTO: 15.5 % (ref 11.8–14.4)
PDW BLD-RTO: 15.9 % (ref 11.8–14.4)
PERFORMING LOCATION: ABNORMAL
PERFORMING LOCATION: NORMAL
PLATELET # BLD: 129 K/UL (ref 138–453)
PLATELET # BLD: 136 K/UL (ref 138–453)
PLATELET # BLD: 137 K/UL (ref 138–453)
PLATELET # BLD: 140 K/UL (ref 138–453)
PLATELET # BLD: 143 K/UL (ref 138–453)
PLATELET # BLD: 148 K/UL (ref 138–453)
PLATELET # BLD: 152 K/UL (ref 138–453)
PLATELET # BLD: 167 K/UL (ref 138–453)
PLATELET # BLD: 169 K/UL (ref 138–453)
PLATELET # BLD: 174 K/UL (ref 138–453)
PLATELET # BLD: 197 K/UL (ref 138–453)
PLATELET # BLD: 203 K/UL (ref 138–453)
PLATELET # BLD: 215 K/UL (ref 138–453)
PLATELET # BLD: 225 K/UL (ref 138–453)
PLATELET # BLD: 239 K/UL (ref 138–453)
PLATELET # BLD: 249 K/UL (ref 138–453)
PLATELET # BLD: 256 K/UL (ref 138–453)
PLATELET # BLD: ABNORMAL K/UL (ref 138–453)
PLATELET ESTIMATE: ABNORMAL
PLATELET, FLUORESCENCE: 126 K/UL (ref 138–453)
PLATELET, FLUORESCENCE: 135 K/UL (ref 138–453)
PLATELET, FLUORESCENCE: 194 K/UL (ref 138–453)
PLATELET, IMMATURE FRACTION: 2.8 % (ref 1.1–10.3)
PLATELET, IMMATURE FRACTION: 4.3 % (ref 1.1–10.3)
PLATELET, IMMATURE FRACTION: 6.2 % (ref 1.1–10.3)
PMV BLD AUTO: 10 FL (ref 8.1–13.5)
PMV BLD AUTO: 10.1 FL (ref 8.1–13.5)
PMV BLD AUTO: 10.2 FL (ref 8.1–13.5)
PMV BLD AUTO: 10.3 FL (ref 8.1–13.5)
PMV BLD AUTO: 10.3 FL (ref 8.1–13.5)
PMV BLD AUTO: 11.3 FL (ref 8.1–13.5)
PMV BLD AUTO: 9.7 FL (ref 8.1–13.5)
PMV BLD AUTO: 9.8 FL (ref 8.1–13.5)
PMV BLD AUTO: 9.9 FL (ref 8.1–13.5)
PMV BLD AUTO: ABNORMAL FL (ref 8.1–13.5)
POC CHLORIDE: 105 MMOL/L (ref 98–107)
POC CHLORIDE: 109 MMOL/L (ref 98–107)
POC CHLORIDE: 110 MMOL/L (ref 98–107)
POC CREATININE: 0.88 MG/DL (ref 0.51–1.19)
POC CREATININE: 1.04 MG/DL (ref 0.51–1.19)
POC CREATININE: 1.4 MG/DL (ref 0.51–1.19)
POC HCO3: 12.1 MMOL/L (ref 21–28)
POC HCO3: 18.7 MMOL/L (ref 21–28)
POC HCO3: 22.2 MMOL/L (ref 21–28)
POC HCO3: 22.9 MMOL/L (ref 21–28)
POC HCO3: 23.2 MMOL/L (ref 21–28)
POC HCO3: 23.6 MMOL/L (ref 21–28)
POC HCO3: 25.9 MMOL/L (ref 21–28)
POC HCO3: 26.5 MMOL/L (ref 21–28)
POC HCO3: 27 MMOL/L (ref 21–28)
POC HCO3: 27.2 MMOL/L (ref 21–28)
POC HCO3: 28.2 MMOL/L (ref 21–28)
POC HCO3: 28.8 MMOL/L (ref 21–28)
POC HEMATOCRIT: 22 % (ref 41–53)
POC HEMATOCRIT: 22 % (ref 41–53)
POC HEMATOCRIT: 24 % (ref 41–53)
POC HEMATOCRIT: 25 % (ref 41–53)
POC HEMATOCRIT: 25 % (ref 41–53)
POC HEMATOCRIT: 26 % (ref 41–53)
POC HEMATOCRIT: 28 % (ref 41–53)
POC HEMATOCRIT: 30 % (ref 41–53)
POC HEMATOCRIT: 31 % (ref 41–53)
POC HEMOGLOBIN: 10.3 G/DL (ref 13.5–17.5)
POC HEMOGLOBIN: 10.5 G/DL (ref 13.5–17.5)
POC HEMOGLOBIN: 7.5 G/DL (ref 13.5–17.5)
POC HEMOGLOBIN: 7.6 G/DL (ref 13.5–17.5)
POC HEMOGLOBIN: 8 G/DL (ref 13.5–17.5)
POC HEMOGLOBIN: 8.5 G/DL (ref 13.5–17.5)
POC HEMOGLOBIN: 8.6 G/DL (ref 13.5–17.5)
POC HEMOGLOBIN: 8.9 G/DL (ref 13.5–17.5)
POC HEMOGLOBIN: 9.6 G/DL (ref 13.5–17.5)
POC IONIZED CALCIUM: 1.1 MMOL/L (ref 1.15–1.33)
POC IONIZED CALCIUM: 1.12 MMOL/L (ref 1.15–1.33)
POC IONIZED CALCIUM: 1.14 MMOL/L (ref 1.15–1.33)
POC IONIZED CALCIUM: 1.15 MMOL/L (ref 1.15–1.33)
POC IONIZED CALCIUM: 1.19 MMOL/L (ref 1.15–1.33)
POC IONIZED CALCIUM: 1.2 MMOL/L (ref 1.15–1.33)
POC IONIZED CALCIUM: 1.21 MMOL/L (ref 1.15–1.33)
POC IONIZED CALCIUM: 1.28 MMOL/L (ref 1.15–1.33)
POC IONIZED CALCIUM: 1.4 MMOL/L (ref 1.15–1.33)
POC IONIZED CALCIUM: 1.57 MMOL/L (ref 1.15–1.33)
POC LACTIC ACID: 0.45 MMOL/L (ref 0.56–1.39)
POC LACTIC ACID: 0.59 MMOL/L (ref 0.56–1.39)
POC LACTIC ACID: 0.8 MMOL/L (ref 0.56–1.39)
POC LACTIC ACID: 0.85 MMOL/L (ref 0.56–1.39)
POC LACTIC ACID: 3.21 MMOL/L (ref 0.56–1.39)
POC O2 SATURATION: 100 % (ref 94–98)
POC O2 SATURATION: 56 % (ref 94–98)
POC O2 SATURATION: 87 % (ref 94–98)
POC O2 SATURATION: 91 % (ref 94–98)
POC O2 SATURATION: 92 % (ref 94–98)
POC O2 SATURATION: 92 % (ref 94–98)
POC O2 SATURATION: 93 % (ref 94–98)
POC O2 SATURATION: 94 % (ref 94–98)
POC PCO2 TEMP: ABNORMAL MM HG
POC PCO2: 36.4 MM HG (ref 35–48)
POC PCO2: 36.6 MM HG (ref 35–48)
POC PCO2: 39 MM HG (ref 35–48)
POC PCO2: 39.5 MM HG (ref 35–48)
POC PCO2: 39.7 MM HG (ref 35–48)
POC PCO2: 40.4 MM HG (ref 35–48)
POC PCO2: 44.4 MM HG (ref 35–48)
POC PCO2: 47.2 MM HG (ref 35–48)
POC PCO2: 49.3 MM HG (ref 35–48)
POC PCO2: 55.9 MM HG (ref 35–48)
POC PCO2: 62.8 MM HG (ref 35–48)
POC PCO2: 89.9 MM HG (ref 35–48)
POC PH TEMP: ABNORMAL
POC PH: 6.74 (ref 7.35–7.45)
POC PH: 7.24 (ref 7.35–7.45)
POC PH: 7.27 (ref 7.35–7.45)
POC PH: 7.28 (ref 7.35–7.45)
POC PH: 7.33 (ref 7.35–7.45)
POC PH: 7.36 (ref 7.35–7.45)
POC PH: 7.37 (ref 7.35–7.45)
POC PH: 7.37 (ref 7.35–7.45)
POC PH: 7.39 (ref 7.35–7.45)
POC PH: 7.41 (ref 7.35–7.45)
POC PH: 7.45 (ref 7.35–7.45)
POC PH: 7.48 (ref 7.35–7.45)
POC PO2 TEMP: ABNORMAL MM HG
POC PO2: 239.2 MM HG (ref 83–108)
POC PO2: 269.4 MM HG (ref 83–108)
POC PO2: 357.4 MM HG (ref 83–108)
POC PO2: 428.7 MM HG (ref 83–108)
POC PO2: 474.3 MM HG (ref 83–108)
POC PO2: 58.5 MM HG (ref 83–108)
POC PO2: 59.9 MM HG (ref 83–108)
POC PO2: 63.7 MM HG (ref 83–108)
POC PO2: 65.5 MM HG (ref 83–108)
POC PO2: 67.7 MM HG (ref 83–108)
POC PO2: 67.8 MM HG (ref 83–108)
POC PO2: 82 MM HG (ref 83–108)
POC POTASSIUM: 4.1 MMOL/L (ref 3.5–4.5)
POC POTASSIUM: 4.3 MMOL/L (ref 3.5–4.5)
POC POTASSIUM: 4.8 MMOL/L (ref 3.5–4.5)
POC POTASSIUM: 5 MMOL/L (ref 3.5–4.5)
POC POTASSIUM: 5 MMOL/L (ref 3.5–4.5)
POC POTASSIUM: 5.6 MMOL/L (ref 3.5–4.5)
POC POTASSIUM: 5.8 MMOL/L (ref 3.5–4.5)
POC POTASSIUM: 5.8 MMOL/L (ref 3.5–4.5)
POC POTASSIUM: 6.1 MMOL/L (ref 3.5–4.5)
POC POTASSIUM: 6.3 MMOL/L (ref 3.5–4.5)
POC SODIUM: 139 MMOL/L (ref 138–146)
POC SODIUM: 139 MMOL/L (ref 138–146)
POC SODIUM: 141 MMOL/L (ref 138–146)
POSITIVE BASE EXCESS, ART: 0 (ref 0–3)
POSITIVE BASE EXCESS, ART: 1 (ref 0–3)
POSITIVE BASE EXCESS, ART: 1 (ref 0–3)
POSITIVE BASE EXCESS, ART: 3 (ref 0–3)
POSITIVE BASE EXCESS, ART: ABNORMAL (ref 0–3)
POTASSIUM SERPL-SCNC: 3.8 MMOL/L (ref 3.7–5.3)
POTASSIUM SERPL-SCNC: 3.9 MMOL/L (ref 3.7–5.3)
POTASSIUM SERPL-SCNC: 3.9 MMOL/L (ref 3.7–5.3)
POTASSIUM SERPL-SCNC: 4 MMOL/L (ref 3.7–5.3)
POTASSIUM SERPL-SCNC: 4.2 MMOL/L (ref 3.7–5.3)
POTASSIUM SERPL-SCNC: 4.3 MMOL/L (ref 3.7–5.3)
POTASSIUM SERPL-SCNC: 4.4 MMOL/L (ref 3.7–5.3)
POTASSIUM SERPL-SCNC: 4.4 MMOL/L (ref 3.7–5.3)
POTASSIUM SERPL-SCNC: 4.5 MMOL/L (ref 3.7–5.3)
POTASSIUM SERPL-SCNC: 4.6 MMOL/L (ref 3.7–5.3)
POTASSIUM SERPL-SCNC: 4.7 MMOL/L (ref 3.7–5.3)
POTASSIUM SERPL-SCNC: 4.8 MMOL/L (ref 3.7–5.3)
POTASSIUM SERPL-SCNC: 4.8 MMOL/L (ref 3.7–5.3)
POTASSIUM SERPL-SCNC: 5.5 MMOL/L (ref 3.7–5.3)
POTASSIUM SERPL-SCNC: 5.7 MMOL/L (ref 3.7–5.3)
POTASSIUM SERPL-SCNC: 5.8 MMOL/L (ref 3.7–5.3)
POTASSIUM SERPL-SCNC: 6.1 MMOL/L (ref 3.7–5.3)
PROTHROMBIN TIME: 10.2 SEC (ref 9–12)
PROTHROMBIN TIME: 10.7 SEC (ref 9–12)
PROTHROMBIN TIME: 10.7 SEC (ref 9–12)
PROTHROMBIN TIME: 11 SEC (ref 9–12)
PROTHROMBIN TIME: 13.1 SEC (ref 11.8–14.6)
RBC # BLD: 2.63 M/UL (ref 4.21–5.77)
RBC # BLD: 2.65 M/UL (ref 4.21–5.77)
RBC # BLD: 2.66 M/UL (ref 4.21–5.77)
RBC # BLD: 2.67 M/UL (ref 4.21–5.77)
RBC # BLD: 3.13 M/UL (ref 4.21–5.77)
RBC # BLD: 3.26 M/UL (ref 4.21–5.77)
RBC # BLD: 3.3 M/UL (ref 4.21–5.77)
RBC # BLD: 3.4 M/UL (ref 4.21–5.77)
RBC # BLD: 3.48 M/UL (ref 4.21–5.77)
RBC # BLD: 3.49 M/UL (ref 4.21–5.77)
RBC # BLD: 3.57 M/UL (ref 4.21–5.77)
RBC # BLD: 3.59 M/UL (ref 4.21–5.77)
RBC # BLD: 3.65 M/UL (ref 4.21–5.77)
RBC # BLD: 3.67 M/UL (ref 4.21–5.77)
RBC # BLD: 3.7 M/UL (ref 4.21–5.77)
RBC # BLD: 3.72 M/UL (ref 4.21–5.77)
RBC # BLD: 3.73 M/UL (ref 4.21–5.77)
RBC # BLD: 4.02 M/UL (ref 4.21–5.77)
RBC # BLD: ABNORMAL 10*6/UL
REACTION TIME TEG W HEPARIN: 6.8 MIN (ref 5–10)
REACTION TIME TEG W HEPARIN: 8 MIN (ref 5–10)
REASON FOR REJECTION: NORMAL
REASON FOR REJECTION: NORMAL
SAMPLE SITE: ABNORMAL
SARS-COV-2, PCR: NORMAL
SARS-COV-2, PCR: NORMAL
SARS-COV-2, RAPID: NORMAL
SARS-COV-2, RAPID: NOT DETECTED
SARS-COV-2: NORMAL
SARS-COV-2: NOT DETECTED
SEG NEUTROPHILS: 58 % (ref 36–65)
SEG NEUTROPHILS: 60 % (ref 36–65)
SEG NEUTROPHILS: 62 % (ref 36–65)
SEG NEUTROPHILS: 65 % (ref 36–65)
SEG NEUTROPHILS: 66 % (ref 36–65)
SEG NEUTROPHILS: 66 % (ref 36–65)
SEG NEUTROPHILS: 68 % (ref 36–65)
SEG NEUTROPHILS: 69 % (ref 36–65)
SEG NEUTROPHILS: 70 % (ref 36–65)
SEG NEUTROPHILS: 73 % (ref 36–65)
SEG NEUTROPHILS: 73 % (ref 36–65)
SEG NEUTROPHILS: 86 % (ref 36–66)
SEG NEUTROPHILS: 89 % (ref 36–66)
SEGMENTED NEUTROPHILS ABSOLUTE COUNT: 3.18 K/UL (ref 1.5–8.1)
SEGMENTED NEUTROPHILS ABSOLUTE COUNT: 3.38 K/UL (ref 1.5–8.1)
SEGMENTED NEUTROPHILS ABSOLUTE COUNT: 3.71 K/UL (ref 1.5–8.1)
SEGMENTED NEUTROPHILS ABSOLUTE COUNT: 3.74 K/UL (ref 1.5–8.1)
SEGMENTED NEUTROPHILS ABSOLUTE COUNT: 3.89 K/UL (ref 1.5–8.1)
SEGMENTED NEUTROPHILS ABSOLUTE COUNT: 4.03 K/UL (ref 1.5–8.1)
SEGMENTED NEUTROPHILS ABSOLUTE COUNT: 4.18 K/UL (ref 1.5–8.1)
SEGMENTED NEUTROPHILS ABSOLUTE COUNT: 4.22 K/UL (ref 1.5–8.1)
SEGMENTED NEUTROPHILS ABSOLUTE COUNT: 4.36 K/UL (ref 1.5–8.1)
SEGMENTED NEUTROPHILS ABSOLUTE COUNT: 4.54 K/UL (ref 1.5–8.1)
SEGMENTED NEUTROPHILS ABSOLUTE COUNT: 4.7 K/UL (ref 1.5–8.1)
SEGMENTED NEUTROPHILS ABSOLUTE COUNT: 5.45 K/UL (ref 1.5–8.1)
SEGMENTED NEUTROPHILS ABSOLUTE COUNT: 8.17 K/UL (ref 1.8–7.7)
SEGMENTED NEUTROPHILS ABSOLUTE COUNT: 8.37 K/UL (ref 1.8–7.7)
SEGMENTED NEUTROPHILS ABSOLUTE COUNT: 8.53 K/UL (ref 1.5–8.1)
SODIUM BLD-SCNC: 136 MMOL/L (ref 135–144)
SODIUM BLD-SCNC: 137 MMOL/L (ref 135–144)
SODIUM BLD-SCNC: 138 MMOL/L (ref 135–144)
SODIUM BLD-SCNC: 138 MMOL/L (ref 135–144)
SODIUM BLD-SCNC: 139 MMOL/L (ref 135–144)
SODIUM BLD-SCNC: 140 MMOL/L (ref 135–144)
SODIUM BLD-SCNC: 141 MMOL/L (ref 135–144)
SODIUM BLD-SCNC: 142 MMOL/L (ref 135–144)
SOURCE: NORMAL
SOURCE: NORMAL
SPECIMEN DESCRIPTION: NORMAL
TCO2 (CALC), ART: 15 MMOL/L (ref 22–29)
TCO2 (CALC), ART: 20 MMOL/L (ref 22–29)
TCO2 (CALC), ART: 23 MMOL/L (ref 22–29)
TCO2 (CALC), ART: 24 MMOL/L (ref 22–29)
TCO2 (CALC), ART: 25 MMOL/L (ref 22–29)
TCO2 (CALC), ART: 25 MMOL/L (ref 22–29)
TCO2 (CALC), ART: 27 MMOL/L (ref 22–29)
TCO2 (CALC), ART: 28 MMOL/L (ref 22–29)
TCO2 (CALC), ART: 30 MMOL/L (ref 22–29)
TCO2 (CALC), ART: 31 MMOL/L (ref 22–29)
TEG COMMENT: ABNORMAL
TEG COMMENT: NORMAL
TOTAL IRON BINDING CAPACITY: 237 UG/DL (ref 250–450)
TOTAL PROTEIN: 5 G/DL (ref 6.4–8.3)
TOTAL PROTEIN: 5 G/DL (ref 6.4–8.3)
TOTAL PROTEIN: 5.8 G/DL (ref 6.4–8.3)
TRANSFUSION STATUS: NORMAL
TRANSFUSION STATUS: NORMAL
TROPONIN INTERP: ABNORMAL
TROPONIN T: ABNORMAL NG/ML
TROPONIN, HIGH SENSITIVITY: 130 NG/L (ref 0–22)
TROPONIN, HIGH SENSITIVITY: 138 NG/L (ref 0–22)
TROPONIN, HIGH SENSITIVITY: 160 NG/L (ref 0–22)
TROPONIN, HIGH SENSITIVITY: 195 NG/L (ref 0–22)
TROPONIN, HIGH SENSITIVITY: 204 NG/L (ref 0–22)
TROPONIN, HIGH SENSITIVITY: 34 NG/L (ref 0–22)
TROPONIN, HIGH SENSITIVITY: 34 NG/L (ref 0–22)
TROPONIN, HIGH SENSITIVITY: 35 NG/L (ref 0–22)
UNIT DIVISION: 0
UNIT DIVISION: 0
UNIT NUMBER: NORMAL
UNIT NUMBER: NORMAL
UNSATURATED IRON BINDING CAPACITY: 43 UG/DL (ref 112–347)
WBC # BLD: 10.5 K/UL (ref 3.5–11.3)
WBC # BLD: 11.7 K/UL (ref 3.5–11.3)
WBC # BLD: 11.9 K/UL (ref 3.5–11.3)
WBC # BLD: 5.1 K/UL (ref 3.5–11.3)
WBC # BLD: 5.1 K/UL (ref 3.5–11.3)
WBC # BLD: 5.7 K/UL (ref 3.5–11.3)
WBC # BLD: 5.9 K/UL (ref 3.5–11.3)
WBC # BLD: 6 K/UL (ref 3.5–11.3)
WBC # BLD: 6.3 K/UL (ref 3.5–11.3)
WBC # BLD: 6.4 K/UL (ref 3.5–11.3)
WBC # BLD: 6.6 K/UL (ref 3.5–11.3)
WBC # BLD: 6.6 K/UL (ref 3.5–11.3)
WBC # BLD: 6.7 K/UL (ref 3.5–11.3)
WBC # BLD: 7.2 K/UL (ref 3.5–11.3)
WBC # BLD: 8.4 K/UL (ref 3.5–11.3)
WBC # BLD: 9.4 K/UL (ref 3.5–11.3)
WBC # BLD: 9.5 K/UL (ref 3.5–11.3)
WBC # BLD: 9.9 K/UL (ref 3.5–11.3)
WBC # BLD: ABNORMAL 10*3/UL
ZZ NTE CLEAN UP: ORDERED TEST: NORMAL
ZZ NTE CLEAN UP: ORDERED TEST: NORMAL
ZZ NTE WITH NAME CLEAN UP: SPECIMEN SOURCE: NORMAL
ZZ NTE WITH NAME CLEAN UP: SPECIMEN SOURCE: NORMAL

## 2020-01-01 PROCEDURE — C9113 INJ PANTOPRAZOLE SODIUM, VIA: HCPCS | Performed by: PHYSICIAN ASSISTANT

## 2020-01-01 PROCEDURE — 82803 BLOOD GASES ANY COMBINATION: CPT

## 2020-01-01 PROCEDURE — 86900 BLOOD TYPING SEROLOGIC ABO: CPT

## 2020-01-01 PROCEDURE — 36415 COLL VENOUS BLD VENIPUNCTURE: CPT

## 2020-01-01 PROCEDURE — 2060000000 HC ICU INTERMEDIATE R&B

## 2020-01-01 PROCEDURE — 80048 BASIC METABOLIC PNL TOTAL CA: CPT

## 2020-01-01 PROCEDURE — 6370000000 HC RX 637 (ALT 250 FOR IP): Performed by: INTERNAL MEDICINE

## 2020-01-01 PROCEDURE — 6360000002 HC RX W HCPCS: Performed by: PHYSICIAN ASSISTANT

## 2020-01-01 PROCEDURE — 85730 THROMBOPLASTIN TIME PARTIAL: CPT

## 2020-01-01 PROCEDURE — 85014 HEMATOCRIT: CPT

## 2020-01-01 PROCEDURE — 94664 DEMO&/EVAL PT USE INHALER: CPT

## 2020-01-01 PROCEDURE — 93010 ELECTROCARDIOGRAM REPORT: CPT | Performed by: INTERNAL MEDICINE

## 2020-01-01 PROCEDURE — 85025 COMPLETE CBC W/AUTO DIFF WBC: CPT

## 2020-01-01 PROCEDURE — 94726 PLETHYSMOGRAPHY LUNG VOLUMES: CPT

## 2020-01-01 PROCEDURE — 2500000003 HC RX 250 WO HCPCS: Performed by: NURSE ANESTHETIST, CERTIFIED REGISTERED

## 2020-01-01 PROCEDURE — 4004F PT TOBACCO SCREEN RCVD TLK: CPT | Performed by: INTERNAL MEDICINE

## 2020-01-01 PROCEDURE — P9016 RBC LEUKOCYTES REDUCED: HCPCS

## 2020-01-01 PROCEDURE — 93970 EXTREMITY STUDY: CPT

## 2020-01-01 PROCEDURE — 2580000003 HC RX 258: Performed by: NURSE PRACTITIONER

## 2020-01-01 PROCEDURE — 83605 ASSAY OF LACTIC ACID: CPT

## 2020-01-01 PROCEDURE — 35820 EXPLORE CHEST VESSELS: CPT | Performed by: THORACIC SURGERY (CARDIOTHORACIC VASCULAR SURGERY)

## 2020-01-01 PROCEDURE — G8427 DOCREV CUR MEDS BY ELIG CLIN: HCPCS | Performed by: INTERNAL MEDICINE

## 2020-01-01 PROCEDURE — 97535 SELF CARE MNGMENT TRAINING: CPT

## 2020-01-01 PROCEDURE — 87086 URINE CULTURE/COLONY COUNT: CPT

## 2020-01-01 PROCEDURE — 85390 FIBRINOLYSINS SCREEN I&R: CPT

## 2020-01-01 PROCEDURE — 85384 FIBRINOGEN ACTIVITY: CPT

## 2020-01-01 PROCEDURE — 94761 N-INVAS EAR/PLS OXIMETRY MLT: CPT

## 2020-01-01 PROCEDURE — 96375 TX/PRO/DX INJ NEW DRUG ADDON: CPT

## 2020-01-01 PROCEDURE — 99232 SBSQ HOSP IP/OBS MODERATE 35: CPT | Performed by: INTERNAL MEDICINE

## 2020-01-01 PROCEDURE — C9113 INJ PANTOPRAZOLE SODIUM, VIA: HCPCS | Performed by: STUDENT IN AN ORGANIZED HEALTH CARE EDUCATION/TRAINING PROGRAM

## 2020-01-01 PROCEDURE — 7100000000 HC PACU RECOVERY - FIRST 15 MIN: Performed by: INTERNAL MEDICINE

## 2020-01-01 PROCEDURE — 94003 VENT MGMT INPAT SUBQ DAY: CPT

## 2020-01-01 PROCEDURE — 76937 US GUIDE VASCULAR ACCESS: CPT

## 2020-01-01 PROCEDURE — 85018 HEMOGLOBIN: CPT

## 2020-01-01 PROCEDURE — 6360000002 HC RX W HCPCS

## 2020-01-01 PROCEDURE — 3609017100 HC EGD: Performed by: INTERNAL MEDICINE

## 2020-01-01 PROCEDURE — 3600000005 HC SURGERY LEVEL 5 BASE: Performed by: THORACIC SURGERY (CARDIOTHORACIC VASCULAR SURGERY)

## 2020-01-01 PROCEDURE — 6370000000 HC RX 637 (ALT 250 FOR IP): Performed by: PHYSICIAN ASSISTANT

## 2020-01-01 PROCEDURE — 2580000003 HC RX 258: Performed by: INTERNAL MEDICINE

## 2020-01-01 PROCEDURE — C9113 INJ PANTOPRAZOLE SODIUM, VIA: HCPCS | Performed by: INTERNAL MEDICINE

## 2020-01-01 PROCEDURE — 85055 RETICULATED PLATELET ASSAY: CPT

## 2020-01-01 PROCEDURE — 3700000000 HC ANESTHESIA ATTENDED CARE: Performed by: INTERNAL MEDICINE

## 2020-01-01 PROCEDURE — C1753 CATH, INTRAVAS ULTRASOUND: HCPCS

## 2020-01-01 PROCEDURE — 82330 ASSAY OF CALCIUM: CPT

## 2020-01-01 PROCEDURE — 2000000000 HC ICU R&B

## 2020-01-01 PROCEDURE — 99291 CRITICAL CARE FIRST HOUR: CPT | Performed by: INTERNAL MEDICINE

## 2020-01-01 PROCEDURE — 93005 ELECTROCARDIOGRAM TRACING: CPT | Performed by: EMERGENCY MEDICINE

## 2020-01-01 PROCEDURE — 3600000008 HC SURGERY OHS BASE: Performed by: THORACIC SURGERY (CARDIOTHORACIC VASCULAR SURGERY)

## 2020-01-01 PROCEDURE — 6370000000 HC RX 637 (ALT 250 FOR IP): Performed by: STUDENT IN AN ORGANIZED HEALTH CARE EDUCATION/TRAINING PROGRAM

## 2020-01-01 PROCEDURE — 6360000002 HC RX W HCPCS: Performed by: INTERNAL MEDICINE

## 2020-01-01 PROCEDURE — 6360000002 HC RX W HCPCS: Performed by: NURSE ANESTHETIST, CERTIFIED REGISTERED

## 2020-01-01 PROCEDURE — 93458 L HRT ARTERY/VENTRICLE ANGIO: CPT | Performed by: INTERNAL MEDICINE

## 2020-01-01 PROCEDURE — 3700000001 HC ADD 15 MINUTES (ANESTHESIA): Performed by: THORACIC SURGERY (CARDIOTHORACIC VASCULAR SURGERY)

## 2020-01-01 PROCEDURE — 86920 COMPATIBILITY TEST SPIN: CPT

## 2020-01-01 PROCEDURE — 3700000000 HC ANESTHESIA ATTENDED CARE: Performed by: THORACIC SURGERY (CARDIOTHORACIC VASCULAR SURGERY)

## 2020-01-01 PROCEDURE — 2580000003 HC RX 258: Performed by: THORACIC SURGERY (CARDIOTHORACIC VASCULAR SURGERY)

## 2020-01-01 PROCEDURE — C9113 INJ PANTOPRAZOLE SODIUM, VIA: HCPCS | Performed by: EMERGENCY MEDICINE

## 2020-01-01 PROCEDURE — 83735 ASSAY OF MAGNESIUM: CPT

## 2020-01-01 PROCEDURE — 2700000000 HC OXYGEN THERAPY PER DAY

## 2020-01-01 PROCEDURE — 86901 BLOOD TYPING SEROLOGIC RH(D): CPT

## 2020-01-01 PROCEDURE — 0WJC4ZZ INSPECTION OF MEDIASTINUM, PERCUTANEOUS ENDOSCOPIC APPROACH: ICD-10-PCS | Performed by: THORACIC SURGERY (CARDIOTHORACIC VASCULAR SURGERY)

## 2020-01-01 PROCEDURE — 7100000001 HC PACU RECOVERY - ADDTL 15 MIN

## 2020-01-01 PROCEDURE — 94640 AIRWAY INHALATION TREATMENT: CPT

## 2020-01-01 PROCEDURE — 84132 ASSAY OF SERUM POTASSIUM: CPT

## 2020-01-01 PROCEDURE — 7100000000 HC PACU RECOVERY - FIRST 15 MIN

## 2020-01-01 PROCEDURE — 85347 COAGULATION TIME ACTIVATED: CPT

## 2020-01-01 PROCEDURE — 84295 ASSAY OF SERUM SODIUM: CPT

## 2020-01-01 PROCEDURE — 85576 BLOOD PLATELET AGGREGATION: CPT

## 2020-01-01 PROCEDURE — 6360000002 HC RX W HCPCS: Performed by: STUDENT IN AN ORGANIZED HEALTH CARE EDUCATION/TRAINING PROGRAM

## 2020-01-01 PROCEDURE — 6370000000 HC RX 637 (ALT 250 FOR IP): Performed by: NURSE PRACTITIONER

## 2020-01-01 PROCEDURE — 94002 VENT MGMT INPAT INIT DAY: CPT

## 2020-01-01 PROCEDURE — C1769 GUIDE WIRE: HCPCS

## 2020-01-01 PROCEDURE — 92950 HEART/LUNG RESUSCITATION CPR: CPT

## 2020-01-01 PROCEDURE — 2720000010 HC SURG SUPPLY STERILE: Performed by: THORACIC SURGERY (CARDIOTHORACIC VASCULAR SURGERY)

## 2020-01-01 PROCEDURE — 31500 INSERT EMERGENCY AIRWAY: CPT

## 2020-01-01 PROCEDURE — 86850 RBC ANTIBODY SCREEN: CPT

## 2020-01-01 PROCEDURE — 2500000003 HC RX 250 WO HCPCS: Performed by: THORACIC SURGERY (CARDIOTHORACIC VASCULAR SURGERY)

## 2020-01-01 PROCEDURE — 2500000003 HC RX 250 WO HCPCS: Performed by: INTERNAL MEDICINE

## 2020-01-01 PROCEDURE — 71045 X-RAY EXAM CHEST 1 VIEW: CPT

## 2020-01-01 PROCEDURE — 92978 ENDOLUMINL IVUS OCT C 1ST: CPT | Performed by: INTERNAL MEDICINE

## 2020-01-01 PROCEDURE — C1894 INTRO/SHEATH, NON-LASER: HCPCS

## 2020-01-01 PROCEDURE — 3600000015 HC SURGERY LEVEL 5 ADDTL 15MIN: Performed by: THORACIC SURGERY (CARDIOTHORACIC VASCULAR SURGERY)

## 2020-01-01 PROCEDURE — APPNB45 APP NON BILLABLE 31-45 MINUTES: Performed by: INTERNAL MEDICINE

## 2020-01-01 PROCEDURE — 36430 TRANSFUSION BLD/BLD COMPNT: CPT

## 2020-01-01 PROCEDURE — 2500000003 HC RX 250 WO HCPCS: Performed by: NURSE PRACTITIONER

## 2020-01-01 PROCEDURE — 84484 ASSAY OF TROPONIN QUANT: CPT

## 2020-01-01 PROCEDURE — C1887 CATHETER, GUIDING: HCPCS

## 2020-01-01 PROCEDURE — 2580000003 HC RX 258: Performed by: PHYSICIAN ASSISTANT

## 2020-01-01 PROCEDURE — 2500000003 HC RX 250 WO HCPCS

## 2020-01-01 PROCEDURE — 93308 TTE F-UP OR LMTD: CPT

## 2020-01-01 PROCEDURE — 33517 CABG ARTERY-VEIN SINGLE: CPT | Performed by: THORACIC SURGERY (CARDIOTHORACIC VASCULAR SURGERY)

## 2020-01-01 PROCEDURE — 2580000003 HC RX 258: Performed by: SPECIALIST

## 2020-01-01 PROCEDURE — 97166 OT EVAL MOD COMPLEX 45 MIN: CPT

## 2020-01-01 PROCEDURE — 85610 PROTHROMBIN TIME: CPT

## 2020-01-01 PROCEDURE — 5A12012 PERFORMANCE OF CARDIAC OUTPUT, SINGLE, MANUAL: ICD-10-PCS | Performed by: THORACIC SURGERY (CARDIOTHORACIC VASCULAR SURGERY)

## 2020-01-01 PROCEDURE — C1725 CATH, TRANSLUMIN NON-LASER: HCPCS

## 2020-01-01 PROCEDURE — 96374 THER/PROPH/DIAG INJ IV PUSH: CPT

## 2020-01-01 PROCEDURE — 80076 HEPATIC FUNCTION PANEL: CPT

## 2020-01-01 PROCEDURE — APPSS45 APP SPLIT SHARED TIME 31-45 MINUTES: Performed by: PHYSICIAN ASSISTANT

## 2020-01-01 PROCEDURE — 2580000003 HC RX 258

## 2020-01-01 PROCEDURE — 6360000002 HC RX W HCPCS: Performed by: THORACIC SURGERY (CARDIOTHORACIC VASCULAR SURGERY)

## 2020-01-01 PROCEDURE — 21750 REPAIR OF STERNUM SEPARATION: CPT | Performed by: PHYSICIAN ASSISTANT

## 2020-01-01 PROCEDURE — 36620 INSERTION CATHETER ARTERY: CPT

## 2020-01-01 PROCEDURE — 2709999900 HC NON-CHARGEABLE SUPPLY

## 2020-01-01 PROCEDURE — 94729 DIFFUSING CAPACITY: CPT

## 2020-01-01 PROCEDURE — 2580000003 HC RX 258: Performed by: STUDENT IN AN ORGANIZED HEALTH CARE EDUCATION/TRAINING PROGRAM

## 2020-01-01 PROCEDURE — 82947 ASSAY GLUCOSE BLOOD QUANT: CPT

## 2020-01-01 PROCEDURE — 85027 COMPLETE CBC AUTOMATED: CPT

## 2020-01-01 PROCEDURE — 85049 AUTOMATED PLATELET COUNT: CPT

## 2020-01-01 PROCEDURE — 71046 X-RAY EXAM CHEST 2 VIEWS: CPT

## 2020-01-01 PROCEDURE — 99238 HOSP IP/OBS DSCHRG MGMT 30/<: CPT | Performed by: INTERNAL MEDICINE

## 2020-01-01 PROCEDURE — 93005 ELECTROCARDIOGRAM TRACING: CPT | Performed by: INTERNAL MEDICINE

## 2020-01-01 PROCEDURE — 93306 TTE W/DOPPLER COMPLETE: CPT

## 2020-01-01 PROCEDURE — 3017F COLORECTAL CA SCREEN DOC REV: CPT | Performed by: INTERNAL MEDICINE

## 2020-01-01 PROCEDURE — 99233 SBSQ HOSP IP/OBS HIGH 50: CPT | Performed by: INTERNAL MEDICINE

## 2020-01-01 PROCEDURE — 97162 PT EVAL MOD COMPLEX 30 MIN: CPT

## 2020-01-01 PROCEDURE — 2709999900 HC NON-CHARGEABLE SUPPLY: Performed by: THORACIC SURGERY (CARDIOTHORACIC VASCULAR SURGERY)

## 2020-01-01 PROCEDURE — 82435 ASSAY OF BLOOD CHLORIDE: CPT

## 2020-01-01 PROCEDURE — 2580000003 HC RX 258: Performed by: NURSE ANESTHETIST, CERTIFIED REGISTERED

## 2020-01-01 PROCEDURE — 99024 POSTOP FOLLOW-UP VISIT: CPT | Performed by: THORACIC SURGERY (CARDIOTHORACIC VASCULAR SURGERY)

## 2020-01-01 PROCEDURE — U0003 INFECTIOUS AGENT DETECTION BY NUCLEIC ACID (DNA OR RNA); SEVERE ACUTE RESPIRATORY SYNDROME CORONAVIRUS 2 (SARS-COV-2) (CORONAVIRUS DISEASE [COVID-19]), AMPLIFIED PROBE TECHNIQUE, MAKING USE OF HIGH THROUGHPUT TECHNOLOGIES AS DESCRIBED BY CMS-2020-01-R: HCPCS

## 2020-01-01 PROCEDURE — 3700000001 HC ADD 15 MINUTES (ANESTHESIA): Performed by: INTERNAL MEDICINE

## 2020-01-01 PROCEDURE — 33533 CABG ARTERIAL SINGLE: CPT | Performed by: THORACIC SURGERY (CARDIOTHORACIC VASCULAR SURGERY)

## 2020-01-01 PROCEDURE — B240ZZ3 ULTRASONOGRAPHY OF SINGLE CORONARY ARTERY, INTRAVASCULAR: ICD-10-PCS | Performed by: INTERNAL MEDICINE

## 2020-01-01 PROCEDURE — 82565 ASSAY OF CREATININE: CPT

## 2020-01-01 PROCEDURE — 6370000000 HC RX 637 (ALT 250 FOR IP): Performed by: THORACIC SURGERY (CARDIOTHORACIC VASCULAR SURGERY)

## 2020-01-01 PROCEDURE — 6360000004 HC RX CONTRAST MEDICATION: Performed by: INTERNAL MEDICINE

## 2020-01-01 PROCEDURE — 6370000000 HC RX 637 (ALT 250 FOR IP)

## 2020-01-01 PROCEDURE — P9041 ALBUMIN (HUMAN),5%, 50ML: HCPCS | Performed by: NURSE PRACTITIONER

## 2020-01-01 PROCEDURE — 2580000003 HC RX 258: Performed by: EMERGENCY MEDICINE

## 2020-01-01 PROCEDURE — 31500 INSERT EMERGENCY AIRWAY: CPT | Performed by: ANESTHESIOLOGY

## 2020-01-01 PROCEDURE — 99221 1ST HOSP IP/OBS SF/LOW 40: CPT | Performed by: INTERNAL MEDICINE

## 2020-01-01 PROCEDURE — 93010 ELECTROCARDIOGRAM REPORT: CPT | Performed by: FAMILY MEDICINE

## 2020-01-01 PROCEDURE — C9113 INJ PANTOPRAZOLE SODIUM, VIA: HCPCS | Performed by: NURSE PRACTITIONER

## 2020-01-01 PROCEDURE — 93880 EXTRACRANIAL BILAT STUDY: CPT

## 2020-01-01 PROCEDURE — 0DJ08ZZ INSPECTION OF UPPER INTESTINAL TRACT, VIA NATURAL OR ARTIFICIAL OPENING ENDOSCOPIC: ICD-10-PCS | Performed by: INTERNAL MEDICINE

## 2020-01-01 PROCEDURE — 6360000002 HC RX W HCPCS: Performed by: EMERGENCY MEDICINE

## 2020-01-01 PROCEDURE — U0002 COVID-19 LAB TEST NON-CDC: HCPCS

## 2020-01-01 PROCEDURE — 6360000002 HC RX W HCPCS: Performed by: ANESTHESIOLOGY

## 2020-01-01 PROCEDURE — 83550 IRON BINDING TEST: CPT

## 2020-01-01 PROCEDURE — 02100Z9 BYPASS CORONARY ARTERY, ONE ARTERY FROM LEFT INTERNAL MAMMARY, OPEN APPROACH: ICD-10-PCS | Performed by: THORACIC SURGERY (CARDIOTHORACIC VASCULAR SURGERY)

## 2020-01-01 PROCEDURE — 97530 THERAPEUTIC ACTIVITIES: CPT

## 2020-01-01 PROCEDURE — 87641 MR-STAPH DNA AMP PROBE: CPT

## 2020-01-01 PROCEDURE — 71250 CT THORAX DX C-: CPT

## 2020-01-01 PROCEDURE — 83540 ASSAY OF IRON: CPT

## 2020-01-01 PROCEDURE — 87040 BLOOD CULTURE FOR BACTERIA: CPT

## 2020-01-01 PROCEDURE — 94770 HC ETCO2 MONITOR DAILY: CPT

## 2020-01-01 PROCEDURE — 93923 UPR/LXTR ART STDY 3+ LVLS: CPT

## 2020-01-01 PROCEDURE — 0W3C0ZZ CONTROL BLEEDING IN MEDIASTINUM, OPEN APPROACH: ICD-10-PCS | Performed by: THORACIC SURGERY (CARDIOTHORACIC VASCULAR SURGERY)

## 2020-01-01 PROCEDURE — 7100000001 HC PACU RECOVERY - ADDTL 15 MIN: Performed by: INTERNAL MEDICINE

## 2020-01-01 PROCEDURE — 6370000000 HC RX 637 (ALT 250 FOR IP): Performed by: SPECIALIST

## 2020-01-01 PROCEDURE — 021009W BYPASS CORONARY ARTERY, ONE ARTERY FROM AORTA WITH AUTOLOGOUS VENOUS TISSUE, OPEN APPROACH: ICD-10-PCS | Performed by: THORACIC SURGERY (CARDIOTHORACIC VASCULAR SURGERY)

## 2020-01-01 PROCEDURE — 6360000002 HC RX W HCPCS: Performed by: SPECIALIST

## 2020-01-01 PROCEDURE — 21750 REPAIR OF STERNUM SEPARATION: CPT | Performed by: THORACIC SURGERY (CARDIOTHORACIC VASCULAR SURGERY)

## 2020-01-01 PROCEDURE — 37799 UNLISTED PX VASCULAR SURGERY: CPT

## 2020-01-01 PROCEDURE — B2151ZZ FLUOROSCOPY OF LEFT HEART USING LOW OSMOLAR CONTRAST: ICD-10-PCS | Performed by: INTERNAL MEDICINE

## 2020-01-01 PROCEDURE — 94060 EVALUATION OF WHEEZING: CPT

## 2020-01-01 PROCEDURE — G8417 CALC BMI ABV UP PARAM F/U: HCPCS | Performed by: INTERNAL MEDICINE

## 2020-01-01 PROCEDURE — 43235 EGD DIAGNOSTIC BRUSH WASH: CPT | Performed by: INTERNAL MEDICINE

## 2020-01-01 PROCEDURE — 99215 OFFICE O/P EST HI 40 MIN: CPT | Performed by: INTERNAL MEDICINE

## 2020-01-01 PROCEDURE — P9047 ALBUMIN (HUMAN), 25%, 50ML: HCPCS

## 2020-01-01 PROCEDURE — 1123F ACP DISCUSS/DSCN MKR DOCD: CPT | Performed by: INTERNAL MEDICINE

## 2020-01-01 PROCEDURE — 70498 CT ANGIOGRAPHY NECK: CPT

## 2020-01-01 PROCEDURE — 80053 COMPREHEN METABOLIC PANEL: CPT

## 2020-01-01 PROCEDURE — 6360000002 HC RX W HCPCS: Performed by: NURSE PRACTITIONER

## 2020-01-01 PROCEDURE — B246ZZ4 ULTRASONOGRAPHY OF RIGHT AND LEFT HEART, TRANSESOPHAGEAL: ICD-10-PCS | Performed by: THORACIC SURGERY (CARDIOTHORACIC VASCULAR SURGERY)

## 2020-01-01 PROCEDURE — 6370000000 HC RX 637 (ALT 250 FOR IP): Performed by: ANESTHESIOLOGY

## 2020-01-01 PROCEDURE — 6360000004 HC RX CONTRAST MEDICATION

## 2020-01-01 PROCEDURE — 2500000003 HC RX 250 WO HCPCS: Performed by: SPECIALIST

## 2020-01-01 PROCEDURE — 3600000018 HC SURGERY OHS ADDTL 15MIN: Performed by: THORACIC SURGERY (CARDIOTHORACIC VASCULAR SURGERY)

## 2020-01-01 PROCEDURE — 93005 ELECTROCARDIOGRAM TRACING: CPT | Performed by: STUDENT IN AN ORGANIZED HEALTH CARE EDUCATION/TRAINING PROGRAM

## 2020-01-01 PROCEDURE — 99284 EMERGENCY DEPT VISIT MOD MDM: CPT

## 2020-01-01 PROCEDURE — 4A023N7 MEASUREMENT OF CARDIAC SAMPLING AND PRESSURE, LEFT HEART, PERCUTANEOUS APPROACH: ICD-10-PCS | Performed by: INTERNAL MEDICINE

## 2020-01-01 PROCEDURE — P9045 ALBUMIN (HUMAN), 5%, 250 ML: HCPCS | Performed by: SPECIALIST

## 2020-01-01 PROCEDURE — 4040F PNEUMOC VAC/ADMIN/RCVD: CPT | Performed by: INTERNAL MEDICINE

## 2020-01-01 PROCEDURE — B2111ZZ FLUOROSCOPY OF MULTIPLE CORONARY ARTERIES USING LOW OSMOLAR CONTRAST: ICD-10-PCS | Performed by: INTERNAL MEDICINE

## 2020-01-01 PROCEDURE — 33508 ENDOSCOPIC VEIN HARVEST: CPT | Performed by: THORACIC SURGERY (CARDIOTHORACIC VASCULAR SURGERY)

## 2020-01-01 RX ORDER — ATORVASTATIN CALCIUM 40 MG/1
40 TABLET, FILM COATED ORAL NIGHTLY
Status: DISCONTINUED | OUTPATIENT
Start: 2020-01-01 | End: 2020-01-01 | Stop reason: HOSPADM

## 2020-01-01 RX ORDER — LIDOCAINE HYDROCHLORIDE 10 MG/ML
1 INJECTION, SOLUTION EPIDURAL; INFILTRATION; INTRACAUDAL; PERINEURAL ONCE
Status: DISCONTINUED | OUTPATIENT
Start: 2020-01-01 | End: 2020-01-01 | Stop reason: HOSPADM

## 2020-01-01 RX ORDER — SODIUM CHLORIDE 9 MG/ML
INJECTION, SOLUTION INTRAVENOUS CONTINUOUS
Status: DISCONTINUED | OUTPATIENT
Start: 2020-01-01 | End: 2020-01-01 | Stop reason: SDUPTHER

## 2020-01-01 RX ORDER — CLOPIDOGREL BISULFATE 75 MG/1
225 TABLET ORAL ONCE
Status: COMPLETED | OUTPATIENT
Start: 2020-01-01 | End: 2020-01-01

## 2020-01-01 RX ORDER — ISOSORBIDE MONONITRATE 60 MG/1
TABLET, EXTENDED RELEASE ORAL
Qty: 90 TABLET | Refills: 3 | Status: ON HOLD | OUTPATIENT
Start: 2020-01-01 | End: 2020-01-01

## 2020-01-01 RX ORDER — HYDRALAZINE HYDROCHLORIDE 20 MG/ML
5 INJECTION INTRAMUSCULAR; INTRAVENOUS EVERY 5 MIN PRN
Status: DISCONTINUED | OUTPATIENT
Start: 2020-01-01 | End: 2020-01-01 | Stop reason: HOSPADM

## 2020-01-01 RX ORDER — METOPROLOL TARTRATE 5 MG/5ML
2.5 INJECTION INTRAVENOUS EVERY 10 MIN PRN
Status: DISCONTINUED | OUTPATIENT
Start: 2020-01-01 | End: 2020-01-01 | Stop reason: HOSPADM

## 2020-01-01 RX ORDER — HEPARIN SODIUM 200 [USP'U]/100ML
INJECTION, SOLUTION INTRAVENOUS PRN
Status: DISCONTINUED | OUTPATIENT
Start: 2020-01-01 | End: 2020-01-01 | Stop reason: ALTCHOICE

## 2020-01-01 RX ORDER — ALBUMIN (HUMAN) 12.5 G/50ML
SOLUTION INTRAVENOUS
Status: COMPLETED
Start: 2020-01-01 | End: 2020-01-01

## 2020-01-01 RX ORDER — DEXMEDETOMIDINE HYDROCHLORIDE 4 UG/ML
0.2 INJECTION, SOLUTION INTRAVENOUS CONTINUOUS
Status: DISCONTINUED | OUTPATIENT
Start: 2020-01-01 | End: 2020-01-01 | Stop reason: HOSPADM

## 2020-01-01 RX ORDER — ALBUTEROL SULFATE 90 UG/1
2 AEROSOL, METERED RESPIRATORY (INHALATION) EVERY 6 HOURS PRN
Status: DISCONTINUED | OUTPATIENT
Start: 2020-01-01 | End: 2020-01-01 | Stop reason: HOSPADM

## 2020-01-01 RX ORDER — ALBUTEROL SULFATE 2.5 MG/3ML
2.5 SOLUTION RESPIRATORY (INHALATION) EVERY 6 HOURS PRN
Status: DISCONTINUED | OUTPATIENT
Start: 2020-01-01 | End: 2020-01-01 | Stop reason: HOSPADM

## 2020-01-01 RX ORDER — SODIUM CHLORIDE 9 MG/ML
INJECTION, SOLUTION INTRAVENOUS CONTINUOUS PRN
Status: DISCONTINUED | OUTPATIENT
Start: 2020-01-01 | End: 2020-01-01 | Stop reason: SDUPTHER

## 2020-01-01 RX ORDER — ROCURONIUM BROMIDE 10 MG/ML
INJECTION, SOLUTION INTRAVENOUS PRN
Status: DISCONTINUED | OUTPATIENT
Start: 2020-01-01 | End: 2020-01-01 | Stop reason: SDUPTHER

## 2020-01-01 RX ORDER — PANTOPRAZOLE SODIUM 40 MG/10ML
40 INJECTION, POWDER, LYOPHILIZED, FOR SOLUTION INTRAVENOUS 2 TIMES DAILY
Status: DISCONTINUED | OUTPATIENT
Start: 2020-01-01 | End: 2020-01-01 | Stop reason: HOSPADM

## 2020-01-01 RX ORDER — 0.9 % SODIUM CHLORIDE 0.9 %
1000 INTRAVENOUS SOLUTION INTRAVENOUS ONCE
Status: COMPLETED | OUTPATIENT
Start: 2020-01-01 | End: 2020-01-01

## 2020-01-01 RX ORDER — DOBUTAMINE HYDROCHLORIDE 100 MG/100ML
INJECTION INTRAVENOUS CONTINUOUS PRN
Status: DISCONTINUED | OUTPATIENT
Start: 2020-01-01 | End: 2020-01-01 | Stop reason: SDUPTHER

## 2020-01-01 RX ORDER — DOBUTAMINE HYDROCHLORIDE 400 MG/100ML
2.5 INJECTION INTRAVENOUS CONTINUOUS
Status: DISCONTINUED | OUTPATIENT
Start: 2020-01-01 | End: 2020-01-01 | Stop reason: HOSPADM

## 2020-01-01 RX ORDER — SODIUM CHLORIDE, SODIUM LACTATE, POTASSIUM CHLORIDE, CALCIUM CHLORIDE 600; 310; 30; 20 MG/100ML; MG/100ML; MG/100ML; MG/100ML
INJECTION, SOLUTION INTRAVENOUS CONTINUOUS PRN
Status: DISCONTINUED | OUTPATIENT
Start: 2020-01-01 | End: 2020-01-01 | Stop reason: SDUPTHER

## 2020-01-01 RX ORDER — LIDOCAINE HYDROCHLORIDE 10 MG/ML
INJECTION, SOLUTION INFILTRATION; PERINEURAL
Status: COMPLETED
Start: 2020-01-01 | End: 2020-01-01

## 2020-01-01 RX ORDER — 0.9 % SODIUM CHLORIDE 0.9 %
20 INTRAVENOUS SOLUTION INTRAVENOUS ONCE
Status: COMPLETED | OUTPATIENT
Start: 2020-01-01 | End: 2020-01-01

## 2020-01-01 RX ORDER — MAGNESIUM HYDROXIDE 1200 MG/15ML
LIQUID ORAL CONTINUOUS PRN
Status: COMPLETED | OUTPATIENT
Start: 2020-01-01 | End: 2020-01-01

## 2020-01-01 RX ORDER — CALCIUM CHLORIDE 100 MG/ML
INJECTION INTRAVENOUS; INTRAVENTRICULAR PRN
Status: DISCONTINUED | OUTPATIENT
Start: 2020-01-01 | End: 2020-01-01 | Stop reason: SDUPTHER

## 2020-01-01 RX ORDER — BACITRACIN 50000 [USP'U]/1
INJECTION, POWDER, LYOPHILIZED, FOR SOLUTION INTRAMUSCULAR PRN
Status: DISCONTINUED | OUTPATIENT
Start: 2020-01-01 | End: 2020-01-01 | Stop reason: HOSPADM

## 2020-01-01 RX ORDER — SODIUM PHOSPHATE, DIBASIC AND SODIUM PHOSPHATE, MONOBASIC 7; 19 G/133ML; G/133ML
1 ENEMA RECTAL DAILY PRN
Status: DISCONTINUED | OUTPATIENT
Start: 2020-01-01 | End: 2020-01-01 | Stop reason: HOSPADM

## 2020-01-01 RX ORDER — DEXTROSE MONOHYDRATE 50 MG/ML
100 INJECTION, SOLUTION INTRAVENOUS PRN
Status: DISCONTINUED | OUTPATIENT
Start: 2020-01-01 | End: 2020-01-01 | Stop reason: HOSPADM

## 2020-01-01 RX ORDER — SODIUM CHLORIDE 0.9 % (FLUSH) 0.9 %
10 SYRINGE (ML) INJECTION EVERY 12 HOURS SCHEDULED
Status: DISCONTINUED | OUTPATIENT
Start: 2020-01-01 | End: 2020-01-01

## 2020-01-01 RX ORDER — NITROGLYCERIN 20 MG/100ML
5 INJECTION INTRAVENOUS CONTINUOUS
Status: DISCONTINUED | OUTPATIENT
Start: 2020-01-01 | End: 2020-01-01 | Stop reason: HOSPADM

## 2020-01-01 RX ORDER — EPHEDRINE SULFATE 50 MG/ML
INJECTION, SOLUTION INTRAVENOUS PRN
Status: DISCONTINUED | OUTPATIENT
Start: 2020-01-01 | End: 2020-01-01 | Stop reason: SDUPTHER

## 2020-01-01 RX ORDER — LIDOCAINE HYDROCHLORIDE 10 MG/ML
INJECTION, SOLUTION EPIDURAL; INFILTRATION; INTRACAUDAL; PERINEURAL PRN
Status: DISCONTINUED | OUTPATIENT
Start: 2020-01-01 | End: 2020-01-01 | Stop reason: SDUPTHER

## 2020-01-01 RX ORDER — PROPOFOL 10 MG/ML
10 INJECTION, EMULSION INTRAVENOUS CONTINUOUS
Status: DISCONTINUED | OUTPATIENT
Start: 2020-01-01 | End: 2020-01-01 | Stop reason: HOSPADM

## 2020-01-01 RX ORDER — IPRATROPIUM BROMIDE AND ALBUTEROL SULFATE 2.5; .5 MG/3ML; MG/3ML
1 SOLUTION RESPIRATORY (INHALATION)
Status: DISCONTINUED | OUTPATIENT
Start: 2020-01-01 | End: 2020-01-01 | Stop reason: HOSPADM

## 2020-01-01 RX ORDER — FENTANYL CITRATE 50 UG/ML
50 INJECTION, SOLUTION INTRAMUSCULAR; INTRAVENOUS
Status: DISCONTINUED | OUTPATIENT
Start: 2020-01-01 | End: 2020-01-01 | Stop reason: HOSPADM

## 2020-01-01 RX ORDER — CLOPIDOGREL BISULFATE 75 MG/1
75 TABLET ORAL DAILY
Status: DISCONTINUED | OUTPATIENT
Start: 2020-01-01 | End: 2020-01-01

## 2020-01-01 RX ORDER — DEXTROSE MONOHYDRATE 25 G/50ML
INJECTION, SOLUTION INTRAVENOUS PRN
Status: DISCONTINUED | OUTPATIENT
Start: 2020-01-01 | End: 2020-01-01 | Stop reason: SDUPTHER

## 2020-01-01 RX ORDER — ALBUMIN, HUMAN INJ 5% 5 %
25 SOLUTION INTRAVENOUS PRN
Status: DISCONTINUED | OUTPATIENT
Start: 2020-01-01 | End: 2020-01-01 | Stop reason: HOSPADM

## 2020-01-01 RX ORDER — NOREPINEPHRINE BIT/0.9 % NACL 16MG/250ML
0.02 INFUSION BOTTLE (ML) INTRAVENOUS CONTINUOUS
Status: DISCONTINUED | OUTPATIENT
Start: 2020-01-01 | End: 2020-01-01 | Stop reason: HOSPADM

## 2020-01-01 RX ORDER — SODIUM CHLORIDE 9 MG/ML
10 INJECTION INTRAVENOUS DAILY
Status: DISCONTINUED | OUTPATIENT
Start: 2020-01-01 | End: 2020-01-01

## 2020-01-01 RX ORDER — ASPIRIN 81 MG/1
81 TABLET ORAL DAILY
Status: DISCONTINUED | OUTPATIENT
Start: 2020-01-01 | End: 2020-01-01 | Stop reason: HOSPADM

## 2020-01-01 RX ORDER — PROMETHAZINE HYDROCHLORIDE 25 MG/1
12.5 TABLET ORAL EVERY 6 HOURS PRN
Status: DISCONTINUED | OUTPATIENT
Start: 2020-01-01 | End: 2020-01-01 | Stop reason: SDUPTHER

## 2020-01-01 RX ORDER — FENTANYL CITRATE 50 UG/ML
25 INJECTION, SOLUTION INTRAMUSCULAR; INTRAVENOUS
Status: DISCONTINUED | OUTPATIENT
Start: 2020-01-01 | End: 2020-01-01 | Stop reason: HOSPADM

## 2020-01-01 RX ORDER — SODIUM CHLORIDE 0.9 % (FLUSH) 0.9 %
10 SYRINGE (ML) INJECTION PRN
Status: DISCONTINUED | OUTPATIENT
Start: 2020-01-01 | End: 2020-01-01 | Stop reason: HOSPADM

## 2020-01-01 RX ORDER — FENTANYL CITRATE 0.05 MG/ML
INJECTION, SOLUTION INTRAMUSCULAR; INTRAVENOUS PRN
Status: DISCONTINUED | OUTPATIENT
Start: 2020-01-01 | End: 2020-01-01 | Stop reason: SDUPTHER

## 2020-01-01 RX ORDER — POTASSIUM CHLORIDE 29.8 MG/ML
20 INJECTION INTRAVENOUS PRN
Status: DISCONTINUED | OUTPATIENT
Start: 2020-01-01 | End: 2020-01-01 | Stop reason: HOSPADM

## 2020-01-01 RX ORDER — OXYCODONE HYDROCHLORIDE AND ACETAMINOPHEN 5; 325 MG/1; MG/1
1 TABLET ORAL EVERY 4 HOURS PRN
Status: DISCONTINUED | OUTPATIENT
Start: 2020-01-01 | End: 2020-01-01 | Stop reason: HOSPADM

## 2020-01-01 RX ORDER — NITROGLYCERIN 0.4 MG/1
TABLET SUBLINGUAL
Status: COMPLETED
Start: 2020-01-01 | End: 2020-01-01

## 2020-01-01 RX ORDER — ASPIRIN 81 MG/1
81 TABLET ORAL DAILY
Status: DISCONTINUED | OUTPATIENT
Start: 2020-01-01 | End: 2020-01-01 | Stop reason: SDUPTHER

## 2020-01-01 RX ORDER — PROPOFOL 10 MG/ML
INJECTION, EMULSION INTRAVENOUS PRN
Status: DISCONTINUED | OUTPATIENT
Start: 2020-01-01 | End: 2020-01-01 | Stop reason: SDUPTHER

## 2020-01-01 RX ORDER — OXYCODONE HYDROCHLORIDE AND ACETAMINOPHEN 5; 325 MG/1; MG/1
2 TABLET ORAL EVERY 4 HOURS PRN
Status: DISCONTINUED | OUTPATIENT
Start: 2020-01-01 | End: 2020-01-01 | Stop reason: HOSPADM

## 2020-01-01 RX ORDER — FENTANYL CITRATE 50 UG/ML
INJECTION, SOLUTION INTRAMUSCULAR; INTRAVENOUS PRN
Status: DISCONTINUED | OUTPATIENT
Start: 2020-01-01 | End: 2020-01-01 | Stop reason: SDUPTHER

## 2020-01-01 RX ORDER — SODIUM CHLORIDE 0.9 % (FLUSH) 0.9 %
10 SYRINGE (ML) INJECTION EVERY 12 HOURS SCHEDULED
Status: DISCONTINUED | OUTPATIENT
Start: 2020-01-01 | End: 2020-01-01 | Stop reason: HOSPADM

## 2020-01-01 RX ORDER — PANTOPRAZOLE SODIUM 40 MG/10ML
40 INJECTION, POWDER, LYOPHILIZED, FOR SOLUTION INTRAVENOUS 2 TIMES DAILY
Status: DISCONTINUED | OUTPATIENT
Start: 2020-01-01 | End: 2020-01-01

## 2020-01-01 RX ORDER — MAGNESIUM SULFATE 1 G/100ML
1 INJECTION INTRAVENOUS
Status: COMPLETED | OUTPATIENT
Start: 2020-01-01 | End: 2020-01-01

## 2020-01-01 RX ORDER — VANCOMYCIN HYDROCHLORIDE 1 G/20ML
INJECTION, POWDER, LYOPHILIZED, FOR SOLUTION INTRAVENOUS PRN
Status: DISCONTINUED | OUTPATIENT
Start: 2020-01-01 | End: 2020-01-01 | Stop reason: SDUPTHER

## 2020-01-01 RX ORDER — ONDANSETRON 2 MG/ML
4 INJECTION INTRAMUSCULAR; INTRAVENOUS EVERY 6 HOURS PRN
Status: DISCONTINUED | OUTPATIENT
Start: 2020-01-01 | End: 2020-01-01 | Stop reason: SDUPTHER

## 2020-01-01 RX ORDER — AMIODARONE HYDROCHLORIDE 200 MG/1
200 TABLET ORAL 3 TIMES DAILY
Status: DISCONTINUED | OUTPATIENT
Start: 2020-01-01 | End: 2020-01-01

## 2020-01-01 RX ORDER — MIDAZOLAM HYDROCHLORIDE 1 MG/ML
INJECTION INTRAMUSCULAR; INTRAVENOUS PRN
Status: DISCONTINUED | OUTPATIENT
Start: 2020-01-01 | End: 2020-01-01 | Stop reason: SDUPTHER

## 2020-01-01 RX ORDER — ASPIRIN 81 MG/1
81 TABLET ORAL
Status: DISPENSED | OUTPATIENT
Start: 2020-01-01 | End: 2020-01-01

## 2020-01-01 RX ORDER — NICOTINE POLACRILEX 4 MG
15 LOZENGE BUCCAL PRN
Status: DISCONTINUED | OUTPATIENT
Start: 2020-01-01 | End: 2020-01-01 | Stop reason: HOSPADM

## 2020-01-01 RX ORDER — SODIUM CHLORIDE 9 MG/ML
10 INJECTION INTRAVENOUS DAILY
Status: DISCONTINUED | OUTPATIENT
Start: 2020-01-01 | End: 2020-01-01 | Stop reason: SDUPTHER

## 2020-01-01 RX ORDER — PANTOPRAZOLE SODIUM 40 MG/1
40 TABLET, DELAYED RELEASE ORAL DAILY
Status: DISCONTINUED | OUTPATIENT
Start: 2020-01-01 | End: 2020-01-01 | Stop reason: HOSPADM

## 2020-01-01 RX ORDER — PROPOFOL 10 MG/ML
INJECTION, EMULSION INTRAVENOUS CONTINUOUS PRN
Status: DISCONTINUED | OUTPATIENT
Start: 2020-01-01 | End: 2020-01-01 | Stop reason: SDUPTHER

## 2020-01-01 RX ORDER — POLYETHYLENE GLYCOL 3350 17 G/17G
17 POWDER, FOR SOLUTION ORAL DAILY
Status: DISCONTINUED | OUTPATIENT
Start: 2020-01-01 | End: 2020-01-01 | Stop reason: HOSPADM

## 2020-01-01 RX ORDER — SODIUM CHLORIDE 0.9 % (FLUSH) 0.9 %
10 SYRINGE (ML) INJECTION PRN
Status: DISCONTINUED | OUTPATIENT
Start: 2020-01-01 | End: 2020-01-01

## 2020-01-01 RX ORDER — EPINEPHRINE 0.1 MG/ML
SYRINGE (ML) INJECTION PRN
Status: DISCONTINUED | OUTPATIENT
Start: 2020-01-01 | End: 2020-01-01 | Stop reason: SDUPTHER

## 2020-01-01 RX ORDER — PROTAMINE SULFATE 10 MG/ML
50 INJECTION, SOLUTION INTRAVENOUS
Status: ACTIVE | OUTPATIENT
Start: 2020-01-01 | End: 2020-01-01

## 2020-01-01 RX ORDER — BACITRACIN 50000 [USP'U]/1
INJECTION, POWDER, LYOPHILIZED, FOR SOLUTION INTRAMUSCULAR PRN
Status: DISCONTINUED | OUTPATIENT
Start: 2020-01-01 | End: 2020-01-01 | Stop reason: ALTCHOICE

## 2020-01-01 RX ORDER — DEXTROSE MONOHYDRATE 25 G/50ML
12.5 INJECTION, SOLUTION INTRAVENOUS PRN
Status: DISCONTINUED | OUTPATIENT
Start: 2020-01-01 | End: 2020-01-01 | Stop reason: HOSPADM

## 2020-01-01 RX ORDER — PANTOPRAZOLE SODIUM 40 MG/10ML
40 INJECTION, POWDER, LYOPHILIZED, FOR SOLUTION INTRAVENOUS DAILY
Status: DISCONTINUED | OUTPATIENT
Start: 2020-01-01 | End: 2020-01-01

## 2020-01-01 RX ORDER — HEPARIN SODIUM 1000 [USP'U]/ML
4000 INJECTION, SOLUTION INTRAVENOUS; SUBCUTANEOUS PRN
Status: DISCONTINUED | OUTPATIENT
Start: 2020-01-01 | End: 2020-01-01

## 2020-01-01 RX ORDER — ALBUMIN, HUMAN INJ 5% 5 %
SOLUTION INTRAVENOUS PRN
Status: DISCONTINUED | OUTPATIENT
Start: 2020-01-01 | End: 2020-01-01 | Stop reason: SDUPTHER

## 2020-01-01 RX ORDER — AMIODARONE HYDROCHLORIDE 200 MG/1
200 TABLET ORAL 2 TIMES DAILY
Status: DISCONTINUED | OUTPATIENT
Start: 2020-01-01 | End: 2020-01-01 | Stop reason: HOSPADM

## 2020-01-01 RX ORDER — ONDANSETRON 2 MG/ML
4 INJECTION INTRAMUSCULAR; INTRAVENOUS EVERY 8 HOURS PRN
Status: DISCONTINUED | OUTPATIENT
Start: 2020-01-01 | End: 2020-01-01 | Stop reason: HOSPADM

## 2020-01-01 RX ORDER — LORAZEPAM 2 MG/1
2 TABLET ORAL
Status: COMPLETED | OUTPATIENT
Start: 2020-01-01 | End: 2020-01-01

## 2020-01-01 RX ORDER — MAGNESIUM SULFATE 1 G/100ML
1 INJECTION INTRAVENOUS PRN
Status: DISCONTINUED | OUTPATIENT
Start: 2020-01-01 | End: 2020-01-01 | Stop reason: HOSPADM

## 2020-01-01 RX ORDER — CEFAZOLIN SODIUM 2 G/50ML
SOLUTION INTRAVENOUS PRN
Status: DISCONTINUED | OUTPATIENT
Start: 2020-01-01 | End: 2020-01-01 | Stop reason: SDUPTHER

## 2020-01-01 RX ORDER — UREA 10 %
1 LOTION (ML) TOPICAL NIGHTLY PRN
Status: DISCONTINUED | OUTPATIENT
Start: 2020-01-01 | End: 2020-01-01 | Stop reason: HOSPADM

## 2020-01-01 RX ORDER — HEPARIN SODIUM 10000 [USP'U]/100ML
1000 INJECTION, SOLUTION INTRAVENOUS CONTINUOUS
Status: DISPENSED | OUTPATIENT
Start: 2020-01-01 | End: 2020-01-01

## 2020-01-01 RX ORDER — ATORVASTATIN CALCIUM 20 MG/1
20 TABLET, FILM COATED ORAL NIGHTLY
Status: DISCONTINUED | OUTPATIENT
Start: 2020-01-01 | End: 2020-01-01

## 2020-01-01 RX ORDER — ACETAMINOPHEN 325 MG/1
650 TABLET ORAL EVERY 6 HOURS PRN
Status: DISCONTINUED | OUTPATIENT
Start: 2020-01-01 | End: 2020-01-01 | Stop reason: SDUPTHER

## 2020-01-01 RX ORDER — CALCIUM CHLORIDE 100 MG/ML
INJECTION INTRAVENOUS; INTRAVENTRICULAR
Status: COMPLETED
Start: 2020-01-01 | End: 2020-01-01

## 2020-01-01 RX ORDER — ACETAMINOPHEN 325 MG/1
650 TABLET ORAL EVERY 4 HOURS PRN
Status: DISCONTINUED | OUTPATIENT
Start: 2020-01-01 | End: 2020-01-01 | Stop reason: HOSPADM

## 2020-01-01 RX ORDER — NOREPINEPHRINE BIT/0.9 % NACL 16MG/250ML
0.2 INFUSION BOTTLE (ML) INTRAVENOUS CONTINUOUS PRN
Status: DISCONTINUED | OUTPATIENT
Start: 2020-01-01 | End: 2020-01-01 | Stop reason: HOSPADM

## 2020-01-01 RX ORDER — ATORVASTATIN CALCIUM 80 MG/1
80 TABLET, FILM COATED ORAL NIGHTLY
Status: DISCONTINUED | OUTPATIENT
Start: 2020-01-01 | End: 2020-01-01 | Stop reason: HOSPADM

## 2020-01-01 RX ORDER — METOPROLOL SUCCINATE 25 MG/1
12.5 TABLET, EXTENDED RELEASE ORAL DAILY
COMMUNITY
Start: 2018-09-24

## 2020-01-01 RX ORDER — SODIUM CHLORIDE 9 MG/ML
10 INJECTION INTRAVENOUS 2 TIMES DAILY
Status: DISCONTINUED | OUTPATIENT
Start: 2020-01-01 | End: 2020-01-01 | Stop reason: HOSPADM

## 2020-01-01 RX ORDER — ISOSORBIDE MONONITRATE 60 MG/1
30 TABLET, EXTENDED RELEASE ORAL DAILY
COMMUNITY
Start: 2019-04-12

## 2020-01-01 RX ORDER — NITROGLYCERIN 0.4 MG/1
0.4 TABLET SUBLINGUAL EVERY 5 MIN PRN
Status: DISCONTINUED | OUTPATIENT
Start: 2020-01-01 | End: 2020-01-01 | Stop reason: HOSPADM

## 2020-01-01 RX ORDER — DIPHENHYDRAMINE HCL 25 MG
25 TABLET ORAL NIGHTLY PRN
Status: DISCONTINUED | OUTPATIENT
Start: 2020-01-01 | End: 2020-01-01 | Stop reason: HOSPADM

## 2020-01-01 RX ORDER — DEXTROSE MONOHYDRATE 25 G/50ML
25 INJECTION, SOLUTION INTRAVENOUS PRN
Status: DISCONTINUED | OUTPATIENT
Start: 2020-01-01 | End: 2020-01-01 | Stop reason: HOSPADM

## 2020-01-01 RX ORDER — ACETAMINOPHEN 650 MG/1
650 SUPPOSITORY RECTAL EVERY 6 HOURS PRN
Status: DISCONTINUED | OUTPATIENT
Start: 2020-01-01 | End: 2020-01-01 | Stop reason: SDUPTHER

## 2020-01-01 RX ORDER — CISATRACURIUM BESYLATE 10 MG/ML
2 INJECTION, SOLUTION INTRAVENOUS ONCE
Status: DISCONTINUED | OUTPATIENT
Start: 2020-01-01 | End: 2020-01-01

## 2020-01-01 RX ORDER — CHLORHEXIDINE GLUCONATE 0.12 MG/ML
15 RINSE ORAL ONCE
Status: COMPLETED | OUTPATIENT
Start: 2020-01-01 | End: 2020-01-01

## 2020-01-01 RX ORDER — ISOSORBIDE MONONITRATE 60 MG/1
TABLET, EXTENDED RELEASE ORAL
Qty: 30 TABLET | Refills: 0 | Status: SHIPPED | OUTPATIENT
Start: 2020-01-01 | End: 2020-01-01

## 2020-01-01 RX ORDER — PROTAMINE SULFATE 10 MG/ML
INJECTION, SOLUTION INTRAVENOUS PRN
Status: DISCONTINUED | OUTPATIENT
Start: 2020-01-01 | End: 2020-01-01 | Stop reason: SDUPTHER

## 2020-01-01 RX ORDER — CHLORHEXIDINE GLUCONATE 4 G/100ML
SOLUTION TOPICAL SEE ADMIN INSTRUCTIONS
Status: DISCONTINUED | OUTPATIENT
Start: 2020-01-01 | End: 2020-01-01 | Stop reason: HOSPADM

## 2020-01-01 RX ORDER — CLOPIDOGREL BISULFATE 75 MG/1
75 TABLET ORAL DAILY
Status: DISCONTINUED | OUTPATIENT
Start: 2020-01-01 | End: 2020-01-01 | Stop reason: HOSPADM

## 2020-01-01 RX ORDER — HEPARIN SODIUM 1000 [USP'U]/ML
2000 INJECTION, SOLUTION INTRAVENOUS; SUBCUTANEOUS PRN
Status: DISCONTINUED | OUTPATIENT
Start: 2020-01-01 | End: 2020-01-01

## 2020-01-01 RX ORDER — ACETAMINOPHEN 325 MG/1
650 TABLET ORAL EVERY 4 HOURS PRN
Status: DISCONTINUED | OUTPATIENT
Start: 2020-01-01 | End: 2020-01-01 | Stop reason: SDUPTHER

## 2020-01-01 RX ADMIN — METOPROLOL TARTRATE 12.5 MG: 25 TABLET ORAL at 09:45

## 2020-01-01 RX ADMIN — IPRATROPIUM BROMIDE AND ALBUTEROL SULFATE 1 AMPULE: .5; 3 SOLUTION RESPIRATORY (INHALATION) at 20:42

## 2020-01-01 RX ADMIN — Medication 10 ML: at 19:41

## 2020-01-01 RX ADMIN — LIDOCAINE HYDROCHLORIDE: 10 INJECTION, SOLUTION INFILTRATION; PERINEURAL at 17:53

## 2020-01-01 RX ADMIN — POTASSIUM CHLORIDE 20 MEQ: 29.8 INJECTION, SOLUTION INTRAVENOUS at 14:53

## 2020-01-01 RX ADMIN — EPINEPHRINE 1 MG: 0.1 INJECTION INTRACARDIAC; INTRAVENOUS at 02:41

## 2020-01-01 RX ADMIN — CLOPIDOGREL 75 MG: 75 TABLET, FILM COATED ORAL at 08:25

## 2020-01-01 RX ADMIN — Medication 10 ML: at 16:02

## 2020-01-01 RX ADMIN — FENTANYL CITRATE 100 MCG: 50 INJECTION INTRAVENOUS at 08:18

## 2020-01-01 RX ADMIN — IPRATROPIUM BROMIDE AND ALBUTEROL SULFATE 1 AMPULE: .5; 3 SOLUTION RESPIRATORY (INHALATION) at 11:48

## 2020-01-01 RX ADMIN — IPRATROPIUM BROMIDE AND ALBUTEROL SULFATE 1 AMPULE: .5; 3 SOLUTION RESPIRATORY (INHALATION) at 15:41

## 2020-01-01 RX ADMIN — METOPROLOL TARTRATE 12.5 MG: 25 TABLET ORAL at 10:21

## 2020-01-01 RX ADMIN — IOHEXOL 90 ML: 350 INJECTION, SOLUTION INTRAVENOUS at 13:20

## 2020-01-01 RX ADMIN — Medication 1 MG: at 22:02

## 2020-01-01 RX ADMIN — Medication 0.2 MCG/KG/MIN: at 02:27

## 2020-01-01 RX ADMIN — PROPOFOL 20 MG: 10 INJECTION, EMULSION INTRAVENOUS at 10:49

## 2020-01-01 RX ADMIN — PROPOFOL 20 MG: 10 INJECTION, EMULSION INTRAVENOUS at 10:52

## 2020-01-01 RX ADMIN — SODIUM CHLORIDE 8 MG/HR: 9 INJECTION, SOLUTION INTRAVENOUS at 16:15

## 2020-01-01 RX ADMIN — PROPOFOL 20 MG: 10 INJECTION, EMULSION INTRAVENOUS at 10:44

## 2020-01-01 RX ADMIN — PHENYLEPHRINE HYDROCHLORIDE 200 MCG: 10 INJECTION INTRAVENOUS at 08:48

## 2020-01-01 RX ADMIN — MUPIROCIN: 20 OINTMENT TOPICAL at 21:24

## 2020-01-01 RX ADMIN — SODIUM CHLORIDE: 9 INJECTION, SOLUTION INTRAVENOUS at 02:27

## 2020-01-01 RX ADMIN — DEXTROSE MONOHYDRATE 12.5 G: 25 INJECTION, SOLUTION INTRAVENOUS at 03:19

## 2020-01-01 RX ADMIN — INSULIN HUMAN 10 UNITS: 100 INJECTION, SOLUTION PARENTERAL at 11:49

## 2020-01-01 RX ADMIN — HEPARIN SODIUM AND DEXTROSE 1400 UNITS/HR: 10000; 5 INJECTION INTRAVENOUS at 21:32

## 2020-01-01 RX ADMIN — Medication 1 MG: at 21:17

## 2020-01-01 RX ADMIN — SODIUM CHLORIDE, POTASSIUM CHLORIDE, SODIUM LACTATE AND CALCIUM CHLORIDE: 600; 310; 30; 20 INJECTION, SOLUTION INTRAVENOUS at 08:10

## 2020-01-01 RX ADMIN — NITROGLYCERIN 5 MCG/MIN: 20 INJECTION INTRAVENOUS at 21:19

## 2020-01-01 RX ADMIN — Medication 1 MG: at 20:54

## 2020-01-01 RX ADMIN — DEXTROSE MONOHYDRATE 2 G: 50 INJECTION, SOLUTION INTRAVENOUS at 12:33

## 2020-01-01 RX ADMIN — SODIUM CHLORIDE 8 MG/HR: 9 INJECTION, SOLUTION INTRAVENOUS at 23:31

## 2020-01-01 RX ADMIN — PHENYLEPHRINE HYDROCHLORIDE 300 MCG: 10 INJECTION INTRAVENOUS at 09:52

## 2020-01-01 RX ADMIN — SODIUM CHLORIDE: 900 INJECTION, SOLUTION INTRAVENOUS at 09:38

## 2020-01-01 RX ADMIN — ATORVASTATIN CALCIUM 80 MG: 80 TABLET, FILM COATED ORAL at 21:25

## 2020-01-01 RX ADMIN — VASOPRESSIN 0.02 UNITS/MIN: 20 INJECTION INTRAVENOUS at 19:17

## 2020-01-01 RX ADMIN — ROCURONIUM BROMIDE 30 MG: 10 INJECTION INTRAVENOUS at 09:55

## 2020-01-01 RX ADMIN — Medication 10 ML: at 21:28

## 2020-01-01 RX ADMIN — ALBUMIN (HUMAN) 25 G: 12.5 INJECTION, SOLUTION INTRAVENOUS at 14:00

## 2020-01-01 RX ADMIN — EPINEPHRINE 0.2 MCG/KG/MIN: 1 INJECTION PARENTERAL at 03:05

## 2020-01-01 RX ADMIN — AMIODARONE HYDROCHLORIDE 33.3 MG/MIN: 150 INJECTION, SOLUTION INTRAVENOUS at 11:55

## 2020-01-01 RX ADMIN — AMINOCAPROIC ACID 10 G/HR: 250 INJECTION, SOLUTION INTRAVENOUS at 08:39

## 2020-01-01 RX ADMIN — NITROGLYCERIN 5 MCG/MIN: 20 INJECTION INTRAVENOUS at 15:25

## 2020-01-01 RX ADMIN — METOPROLOL TARTRATE 12.5 MG: 25 TABLET ORAL at 20:22

## 2020-01-01 RX ADMIN — TIOTROPIUM BROMIDE INHALATION SPRAY 2 PUFF: 3.12 SPRAY, METERED RESPIRATORY (INHALATION) at 09:01

## 2020-01-01 RX ADMIN — CHLORHEXIDINE GLUCONATE 15 ML: 1.2 RINSE ORAL at 07:11

## 2020-01-01 RX ADMIN — METOPROLOL TARTRATE 12.5 MG: 25 TABLET ORAL at 22:02

## 2020-01-01 RX ADMIN — METOPROLOL TARTRATE 12.5 MG: 25 TABLET ORAL at 08:42

## 2020-01-01 RX ADMIN — SODIUM CHLORIDE 1000 ML: 9 INJECTION, SOLUTION INTRAVENOUS at 22:15

## 2020-01-01 RX ADMIN — FENTANYL CITRATE 50 MCG: 50 INJECTION INTRAMUSCULAR; INTRAVENOUS at 12:32

## 2020-01-01 RX ADMIN — ALBUTEROL SULFATE 2.5 MG: 2.5 SOLUTION RESPIRATORY (INHALATION) at 17:21

## 2020-01-01 RX ADMIN — ATORVASTATIN CALCIUM 80 MG: 80 TABLET, FILM COATED ORAL at 20:10

## 2020-01-01 RX ADMIN — Medication 1 MG: at 20:13

## 2020-01-01 RX ADMIN — SODIUM CHLORIDE 8 MG/HR: 9 INJECTION, SOLUTION INTRAVENOUS at 01:50

## 2020-01-01 RX ADMIN — DEXTROSE MONOHYDRATE 25 G: 25 INJECTION, SOLUTION INTRAVENOUS at 02:00

## 2020-01-01 RX ADMIN — HEPARIN SODIUM AND DEXTROSE 10 UNITS/KG/HR: 10000; 5 INJECTION INTRAVENOUS at 17:58

## 2020-01-01 RX ADMIN — AMIODARONE HYDROCHLORIDE 200 MG: 200 TABLET ORAL at 20:46

## 2020-01-01 RX ADMIN — SODIUM BICARBONATE 50 MEQ: 84 INJECTION INTRAVENOUS at 02:41

## 2020-01-01 RX ADMIN — SODIUM CHLORIDE 8 MG/HR: 9 INJECTION, SOLUTION INTRAVENOUS at 15:05

## 2020-01-01 RX ADMIN — MIDAZOLAM HYDROCHLORIDE 2 MG: 1 INJECTION, SOLUTION INTRAMUSCULAR; INTRAVENOUS at 09:29

## 2020-01-01 RX ADMIN — TIOTROPIUM BROMIDE INHALATION SPRAY 2 PUFF: 3.12 SPRAY, METERED RESPIRATORY (INHALATION) at 09:40

## 2020-01-01 RX ADMIN — IPRATROPIUM BROMIDE AND ALBUTEROL SULFATE 1 AMPULE: .5; 3 SOLUTION RESPIRATORY (INHALATION) at 08:48

## 2020-01-01 RX ADMIN — ALBUMIN (HUMAN) 250 ML: 12.5 INJECTION, SOLUTION INTRAVENOUS at 13:17

## 2020-01-01 RX ADMIN — SODIUM CHLORIDE 8 MG/HR: 9 INJECTION, SOLUTION INTRAVENOUS at 17:54

## 2020-01-01 RX ADMIN — Medication 81 MG: at 10:22

## 2020-01-01 RX ADMIN — PROPOFOL 30 MCG/KG/MIN: 10 INJECTION, EMULSION INTRAVENOUS at 05:12

## 2020-01-01 RX ADMIN — Medication 10 ML: at 20:26

## 2020-01-01 RX ADMIN — METOPROLOL TARTRATE 12.5 MG: 25 TABLET ORAL at 20:10

## 2020-01-01 RX ADMIN — ROCURONIUM BROMIDE 50 MG: 10 INJECTION INTRAVENOUS at 08:18

## 2020-01-01 RX ADMIN — MIDAZOLAM HYDROCHLORIDE 2 MG: 1 INJECTION, SOLUTION INTRAMUSCULAR; INTRAVENOUS at 08:18

## 2020-01-01 RX ADMIN — Medication 81 MG: at 15:52

## 2020-01-01 RX ADMIN — VASOPRESSIN 0.03 UNITS/MIN: 20 INJECTION INTRAVENOUS at 17:24

## 2020-01-01 RX ADMIN — MUPIROCIN: 20 OINTMENT TOPICAL at 16:02

## 2020-01-01 RX ADMIN — METOPROLOL TARTRATE 12.5 MG: 25 TABLET ORAL at 08:23

## 2020-01-01 RX ADMIN — PHENYLEPHRINE HYDROCHLORIDE 100 MCG: 10 INJECTION INTRAVENOUS at 12:36

## 2020-01-01 RX ADMIN — METOPROLOL TARTRATE 12.5 MG: 25 TABLET ORAL at 09:05

## 2020-01-01 RX ADMIN — CHLORHEXIDINE GLUCONATE: 4 SOLUTION TOPICAL at 00:15

## 2020-01-01 RX ADMIN — METOPROLOL TARTRATE 12.5 MG: 25 TABLET ORAL at 21:06

## 2020-01-01 RX ADMIN — SODIUM CHLORIDE 1000 ML: 9 INJECTION, SOLUTION INTRAVENOUS at 10:44

## 2020-01-01 RX ADMIN — ALBUMIN (HUMAN) 25 G: 12.5 INJECTION, SOLUTION INTRAVENOUS at 02:05

## 2020-01-01 RX ADMIN — Medication 10 ML: at 21:24

## 2020-01-01 RX ADMIN — DEXTROSE MONOHYDRATE 2 G: 50 INJECTION, SOLUTION INTRAVENOUS at 20:46

## 2020-01-01 RX ADMIN — VANCOMYCIN HYDROCHLORIDE 1000 MG: 1 INJECTION, POWDER, LYOPHILIZED, FOR SOLUTION INTRAVENOUS at 09:31

## 2020-01-01 RX ADMIN — DEXTROSE MONOHYDRATE 25 G: 25 INJECTION, SOLUTION INTRAVENOUS at 11:46

## 2020-01-01 RX ADMIN — METOPROLOL TARTRATE 12.5 MG: 25 TABLET ORAL at 08:22

## 2020-01-01 RX ADMIN — EPINEPHRINE 1 MG: 0.1 INJECTION INTRACARDIAC; INTRAVENOUS at 02:29

## 2020-01-01 RX ADMIN — FENTANYL CITRATE 150 MCG: 50 INJECTION INTRAVENOUS at 09:24

## 2020-01-01 RX ADMIN — DEXTROSE MONOHYDRATE 25 G: 25 INJECTION, SOLUTION INTRAVENOUS at 07:46

## 2020-01-01 RX ADMIN — Medication 1 MG: at 21:06

## 2020-01-01 RX ADMIN — CEFTRIAXONE 1 G: 1 INJECTION, POWDER, FOR SOLUTION INTRAMUSCULAR; INTRAVENOUS at 11:26

## 2020-01-01 RX ADMIN — Medication 81 MG: at 09:45

## 2020-01-01 RX ADMIN — FENTANYL CITRATE 250 MCG: 50 INJECTION INTRAVENOUS at 09:26

## 2020-01-01 RX ADMIN — SODIUM CHLORIDE 8 MG/HR: 9 INJECTION, SOLUTION INTRAVENOUS at 15:27

## 2020-01-01 RX ADMIN — Medication 10 ML: at 19:48

## 2020-01-01 RX ADMIN — OXYCODONE HYDROCHLORIDE AND ACETAMINOPHEN 2 TABLET: 5; 325 TABLET ORAL at 17:06

## 2020-01-01 RX ADMIN — SODIUM CHLORIDE: 9 INJECTION, SOLUTION INTRAVENOUS at 01:27

## 2020-01-01 RX ADMIN — PHENYLEPHRINE HYDROCHLORIDE 200 MCG: 10 INJECTION INTRAVENOUS at 08:56

## 2020-01-01 RX ADMIN — Medication 81 MG: at 08:24

## 2020-01-01 RX ADMIN — PANTOPRAZOLE SODIUM 80 MG: 40 INJECTION, POWDER, FOR SOLUTION INTRAVENOUS at 10:55

## 2020-01-01 RX ADMIN — SODIUM CHLORIDE 2 MCG/KG/MIN: 9 INJECTION, SOLUTION INTRAVENOUS at 15:30

## 2020-01-01 RX ADMIN — PROPOFOL 30 MCG/KG/MIN: 10 INJECTION, EMULSION INTRAVENOUS at 11:41

## 2020-01-01 RX ADMIN — DOBUTAMINE HYDROCHLORIDE 2.5 MCG/KG/MIN: 400 INJECTION INTRAVENOUS at 11:22

## 2020-01-01 RX ADMIN — METOPROLOL TARTRATE 12.5 MG: 25 TABLET ORAL at 19:47

## 2020-01-01 RX ADMIN — PHENYLEPHRINE HYDROCHLORIDE 100 MCG: 10 INJECTION INTRAVENOUS at 10:34

## 2020-01-01 RX ADMIN — SODIUM CHLORIDE: 900 INJECTION, SOLUTION INTRAVENOUS at 09:19

## 2020-01-01 RX ADMIN — Medication 1 MG: at 23:51

## 2020-01-01 RX ADMIN — ATORVASTATIN CALCIUM 80 MG: 80 TABLET, FILM COATED ORAL at 21:06

## 2020-01-01 RX ADMIN — Medication 0.02 MCG/KG/MIN: at 00:50

## 2020-01-01 RX ADMIN — TIOTROPIUM BROMIDE INHALATION SPRAY 2 PUFF: 3.12 SPRAY, METERED RESPIRATORY (INHALATION) at 09:42

## 2020-01-01 RX ADMIN — SODIUM CHLORIDE 8 MG/HR: 9 INJECTION, SOLUTION INTRAVENOUS at 00:00

## 2020-01-01 RX ADMIN — PHENYLEPHRINE HYDROCHLORIDE 200 MCG: 10 INJECTION INTRAVENOUS at 09:45

## 2020-01-01 RX ADMIN — MUPIROCIN: 20 OINTMENT TOPICAL at 02:30

## 2020-01-01 RX ADMIN — Medication 81 MG: at 08:06

## 2020-01-01 RX ADMIN — SODIUM CHLORIDE 8 MG/HR: 9 INJECTION, SOLUTION INTRAVENOUS at 17:55

## 2020-01-01 RX ADMIN — SODIUM CHLORIDE 8 MG/HR: 9 INJECTION, SOLUTION INTRAVENOUS at 10:18

## 2020-01-01 RX ADMIN — IPRATROPIUM BROMIDE AND ALBUTEROL SULFATE 1 AMPULE: .5; 3 SOLUTION RESPIRATORY (INHALATION) at 15:46

## 2020-01-01 RX ADMIN — LORAZEPAM 2 MG: 2 TABLET ORAL at 08:04

## 2020-01-01 RX ADMIN — MIDAZOLAM HYDROCHLORIDE 2 MG: 1 INJECTION, SOLUTION INTRAMUSCULAR; INTRAVENOUS at 12:08

## 2020-01-01 RX ADMIN — METOPROLOL TARTRATE 12.5 MG: 25 TABLET ORAL at 21:13

## 2020-01-01 RX ADMIN — METOPROLOL TARTRATE 12.5 MG: 25 TABLET ORAL at 09:25

## 2020-01-01 RX ADMIN — MAGNESIUM SULFATE HEPTAHYDRATE 1 G: 1 INJECTION, SOLUTION INTRAVENOUS at 16:39

## 2020-01-01 RX ADMIN — Medication 81 MG: at 09:25

## 2020-01-01 RX ADMIN — FENTANYL CITRATE 100 MCG: 50 INJECTION INTRAVENOUS at 12:10

## 2020-01-01 RX ADMIN — PANTOPRAZOLE SODIUM 40 MG: 40 INJECTION, POWDER, FOR SOLUTION INTRAVENOUS at 21:24

## 2020-01-01 RX ADMIN — ROCURONIUM BROMIDE 30 MG: 10 INJECTION INTRAVENOUS at 13:04

## 2020-01-01 RX ADMIN — IPRATROPIUM BROMIDE AND ALBUTEROL SULFATE 1 AMPULE: .5; 3 SOLUTION RESPIRATORY (INHALATION) at 20:07

## 2020-01-01 RX ADMIN — Medication 10 ML: at 07:46

## 2020-01-01 RX ADMIN — PHENYLEPHRINE HYDROCHLORIDE 0.06 MCG: 10 INJECTION INTRAVENOUS at 09:16

## 2020-01-01 RX ADMIN — METOPROLOL TARTRATE 12.5 MG: 25 TABLET ORAL at 20:53

## 2020-01-01 RX ADMIN — TIOTROPIUM BROMIDE INHALATION SPRAY 2 PUFF: 3.12 SPRAY, METERED RESPIRATORY (INHALATION) at 07:55

## 2020-01-01 RX ADMIN — PHENYLEPHRINE HYDROCHLORIDE 200 MCG: 10 INJECTION INTRAVENOUS at 08:37

## 2020-01-01 RX ADMIN — ROCURONIUM BROMIDE 30 MG: 10 INJECTION INTRAVENOUS at 10:31

## 2020-01-01 RX ADMIN — Medication 1 MG: at 22:06

## 2020-01-01 RX ADMIN — HEPARIN SODIUM AND DEXTROSE 1470 UNITS/HR: 10000; 5 INJECTION INTRAVENOUS at 20:24

## 2020-01-01 RX ADMIN — OXYCODONE HYDROCHLORIDE AND ACETAMINOPHEN 2 TABLET: 5; 325 TABLET ORAL at 21:32

## 2020-01-01 RX ADMIN — EPINEPHRINE 0.03 MCG/KG/MIN: 1 INJECTION INTRAMUSCULAR; INTRAVENOUS; SUBCUTANEOUS at 14:15

## 2020-01-01 RX ADMIN — SODIUM CHLORIDE 1000 ML: 9 INJECTION, SOLUTION INTRAVENOUS at 15:37

## 2020-01-01 RX ADMIN — MAGNESIUM SULFATE HEPTAHYDRATE 1 G: 1 INJECTION, SOLUTION INTRAVENOUS at 17:35

## 2020-01-01 RX ADMIN — PROPOFOL 50 MG: 10 INJECTION, EMULSION INTRAVENOUS at 10:43

## 2020-01-01 RX ADMIN — ALBUMIN (HUMAN) 25 G: 12.5 INJECTION, SOLUTION INTRAVENOUS at 22:05

## 2020-01-01 RX ADMIN — PHENYLEPHRINE HYDROCHLORIDE 50 MCG/MIN: 10 INJECTION INTRAVENOUS at 10:46

## 2020-01-01 RX ADMIN — EPINEPHRINE 1 MG: 0.1 INJECTION INTRACARDIAC; INTRAVENOUS at 02:33

## 2020-01-01 RX ADMIN — ALBUMIN (HUMAN) 25 G: 12.5 INJECTION, SOLUTION INTRAVENOUS at 21:10

## 2020-01-01 RX ADMIN — HEPARIN SODIUM 2000 UNITS: 1000 INJECTION INTRAVENOUS; SUBCUTANEOUS at 13:41

## 2020-01-01 RX ADMIN — DEXTROSE MONOHYDRATE 2 G: 50 INJECTION, SOLUTION INTRAVENOUS at 04:45

## 2020-01-01 RX ADMIN — DOBUTAMINE HYDROCHLORIDE 5 MCG/KG/MIN: 100 INJECTION INTRAVENOUS at 02:27

## 2020-01-01 RX ADMIN — PANTOPRAZOLE SODIUM 40 MG: 40 INJECTION, POWDER, FOR SOLUTION INTRAVENOUS at 16:01

## 2020-01-01 RX ADMIN — CLOPIDOGREL 75 MG: 75 TABLET, FILM COATED ORAL at 15:52

## 2020-01-01 RX ADMIN — LIDOCAINE HYDROCHLORIDE 50 MG: 10 INJECTION, SOLUTION EPIDURAL; INFILTRATION; INTRACAUDAL; PERINEURAL at 10:43

## 2020-01-01 RX ADMIN — LIDOCAINE HYDROCHLORIDE 50 MG: 10 INJECTION, SOLUTION EPIDURAL; INFILTRATION; INTRACAUDAL; PERINEURAL at 08:18

## 2020-01-01 RX ADMIN — SODIUM BICARBONATE 50 MEQ: 84 INJECTION INTRAVENOUS at 11:23

## 2020-01-01 RX ADMIN — PHENYLEPHRINE HYDROCHLORIDE 200 MCG: 10 INJECTION INTRAVENOUS at 09:13

## 2020-01-01 RX ADMIN — ATORVASTATIN CALCIUM 80 MG: 80 TABLET, FILM COATED ORAL at 11:07

## 2020-01-01 RX ADMIN — CALCIUM CHLORIDE 1 G: 100 INJECTION INTRAVENOUS; INTRAVENTRICULAR at 09:15

## 2020-01-01 RX ADMIN — ATORVASTATIN CALCIUM 80 MG: 80 TABLET, FILM COATED ORAL at 21:13

## 2020-01-01 RX ADMIN — PROPOFOL 30 MCG/KG/MIN: 10 INJECTION, EMULSION INTRAVENOUS at 09:19

## 2020-01-01 RX ADMIN — METOPROLOL TARTRATE 12.5 MG: 25 TABLET ORAL at 08:00

## 2020-01-01 RX ADMIN — HEPARIN SODIUM AND DEXTROSE 1000 UNITS/HR: 10000; 5 INJECTION INTRAVENOUS at 19:21

## 2020-01-01 RX ADMIN — CALCIUM CHLORIDE 0.5 G: 100 INJECTION, SOLUTION INTRAVENOUS; INTRAVENTRICULAR at 11:02

## 2020-01-01 RX ADMIN — HEPARIN SODIUM AND DEXTROSE 1400 UNITS/HR: 10000; 5 INJECTION INTRAVENOUS at 19:33

## 2020-01-01 RX ADMIN — PHENYLEPHRINE HYDROCHLORIDE 200 MCG: 10 INJECTION INTRAVENOUS at 08:26

## 2020-01-01 RX ADMIN — METOPROLOL TARTRATE 12.5 MG: 25 TABLET, FILM COATED ORAL at 07:12

## 2020-01-01 RX ADMIN — SODIUM BICARBONATE 50 MEQ: 84 INJECTION INTRAVENOUS at 03:20

## 2020-01-01 RX ADMIN — SODIUM CHLORIDE 8 MG/HR: 9 INJECTION, SOLUTION INTRAVENOUS at 21:13

## 2020-01-01 RX ADMIN — AMIODARONE HYDROCHLORIDE 200 MG: 200 TABLET ORAL at 21:24

## 2020-01-01 RX ADMIN — HEPARIN SODIUM AND DEXTROSE 12 UNITS/KG/HR: 10000; 5 INJECTION INTRAVENOUS at 21:48

## 2020-01-01 RX ADMIN — DEXMEDETOMIDINE HYDROCHLORIDE 0.2 MCG/KG/HR: 400 INJECTION INTRAVENOUS at 12:05

## 2020-01-01 RX ADMIN — FENTANYL CITRATE 250 MCG: 50 INJECTION INTRAVENOUS at 09:29

## 2020-01-01 RX ADMIN — Medication 10 ML: at 08:10

## 2020-01-01 RX ADMIN — NITROGLYCERIN 0.4 MG: 0.4 TABLET SUBLINGUAL at 14:07

## 2020-01-01 RX ADMIN — ALBUMIN (HUMAN): 0.25 INJECTION, SOLUTION INTRAVENOUS at 14:59

## 2020-01-01 RX ADMIN — Medication 81 MG: at 09:05

## 2020-01-01 RX ADMIN — SODIUM CHLORIDE 8 MG/HR: 9 INJECTION, SOLUTION INTRAVENOUS at 21:06

## 2020-01-01 RX ADMIN — MUPIROCIN: 20 OINTMENT TOPICAL at 20:46

## 2020-01-01 RX ADMIN — SODIUM CHLORIDE 8 MG/HR: 9 INJECTION, SOLUTION INTRAVENOUS at 06:00

## 2020-01-01 RX ADMIN — SODIUM CHLORIDE 150 ML/HR: 9 INJECTION, SOLUTION INTRAVENOUS at 17:40

## 2020-01-01 RX ADMIN — PHENYLEPHRINE HYDROCHLORIDE 200 MCG: 10 INJECTION INTRAVENOUS at 10:20

## 2020-01-01 RX ADMIN — MUPIROCIN: 20 OINTMENT TOPICAL at 07:12

## 2020-01-01 RX ADMIN — EPINEPHRINE 1 MG: 0.1 INJECTION INTRACARDIAC; INTRAVENOUS at 02:37

## 2020-01-01 RX ADMIN — CALCIUM CHLORIDE 1 G: 100 INJECTION, SOLUTION INTRAVENOUS; INTRAVENTRICULAR at 02:29

## 2020-01-01 RX ADMIN — OXYCODONE HYDROCHLORIDE AND ACETAMINOPHEN 2 TABLET: 5; 325 TABLET ORAL at 07:41

## 2020-01-01 RX ADMIN — HEPARIN SODIUM 2000 UNITS: 1000 INJECTION INTRAVENOUS; SUBCUTANEOUS at 17:10

## 2020-01-01 RX ADMIN — NITROGLYCERIN 0.4 MG: 0.4 TABLET, ORALLY DISINTEGRATING SUBLINGUAL at 14:07

## 2020-01-01 RX ADMIN — SODIUM CHLORIDE 20 ML: 9 INJECTION, SOLUTION INTRAVENOUS at 10:42

## 2020-01-01 RX ADMIN — DEXMEDETOMIDINE HYDROCHLORIDE 0.6 MCG/KG/HR: 400 INJECTION INTRAVENOUS at 17:05

## 2020-01-01 RX ADMIN — CALCIUM CHLORIDE 0.5 G: 100 INJECTION, SOLUTION INTRAVENOUS; INTRAVENTRICULAR at 10:46

## 2020-01-01 RX ADMIN — ATORVASTATIN CALCIUM 80 MG: 80 TABLET, FILM COATED ORAL at 20:19

## 2020-01-01 RX ADMIN — CEFAZOLIN SODIUM 2 G: 2 SOLUTION INTRAVENOUS at 12:38

## 2020-01-01 RX ADMIN — INSULIN HUMAN 10 UNITS: 100 INJECTION, SOLUTION PARENTERAL at 07:46

## 2020-01-01 RX ADMIN — SODIUM CHLORIDE 80 MG: 9 INJECTION, SOLUTION INTRAVENOUS at 20:26

## 2020-01-01 RX ADMIN — SODIUM CHLORIDE 8 MG/HR: 9 INJECTION, SOLUTION INTRAVENOUS at 14:53

## 2020-01-01 RX ADMIN — PROPOFOL 175 MG: 10 INJECTION, EMULSION INTRAVENOUS at 08:18

## 2020-01-01 RX ADMIN — Medication 1 MG: at 21:43

## 2020-01-01 RX ADMIN — HEPARIN SODIUM AND DEXTROSE 1400 UNITS/HR: 10000; 5 INJECTION INTRAVENOUS at 05:56

## 2020-01-01 RX ADMIN — PANTOPRAZOLE SODIUM 8 MG/HR: 40 INJECTION, POWDER, FOR SOLUTION INTRAVENOUS at 11:05

## 2020-01-01 RX ADMIN — ROCURONIUM BROMIDE 50 MG: 10 INJECTION INTRAVENOUS at 09:29

## 2020-01-01 RX ADMIN — OXYCODONE HYDROCHLORIDE AND ACETAMINOPHEN 2 TABLET: 5; 325 TABLET ORAL at 12:31

## 2020-01-01 RX ADMIN — Medication 81 MG: at 08:41

## 2020-01-01 RX ADMIN — ATORVASTATIN CALCIUM 80 MG: 80 TABLET, FILM COATED ORAL at 20:22

## 2020-01-01 RX ADMIN — CEFTRIAXONE SODIUM 1 G: 1 INJECTION, POWDER, FOR SOLUTION INTRAMUSCULAR; INTRAVENOUS at 15:10

## 2020-01-01 RX ADMIN — ALBUMIN (HUMAN) 25 G: 12.5 INJECTION, SOLUTION INTRAVENOUS at 17:09

## 2020-01-01 RX ADMIN — ATORVASTATIN CALCIUM 80 MG: 80 TABLET, FILM COATED ORAL at 22:02

## 2020-01-01 RX ADMIN — METOPROLOL TARTRATE 12.5 MG: 25 TABLET ORAL at 21:49

## 2020-01-01 RX ADMIN — METOPROLOL TARTRATE 12.5 MG: 25 TABLET ORAL at 20:20

## 2020-01-01 RX ADMIN — PHENYLEPHRINE HYDROCHLORIDE 200 MCG: 10 INJECTION INTRAVENOUS at 09:56

## 2020-01-01 RX ADMIN — SODIUM CHLORIDE 8 MG/HR: 9 INJECTION, SOLUTION INTRAVENOUS at 19:32

## 2020-01-01 RX ADMIN — SODIUM CHLORIDE 2 MCG/KG/MIN: 9 INJECTION, SOLUTION INTRAVENOUS at 05:31

## 2020-01-01 RX ADMIN — Medication 81 MG: at 07:12

## 2020-01-01 RX ADMIN — PROTAMINE SULFATE 250 MG: 10 INJECTION, SOLUTION INTRAVENOUS at 12:01

## 2020-01-01 RX ADMIN — HEPARIN SODIUM 4000 UNITS: 1000 INJECTION INTRAVENOUS; SUBCUTANEOUS at 21:49

## 2020-01-01 RX ADMIN — ATORVASTATIN CALCIUM 80 MG: 80 TABLET, FILM COATED ORAL at 20:53

## 2020-01-01 RX ADMIN — INSULIN HUMAN 10 UNITS: 100 INJECTION, SOLUTION PARENTERAL at 02:00

## 2020-01-01 RX ADMIN — SODIUM BICARBONATE 50 MEQ: 84 INJECTION INTRAVENOUS at 02:33

## 2020-01-01 RX ADMIN — SODIUM CHLORIDE 8.2 UNITS/HR: 9 INJECTION, SOLUTION INTRAVENOUS at 00:26

## 2020-01-01 RX ADMIN — HEPARIN SODIUM 2000 UNITS: 1000 INJECTION INTRAVENOUS; SUBCUTANEOUS at 15:38

## 2020-01-01 RX ADMIN — FENTANYL CITRATE 150 MCG: 50 INJECTION INTRAVENOUS at 12:26

## 2020-01-01 RX ADMIN — Medication 1 MG: at 21:49

## 2020-01-01 RX ADMIN — SODIUM BICARBONATE 50 MEQ: 84 INJECTION INTRAVENOUS at 03:19

## 2020-01-01 RX ADMIN — FENTANYL CITRATE 100 MCG: 50 INJECTION, SOLUTION INTRAMUSCULAR; INTRAVENOUS at 09:29

## 2020-01-01 RX ADMIN — CLOPIDOGREL BISULFATE 225 MG: 75 TABLET ORAL at 20:26

## 2020-01-01 RX ADMIN — SODIUM CHLORIDE 8 MG/HR: 9 INJECTION, SOLUTION INTRAVENOUS at 06:57

## 2020-01-01 RX ADMIN — CEFAZOLIN SODIUM 2 G: 2 SOLUTION INTRAVENOUS at 08:44

## 2020-01-01 ASSESSMENT — PULMONARY FUNCTION TESTS
PIF_VALUE: 23
PIF_VALUE: 19
PIF_VALUE: 23
PIF_VALUE: 16
PIF_VALUE: 1
PIF_VALUE: 24
PIF_VALUE: 20
PIF_VALUE: 21
PIF_VALUE: 23
PIF_VALUE: 26
PIF_VALUE: 21
PIF_VALUE: 22
PIF_VALUE: 14
PIF_VALUE: 30
PIF_VALUE: 14
PIF_VALUE: 19
PIF_VALUE: 20
PIF_VALUE: 1
PIF_VALUE: 15
PIF_VALUE: 19
PIF_VALUE: 20
PIF_VALUE: 20
PIF_VALUE: 17
PIF_VALUE: 1
PIF_VALUE: 24
PIF_VALUE: 19
PIF_VALUE: 1
PIF_VALUE: 23
PIF_VALUE: 1
PIF_VALUE: 20
PIF_VALUE: 19
PIF_VALUE: 19
PIF_VALUE: 21
PIF_VALUE: 35
PIF_VALUE: 15
PIF_VALUE: 8
PIF_VALUE: 1
PIF_VALUE: 22
PIF_VALUE: 19
PIF_VALUE: 19
PIF_VALUE: 10
PIF_VALUE: 2
PIF_VALUE: 12
PIF_VALUE: 30
PIF_VALUE: 19
PIF_VALUE: 27
PIF_VALUE: 1
PIF_VALUE: 1
PIF_VALUE: 30
PIF_VALUE: 23
PIF_VALUE: 24
PIF_VALUE: 15
PIF_VALUE: 19
PIF_VALUE: 21
PIF_VALUE: 23
PIF_VALUE: 14
PIF_VALUE: 23
PIF_VALUE: 17
PIF_VALUE: 20
PIF_VALUE: 36
PIF_VALUE: 20
PIF_VALUE: 14
PIF_VALUE: 19
PIF_VALUE: 1
PIF_VALUE: 12
PIF_VALUE: 23
PIF_VALUE: 19
PIF_VALUE: 19
PIF_VALUE: 20
PIF_VALUE: 1
PIF_VALUE: 20
PIF_VALUE: 1
PIF_VALUE: 19
PIF_VALUE: 19
PIF_VALUE: 15
PIF_VALUE: 1
PIF_VALUE: 19
PIF_VALUE: 1
PIF_VALUE: 19
PIF_VALUE: 2
PIF_VALUE: 20
PIF_VALUE: 19
PIF_VALUE: 18
PIF_VALUE: 21
PIF_VALUE: 14
PIF_VALUE: 19
PIF_VALUE: 22
PIF_VALUE: 19
PIF_VALUE: 19
PIF_VALUE: 17
PIF_VALUE: 19
PIF_VALUE: 21
PIF_VALUE: 28
PIF_VALUE: 1
PIF_VALUE: 20
PIF_VALUE: 18
PIF_VALUE: 2
PEFR_L/MIN: 16
PIF_VALUE: 17
PIF_VALUE: 1
PIF_VALUE: 19
PIF_VALUE: 12
PIF_VALUE: 18
PIF_VALUE: 19
PIF_VALUE: 19
PIF_VALUE: 23
PIF_VALUE: 14
PIF_VALUE: 1
PIF_VALUE: 14
PIF_VALUE: 19
PIF_VALUE: 1
PIF_VALUE: 11
PIF_VALUE: 19
PIF_VALUE: 22
PIF_VALUE: 19
PIF_VALUE: 1
PIF_VALUE: 19
PIF_VALUE: 24
PIF_VALUE: 20
PIF_VALUE: 1
PIF_VALUE: 21
PIF_VALUE: 21
PIF_VALUE: 22
PIF_VALUE: 19
PIF_VALUE: 18
PIF_VALUE: 17
PIF_VALUE: 18
PIF_VALUE: 1
PIF_VALUE: 18
PIF_VALUE: 2
PIF_VALUE: 16
PIF_VALUE: 16
PIF_VALUE: 17
PIF_VALUE: 1
PIF_VALUE: 17
PIF_VALUE: 19
PIF_VALUE: 1
PIF_VALUE: 1
PIF_VALUE: 20
PIF_VALUE: 18
PIF_VALUE: 1
PIF_VALUE: 19
PIF_VALUE: 21
PIF_VALUE: 23
PIF_VALUE: 18
PIF_VALUE: 30
PIF_VALUE: 25
PIF_VALUE: 21
PIF_VALUE: 16
PIF_VALUE: 20
PIF_VALUE: 16
PIF_VALUE: 1
PIF_VALUE: 1
PIF_VALUE: 13
PIF_VALUE: 1
PIF_VALUE: 31
PIF_VALUE: 19
PIF_VALUE: 14
PIF_VALUE: 14
PIF_VALUE: 0
PIF_VALUE: 20
PIF_VALUE: 22
PIF_VALUE: 1
PIF_VALUE: 21
PIF_VALUE: 22
PIF_VALUE: 21
PIF_VALUE: 12
PIF_VALUE: 23
PIF_VALUE: 13
PIF_VALUE: 1
PIF_VALUE: 30
PIF_VALUE: 19
PIF_VALUE: 20
PIF_VALUE: 22
PIF_VALUE: 19
PIF_VALUE: 20
PIF_VALUE: 19
PIF_VALUE: 14
PIF_VALUE: 21
PIF_VALUE: 21
PIF_VALUE: 20
PIF_VALUE: 20
PIF_VALUE: 13
PIF_VALUE: 20
PIF_VALUE: 1
PIF_VALUE: 18
PIF_VALUE: 1
PIF_VALUE: 1
PIF_VALUE: 16
PIF_VALUE: 23
PIF_VALUE: 17
PIF_VALUE: 19
PIF_VALUE: 1
PIF_VALUE: 18
PIF_VALUE: 1
PIF_VALUE: 22
PIF_VALUE: 22
PIF_VALUE: 23
PIF_VALUE: 19
PIF_VALUE: 17
PIF_VALUE: 19
PIF_VALUE: 1
PIF_VALUE: 19
PIF_VALUE: 1
PIF_VALUE: 11
PIF_VALUE: 11
PIF_VALUE: 14
PIF_VALUE: 19
PIF_VALUE: 19
PIF_VALUE: 16
PIF_VALUE: 14
PIF_VALUE: 19
PIF_VALUE: 19
PIF_VALUE: 22
PIF_VALUE: 1
PIF_VALUE: 16
PIF_VALUE: 1
PIF_VALUE: 15
PIF_VALUE: 20
PIF_VALUE: 17
PIF_VALUE: 1
PIF_VALUE: 2
PIF_VALUE: 23
PIF_VALUE: 1
PIF_VALUE: 17
PIF_VALUE: 6
PIF_VALUE: 23
PIF_VALUE: 18
PIF_VALUE: 19
PIF_VALUE: 1
PIF_VALUE: 19
PIF_VALUE: 14
PIF_VALUE: 19
PIF_VALUE: 12
PIF_VALUE: 19
PIF_VALUE: 16
PIF_VALUE: 2
PIF_VALUE: 19
PIF_VALUE: 19
PIF_VALUE: 14
PIF_VALUE: 20
PIF_VALUE: 1
PIF_VALUE: 18
PIF_VALUE: 20
PIF_VALUE: 14
PIF_VALUE: 19
PIF_VALUE: 20
PIF_VALUE: 19
PIF_VALUE: 22
PIF_VALUE: 19
PIF_VALUE: 20
PIF_VALUE: 14
PIF_VALUE: 1
PIF_VALUE: 19
PIF_VALUE: 1
PIF_VALUE: 18
PIF_VALUE: 19
PIF_VALUE: 35
PIF_VALUE: 20
PIF_VALUE: 1
PIF_VALUE: 20
PIF_VALUE: 1
PIF_VALUE: 20
PIF_VALUE: 19
PIF_VALUE: 1
PIF_VALUE: 21
PIF_VALUE: 23
PIF_VALUE: 19
PIF_VALUE: 19
PIF_VALUE: 18
PIF_VALUE: 19
PIF_VALUE: 22
PIF_VALUE: 1
PIF_VALUE: 2
PIF_VALUE: 18
PIF_VALUE: 22
PIF_VALUE: 20
PIF_VALUE: 18
PIF_VALUE: 23
PIF_VALUE: 22
PIF_VALUE: 14
PIF_VALUE: 23
PIF_VALUE: 22
PIF_VALUE: 20
PIF_VALUE: 17
PIF_VALUE: 19
PIF_VALUE: 10
PIF_VALUE: 16
PIF_VALUE: 21
PIF_VALUE: 19
PIF_VALUE: 22
PIF_VALUE: 18
PIF_VALUE: 20
PIF_VALUE: 13
PIF_VALUE: 12
PIF_VALUE: 18
PIF_VALUE: 1
PIF_VALUE: 23
PIF_VALUE: 20
PIF_VALUE: 15
PIF_VALUE: 1
PIF_VALUE: 19
PIF_VALUE: 19
PIF_VALUE: 1
PIF_VALUE: 1
PIF_VALUE: 15
PIF_VALUE: 21
PIF_VALUE: 1
PIF_VALUE: 20
PIF_VALUE: 19
PIF_VALUE: 19
PIF_VALUE: 1
PIF_VALUE: 19
PIF_VALUE: 19
PIF_VALUE: 20
PIF_VALUE: 19
PIF_VALUE: 1
PIF_VALUE: 26
PIF_VALUE: 1
PIF_VALUE: 1
PIF_VALUE: 19
PIF_VALUE: 12
PIF_VALUE: 23
PIF_VALUE: 19
PIF_VALUE: 1
PIF_VALUE: 21
PIF_VALUE: 22
PIF_VALUE: 1
PIF_VALUE: 23
PIF_VALUE: 14
PIF_VALUE: 21
PIF_VALUE: 25
PIF_VALUE: 20
PIF_VALUE: 0
PIF_VALUE: 18
PIF_VALUE: 20
PIF_VALUE: 14
PIF_VALUE: 19
PIF_VALUE: 19
PIF_VALUE: 20
PIF_VALUE: 16
PIF_VALUE: 17
PIF_VALUE: 1
PIF_VALUE: 1
PIF_VALUE: 24
PIF_VALUE: 20
PIF_VALUE: 22
PIF_VALUE: 19
PIF_VALUE: 1
PIF_VALUE: 19
PIF_VALUE: 16
PIF_VALUE: 13
PIF_VALUE: 19
PIF_VALUE: 20
PIF_VALUE: 22
PIF_VALUE: 20
PIF_VALUE: 4
PIF_VALUE: 23
PIF_VALUE: 16
PIF_VALUE: 2
PIF_VALUE: 34
PIF_VALUE: 19
PIF_VALUE: 22
PIF_VALUE: 1
PIF_VALUE: 19
PIF_VALUE: 29
PIF_VALUE: 21
PIF_VALUE: 19
PIF_VALUE: 26
PIF_VALUE: 20
PIF_VALUE: 17
PIF_VALUE: 23
PIF_VALUE: 23
PIF_VALUE: 20
PIF_VALUE: 17
PIF_VALUE: 16
PIF_VALUE: 19
PIF_VALUE: 19
PIF_VALUE: 21
PIF_VALUE: 19
PIF_VALUE: 1
PIF_VALUE: 1
PIF_VALUE: 27
PIF_VALUE: 23
PIF_VALUE: 1
PIF_VALUE: 26
PIF_VALUE: 20
PIF_VALUE: 28
PIF_VALUE: 19
PIF_VALUE: 17
PIF_VALUE: 11
PIF_VALUE: 19
PIF_VALUE: 23
PIF_VALUE: 19
PIF_VALUE: 10
PIF_VALUE: 2
PIF_VALUE: 1
PIF_VALUE: 21
PIF_VALUE: 17
PIF_VALUE: 19
PIF_VALUE: 20
PIF_VALUE: 19
PIF_VALUE: 22
PIF_VALUE: 21
PIF_VALUE: 20
PIF_VALUE: 19
PIF_VALUE: 19
PIF_VALUE: 1
PIF_VALUE: 20
PIF_VALUE: 1
PIF_VALUE: 20
PIF_VALUE: 21
PIF_VALUE: 1
PIF_VALUE: 13
PIF_VALUE: 20
PIF_VALUE: 1
PIF_VALUE: 2
PIF_VALUE: 19
PIF_VALUE: 1
PIF_VALUE: 18
PIF_VALUE: 19
PIF_VALUE: 31
PIF_VALUE: 23
PIF_VALUE: 20
PIF_VALUE: 19
PIF_VALUE: 29
PIF_VALUE: 14
PIF_VALUE: 16
PIF_VALUE: 14
PIF_VALUE: 1
PIF_VALUE: 1
PIF_VALUE: 23
PIF_VALUE: 11
PIF_VALUE: 20
PIF_VALUE: 19
PIF_VALUE: 22
PIF_VALUE: 19
PIF_VALUE: 1
PIF_VALUE: 20
PIF_VALUE: 19
PIF_VALUE: 19
PIF_VALUE: 17
PIF_VALUE: 1
PIF_VALUE: 13
PIF_VALUE: 19
PIF_VALUE: 2
PIF_VALUE: 20
PIF_VALUE: 20
PIF_VALUE: 23
PIF_VALUE: 14
PIF_VALUE: 17
PIF_VALUE: 18
PIF_VALUE: 1
PIF_VALUE: 17
PIF_VALUE: 19
PIF_VALUE: 1
PIF_VALUE: 21
PIF_VALUE: 20
PIF_VALUE: 19
PIF_VALUE: 2
PEFR_L/MIN: 16
PIF_VALUE: 10
PIF_VALUE: 1
PIF_VALUE: 1
PIF_VALUE: 20
PIF_VALUE: 29
PIF_VALUE: 1
PIF_VALUE: 20
PIF_VALUE: 1
PIF_VALUE: 20
PIF_VALUE: 2
PIF_VALUE: 21
PIF_VALUE: 19
PIF_VALUE: 1
PIF_VALUE: 17
PIF_VALUE: 19
PIF_VALUE: 19
PIF_VALUE: 14
PIF_VALUE: 2
PIF_VALUE: 19
PIF_VALUE: 19
PIF_VALUE: 18
PIF_VALUE: 1
PIF_VALUE: 11
PIF_VALUE: 16
PIF_VALUE: 22
PIF_VALUE: 19
PIF_VALUE: 1
PIF_VALUE: 19
PIF_VALUE: 1

## 2020-01-01 ASSESSMENT — PAIN SCALES - GENERAL
PAINLEVEL_OUTOF10: 0

## 2020-01-01 ASSESSMENT — COPD QUESTIONNAIRES
CAT_SEVERITY: NO INTERVAL CHANGE
CAT_SEVERITY: MODERATE

## 2020-01-01 ASSESSMENT — PAIN - FUNCTIONAL ASSESSMENT: PAIN_FUNCTIONAL_ASSESSMENT: 0-10

## 2020-01-01 ASSESSMENT — LIFESTYLE VARIABLES: SMOKING_STATUS: 1

## 2020-06-16 NOTE — PROGRESS NOTES
done today in office on 6/16/2020 showed sinus rhythm with Q waves in the inferior leads but there is new T wave inversion in the lateral leads. Mr. Jeffery Langston is here for six month follow up. He states he has been having chest pain here and there where it wakes him up out of a sound sleep with diaphoresis. He has had to take Nitro to help with the pain. He said the pain is not necessarily related to activity but gets worse with exertion. Episodes are happening 3-4 times a week. Chest pain radiated to the back and sometimes associated with sweating and diaphoresis. Episodes usually last for few minutes and is relieved with rest or sublingual nitroglycerin. If he walks too much his right leg will develop pain \"he is scheduled to see Dr. Ismael Barrientos for his peripheral vascular disease and bilateral carotid artery disease\" he does have dizzy spells but these are not worsening at all. He has been having a lot of indigestion so he as been eating a lot of TUMS. He said it does help. Patient also reported having chronic shortness of breath but reported no orthopnea or PND. He has gained some weight over the past several month because of the stay at home COVID-19 pandemic but he said he keeps himself busy because he is taking care of a 9year-old child. He does help with household chores and does the grocery shopping. Unfortunately he continues to smoke and is not very compliant with his follow-up. He did not do the blood work or the echo that were ordered last visit. Past Medical History:    Past Medical History:   Diagnosis Date    CAD (coronary artery disease)     dr Alejo Harada cc 7/11/18    Carotid artery stenosis     COPD (chronic obstructive pulmonary disease) ([facility] [address]     H/O cardiac catheterization 8/20/15    LMCA:Lesion on LMCA: Distal subsection. 40% stenosis. LCx:Lesion on Prox CX: Ostial. 50% stenosis. RCA: Lesion on Prox RCA: Ostial. 95% stenosis. Lesion on Prox RCA: Proximal subsection.  75% stenosis. Lesion on R PDA: Ostial. 705 stenosis. EF 60%.  H/O cardiovascular stress test 8/17/15    Abnormal. Moderate perfusion defect of moderate intensity in the inferolateral, inferior, and inferoapical regions during stress imaging, most consistent with ischemia. Global LV systolic function was abnormal with EF: 42% w/regional wall motion abnormalities. Results are most consistent with an intermediate to high risk for significant CAD.     H/O cardiovascular stress test 08/03/2016    Abnormal myocardial perfusion study, large perfusion defect of moderate-severe intensity in inferolateral, inferior, inferoseptal, apical and inferoapical regions during stress and rest imaging, most consistant with old myocardial infarction with hayden-infarct ischemia, EF 44%, Overall results most consistent with intermediate-high risk for CAD, Add. testing including cardiac cath maybe indicated     H/O cardiovascular stress test 07/05/2018    Abnormal myocardial perfusion study. There is a moderate perfusion defect of moderaete intensity in the inferolateral, inferior and inferoseptal regions during stress and rest imaging which is most consistent with an old MI with hayden-infact ischema. EF 43%. Results are most consistant with an intermediate risk for reverdaibel CAD    H/O echocardiogram 12/4/15    EF 55%. Mildly increased LV wall thickness. Left atrium is mildly dilated (29-33) left atrial volume index of 32 ml/m2. aortic leaflet calcification with mild aortic stenosis. Moderate aortic regurgitation. Mild (grade l) diastolic dysfunction    H/O echocardiogram 07/05/2018    EF 55%. The LV wall thickness is mildly increased. Mild hopkinesis of the inferolateral wall. The left atrium is mildly dilated (29-33) with a left atrial volume index of 29ml/m2. Mild aortic stenosis, mean gradient 17 mmHg. Mild to moderate aortic regurg. Mild diastiolic dysfucntion.      History of Holter monitoring 10/30/2017    Rare PAC's with 2 atrial runs, the longest being 7 beats in duration at 171 bpm and most consistent with a short run of atrial fibrillation.  Hyperlipidemia     Hypertension     MI (myocardial infarction) ([facility] [address]     Peripheral vascular disease ([facility] [address]     S/P angioplasty with stent 8/24/15    PCI to ostial lesion and balloon angioplasy to proximal lesion.  Distal RCA lesion had total occlusion after balloon angioplasty    Unspecified cerebral artery occlusion with cerebral infarction     TIA     Past Surgical History:  Past Surgical History:   Procedure Laterality Date    CARDIAC CATHETERIZATION Left 8/20/2015    right radial/Kimberly Salazar/Dr Cohen    CAROTID ENDARTERECTOMY Bilateral 1995, 2002    CAROTID ENDARTERECTOMY Left 11/13/2017    LEFT CAROTID PSEUDOANEURYSM REPAIR WITH [id number] AND [id number] VIABAHN STENTS, LEFT CAROTID ANGIOGRAM, SAPHENOUS VEIN GRAFT BYPASS performed by Nba Ni DO at [place] [id number]  08/24/15    balloon, stentx1 dr Adrian Kumar 7/11/18    DIAGNOSTIC CARDIAC CATH LAB PROCEDURE  08/20/15    ENDOSCOPY, COLON, DIAGNOSTIC      KNEE SURGERY  2013    LEG TENDON SURGERY      R    OTHER SURGICAL HISTORY Left 11/13/2017    REPAIR CAROTID ANEURYSM WITH LT SAPHENOUS VEIN GRAFT    OTHER SURGICAL HISTORY Right 07/23/2018     CAROTID ARTERY RESECTION OF  PSEUDO ANEURISM    VT Livan Gutierrezds INCIS Right 7/23/2018    RSECTION OF RIGHT CAROTID PSEUDOANEURYSM, WITH PATCH GRAFT ([facility]) ANGIOPLASTY performed by Nba Ni DO at [address] [place] [address] History:    Social History     Tobacco Use   Smoking Status Current Every Day Smoker    Packs/day: 0.50    Years: 45.00    Pack years: 22.50    Start date: 8/17/1970   Smokeless Tobacco Never Used     Current Medications:  Outpatient Medications Marked as Taking for the 6/16/20 encounter (Office Visit) with Katya Pierre MD   Medication Sig Dispense Refill    isosorbide mononitrate (IMDUR) 60 MG extended release tablet TAKE ONE TABLET BY MOUTH DAILY. 90 tablet 3    amLODIPine (NORVASC) 10 MG tablet Take 1 tablet by mouth daily 90 tablet 3    metoprolol succinate (TOPROL XL) 25 MG extended release tablet Take 1 tablet by mouth daily 90 tablet 3    lisinopril (PRINIVIL;ZESTRIL) 20 MG tablet Take 1 tablet by mouth daily 90 tablet 3    [DISCONTINUED] rosuvastatin (CRESTOR) 40 MG tablet Take 1 tablet by mouth every evening 30 tablet 11    [DISCONTINUED] prasugrel (EFFIENT) 10 MG TABS Take 1 tablet by mouth daily 30 tablet 11    ranolazine (RANEXA) 500 MG extended release tablet Take 1 tablet by mouth 2 times daily 180 tablet 3    albuterol (PROVENTIL) (2.5 MG/3ML) 0.083% nebulizer solution Take 3 mLs by nebulization every 6 hours as needed for Wheezing or Shortness of Breath 120 each 0    guaiFENesin (MUCINEX) 600 MG extended release tablet Take 1 tablet by mouth 2 times daily      tiotropium (SPIRIVA) 18 MCG inhalation capsule Inhale 18 mcg into the lungs daily      acetaminophen (TYLENOL) 325 MG tablet Take 2 tablets by mouth every 6 hours while awake for 5 days 30 tablet 0    fluticasone-vilanterol (BREO ELLIPTA) 100-25 MCG/INH AEPB inhaler Inhale 1 puff into the lungs daily      albuterol sulfate  (90 Base) MCG/ACT inhaler Inhale 2 puffs into the lungs every 6 hours as needed for Wheezing or Shortness of Breath 1 Inhaler 11    DiphenhydrAMINE HCl (BENADRYL ALLERGY PO) Take by mouth Pt takes at night for his sinuses      nitroGLYCERIN (NITROSTAT) 0.4 MG SL tablet Place 1 tablet under the tongue every 5 minutes as needed for Chest pain 25 tablet 3    aspirin 81 MG tablet Take 81 mg by mouth daily         REVIEW OF SYSTEMS:    CONSTITUTIONAL: No major weight gain or loss, fatigue, weakness, night sweats or fever. HEENT: No new vision difficulties or ringing in the ears.   RESPIRATORY: see HPI  CARDIOVASCULAR: See HPI  GI: No nausea, vomiting, diarrhea, constipation, abdominal pain or changes in 65 01/02/2017     Lab Results   Component Value Date    HDL 53 09/10/2018    HDL 55 09/06/2017    HDL 51 01/02/2017     Lab Results   Component Value Date    LDLCHOLESTEROL 57 09/10/2018    LDLCHOLESTEROL 68 09/06/2017    LDLCHOLESTEROL 89 01/02/2017       Assessment:      Diagnosis Orders   1. ASHD (arteriosclerotic heart disease)  EKG 12 lead    Referral to Cardiac Cath    Echo 2D w doppler w color complete    Basic Metabolic Panel    CBC   2. Abnormal ECG  Referral to Cardiac Cath   3. Essential hypertension  Referral to Cardiac Cath   4. Mixed hyperlipidemia  Referral to Cardiac Cath    Lipid Panel   5. Tobacco abuse counseling  Referral to Cardiac Cath   6. PVD (peripheral vascular disease) ([facility] [address]  Referral to Cardiac Cath   7. Ischemic chest pain [facility])  Referral to Cardiac Cath   8. Bilateral carotid artery stenosis  Referral to Cardiac Cath       Plan:     · Patient with extensive history of vascular disease. · Bilateral carotid artery disease and peripheral vascular disease. · Coronary artery disease with inferior infarction at the time of his cardiac catheterization back in 2015. · Currently with recurrent chest pain on exertion that is not controlled with the maximally tolerated medical therapy. · Unfortunately patient continues to smoke. · He has new T wave inversion in the lateral leads on his ECG today. · Giving refractory chest pain despite optimally tolerated medical therapy, I think the best course of action is to proceed with cardiac catheterization. · Additional Testing List: For these reasons, I recommended that the patient consider undergoing a cardiac catheterization with coronary angiography to assess their coronary anatomy and facilitate better treatment recommendations.  I discussed the risks, benefits, and alternatives to the procedure including the risk of bleeding, contrast induced allergy and/or kidney damage, arrythmia, stroke, heart attack, death, or the need for further Hypertension: Controlled   · Anti-anginal medications: Continue amlodipine (Norvasc) 10 mg once daily. · Anti-anginal medications: Continue nitroglycerin 0.4 mg tablets as needed for chest pain. · Anti-anginal medications: Continue isosorbide mononitrate (Imdur) 60 mg once daily. ACE Inibitor/ARB: Continue lisinopril 20 mg once daily. I also discussed the potential side effects of this medication including lightheadedness and dizziness and told him to stop the medication of this occurs and call our office if this occurs. Beta Blocker: Continue Metoprolol succinate (Toprol XL) 25 mg daily. Continue Norvasc and Imdur. Counseled regarding low salt diet. Additional Testing: I Ordered an Echocardiogram to assess Mr. Sanabria's ejection fraction and to look for significant valvular heart disease as a source of Mr. Soheila Ozuna symptoms    · Hyperlipidemia: Mixed: LDL 57 as of 09/10/2018  · Statin Therapy: Continue rosuvastatin (Crestor) 40 mg nightly. I discussed the potential benefits of statin therapy as well as the potential risks including myalgia as well as the rare but potentially serious complication of liver or kidney damage. Although rare, I told him that this could be serious and therefore told him to stop the medication immediately and call if he developed any severe muscle aches or pains and he agreed to do so. · PCSK9 inhibitors: Not indicated at this time   · Follow-up repeat lipid panel. Tobacco Abuse Counseling: I spent several minutes discussing the dangers of tobacco abuse as well as multiple methods for trying to quit smoking. In the end, Mr. Soheial Ozuna said that he did not want any assistance at this time but would continue to try and quit reduce and eventually quit smoking in the near future. · Peripheral vascular disease: S/P stents 8/21/2019  · Continue to follow up with Dr Justin Vega  · Continue ASA, Effient and Crestor. Obesity: Body mass index is 29.89 kg/m².    I also briefly discussed

## 2020-06-16 NOTE — PATIENT INSTRUCTIONS
SURVEY:    You may be receiving a survey from [employer] regarding your visit today. Please complete the survey to enable us to provide the highest quality of care to you and your family. If you cannot score us a very good on any question, please call the office to discuss how we could have made your experience a very good one. Thank you.

## 2020-06-24 NOTE — TELEPHONE ENCOUNTER
Spoke with Lupe Weaver from [facility] who stated that pt will have to have a pre-cath appointment with their interventionalist prior to heart cath. I spoke with Dr. Apollo De La Cruz office([phone]) and the soonest they can get him in for a pre -cath appointment is 6/30/20 at 2:15 pm in [address]. Left pt a message to return my call. Pt called back and was notified and verbalized understanding.

## 2020-06-25 PROBLEM — K92.2 GI BLEED: Status: ACTIVE | Noted: 2020-01-01

## 2020-06-25 NOTE — PROCEDURES
Arterial Line Placement Procedure Note          Performed by: Kyler Patel DO               Indication: severe hypotension    Consent: The patient provided verbal consent for this procedure. David's Test: Normal    Time out performed: Immediately prior to the procedure a \"time out\" was called to verify the correct patient, the correct procedure, equipment, support staff and site/side marked as required. All elements of maximal sterile barrier techniques were followed. Procedure: The skin over the left radial artery was prepped with betadine and draped in a sterile fashion. A 20 gauge angiocath was then inserted, using a modified Seldinger technique, into the vessel. The transducer set was then attached and securely fastened to the skin with an adhesive dressing. Waveforms on the monitor were observed and found to be adequate. The patient had good distal perfusion after the procedure. The site was then dressed in a sterile fashion. The patient tolerated the procedure well.      Complications: None    2:44 PM

## 2020-06-25 NOTE — PLAN OF CARE
PROVIDE ADEQUATE OXYGENATION WITH ACCEPTABLE SP02/ABG'S    [x]  IDENTIFY APPROPRIATE OXYGEN THERAPY  [x]   MONITOR SP02/ABG'S AS NEEDED   [x]   PATIENT EDUCATION AS NEEDED    Pt takes inhalers at home. Pt has history of COPD and 0.5 pack per day smoker. Breath sounds are clear and diminished at this time. Pt denies sob or difficulty breathing.

## 2020-06-25 NOTE — ED NOTES
O Negative Blood started on patient via emergency transfusion per Dr. Kong Ascencio order.      Anice Favre, RN  06/25/20 1129

## 2020-06-25 NOTE — H&P
Critical Care - History and Physical Examination    Patient's name:  Elizabeth Longo  Medical Record Number: [id number]  Patient's account/billing number: [de-identified]  Patient's YOB: 1955  Age: 77 y.o. Date of Admission: 6/25/2020  3:14 PM  Reason of ICU admission:   Date of History and Physical Examination: 6/25/2020      Primary Care Physician: Alivia Erickson DO  Attending Physician:    Code Status: Full Code    Chief complaint:     Hemetamesis and Melena. Reason for ICU admission:   Massive GI bleed. History Of Present Illness:       49-year-old male extensive past history of coronary artery disease, PAD,  stroke with multiple stents. Patient came in as a direct transfer from [place] after he had multiple episodes of hematemesis for the last 3 days and one episode of melena today in the a.m. As per the patient he had been doing fine for 3 days ago when he started having these recurrent episodes of nausea and vomiting with red blood. Occasional alcoholic in the past however states that he is a very occasional user right now. Patient was brought to the [place] ER and due to concern for massive bleeding was transferred to  5. Given 2 units of blood and given an Protonix bolus. Hemoglobin afterwards was 12.6 however has been very tachycardic and hypotensive. On arrival patient is slightly hypotensive however alert and oriented x3. Will start on a Protonix drip and have ceftriaxone, give 1L  fluid bolus and follow-up. Past Medical History:        Diagnosis Date    CAD (coronary artery disease)     dr May Mandel cc 7/11/18    Carotid artery stenosis     COPD (chronic obstructive pulmonary disease) ([facility]     H/O cardiac catheterization 8/20/15    LMCA:Lesion on LMCA: Distal subsection. 40% stenosis. LCx:Lesion on Prox CX: Ostial. 50% stenosis. RCA: Lesion on Prox RCA: Ostial. 95% stenosis. Lesion on Prox RCA: Proximal subsection. 75% stenosis.  Lesion on R PDA: Ostial. 705 stenosis. EF 60%.  H/O cardiovascular stress test 8/17/15    Abnormal. Moderate perfusion defect of moderate intensity in the inferolateral, inferior, and inferoapical regions during stress imaging, most consistent with ischemia. Global LV systolic function was abnormal with EF: 42% w/regional wall motion abnormalities. Results are most consistent with an intermediate to high risk for significant CAD.     H/O cardiovascular stress test 08/03/2016    Abnormal myocardial perfusion study, large perfusion defect of moderate-severe intensity in inferolateral, inferior, inferoseptal, apical and inferoapical regions during stress and rest imaging, most consistant with old myocardial infarction with hayden-infarct ischemia, EF 44%, Overall results most consistent with intermediate-high risk for CAD, Add. testing including cardiac cath maybe indicated     H/O cardiovascular stress test 07/05/2018    Abnormal myocardial perfusion study. There is a moderate perfusion defect of moderaete intensity in the inferolateral, inferior and inferoseptal regions during stress and rest imaging which is most consistent with an old MI with hayden-infact ischema. EF 43%. Results are most consistant with an intermediate risk for reverdaibel CAD    H/O echocardiogram 12/4/15    EF 55%. Mildly increased LV wall thickness. Left atrium is mildly dilated (29-33) left atrial volume index of 32 ml/m2. aortic leaflet calcification with mild aortic stenosis. Moderate aortic regurgitation. Mild (grade l) diastolic dysfunction    H/O echocardiogram 07/05/2018    EF 55%. The LV wall thickness is mildly increased. Mild hopkinesis of the inferolateral wall. The left atrium is mildly dilated (29-33) with a left atrial volume index of 29ml/m2. Mild aortic stenosis, mean gradient 17 mmHg. Mild to moderate aortic regurg. Mild diastiolic dysfucntion.      History of Holter monitoring 10/30/2017    Rare PAC's with 2 atrial runs, the longest being amLODIPine (NORVASC) 10 MG tablet Take 1 tablet by mouth daily 10/21/19   Daniela Solitario MD   metoprolol succinate (TOPROL XL) 25 MG extended release tablet Take 1 tablet by mouth daily 10/21/19   Daniela Solitario MD   lisinopril (PRINIVIL;ZESTRIL) 20 MG tablet Take 1 tablet by mouth daily 10/21/19   Daniela Solitario MD   ranolazine (RANEXA) 500 MG extended release tablet Take 1 tablet by mouth 2 times daily 6/12/19   Daniela Solitario MD   ranolazine (RANEXA) 500 MG extended release tablet Take 1 tablet by mouth 2 times daily for 28 days 4/11/19 5/9/19  Daniela Solitario MD   albuterol (PROVENTIL) (2.5 MG/3ML) 0.083% nebulizer solution Take 3 mLs by nebulization every 6 hours as needed for Wheezing or Shortness of Breath 3/15/19   Daylene Given JANESSA Craig   guaiFENesin (MUCINEX) 600 MG extended release tablet Take 1 tablet by mouth 2 times daily 3/15/19   Daylene Given JANESSA Craig   ranolazine (RANEXA) 500 MG extended release tablet Take 500 mg by mouth 2 times daily SAMPLES Ranexa 500 mg Lot #[id number] Exp 4/2021 x 4 11/20/18 4/11/19  Historical Provider, MD   tiotropium (SPIRIVA) 18 MCG inhalation capsule Inhale 18 mcg into the lungs daily    Historical Provider, MD   fluticasone-vilanterol (BREO ELLIPTA) 100-25 MCG/INH AEPB inhaler Inhale 1 puff into the lungs daily    Historical Provider, MD   albuterol sulfate  (90 Base) MCG/ACT inhaler Inhale 2 puffs into the lungs every 6 hours as needed for Wheezing or Shortness of Breath 2/15/18 6/25/20  [address] LUCIA MD   DiphenhydrAMINE HCl (BENADRYL ALLERGY PO) Take 50 mg by mouth nightly Pt takes at night for his sinuses     Historical Provider, MD   nitroGLYCERIN (NITROSTAT) 0.4 MG SL tablet Place 1 tablet under the tongue every 5 minutes as needed for Chest pain 8/24/15   Lincoln Patino MD   aspirin 81 MG tablet Take 81 mg by mouth daily    Historical Provider, MD       Social History:   TOBACCO:   reports that he has been smoking. He started smoking about 49 years ago. He has a 22.50 pack-year smoking history. He has never used smokeless tobacco.  ETOH:   reports no history of alcohol use. DRUGS:  reports no history of drug use. OCCUPATION:      Family History:       Adopted: Yes   Problem Relation Age of Onset    Cancer Brother            REVIEW OF SYSTEMS (ROS):  Review of Systems - Negative except mentioned in HPI   General ROS: negative  Psychological ROS: negative  Ophthalmic ROS: negative  ENT: negative  Hematological and Lymphatic ROS: negative  Endocrine ROS: negative  Breast ROS: negative  Respiratory ROS: no cough, shortness of breath, or wheezing  Cardiovascular ROS: no chest pain or dyspnea on exertion  Gastrointestinal ROS: Hematemesis and melena. Genito-Urinary ROS: negative  Musculoskeletal ROS: negative  Neurological ROS: negative  Dermatological ROS: negative      Physical Exam:    Vitals: BP (!) 86/46   Pulse 74   Temp 98.4 °F (36.9 °C) (Axillary)   Resp 14   Ht 6' 2.02\" (1.88 m)   Wt 232 lb (105.2 kg)   SpO2 99%   BMI 29.77 kg/m²     Body weight:   Wt Readings from Last 3 Encounters:   06/25/20 232 lb (105.2 kg)   06/25/20 232 lb (105.2 kg)   06/16/20 232 lb 12.8 oz (105.6 kg)       Body Mass Index : Body mass index is 29.77 kg/m². PHYSICAL EXAMINATION :  Constitutional: Appears well, in no distress  EENT: PERRLA, EOMI, sclera clear, anicteric, oropharynx clear, no lesions, neck supple with midline trachea. Neck: Supple, symmetrical, trachea midline, no adenopathy, thyroid symmetric, no jvd skin normal  Respiratory: clear to auscultation, no wheezes or rales and unlabored breathing.  No intercostal tenderness  Cardiovascular: regular rate and rhythm, normal S1, S2, no murmur noted and 2+ pulses throughout  Abdomen: soft, nontender, nondistended, no masses or organomegaly  Neurological:  Extremities:  peripheral pulses normal, no pedal edema, no clubbing or cyanosis      Laboratory findings:-    CBC:   Recent Labs     06/25/20  1338   WBC 9.9 HGB 12.6*        BMP:    Recent Labs     06/25/20  1042      K 4.8   CL 98   CO2 25   BUN 64*   CREATININE 1.54*   GLUCOSE 135*     S. Calcium:  Recent Labs     06/25/20  1042   CALCIUM 8.4*     S. Ionized Calcium:No results for input(s): IONCA in the last 72 hours. S. Magnesium:No results for input(s): MG in the last 72 hours. S. Phosphorus:No results for input(s): PHOS in the last 72 hours. S. Glucose:No results for input(s): POCGLU in the last 72 hours. Glycosylated hemoglobin A1C: No results for input(s): LABA1C in the last 72 hours. INR:   Recent Labs     06/25/20  1042   INR 1.0     Hepatic functions:   Recent Labs     06/25/20  1042   ALKPHOS 56   ALT 6   AST 10   PROT 5.8*   BILITOT 0.36   LABALBU 3.5     Pancreatic functions:No results for input(s): LACTA, AMYLASE in the last 72 hours. S. Lactic Acid: No results for input(s): LACTA in the last 72 hours. Cardiac enzymes:No results for input(s): CKTOTAL, CKMB, CKMBINDEX, TROPONINI in the last 72 hours. BNP:No results for input(s): BNP in the last 72 hours. Lipid profile: No results for input(s): CHOL, TRIG, HDL, LDLCALC in the last 72 hours.     Invalid input(s): LDL  Blood Gases: No results found for: PH, PCO2, PO2, HCO3, O2SAT  Thyroid functions:   Lab Results   Component Value Date    TSH 1.84 08/03/2015           Assessment and Plan     Patient Active Problem List   Diagnosis    Gross hematuria    Benign non-nodular prostatic hyperplasia with lower urinary tract symptoms    [facility]    COPD exacerbation ([facility]    CAD (coronary artery disease)    COPD (chronic obstructive pulmonary disease) ([facility]    Hypertension    Hyperlipidemia    Peripheral vascular disease ([facility]    Acute respiratory failure with hypoxia (HCC)    Bacterial pneumonia    Hyponatremia    GI bleed         ASSESSMENT & PLAN:    New onset recurrent hematemesis with melena likely secondary to upper GI bleed secondary to NSAID use: Last colonoscopy 15 years ago. Currently vitals are stable. Continue ceftriaxone, PPI drip and octreotide. Appreciate GI recommendations. H&H Q6 hourly. CAD status post PCI to RCA in 2015 on aspirin and Brilinta, procedure complication with dissection/total distal occlusion/inferior wall MI transient heart block. T wave inversion on lateral leads on his EKG since 6/16/2020 as per cardiology note. 10 days ago was seen by cardiology who recommended cardiac cath. Currently on aspirin and Prasugrel, Toprol 25, Imdur, nitroglycerin, Norvasc, Ranexa, Crestor. Last dose of Prasugrel today. As per GI, will likely need reversal through [facility] if emergent EGD> Otherwise plan for tomorrow am.     SUREKHA with baseline creatinine normal likely secondary to hypovolemia secondary to upper GI bleed: Continue aggressive IV hydration from 1 L of normal saline bolus.     Eden Rowley MD  [facility]         6/25/2020, 4:04 PM

## 2020-06-25 NOTE — ED NOTES
Geoffrey Kelley from [facility] called back with Dr Workman at [facility] and connected with Dr Altaf Gu  06/25/20 0253

## 2020-06-25 NOTE — CONSULTS
[facility]    GASTROENTEROLOGY CONSULT    Patient:   Concetta Vu   :    1955   Facility:   [facility]   Date:    2020  Admission Dx:  GI bleed [K92.2]  Requesting physician: Alex Esquivel MD  Reason for consult:  GI bleed    CC : Black stool     SUBJECTIVE     HISTORY OF PRESENT ILLNESS  This is a 77 y.o.   male who was admitted 2020 with GI bleed [K92.2]. We have been asked to see the patient in consultation by Alex Esquivel MD for GI bleed. 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 Over the course of yesterday he had total of 5 episodes of melena. Patient also reports an episode of coffee-ground hematemesis approximately 12 noon and an additional episode of coffee-ground emesis with small bright red clots approximately 5 PM yesterday. Today patient had 3 episodes of melena. It was at this time wife insisted patient be seen at the hospital.  Patient does admit to associated lightheadedness and dizziness. Patient presented over to [facility] where he was found to be severely hypotensive with blood pressure 50/31. Hemoglobin was 12.0 g/dL. Patient received 2 units PRBCs with fluid resuscitation. Repeat hemoglobin 12.6 g/dL. Patient seen and examined. Patient denies any previous history of hematemesis or melena. Patient does admit to taking Advil PM nightly for the past 3 weeks. Patient does report abdominal discomfort and has been taking Tums for a long period.   Patient reports most recently he has noted the need to increase frequency of Tums.  No prior EGD. Remote history of colonoscopy approximately 15 to 20 years ago that was unremarkable. Patient's family history is unknown. Patient admits to one half alcoholic beverage per year. No illicit drug use. He is a current every day smoker and smokes 9 to 11 cigarettes/day. Smoking since the age of 10. A review of patient's medication shows he is on 3 antihypertensive medications. Patient reports taking all medications this morning. Patient also is on aspirin and Effient for his cardiac disease. Both taken this morning. OBJECTIVE:     PAST MEDICAL/SURGICAL HISTORY  Past Medical History:   Diagnosis Date    CAD (coronary artery disease)     dr Laureen Fierro cc 7/11/18    Carotid artery stenosis     [facility]     H/O cardiac catheterization 8/20/15    LMCA:Lesion on LMCA: Distal subsection. 40% stenosis. LCx:Lesion on Prox CX: Ostial. 50% stenosis. RCA: Lesion on Prox RCA: Ostial. 95% stenosis. Lesion on Prox RCA: Proximal subsection. 75% stenosis. Lesion on R PDA: Ostial. 705 stenosis. EF 60%.  H/O cardiovascular stress test 8/17/15    Abnormal. Moderate perfusion defect of moderate intensity in the inferolateral, inferior, and inferoapical regions during stress imaging, most consistent with ischemia. Global LV systolic function was abnormal with EF: 42% w/regional wall motion abnormalities.  Results are most consistent with an intermediate to high risk for significant CAD.     H/O cardiovascular stress test 08/03/2016    Abnormal myocardial perfusion study, large perfusion defect of moderate-severe intensity in inferolateral, inferior, inferoseptal, apical and inferoapical regions during stress and rest imaging, most consistant with old myocardial infarction with hayden-infarct ischemia, EF 44%, Overall results most consistent with intermediate-high risk for CAD, Add. testing including cardiac cath maybe indicated     H/O cardiovascular stress 500 MG extended release tablet Take 500 mg by mouth 2 times daily SAMPLES Ranexa 500 mg Lot #[id number] Exp 4/2021 x 4 11/20/18 4/11/19  Historical Provider, MD   tiotropium (SPIRIVA) 18 MCG inhalation capsule Inhale 18 mcg into the lungs daily    Historical Provider, MD   fluticasone-vilanterol (BREO ELLIPTA) 100-25 MCG/INH AEPB inhaler Inhale 1 puff into the lungs daily    Historical Provider, MD   albuterol sulfate  (90 Base) MCG/ACT inhaler Inhale 2 puffs into the lungs every 6 hours as needed for Wheezing or Shortness of Breath 2/15/18 6/25/20  Meche Singh MD   DiphenhydrAMINE HCl (BENADRYL ALLERGY PO) Take 50 mg by mouth nightly Pt takes at night for his sinuses     Historical Provider, MD   nitroGLYCERIN (NITROSTAT) 0.4 MG SL tablet Place 1 tablet under the tongue every 5 minutes as needed for Chest pain 8/24/15   August Hill MD   aspirin 81 MG tablet Take 81 mg by mouth daily    Historical Provider, MD       CURRENT MEDICATIONS:  Scheduled Meds:   sodium chloride  1,000 mL Intravenous Once    sodium chloride flush  10 mL Intravenous 2 times per day    cefTRIAXone (ROCEPHIN) IV  1 g Intravenous Q24H     Continuous Infusions:   IV infusion builder      pantoprozole (PROTONIX) infusion      octreotide (SANDOSTATIN) infusion       PRN Meds:sodium chloride flush, acetaminophen **OR** acetaminophen, promethazine **OR** ondansetron    SOCIAL HISTORY:     Tobacco:   reports that he has been smoking. He started smoking about 49 years ago. He has a 22.50 pack-year smoking history. He has never used smokeless tobacco.  Alcohol:   reports no history of alcohol use. Illicit drugs:  reports no history of drug use. FAMILY HISTORY:     Family History   Adopted: Yes   Problem Relation Age of Onset    Cancer Brother        REVIEW OF SYSTEMS:    Constitutional: No fever, no chills, no lethargy, no weakness.  + Lightheadedness and dizziness  HEENT:  No headache, otalgia, itchy eyes, nasal discharge or sore throat. Cardiac:  No chest pain, dyspnea, orthopnea or PND. Chest:   No cough, phlegm or wheezing. Abdomen:  No abdominal pain, + nausea , vomiting with hematemesis. + melena  Neuro:  No focal weakness, abnormal movements or seizure like activity. Skin:   No rashes, no itching. :   No hematuria, no pyuria, no dysuria, no flank pain. Extremities:  No swelling or joint pains. ROS was otherwise negative except as mentioned in the [address] PHYSICAL EXAM:    BP (!) 86/46   Pulse 75   Temp 98.4 °F (36.9 °C) (Axillary)   Resp 17   Ht 6' 2.02\" (1.88 m)   Wt 232 lb (105.2 kg)   SpO2 98%   BMI 29.77 kg/m²     GENERAL: Tan over skin exposed areas, well developed, Well nourished, No apparent distress  HEAD:   Normocephalic, Atraumatic  EENT:   EOMI, Sclera not icteric, Oropharynx moist   NECK:   Supple, Trachea midline  LUNGS:  CTA Bilaterally  HEART:  RRR, No murmur  ABDOMEN:   Soft, Nontender, Nondistended, BS WNL  EXT:   No clubbing. No cyanosis. No edema. SKIN:   No rashes. No jaundice. No stigmata of liver disease. MUSC/SKEL:   Adequate muscle bulk for patient's age, No significant synovitis, No deformities  NEURO:  A&O x Three, CN II- XII grossly intact      LABS AND IMAGING:     CBC  Recent Labs     06/25/20  1042 06/25/20  1338   WBC 11.7* 9.9   HGB 12.0* 12.6*   HCT 37.3* 38.9*   .0 96.8   MCH 32.2 31.3   MCHC 32.2 32.4   PLT See Reflexed IPF Result 148       IMMATURE PLTs  Lab Results   Component Value Date    PLTFLUORE 194 06/25/2020       BMP  Recent Labs     06/25/20  1042      K 4.8   CL 98   CO2 25   BUN 64*   CREATININE 1.54*   GLUCOSE 135*   CALCIUM 8.4*       LFTS  Recent Labs     06/25/20  1042   ALKPHOS 56   ALT 6   AST 10   PROT 5.8*   BILITOT 0.36   LABALBU 3.5       AMYLASE/LIPASE/AMMONIA  No results for input(s): AMYLASE, LIPASE, AMMONIA in the last 72 hours.     PT/INR  Recent Labs     06/25/20  1042   PROTIME 13.1   INR 1.0       ANEMIA STUDIES  No results for input(s): IRON, LABIRON, TIBC, UIBC, FERRITIN, [id number], FOLATE, OCCULTBLD in the last 72 hours. IMAGING        IMPRESSION:     47-year-old male with a history COPD with ongoing tobacco use, CVA, carotid artery stenosis s/p bilateral carotid endarterectomy, right carotid artery pseudoaneurysm with patch graft, left carotid pseudoaneurysm, CAD s/p stents on ASA and Effient, hypertension, MI, and PVD who presented as transfer from [facility] for GI bleed with melena and hematemesis     - Suspect acute upper GI bleed secondary to NSAID inducted PUD. Dieulofoy lesion possible. No history of significant alcohol use to this suspect underlying liver pathology    Patient currently hypotensive likely secondary to 3 antihypertensive taken this morning as well as acute GI bleed. -Patient has received 2 units PRBCs as well as IV fluids. Patient currently asymptomatic upon evaluation. Old records, labs and imaging reviewed. PLAN   1. Monitor hemoglobin and hematocrit. 2.  We will hold off on urgent EGD at this time. We will try to avoid reversing effects of Effient and ASA due to significant cardiac history with stents and carotid history with CVA. 3.  We will plan for EGD in AM.  EGD can be completed sooner if patient shows any sign of acute drop in hemoglobin or hemodynamic instability. 4.  Fluid resuscitation per ICU team  5. Continue PPI gtt  6. Recommend outpatient screening colonoscopy (last colonoscopy 15-20 years ago)  7. Please call GI with any questions, concerns, or acute change in clinical status      This plan was formulated in collaboration with Dr. Lalo Abdul  Thank you for allowing us to participate in the care of your patient. Electronically signed by: Mima MOORE on 6/25/2020 at 4:25 PM     Please note that this note was generated using a voice recognition dictation software.   Although every effort was made to ensure the accuracy of this automated transcription, some errors in transcription may have occurred.

## 2020-06-25 NOTE — ED PROVIDER NOTES
[facility] ED  Formerly [facility]   Chief Complaint   Patient presents with    Rectal Bleeding     Onset 2 days ago, black colored diarrhea    Dizziness     Onset today, worse with reposition        HPI   Johnston Ruffing is a 77 y.o. male who presents with bleeding from the rectum, onset was 3 days ago. He also vomited once. The quality of the bleeding was heavy, multiple episodes. The patient has associated no complaint of pain. He notes that he has been very dizzy and lightheaded. The context is that the symptoms started spontaneously while having a bowel movement. This has not happened to the patient previously. They are on effient. REVIEW OF SYSTEMS   GI: See history of present illness  Cardiac: no chest pain or syncope  Pulmonary: No difficulty breathing or new cough  General: No fevers   : No hematuria or dysuria  See HPI for further details. All other review of systems otherwise negative. PAST MEDICAL & SURGICAL HISTORY   Past Medical History:   Diagnosis Date    CAD (coronary artery disease)     dr Francois Adkins cc 7/11/18    Carotid artery stenosis     COPD (chronic obstructive pulmonary disease) ([facility] [address]     H/O cardiac catheterization 8/20/15    LMCA:Lesion on LMCA: Distal subsection. 40% stenosis. LCx:Lesion on Prox CX: Ostial. 50% stenosis. RCA: Lesion on Prox RCA: Ostial. 95% stenosis. Lesion on Prox RCA: Proximal subsection. 75% stenosis. Lesion on R PDA: Ostial. 705 stenosis. EF 60%.  H/O cardiovascular stress test 8/17/15    Abnormal. Moderate perfusion defect of moderate intensity in the inferolateral, inferior, and inferoapical regions during stress imaging, most consistent with ischemia. Global LV systolic function was abnormal with EF: 42% w/regional wall motion abnormalities.  Results are most consistent with an intermediate to high risk for significant CAD.     H/O cardiovascular stress test 08/03/2016    Abnormal myocardial perfusion study, large perfusion defect of moderate-severe intensity in inferolateral, inferior, inferoseptal, apical and inferoapical regions during stress and rest imaging, most consistant with old myocardial infarction with hayden-infarct ischemia, EF 44%, Overall results most consistent with intermediate-high risk for CAD, Add. testing including cardiac cath maybe indicated     H/O cardiovascular stress test 07/05/2018    Abnormal myocardial perfusion study. There is a moderate perfusion defect of moderaete intensity in the inferolateral, inferior and inferoseptal regions during stress and rest imaging which is most consistent with an old MI with hayden-infact ischema. EF 43%. Results are most consistant with an intermediate risk for reverdaibel CAD    H/O echocardiogram 12/4/15    EF 55%. Mildly increased LV wall thickness. Left atrium is mildly dilated (29-33) left atrial volume index of 32 ml/m2. aortic leaflet calcification with mild aortic stenosis. Moderate aortic regurgitation. Mild (grade l) diastolic dysfunction    H/O echocardiogram 07/05/2018    EF 55%. The LV wall thickness is mildly increased. Mild hopkinesis of the inferolateral wall. The left atrium is mildly dilated (29-33) with a left atrial volume index of 29ml/m2. Mild aortic stenosis, mean gradient 17 mmHg. Mild to moderate aortic regurg. Mild diastiolic dysfucntion.  History of Holter monitoring 10/30/2017    Rare PAC's with 2 atrial runs, the longest being 7 beats in duration at 171 bpm and most consistent with a short run of atrial fibrillation.  Hyperlipidemia     Hypertension     MI (myocardial infarction) ([address]     Peripheral vascular disease ([address]     S/P angioplasty with stent 8/24/15    PCI to ostial lesion and balloon angioplasy to proximal lesion.  Distal RCA lesion had total occlusion after balloon angioplasty    Unspecified cerebral artery occlusion with cerebral infarction     TIA     Past Surgical History:   Procedure

## 2020-06-26 PROBLEM — K29.60 NSAID INDUCED GASTRITIS: Status: ACTIVE | Noted: 2020-01-01

## 2020-06-26 PROBLEM — K26.4 BLEEDING DUODENAL ULCER: Status: ACTIVE | Noted: 2020-01-01

## 2020-06-26 PROBLEM — T39.395A NSAID INDUCED GASTRITIS: Status: ACTIVE | Noted: 2020-01-01

## 2020-06-26 NOTE — OP NOTE
[facility]    [facility] ENDOSCOPY     EGD    PROCEDURE DATE: 06/26/20    REFERRING PHYSICIAN: Shawn Hearn MD     PRIMARY CARE PROVIDER: Luz Elena Davies DO    ATTENDING PHYSICIAN: Dave Pal MD     HISTORY: Mr. Azeb Bella is a 77 y.o. male who presents to the [facility] Endoscopy unit for upper endoscopy. The patient's clinical history is remarkable for CAD s/p PCI in 2015 on DAPT, HTN,, PVD, Carotid stenosis, COPD, recent use of NSAIDS, admitted for Suspected GI hemorrhage. He is currently medically stable and appropriate for the planned procedure. PREOPERATIVE DIAGNOSIS: GI bleed. PROCEDURES:   1) Transoral Upper Endoscopy. POSTOPERATIVE DIAGNOSIS:     No active bleeding. Erosive Duodenopathy and Small 5mm clean based ulcer (Fabian III-low risk) identified in the duodenal bulb---presumably NSAID induced     1)Moderate amount of gastritis with erythema scattered through out the gastric body and antrum. The stomach was free of any high risk bleeding lesions and empty on examination. 2)Moderate duodenitis present predominantly in the duodenal bulb, with moderate amount of surrounding erythema with out hyperemia. Small superficial erosion 2mm and 3mm identified in the duodenal bulb with out high risk features. Small 5mm clean based (\"punched out\" appearance) duodenal bulb ulcer(Fabian III), no high risk features, no visible vessel, no clot, appears to be healing. MEDICATIONS:   MAC per anesthesia     EBL: <10cc    INSTRUMENT: Olympus GIF-H180  flexible Gastroscope. PREPARATION: The nature and character of the procedure as well as risks, benefits, and alternatives were discussed with the patient and informed consent was obtained. Complications were said to include, but were not limited to: medication allergy, medication reaction, cardiovascular and respiratory problems, bleeding, perforation, infection, and/or missed diagnosis.  Following arrival in the endoscopy BID. If repeat Hg stable in afternoon, can provide trial of clear liquid diet. Hold NSAID, AP/AC therapy for 24 hrs unless strongly indicated. 2) Return back to medical floor, if patient remains stable for next 12 hrs, can evaluate for transfer out of ICU setting. Will continue to follow. [facility]     this note is created with the assistance of a speech recognition program.  While intending to generate a document that actually reflects the content of the visit, the document can still have some errors including those of syntax and sound a like substitutions which may escape proof reading. It such instances, actual meaning can be extrapolated by contextual diversion.

## 2020-06-26 NOTE — ANESTHESIA PRE PROCEDURE
with intermediate-high risk for CAD, Add. testing including cardiac cath maybe indicated     H/O cardiovascular stress test 07/05/2018    Abnormal myocardial perfusion study. There is a moderate perfusion defect of moderaete intensity in the inferolateral, inferior and inferoseptal regions during stress and rest imaging which is most consistent with an old MI with hayden-infact ischema. EF 43%. Results are most consistant with an intermediate risk for reverdaibel CAD    H/O echocardiogram 12/4/15    EF 55%. Mildly increased LV wall thickness. Left atrium is mildly dilated (29-33) left atrial volume index of 32 ml/m2. aortic leaflet calcification with mild aortic stenosis. Moderate aortic regurgitation. Mild (grade l) diastolic dysfunction    H/O echocardiogram 07/05/2018    EF 55%. The LV wall thickness is mildly increased. Mild hopkinesis of the inferolateral wall. The left atrium is mildly dilated (29-33) with a left atrial volume index of 29ml/m2. Mild aortic stenosis, mean gradient 17 mmHg. Mild to moderate aortic regurg. Mild diastiolic dysfucntion.  History of Holter monitoring 10/30/2017    Rare PAC's with 2 atrial runs, the longest being 7 beats in duration at 171 bpm and most consistent with a short run of atrial fibrillation.  Hyperlipidemia     Hypertension     MI (myocardial infarction) ([address]     Peripheral vascular disease ([address]     S/P angioplasty with stent 8/24/15    PCI to ostial lesion and balloon angioplasy to proximal lesion.  Distal RCA lesion had total occlusion after balloon angioplasty    Unspecified cerebral artery occlusion with cerebral infarction     TIA       Past Surgical History:        Procedure Laterality Date    CARDIAC CATHETERIZATION Left 8/20/2015    right radial/Kimberly Salazar/Dr Cohen    CAROTID ENDARTERECTOMY Bilateral 1995, 2002    CAROTID ENDARTERECTOMY Left 11/13/2017    LEFT CAROTID PSEUDOANEURYSM REPAIR WITH [id number] AND [id number] VIABAHN STENTS, LEFT CAROTID 06/26/2020     06/26/2020    CO2 20 06/26/2020    BUN 32 06/26/2020    CREATININE 0.75 06/26/2020    GFRAA >60 06/26/2020    LABGLOM >60 06/26/2020    GLUCOSE 94 06/26/2020    GLUCOSE 97 03/29/2012    PROT 5.0 06/26/2020    CALCIUM 7.4 06/26/2020    BILITOT 0.49 06/26/2020    ALKPHOS 53 06/26/2020    AST 10 06/26/2020    ALT <5 06/26/2020       POC Tests:   No results for input(s): POCGLU, POCNA, POCK, POCCL, POCBUN, POCHEMO, POCHCT in the last 72 hours. Coags:   Lab Results   Component Value Date    PROTIME 10.2 06/25/2020    PROTIME 10.6 03/29/2012    INR 1.0 06/25/2020    APTT 23.7 06/26/2020       HCG (If Applicable): No results found for: PREGTESTUR, PREGSERUM, HCG, HCGQUANT     ABGs:   Lab Results   Component Value Date    PHART 7.380 03/12/2019    PO2ART 85.4 03/12/2019    BTB1ADI 43.3 03/12/2019    MPA7IBC 25.0 03/12/2019    A5DFYKYV 96.3 03/12/2019        Type & Screen (If Applicable):  No results found for: [facility]    Anesthesia Evaluation  Patient summary reviewed no history of anesthetic complications:   Airway: Mallampati: III     Neck ROM: full   Dental:    (+) edentulous and upper dentures      Pulmonary:   (+) COPD: moderate,  decreased breath sounds,  current smoker                          ROS comment: SILVA not tested  . 5 ppd   Cardiovascular:    (+) hypertension:, valvular problems/murmurs: AS, past MI: > 6 months, CAD:, CABG/stent:, JOHN:,                ROS comment: Significant fixed defect from MI in the past.    H/O PVCs on Holter  Activity limited. EF 42%   Cardiology eval : moderate risks  Our assessment : high rksk     Neuro/Psych:   (+) CVA:,    (-) seizures            ROS comment: S/P left ICA pseudo aneurysmal repair and stent placement :   For pseudo aneurysmal repair on the right side. vertebral good flow. Bilateral carotid stenosis.     GI/Hepatic/Renal: Neg GI/Hepatic/Renal ROS  (+) renal disease: CRI,           Endo/Other: Negative Endo/Other ROS       (-) diabetes mellitus               Abdominal:           Vascular:   + PVD, aortic or cerebral, . Anesthesia Plan      general     ASA 4     (ASA 4   Markedly elevated CV risks )  Induction: intravenous. Anesthetic plan and risks discussed with patient.                       Elvin Lau MD   6/26/2020

## 2020-06-26 NOTE — PROGRESS NOTES
Critical Care Team - Daily Progress Note      Date and time: 2020 7:28 AM  Patient's name:  Anabela Wray  Medical Record Number: [id number]  Patient's account/billing number: [de-identified]  Patient's YOB: 1955  Age: 77 y.o. Date of Admission: 2020  3:14 PM  Length of stay during current admission: 1      Primary Care Physician: Tatiana Luong DO  ICU Attending Physician: Dr. Lester Sutton Status: Full Code    Reason for ICU admission: upper GI bleed      SUBJECTIVE:     OVERNIGHT EVENTS:   No significant events    Mentation: baseline, a/ox3     Fever:-  Temp (24hrs), Av.7 °F (36.5 °C), Min:96.3 °F (35.7 °C), Max:98.4 °F (36.9 °C)    Secretions:none    Blood pressure: 135/55  Vasopressors:none  Systolic (46FJO), SMP:74 , Min:84 , DQX:730     Diastolic (90VNR), PPC:37, Min:43, Max:68        Urine output in the last 24 hours:  In: 3148 [I.V.:3148]  Out: 1400 [Urine:1400]  Net since admission:1.3  Patient Vitals for the past 96 hrs (Last 3 readings):   Weight   20 1552 232 lb (105.2 kg)       Room air    Fluids:NS 150ml/hr  Feeding:npo  Analgesics:tylenol   Sedation:none  DVT Prophylaxis: epc cuff  Mobilization:with assist  Head Up:yea  GI Prophylaxis:  ppi drip  Glycemic Control:94  Spontaneous breathing trial:room air  Bowel management: none  Indwelling catheter:none        AWAKE & FOLLOWING COMMANDS:  [] No   [x] Yes    CURRENT VENTILATION STATUS:     [] Ventilator  [] BIPAP  [] Nasal Cannula [x] Room Air        SECRETIONS Amount:  [] Small [] Moderate  [] Large  [x] None  Color:     [] White [] Colored  [] Bloody    SEDATION:  RAAS Score:  [] Propofol gtt  [] Versed gtt  [] Ativan gtt   [x] No Sedation    PARALYZED:  [x] No    [] Yes    DIARRHEA:                [x] No                [] Yes  (C. Difficile status: [] positive                                                                                                                       [] negative [] pending)    VASOPRESSORS:  [x] No    [] Yes    If yes -   [] Levophed       [] Dopamine     [] Vasopressin       [] Dobutamine  [] Phenylephrine         [] Epinephrine    CENTRAL LINES:     [x] No   [] Yes   (Date of Insertion:   )           If yes -     [] Right IJ     [] Left IJ [] Right Femoral [] Left Femoral                   [] Right Subclavian [] Left Subclavian       CASTILLO'S CATHETER:   [x] No   [] Yes  (Date of Insertion:   )     URINE OUTPUT:            [x] Good   [] Low              [] Anuric      OBJECTIVE:     VITAL SIGNS:  /68   Pulse 87   Temp 97.7 °F (36.5 °C) (Oral)   Resp 11   Ht 6' 2.02\" (1.88 m)   Wt 232 lb (105.2 kg)   SpO2 98%   BMI 29.77 kg/m²   Tmax over 24 hours:  Temp (24hrs), Av.7 °F (36.5 °C), Min:96.3 °F (35.7 °C), Max:98.4 °F (36.9 °C)      Patient Vitals for the past 6 hrs:   BP Temp Temp src Pulse Resp SpO2   20 0700 -- -- -- 87 11 98 %   20 0600 -- -- -- 91 12 95 %   20 0500 -- -- -- 89 11 93 %   20 0400 114/68 97.7 °F (36.5 °C) Oral 93 11 94 %   20 0300 -- -- -- 93 12 95 %   20 0200 -- -- -- 87 14 97 %         Intake/Output Summary (Last 24 hours) at 2020 0728  Last data filed at 2020 0400  Gross per 24 hour   Intake 3148 ml   Output 1850 ml   Net 1298 ml     Wt Readings from Last 2 Encounters:   20 232 lb (105.2 kg)   20 232 lb (105.2 kg)     Body mass index is 29.77 kg/m².         PHYSICAL EXAMINATION:    General appearance - alert, well appearing, and in no distress  Mental status - alert, oriented to person, place, and time  Eyes - pupils equal and reactive, extraocular eye movements intact  Ears - bilateral TM's and external ear canals normal  Nose - normal and patent, no erythema, discharge or polyps  Mouth - mucous membranes moist, pharynx normal without lesions  Neck - supple, no significant adenopathy  Chest - clear to auscultation, no wheezes, rales or rhonchi, symmetric air entry  Heart - normal rate, regular rhythm, normal S1, S2, no murmurs, rubs, clicks or gallops  Abdomen - soft, nontender, nondistended, no masses or organomegaly  Neurological - alert, oriented, normal speech, no focal findings or movement disorder noted}  Extremities - peripheral pulses normal, no pedal edema, no clubbing or cyanosis  Skin - normal coloration and turgor, no rashes, no suspicious skin lesions noted      Any additional physical findings:    MEDICATIONS:    Scheduled Meds:   sodium chloride flush  10 mL Intravenous 2 times per day    cefTRIAXone (ROCEPHIN) IV  1 g Intravenous Q24H    lidocaine PF  1 mL Intradermal Once     Continuous Infusions:   pantoprozole (PROTONIX) infusion 8 mg/hr (06/26/20 0600)    sodium chloride 150 mL/hr (06/25/20 1740)     PRN Meds:   sodium chloride flush, 10 mL, PRN  acetaminophen, 650 mg, Q6H PRN    Or  acetaminophen, 650 mg, Q6H PRN  promethazine, 12.5 mg, Q6H PRN    Or  ondansetron, 4 mg, Q6H PRN          VENT SETTINGS (Comprehensive) (if applicable):  Vent Information  FiO2 : 28 %  SpO2: 98 %  SpO2/FiO2 ratio: 350  Additional Respiratory  Assessments  Pulse: 87  Resp: 11  SpO2: 98 %    ABGs:     Laboratory findings:    Complete Blood Count:   Recent Labs     06/25/20  1042 06/25/20  1338 06/25/20  1755 06/25/20  2355 06/26/20  0425   WBC 11.7* 9.9  --   --  8.4   HGB 12.0* 12.6* 12.3* 11.3* 11.3*   HCT 37.3* 38.9* 37.3* 34.7* 35.4*   PLT See Reflexed IPF Result 148  --   --  136*        Last 3 Blood Glucose:   Recent Labs     06/25/20  1042 06/25/20  1755 06/26/20  0425   GLUCOSE 135* 101* 94        PT/INR:    Lab Results   Component Value Date    PROTIME 10.2 06/25/2020    PROTIME 10.6 03/29/2012    INR 1.0 06/25/2020     PTT:    Lab Results   Component Value Date    APTT 23.7 06/26/2020       Comprehensive Metabolic Profile:   Recent Labs     06/25/20  1042 06/25/20  1755 06/26/20  0425   NA 136 136 140   K 4.8 4.7 4.3   CL 98 105 108*   CO2 25 19* 20   BUN 64* 50* 32*   CREATININE 1.54* 0.95 0.75   GLUCOSE 135* 101* 94   CALCIUM 8.4* 7.7* 7.4*   PROT 5.8* 5.0* 5.0*   LABALBU 3.5 3.0* 2.9*   BILITOT 0.36 0.64 0.49   ALKPHOS 56 53 53   AST 10 9 10   ALT 6 5 <5*      Magnesium:   Lab Results   Component Value Date    MG 2.0 03/11/2019     Phosphorus: No results found for: PHOS  Ionized Calcium:   Lab Results   Component Value Date    CAION 1.16 11/13/2017        Urinalysis:     Troponin: No results for input(s): TROPONINI in the last 72 hours. Microbiology:    Cultures during this admission:     Blood cultures:                 [x] None drawn      [] Negative             []  Positive (Details:  )  Urine Culture:                   [x] None drawn      [] Negative             []  Positive (Details:  )  Sputum Culture:               [x] None drawn       [] Negative             []  Positive (Details:  )   Endotracheal aspirate:     [x] None drawn       [] Negative             []  Positive (Details:  )     Other pertinent Labs:           ASSESSMENT:     · Active Problems:  ·   GI bleed  ·   Hypotension due to hypovolemia  · Resolved Problems:  ·   * No resolved hospital problems. *  ·   ·         PLAN:     WEAN PER PROTOCOL:  [] No   [] Yes  [x] N/A    DISCONTINUE ANY LABS:   [x] No   [] Yes    ICU PROPHYLAXIS:  Stress ulcer:  [x] PPI Agent  [] [id number] [] Sucralfate  [] Other:  VTE:   [] Enoxaparin  [] Unfract.  Heparin Subcut  [x] EPC Cuffs    NUTRITION:  [x] NPO [] Tube Feeding (Specify: ) [] TPN  [] PO (Diet: Diet NPO Effective Now Exceptions are: Ice Chips)    HOME MEDICATIONS RECONCILED: [] No  [x] Yes    INSULIN DRIP:   [x] No   [] Yes    CONSULTATION NEEDED:  [] No   [x] Yes    FAMILY UPDATED:    [] No   [x] Yes    TRANSFER OUT OF ICU:   [x] No   [] Yes    ADDITIONAL PLAN:    New onset recurrent hematemesis with melena likely secondary to upper GI bleed secondary to NSAID use: Last colonoscopy 15 years ago.  Currently vitals are stable. Continue ceftriaxone, PPI drip and octreotide. GI planning egd today  H&H Q6 hourly.        CAD status post PCI to RCA in 2015 on aspirin and Brilinta, procedure complication with dissection/total distal occlusion/inferior wall MI transient heart block. T wave inversion on lateral leads on his EKG since 6/16/2020 as per cardiology note. 10 days ago was seen by cardiology who recommended cardiac cath. Currently on aspirin and Prasugrel, Toprol 25, Imdur, nitroglycerin, Norvasc, Ranexa, Crestor. Last dose of Prasugrel today.      SUREKHA with baseline creatinine normal likely secondary to hypovolemia secondary to upper GI bleed: Resolved        Jason Ware M.D.              Critical care resident,  Department of Internal Medicine/ Critical care  [facility] ([place]             6/26/2020, 7:28 AM

## 2020-06-26 NOTE — PROGRESS NOTES
Report received. Pt arrived to unit. Pt admitted to room 431. Pt hooked up to monitor and vitals obtained. Assessment completed. No signs of distress. Pt up in chair with call light in reach. Pt oriented to call light usage. Pt calls out appropriately.

## 2020-06-26 NOTE — CARE COORDINATION
TRANSITIONAL CARE PLANNING/ 2 Rehab [facility] Day: 1    Reason for Admission: GI bleed [K92.2]     Treatment Plan of Care: EGD complete today    Tests/Procedures still needed: cardiac cath in the future as recc by cardiology    Barriers to Discharge: PPI gtt for 24h, then bid     Readmission Risk              Risk of Unplanned Readmission:        14            Patient goals/Treatment: home indepenent with wife     Preferences/Transitional Plan:     Referrals Made: GI and Cardiology    Follow Up needed:

## 2020-06-26 NOTE — PLAN OF CARE
Problem: Falls - Risk of:  Goal: Will remain free from falls  Description: Will remain free from falls  6/26/2020 1656 by Rena Hartman RN  Outcome: Ongoing  6/26/2020 0545 by Belem Patton RN  Outcome: Ongoing  Goal: Absence of physical injury  Description: Absence of physical injury  6/26/2020 1656 by Rena Hartman RN  Outcome: Ongoing  6/26/2020 0545 by Belem Patton RN  Outcome: Ongoing     Problem: Bleeding:  Goal: Will show no signs and symptoms of excessive bleeding  Description: Will show no signs and symptoms of excessive bleeding  6/26/2020 1656 by Rena Hartman RN  Outcome: Ongoing  6/26/2020 0545 by Belem Patton RN  Outcome: Ongoing     Problem: HEMODYNAMIC STATUS  Goal: Patient has stable vital signs and fluid balance  6/26/2020 1656 by Rena Hartman RN  Outcome: Ongoing  6/26/2020 0545 by Belem Patton RN  Outcome: Ongoing     Problem: COMMUNICATION IMPAIRMENT  Goal: Ability to express needs and understand communication  6/26/2020 1656 by Rena Hartman RN  Outcome: Ongoing  6/26/2020 0545 by Belem Patton RN  Outcome: Ongoing     Problem: Discharge Planning:  Goal: Discharged to appropriate level of care  Description: Discharged to appropriate level of care  6/26/2020 1656 by Rena Hartman RN  Outcome: Ongoing  6/26/2020 0545 by Belem Patton RN  Outcome: Ongoing     Problem: Cardiac Output - Decreased:  Goal: Avoidance of environmental tobacco smoke  Description: Absence of orthostatic hypotension  6/26/2020 1656 by Rena Hartman RN  Outcome: Ongoing  6/26/2020 0545 by Belem Patton RN  Outcome: Ongoing  Goal: Cardiac output within specified parameters  Description: Cardiac output within specified parameters  6/26/2020 1656 by Rena Hartman RN  Outcome: Ongoing  6/26/2020 0545 by Belem Patton RN  Outcome: Ongoing  Goal: Hemodynamic stability will improve  Description: Hemodynamic stability will improve  6/26/2020 1656 by Rena Hartman RN  Outcome: Ongoing  6/26/2020 0545 by Osmel Kaur RN  Outcome: Ongoing     Problem: Fluid Volume - Imbalance:  Goal: Absence of imbalanced fluid volume signs and symptoms  Description: Absence of imbalanced fluid volume signs and symptoms  6/26/2020 1656 by Renee Lu RN  Outcome: Ongoing  6/26/2020 0545 by Osmel Kaur RN  Outcome: Ongoing

## 2020-06-26 NOTE — PROGRESS NOTES
[facility]  Documentation Note                Admission Dx: GI bleed [K92.2]    Past Medical History:   has a past medical history of CAD (coronary artery disease), Carotid artery stenosis, COPD (chronic obstructive pulmonary disease) ([facility] H/O cardiac catheterization, H/O cardiovascular stress test, H/O cardiovascular stress test, H/O cardiovascular stress test, H/O echocardiogram, H/O echocardiogram, History of Holter monitoring, Hyperlipidemia, Hypertension, MI (myocardial infarction) ([facility] Peripheral vascular disease ([facility] S/P angioplasty with stent, and Unspecified cerebral artery occlusion with cerebral infarction. Previous Testing:     ECHO 6/16/2020: EF 55%, moderate AS with mean gradient 30 mmHg, mild AI, grade II DD. STRESS 7/5/18: Moderate perfusion defect of moderate intensity in the inferolateral, inferior and inferoseptal region which is most consistent with an old MI with hayden-infarct ischemia. EF 43%. CATH 10/2/15: Severe RCA ostial, proximal and distal stenosis. PCI with MARY ANN to ostial lesion and balloon angioplasty to proximal lesion. Distal RCA lesion had total occlusion after balloon angioplasty. Previous office/hospital visit:   Dr. Lázaro Jean 6/16/2020:   1. ASHD (arteriosclerotic heart disease)  EKG 12 lead     Referral to Cardiac Cath     Echo 2D w doppler w color complete     Basic Metabolic Panel     CBC   2. Abnormal ECG  Referral to Cardiac Cath   3. Essential hypertension  Referral to Cardiac Cath   4. Mixed hyperlipidemia  Referral to Cardiac Cath     Lipid Panel   5. Tobacco abuse counseling  Referral to Cardiac Cath   6. PVD (peripheral vascular disease) ([facility]  Referral to Cardiac Cath   7. Ischemic chest pain [facility])  Referral to Cardiac Cath   8. Bilateral carotid artery stenosis       Plan --  · Patient with extensive history of vascular disease. · Bilateral carotid artery disease and peripheral vascular disease.   · Coronary artery disease with inferior infarction at the time of his cardiac catheterization back in 2015. · Currently with recurrent chest pain on exertion that is not controlled with the maximally tolerated medical therapy. · Unfortunately patient continues to smoke. · He has new T wave inversion in the lateral leads on his ECG today. · Giving refractory chest pain despite optimally tolerated medical therapy, I think the best course of action is to proceed with cardiac catheterization. ? Additional Testing List: For these reasons, I recommended that the patient consider undergoing a cardiac catheterization with coronary angiography to assess their coronary anatomy and facilitate better treatment recommendations. I discussed the risks, benefits, and alternatives to the procedure including the risk of bleeding, contrast induced allergy and/or kidney damage, arrythmia, stroke, heart attack, death, or the need for further procedures. I also discussed the fact that although treatment with simple medical management is a potential treatment option in place of cardiac catheterization, I expressed my opinion that cardiac catheterization in order to define their coronary anatomy and rule out severe 3 vessel or left main coronary artery disease would significant help guide the most appropriate treatment strategy ranging from no treatment, to medications, stents, to even coronary bypass surgery. The patient verbalized understanding of the risks benefits and alternatives and stated that they would like to undergo the procedure. We will plan to schedule the procedure as soon as possible.     · Atherosclerotic Heart Disease: s/p PCI to RCA on 8/24/2015, unfortunately procedure complicated by dissection, total distal occlusion, inferior wall myocardial infarction and transient heart block in 2015  · Antiplatelet Agent: Continue aspirin 81 mg daily and prasugrel (Effient) 10 mg daily. ?  Beta Blocker: Continue Metoprolol succinate (Toprol XL) 25 mg daily.  ? Other Anti-anginal medications: Continue Isosorbide mononitrate (Imdur) 60 mg     ? Anti-anginal medications: Continue nitroglycerin 0.4 mg tablets as needed for chest pain. ? Anti-anginal medications: Continue amlodipine (Norvasc) 10 mg once daily. ? Continue Ranexa 500 mg twice a day  ? Statin Therapy: Continue rosuvastatin (Crestor) 40 mg nightly. ? I counseled him extensively to come to the emergency room if he has worsening chest pain or if his pain is not relieved by rest or sublingual nitroglycerin. I told him if his pain lasted for more than 5 to 7 minutes he should come to the emergency room immediately. Patient verbalized understanding. ? I also spoke to his wife over the phone explaining the situation and the need for cardiac catheterization. Patient decided to go for cardiac cath at [place] [id number].  I will call to make an arrangement for this.     · Essential Hypertension: Controlled   § Anti-anginal medications: Continue amlodipine (Norvasc) 10 mg once daily. § Anti-anginal medications: Continue nitroglycerin 0.4 mg tablets as needed for chest pain. § Anti-anginal medications: Continue isosorbide mononitrate (Imdur) 60 mg once daily. · ACE Inibitor/ARB: Continue lisinopril 20 mg once daily. I also discussed the potential side effects of this medication including lightheadedness and dizziness and told him to stop the medication of this occurs and call our office if this occurs. · Beta Blocker: Continue Metoprolol succinate (Toprol XL) 25 mg daily. · Continue Norvasc and Imdur. · Counseled regarding low salt diet. · Additional Testing: I Ordered an Echocardiogram to assess Mr. Sanabria's ejection fraction and to look for significant valvular heart disease as a source of Mr. Arriaga Fearing symptoms     · Hyperlipidemia: Mixed: LDL 57 as of 09/10/2018  ? Statin Therapy: Continue rosuvastatin (Crestor) 40 mg nightly.  I discussed the potential benefits of statin therapy as extended release tablet Take 1 tablet by mouth 2 times daily for 28 days 4/11/19 5/9/19  Lupe Blanc MD   albuterol (PROVENTIL) (2.5 MG/3ML) 0.083% nebulizer solution Take 3 mLs by nebulization every 6 hours as needed for Wheezing or Shortness of Breath 3/15/19   Aravind Craig PA-C   guaiFENesin (MUCINEX) 600 MG extended release tablet Take 1 tablet by mouth 2 times daily 3/15/19   [facility] JANESSA Craig   ranolazine (RANEXA) 500 MG extended release tablet Take 500 mg by mouth 2 times daily SAMPLES Ranexa 500 mg Lot #[id number] Exp 4/2021 x 4 11/20/18 4/11/19  Historical Provider, MD   tiotropium (SPIRIVA) 18 MCG inhalation capsule Inhale 18 mcg into the lungs daily    Historical Provider, MD   fluticasone-vilanterol (BREO ELLIPTA) 100-25 MCG/INH AEPB inhaler Inhale 1 puff into the lungs daily    Historical Provider, MD   albuterol sulfate  (90 Base) MCG/ACT inhaler Inhale 2 puffs into the lungs every 6 hours as needed for Wheezing or Shortness of Breath 2/15/18 6/25/20  Erica Loja MD   DiphenhydrAMINE HCl (BENADRYL ALLERGY PO) Take 50 mg by mouth nightly Pt takes at night for his sinuses     Historical Provider, MD   nitroGLYCERIN (NITROSTAT) 0.4 MG SL tablet Place 1 tablet under the tongue every 5 minutes as needed for Chest pain 8/24/15   Jatin Mercado MD   aspirin 81 MG tablet Take 81 mg by mouth daily    Historical Provider, MD      Current Facility-Administered Medications: sodium chloride flush 0.9 % injection 10 mL, 10 mL, Intravenous, 2 times per day  sodium chloride flush 0.9 % injection 10 mL, 10 mL, Intravenous, PRN  acetaminophen (TYLENOL) tablet 650 mg, 650 mg, Oral, Q6H PRN **OR** acetaminophen (TYLENOL) suppository 650 mg, 650 mg, Rectal, Q6H PRN  promethazine (PHENERGAN) tablet 12.5 mg, 12.5 mg, Oral, Q6H PRN **OR** ondansetron (ZOFRAN) injection 4 mg, 4 mg, Intravenous, Q6H PRN  pantoprazole (PROTONIX) 80 mg in sodium chloride 0.9 % 100 mL infusion, 8 mg/hr, Intravenous, Continuous  cefTRIAXone (ROCEPHIN) 1 g IVPB in 50 mL D5W minibag, 1 g, Intravenous, Q24H  0.9 % sodium chloride infusion, , Intravenous, Continuous  lidocaine PF 1 % injection 1 mL, 1 mL, Intradermal, Once    Labs:     CBC:   Recent Labs     06/25/20  1338  06/25/20  2355 06/26/20  0425   WBC 9.9  --   --  8.4   HGB 12.6*   < > 11.3* 11.3*   HCT 38.9*   < > 34.7* 35.4*     --   --  136*    < > = values in this interval not displayed.      BMP:   Recent Labs     06/25/20  1755 06/26/20  0425    140   K 4.7 4.3   CO2 19* 20   BUN 50* 32*   CREATININE 0.95 0.75   LABGLOM >60 >60   GLUCOSE 101* 94     PT/INR:   Recent Labs     06/25/20  1042 06/25/20  1947   PROTIME 13.1 10.2   INR 1.0 1.0     APTT:  Recent Labs     06/25/20  1042 06/26/20  0425   APTT 21.5* 23.7     CARDIAC ENZYMES:  Recent Labs     06/25/20  1755 06/25/20  2355 06/26/20  0425   TROPHS 34* 35* 34*     FASTING LIPID PANEL:  Lab Results   Component Value Date    HDL 53 09/10/2018    TRIG 67 09/10/2018     LIVER PROFILE:  Recent Labs     06/25/20  1755 06/26/20  0425   AST 9 10   ALT 5 <5*   LABALBU 3.0* 2.9*       [facility]

## 2020-06-26 NOTE — PROGRESS NOTES
Critical care team - Resident sign-out to medicine service      Date and time: 6/26/2020 2:32 PM  Patient's name:  Sheryle Pearson  Medical Record Number: [id number]  Patient's account/billing number: [de-identified]  Patient's YOB: 1955  Age: 77 y.o. Date of Admission: 6/25/2020  3:14 PM  Length of stay during current admission: 1    Primary Care Physician: Selina Basurto DO    Code Status: Full Code    Mode of physician to physician communication:        [x] Via telephone   [] In person     Date and time of sign-out: 6/26/2020 2:32 PM    Accepting Internal Medicine resident: Lyn Barber    Accepting Medicine team: IM Team med 1    Accepting team's attending: Dr. Juliana Tristan    Patient's current ICU Bed: 101    Patient's assigned bed on floor:  431        [x] Med-Surg Monitored [] Step-down       [] Psychiatry ICU       [] Psych floor     Reason for ICU admission:     Massive GI bleed    ICU course summary:     69-year-old male extensive past history of coronary artery disease, PAD,  stroke with multiple stents. On aspirin and Prasugrel at home.     Patient came in as a direct transfer from [place] after he had multiple episodes of hematemesis for the last 3 days and one episode of melena on day of admission. Occasional alcoholic in the past however states that he is a very occasional user right now. Patient was brought to the [place] ER and due to concern for massive bleeding was transferred to [facility]. Given 2 units of blood and given an Protonix bolus. Hemoglobin afterwards was 12.6 however has been very tachycardic and hypotensive.     On arrival patient is slightly hypotensive however alert and oriented x3. Patient was started on resuscitative measures. IV fluid boluses, Protonix drip and ceftriaxone. GI was consulted. Endoscopy done 6/26 showed   No active bleeding. Cardiology was also consulted for troponin elevation.    T wave inversion on lateral leads on his EKG since 6/16/2020 as per cardiology note. 10 days ago was seen by cardiology who recommended cardiac cath. Currently on aspirin and Prasugrel, Toprol 25, Imdur, nitroglycerin, Norvasc, Ranexa, Crestor. Okay to resume aspirin and Effient. Plan for cardiac cath as outpatient. Patient hemodynamically stable. Labs stable. Okay to be transferred out of ICU.       Procedures during patient's ICU stay:     EGD 6/26/20  Erosive Duodenopathy and Small 5mm clean based ulcer (Fabian III-low risk) identified in the duodenal bulb---presumably NSAID induced and likely source of recent hemorrhage while on AP therapy.    1)Moderate amount of gastritis with erythema scattered through out the gastric body and antrum. The stomach was free of any high risk bleeding lesions and empty on examination. 2)Moderate duodenitis present predominantly in the duodenal bulb, with moderate amount of surrounding erythema with out hyperemia. Small superficial erosion 2mm and 3mm identified in the duodenal bulb with out high risk features. Small 5mm clean based (\"punched out\" appearance) duodenal bulb ulcer(Fabian III), no high risk features, no visible vessel, no clot, appears to be healing.        Current Vitals:     /67   Pulse 85   Temp 97.3 °F (36.3 °C) (Temporal)   Resp 12   Ht 6' 2.02\" (1.88 m)   Wt 232 lb (105.2 kg)   SpO2 97%   BMI 29.77 kg/m²       Cultures:     Blood cultures:                 [] None drawn      [] Negative             []  Positive (Details:  )  Urine Culture:                   [] None drawn      [] Negative             []  Positive (Details:  )  Sputum Culture:               [] None drawn       [] Negative             []  Positive (Details:  )   Endotracheal aspirate:     [] None drawn       [] Negative             []  Positive (Details:  )       Consults:    cardio,  GI    Assessment:     Patient Active Problem List    Diagnosis Date Noted    GI bleed 06/25/2020    Hypotension due to hypovolemia     Hyponatremia 03/13/2019    Bacterial pneumonia 03/12/2019    COPD exacerbation ([facility] 03/11/2019    Acute respiratory failure with hypoxia ([facility] 03/11/2019    CAD (coronary artery disease)     COPD (chronic obstructive pulmonary disease) (HCC)     Hypertension     Hyperlipidemia     Peripheral vascular disease (HCC)     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  Currently vitals are stable.  Continue ceftriaxone, PPI drip for 24 hrs followed by IV twice daily. If repeat Hg stable in afternoon, can provide trial of clear liquid diet. Hold NSAID, AP/AC therapy for 24 hrs unless strongly indicated. H&H Q6 hourly.         CAD status post PCI to RCA in 2015 on aspirin and Brilinta, procedure complication with dissection/total distal occlusion/inferior wall MI transient heart block. Evaluated by cardiology. Okay to resume antiplatelets. Plan for cardiac cath as outpatient. SUREKHA with baseline creatinine normal likely secondary to hypovolemia secondary to upper GI bleed: Resolved        Above mentioned assessment and plan was discussed by me with the admitting medicine resident. The medicine team assigned to the patient by medicine admitting resident will be following up the patient from now onwards on the floor. Gigi Toussaint M.D.   Internal Medicine PGY2  6/26/2020, 2:32 PM

## 2020-06-26 NOTE — PLAN OF CARE
Problem: Falls - Risk of:  Goal: Will remain free from falls  Description: Will remain free from falls  6/26/2020 1733 by Kaylah Champion RN  Outcome: Ongoing  Note: Bed in lowest position. Individual items within reach. Call light within reach. Side rails up x2. Adequate lighting provided. Environment kept free of clutter. Increased frequency of nursing rounds. Problem: COMMUNICATION IMPAIRMENT  Goal: Ability to express needs and understand communication  6/26/2020 1733 by Kaylah Champion RN  Outcome: Ongoing  Note: Pt educated on call light use. Pt calls out appropriately. Pt able to adequately express pain and other needs. Pt communicates efficiently.

## 2020-06-26 NOTE — PROGRESS NOTES
mean gradient 30 mmHg, mild AI, grade II DD.      STRESS 7/5/18: Moderate perfusion defect of moderate intensity in the inferolateral, inferior and inferoseptal region which is most consistent with an old MI with hayden-infarct ischemia. EF 43%.      CATH 10/2/15: Severe RCA ostial, proximal and distal stenosis. PCI with MARY ANN to ostial lesion and balloon angioplasty to proximal lesion. Distal RCA lesion had total occlusion after balloon angioplasty. Physical Exam:  Vitals: /70   Pulse 80   Temp 97.6 °F (36.4 °C) (Oral)   Resp 13   Ht 6' 2.02\" (1.88 m)   Wt 232 lb (105.2 kg)   SpO2 92%   BMI 29.77 kg/m²   24 hour intake/output:    Intake/Output Summary (Last 24 hours) at 6/26/2020 1659  Last data filed at 6/26/2020 1125  Gross per 24 hour   Intake 3248 ml   Output 1800 ml   Net 1448 ml     Last 3 weights: Wt Readings from Last 3 Encounters:   06/25/20 232 lb (105.2 kg)   06/25/20 232 lb (105.2 kg)   06/16/20 232 lb 12.8 oz (105.6 kg)       General appearance: alert and cooperative with exam  HEENT: Head: Normocephalic, no lesions, without obvious abnormality.   Neck: no adenopathy, no carotid bruit, no JVD, supple, symmetrical, trachea midline and thyroid not enlarged, symmetric, no tenderness/mass/nodules  Lungs: clear to auscultation bilaterally  Heart: regular rate and rhythm, S1, S2 normal, no murmur, click, rub or gallop  Abdomen: soft, non-tender; bowel sounds normal; no masses,  no organomegaly  Extremities: extremities normal, atraumatic, no cyanosis or edema  Neurologic: Mental status: Alert, oriented, thought content appropriate      Medications:Current Inpatient  Scheduled Meds:   sodium chloride flush  10 mL Intravenous 2 times per day    cefTRIAXone (ROCEPHIN) IV  1 g Intravenous Q24H    lidocaine PF  1 mL Intradermal Once     Continuous Infusions:   pantoprozole (PROTONIX) infusion 8 mg/hr (06/26/20 1053)    sodium chloride 150 mL/hr (06/25/20 1740)     PRN Meds:sodium chloride flush, acetaminophen **OR** acetaminophen, promethazine **OR** ondansetron    Objective:    CBC:   Recent Labs     06/25/20  1042 06/25/20  1338  06/25/20  2355 06/26/20  0425 06/26/20  1309   WBC 11.7* 9.9  --   --  8.4  --    HGB 12.0* 12.6*   < > 11.3* 11.3* 11.9*   PLT See Reflexed IPF Result 148  --   --  136*  --     < > = values in this interval not displayed. BMP:    Recent Labs     06/25/20  1042 06/25/20  1755 06/26/20  0425    136 140   K 4.8 4.7 4.3   CL 98 105 108*   CO2 25 19* 20   BUN 64* 50* 32*   CREATININE 1.54* 0.95 0.75   GLUCOSE 135* 101* 94     Calcium:  Recent Labs     06/26/20 0425   CALCIUM 7.4*     Ionized Calcium:No results for input(s): IONCA in the last 72 hours. Magnesium:No results for input(s): MG in the last 72 hours. Phosphorus:No results for input(s): PHOS in the last 72 hours. BNP:No results for input(s): BNP in the last 72 hours. Glucose:No results for input(s): POCGLU in the last 72 hours. HgbA1C: No results for input(s): LABA1C in the last 72 hours. INR:   Recent Labs     06/25/20  1947   INR 1.0     Hepatic:   Recent Labs     06/26/20 0425   ALKPHOS 53   ALT <5*   AST 10   PROT 5.0*   BILITOT 0.49   BILIDIR 0.13   LABALBU 2.9*     Amylase and Lipase:  Recent Labs     06/26/20  0400   LACTA NOT REPORTED     Lactic Acid:   Recent Labs     06/26/20  0400   LACTA NOT REPORTED     CARDIAC ENZYMES:No results for input(s): CKTOTAL, CKMB, CKMBINDEX, TROPONINI in the last 72 hours. BNP: No results for input(s): BNP in the last 72 hours. Lipids: No results for input(s): CHOL, TRIG, HDL, LDLCALC in the last 72 hours. Invalid input(s): LDL  ABGs: No results found for: PH, PCO2, PO2, HCO3, O2SAT  Thyroid:   Lab Results   Component Value Date    TSH 1.84 08/03/2015      Urinalysis: No results for input(s): BACTERIA, BLOODU, CLARITYU, COLORU, PHUR, PROTEINU, RBCUA, SPECGRAV, BILIRUBINUR, NITRU, WBCUA, LEUKOCYTESUR, GLUCOSEU in the last 72 hours.     Assessment:  Patient bleed.Stable HCT. CAD stable. Hypertension controlled. Will continue present Rx. RX reviewed. Labs reviewed. Treatment plan Discussed with nursing staff in detail , all questions answered . Electronically signed by Edy Still MD on   6/26/20 at 5:33 PM EDT    Please note that this chart was generated using voice recognition Dragon dictation software. Although every effort was made to ensure the accuracy of this automated transcription, some errors in transcription may have occurred.

## 2020-06-26 NOTE — CONSULTS
Attestation signed by      Attending Physician Statement:    I have discussed the care of  Steven Griffith , including pertinent history and exam findings, with the Cardiology fellow/resident. I have seen and examined the patient and the key elements of all parts of the encounter have been performed by me. I agree with the assessment, plan and orders as documented by the fellow/resident, after I modified exam findings and plan of treatments, and the final version is my approved version of the assessment. Additional Comments:     Intermediate to high risk   DOAC will be restarted when ok by GI  Dr. [facility]   Consultation Note               Today's Date: 6/26/2020  Patient Name: Steven Griffith  Date of admission: 6/25/2020  3:14 PM  Patient's age: 77 y.o., 1955  Admission Dx: GI bleed [K92.2]    Reason for Consult:  EGD clearance    Requesting Physician: Dutch Curry MD    CHIEF COMPLAINT:  No chief complaint on file. History Obtained From:  patient, electronic medical record    HISTORY OF PRESENT ILLNESS:      The patient is a 77 y.o.  male who initially presented to [facility] with multiple episodes of dark melanotic stools starting 2 days ago. Associated coffee ground emesis as well. Has been using Motrin for last few weeks. At the OSH he was noted to be hypotensive and required fluid resuscitation. Also received 2 units prbc. Subsequently transferred to [facility].  for further management. GI consulted and decision made to defer urgent endoscopy given patient was stable and cardiology input was requested. He is an active smoker. Past medical hx significant for CAD s/p PCI on aspirin and Effient, Carotid artery stenosis s/p carotid endarterectomy, HTN and Peripheral vascular disease. Denies any reports of chest pain, shortness of breath, syncope, leg edema or orthopnea.        Past Medical History:   has a past medical history of CAD (coronary artery disease), Carotid artery stenosis, COPD (chronic obstructive pulmonary disease) ([address] H/O cardiac catheterization, H/O cardiovascular stress test, H/O cardiovascular stress test, H/O cardiovascular stress test, H/O echocardiogram, H/O echocardiogram, History of Holter monitoring, Hyperlipidemia, Hypertension, MI (myocardial infarction) ([facility] [address] Peripheral vascular disease ([address] S/P angioplasty with stent, and Unspecified cerebral artery occlusion with cerebral infarction. Past Surgical History:   has a past surgical history that includes knee surgery (2013); Carotid endarterectomy (Bilateral, 1995, 2002); Diagnostic Cardiac Cath Lab Procedure (08/20/15); Leg Tendon Surgery; other surgical history (Left, 11/13/2017); Carotid endarterectomy (Left, 11/13/2017); Cardiac catheterization (Left, 8/20/2015); Coronary angioplasty (08/24/15); other surgical history (Right, 07/23/2018); Endoscopy, colon, diagnostic; and pr thromboendartectmy neck,neck incis (Right, 7/23/2018). Home Medications:    Prior to Admission medications    Medication Sig Start Date End Date Taking? Authorizing Provider   isosorbide mononitrate (IMDUR) 60 MG extended release tablet Take 30 mg by mouth daily 4/12/19  Yes Historical Provider, MD   metoprolol succinate (TOPROL XL) 25 MG extended release tablet Take 12.5 mg by mouth daily 9/24/18  Yes Historical Provider, MD   rosuvastatin (CRESTOR) 40 MG tablet TAKE ONE TABLET EACH EVENING. 6/16/20   Leo Mesa MD   prasugrel (EFFIENT) 10 MG TABS TAKE ONE TABLET DAILY.  6/16/20   Leo Mesa MD   amLODIPine [facility]) 10 MG tablet Take 1 tablet by mouth daily 10/21/19   Leo Mesa MD   lisinopril (PRINIVIL;ZESTRIL) 20 MG tablet Take 1 tablet by mouth daily 10/21/19   Leo Mesa MD   ranolazine [facility]) 500 MG extended release tablet Take 1 tablet by mouth 2 times daily 6/12/19   Leo Mesa MD   ranolazine (RANEXA) 500 MG extended release tablet Take 1 tablet by mouth 2 times daily for 28 days 4/11/19 5/9/19  Oriana Ayala MD   albuterol (PROVENTIL) (2.5 MG/3ML) 0.083% nebulizer solution Take 3 mLs by nebulization every 6 hours as needed for Wheezing or Shortness of Breath 3/15/19   Mallorie Craig PA-C   guaiFENesin (MUCINEX) 600 MG extended release tablet Take 1 tablet by mouth 2 times daily 3/15/19   Mallorie Craig PA-C   ranolazine (RANEXA) 500 MG extended release tablet Take 500 mg by mouth 2 times daily SAMPLES Ranexa 500 mg Lot #[id number] Exp 4/2021 x 4 11/20/18 4/11/19  Historical Provider, MD   tiotropium (SPIRIVA) 18 MCG inhalation capsule Inhale 18 mcg into the lungs daily    Historical Provider, MD   fluticasone-vilanterol (BREO ELLIPTA) 100-25 MCG/INH AEPB inhaler Inhale 1 puff into the lungs daily    Historical Provider, MD   albuterol sulfate  (90 Base) MCG/ACT inhaler Inhale 2 puffs into the lungs every 6 hours as needed for Wheezing or Shortness of Breath 2/15/18 6/25/20  Christal Horn MD   DiphenhydrAMINE HCl (BENADRYL ALLERGY PO) Take 50 mg by mouth nightly Pt takes at night for his sinuses     Historical Provider, MD   nitroGLYCERIN (NITROSTAT) 0.4 MG SL tablet Place 1 tablet under the tongue every 5 minutes as needed for Chest pain 8/24/15   Ros Hogan MD   aspirin 81 MG tablet Take 81 mg by mouth daily    Historical Provider, MD      Current Facility-Administered Medications: sodium chloride flush 0.9 % injection 10 mL, 10 mL, Intravenous, 2 times per day  sodium chloride flush 0.9 % injection 10 mL, 10 mL, Intravenous, PRN  acetaminophen (TYLENOL) tablet 650 mg, 650 mg, Oral, Q6H PRN **OR** acetaminophen (TYLENOL) suppository 650 mg, 650 mg, Rectal, Q6H PRN  promethazine (PHENERGAN) tablet 12.5 mg, 12.5 mg, Oral, Q6H PRN **OR** ondansetron (ZOFRAN) injection 4 mg, 4 mg, Intravenous, Q6H PRN  pantoprazole (PROTONIX) 80 mg in sodium chloride 0.9 % 100 mL infusion, 8 mg/hr, Intravenous, Continuous  cefTRIAXone (ROCEPHIN) 1 g IVPB in 50 mL D5W minibag, 1 g, Intravenous, Q24H  0.9 % sodium chloride infusion, , Intravenous, Continuous  lidocaine PF 1 % injection 1 mL, 1 mL, Intradermal, Once      Allergies:  Patient has no known allergies. Social History:   reports that he has been smoking. He started smoking about 49 years ago. He has a 22.50 pack-year smoking history. He has never used smokeless tobacco. He reports that he does not drink alcohol or use drugs. Family History: family history includes Cancer in his brother. He was adopted. No h/o sudden cardiac death. No for premature CAD      REVIEW OF SYSTEMS:    · Constitutional: there has been no unanticipated weight loss. · Eyes: No visual changes or diplopia. · ENT: No Headaches  · Cardiovascular: see above  · Respiratory: No previous pulmonary problems, No cough  · Gastrointestinal: No abdominal pain. No change in bowel or bladder habits. · Genitourinary: No dysuria, trouble voiding, or hematuria. · Musculoskeletal:  No gait disturbance, No weakness or joint complaints. · Integumentary: No rash or pruritis. · Neurological: No headache, diplopia      PHYSICAL EXAM:      BP (!) 126/57   Pulse 90   Temp 97.7 °F (36.5 °C) (Oral)   Resp 11   Ht 6' 2.02\" (1.88 m)   Wt 232 lb (105.2 kg)   SpO2 98%   BMI 29.77 kg/m²    Constitutional and General Appearance: Alert, no distress  Respiratory:  · No increased work of breathing. · Clear to auscultation bilaterally. No wheeze or crackles. Cardiovascular:  · Normal S1 and S2.   · Jugular venous pulsation Normal  Abdomen:   · Soft  · No tenderness  Extremities:  · No lower extremity edema  Neurologic:  · Alert and oriented. · Moves all extremities well      DATA:    Diagnostics:    Labs:     CBC:   Recent Labs     06/25/20  1338  06/25/20  2355 06/26/20  0425   WBC 9.9  --   --  8.4   HGB 12.6*   < > 11.3* 11.3*   HCT 38.9*   < > 34.7* 35.4*     --   --  136*    < > = values in this interval not displayed.      BMP: Recent Labs     06/25/20  1755 06/26/20  0425    140   K 4.7 4.3   CO2 19* 20   BUN 50* 32*   CREATININE 0.95 0.75   LABGLOM >60 >60   GLUCOSE 101* 94     BNP: No results for input(s): BNP in the last 72 hours. PT/INR:   Recent Labs     06/25/20  1042 06/25/20  1947   PROTIME 13.1 10.2   INR 1.0 1.0     APTT:  Recent Labs     06/25/20  1042 06/26/20  0425   APTT 21.5* 23.7     CARDIAC ENZYMES:  Recent Labs     06/25/20  1755 06/25/20  2355 06/26/20  0425   TROPHS 34* 35* 34*     No results for input(s): CKTOTAL, CKMB, CKMBINDEX, TROPONINI in the last 72 hours. Invalid input(s):  [address]     06/25/20  1755 06/25/20  2355 06/26/20  0425   TROPONINT NOT REPORTED NOT REPORTED NOT REPORTED     FASTING LIPID PANEL:  Lab Results   Component Value Date    HDL 53 09/10/2018    TRIG 67 09/10/2018     LIVER PROFILE:  Recent Labs     06/25/20  1755 06/26/20  0425   AST 9 10   ALT 5 <5*   LABALBU 3.0* 2.9*         EKG: sinus rhythm, anterior/lateral TWI    ECHO 6/16/2020: EF 55%, moderate AS with mean gradient 30 mmHg, mild AI, grade II DD.      STRESS 7/5/18: Moderate perfusion defect of moderate intensity in the inferolateral, inferior and inferoseptal region which is most consistent with an old MI with hayden-infarct ischemia. EF 43%.      CATH 10/2/15: Severe RCA ostial, proximal and distal stenosis. PCI with MARY ANN to ostial lesion and balloon angioplasty to proximal lesion. Distal RCA lesion had total occlusion after balloon angioplasty. IMPRESSION:    1. Acute upper GI bleed  2. Troponin elevation 34>35, asymptomatic  3. CAD s/p PCI to RCA on 2/44/2772, procedure complicated by dissection, total distal occlusion, inferior wall myocardial infarction and transient heart block in 2015  4. Recurrent angina  5. Abnormal EKG - TWI in lateral leads  6. Carotid artery disease s/p carotid endarterectomy  7. COPD  8. Active smoker  9. PVD  10. HLD  11. HTN    RECOMMENDATIONS:  1.  Will resume Aspirin and Effient as soon as cleared by GI. Mild troponin elevation in setting of GI bleed. 2. Patient is intermediate-high risk for MACE during procedures given abnormal EKG. However, given acute GI bleed, needs GI intervention first.  3. Restart home medications including BB as tolerated. 4. Plan for cardiac cath in future as recommended by outpatient primary Cardiologist.       Thank you for allowing us to participate in [address] [place] [address]. Will follow with you.       Electronically signed on 06/26/20 at 8:42 AM by:    Zakiya Romeo MD   Fellow, [id number] [facility]

## 2020-06-26 NOTE — PROGRESS NOTES
Report called to Kendra Long RN  Updated report to [facility] RN  Electronically signed by Kenyatta Duong RN on 6/26/20 at 10:57 AM EDT

## 2020-06-26 NOTE — PLAN OF CARE
Problem: Falls - Risk of:  Goal: Will remain free from falls  Description: Will remain free from falls  6/26/2020 0545 by Sandy Wallace RN  Outcome: Ongoing  6/25/2020 1820 by Mario Merchant RN  Outcome: Met This Shift  Goal: Absence of physical injury  Description: Absence of physical injury  6/26/2020 0545 by Sandy Wallace RN  Outcome: Ongoing  6/25/2020 1820 by Mario Merchant RN  Outcome: Met This Shift     Problem: HEMODYNAMIC STATUS  Goal: Patient has stable vital signs and fluid balance  6/26/2020 0545 by Sandy Wallace RN  Outcome: Ongoing  6/25/2020 1820 by Mario Merchant RN  Outcome: Ongoing     Problem: Bleeding:  Goal: Will show no signs and symptoms of excessive bleeding  Description: Will show no signs and symptoms of excessive bleeding  6/26/2020 0545 by Sandy Wallace RN  Outcome: Ongoing  6/25/2020 1820 by Mario Merchant RN  Outcome: Ongoing     Problem: COMMUNICATION IMPAIRMENT  Goal: Ability to express needs and understand communication  6/26/2020 0545 by Sandy Wallace RN  Outcome: Ongoing  6/25/2020 1820 by Mario Merchant RN  Outcome: Ongoing     Problem: Discharge Planning:  Goal: Discharged to appropriate level of care  Description: Discharged to appropriate level of care  6/26/2020 0545 by Sandy Wallace RN  Outcome: Ongoing  6/25/2020 1820 by Mario Merchant RN  Outcome: Ongoing     Problem: Cardiac Output - Decreased:  Goal: Avoidance of environmental tobacco smoke  Description: Absence of orthostatic hypotension  6/26/2020 0545 by Sandy Wallace RN  Outcome: Ongoing  6/25/2020 1820 by Mario Merchant RN  Outcome: Ongoing  Goal: Cardiac output within specified parameters  Description: Cardiac output within specified parameters  6/26/2020 0545 by Sandy Wallace RN  Outcome: Ongoing  6/25/2020 1820 by Mario Merchant RN  Outcome: Ongoing  Goal: Hemodynamic stability will improve  Description: Hemodynamic stability will improve  6/26/2020 0545 by Juve Chen RN  Outcome: Ongoing  6/25/2020 1820 by Stas Thibodeaux RN  Outcome: Ongoing     Problem: Fluid Volume - Imbalance:  Goal: Absence of imbalanced fluid volume signs and symptoms  Description: Absence of imbalanced fluid volume signs and symptoms  6/26/2020 0545 by Juve Chen RN  Outcome: Ongoing  6/25/2020 1820 by Stas Thibodeaux RN  Outcome: Ongoing

## 2020-06-27 NOTE — PROGRESS NOTES
Patient started complaining of left sided chest pain radiating to the left arm. EKG consistent with ST changes. Trop elevated to 130's. Cardiology fellow was paged by RN and was started on Nitro drip. Currently the patient's pain has resolved. Aspirin and plavix restarted by cardiology after discussion with GI  Will continue to monitor and trend troponin every 6 hours. H/H every 12 hours. Troponin continue to increase. Started on heparin drip. Plan for cardiac cath on Monday.     Olena Angelucci, MD  PGY1 Internal Medicine Resident  [facility]  6/27/2020 4:00 PM

## 2020-06-27 NOTE — PROGRESS NOTES
THE [facility] [place] [facility] Progress Note    My Anthony is a 77 y.o. male patient. Hospitalization Day:2      Chief consult reason: GI bleed. Subjective: Patient seen and examined. Patient overall doing well. No complaints of abdominal pain. No further episodes of hematemesis or melena. VITALS:  /68   Pulse 87   Temp 98.4 °F (36.9 °C) (Oral)   Resp 12   Ht 6' 2.02\" (1.88 m)   Wt 232 lb (105.2 kg)   SpO2 96%   BMI 29.77 kg/m²   TEMPERATURE:  Current - Temp: 98.4 °F (36.9 °C);  Max - Temp  Av.1 °F (36.7 °C)  Min: 97.6 °F (36.4 °C)  Max: 98.4 °F (36.9 °C)    Physical Assessment:  General appearance:  alert, cooperative and no distress  Mental Status:  oriented to person, place and time and normal affect  Lungs:  clear to auscultation bilaterally, normal effort  Heart:  regular rate and rhythm, no murmur  Abdomen:  soft, nontender, nondistended, normal bowel sounds, no masses  Extremities:  no edema, no redness, No clubbing  Skin:  warm, dry, no gross lesions or rashes    Data Review:  LABS and IMAGING:     CBC  Recent Labs     20  1042 20  1338 20  1755 20  2355 20  0425 20  1309 20  0738   WBC 11.7* 9.9  --   --  8.4  --  5.9   HGB 12.0* 12.6* 12.3* 11.3* 11.3* 11.9* 11.7*   HCT 37.3* 38.9* 37.3* 34.7* 35.4* 36.4* 36.9*   .0 96.8  --   --  96.5  --  99.2   MCHC 32.2 32.4  --   --  31.9  --  31.7   RDW 13.9 15.5*  --   --  15.9*  --  15.1*   PLT See Reflexed IPF Result 148  --   --  136*  --  137*       Immature PLTs   Lab Results   Component Value Date    PLTFLUORE 194 2020       ANEMIA STUDIES  Recent Labs     20  1755   LABIRON 82*   TIBC 237*   IRON 194*       BMP  Recent Labs     20  1755 20  0425 20  0738    140 142   K 4.7 4.3 4.2    108* 109*   CO2 19* 20 22   BUN 50* 32* 10   CREATININE 0.95 0.75 0.62*   GLUCOSE 101* 94 99   CALCIUM 7.7* 7.4* 7.5*       LFTS  Recent Labs 06/25/20  1042 06/25/20  1755 06/26/20  0425   ALKPHOS 56 53 53   ALT 6 5 <5*   AST 10 9 10   BILITOT 0.36 0.64 0.49   BILIDIR  --  0.16 0.13   LABALBU 3.5 3.0* 2.9*       AMYLASE/LIPASE/AMMONIA  No results for input(s): AMYLASE, LIPASE, AMMONIA in the last 72 hours. Acute Hepatitis Panel   No results found for: HEPBSAG, HEPCAB, HEPBIGM, HEPAIGM    HCV Genotype   No results found for: HEPATITISCGENOTYPE    HCV Quantitative   No results found for: HCVQNT    LIVER WORK UP:    AFP  No results found for: AFP    Alpha 1 antitrypsin   No results found for: A1A    Anti - Liver/Kidney Ab  No results found for: LIVER-KIDNEYMICROSOMALAB    JEFFERY  No results found for: JEFFERY    AMA  No results found for: MITOAB    ASMA  No results found for: SMOOTHMUSCAB    Ceruloplasmin  No results found for: CERULOPLSM    Celiac panel  No results found for: TISSTRNTIIGG, TTGIGA, IGA    IgG  No results found for: IGG    IgM  No results found for: IGM    GGT   No results found for: LABGGT    PT/INR  Recent Labs     06/25/20  1042 06/25/20  1947   PROTIME 13.1 10.2   INR 1.0 1.0       Cancer Markers:  CEA:  No results for input(s): CEA in the last 72 hours. Ca 125:  No results for input(s):  in the last 72 hours. Ca 19-9:   No results for input(s):  in the last 72 hours. AFP: No results for input(s): AFP in the last 72 hours. Lactic acid:  Recent Labs     06/25/20  1755 06/25/20  2355 06/26/20  0400   LACTACIDWB 0.8 0.5* 0.7       Radiology Review:          ENDOSCOPY     EGD     PROCEDURE DATE: 06/26/20     REFERRING PHYSICIAN: Flora Coraod MD      PRIMARY CARE PROVIDER: Gurmeet Chavez DO     ATTENDING PHYSICIAN: Vladislav Klein MD     PREOPERATIVE DIAGNOSIS: GI bleed.      PROCEDURES:   1) Transoral Upper Endoscopy.      POSTOPERATIVE DIAGNOSIS:      No active bleeding.   Erosive Duodenopathy and Small 5mm clean based ulcer (Fabian III-low risk) identified in the duodenal bulb---presumably NSAID induced      1)Moderate amount of gastritis with erythema scattered through out the gastric body and antrum. The stomach was free of any high risk bleeding lesions and empty on examination. 2)Moderate duodenitis present predominantly in the duodenal bulb, with moderate amount of surrounding erythema with out hyperemia. Small superficial erosion 2mm and 3mm identified in the duodenal bulb with out high risk features. Small 5mm clean based (\"punched out\" appearance) duodenal bulb ulcer(Fabian III), no high risk features, no visible vessel, no clot, appears to be healing. RECOMMENDATIONS:   1) Continue PPI gtt for 24 hrs followed by transition to IV BID. If repeat Hg stable in afternoon, can provide trial of clear liquid diet. Hold NSAID, AP/AC therapy for 24 hrs unless strongly indicated. 2) Return back to medical floor, if patient remains stable for next 12 hrs, can evaluate for transfer out of ICU setting. Will continue to follow.          [facility]     Principal Problem:    Bleeding duodenal ulcer  Active Problems:    GI bleed    Hypotension due to hypovolemia    NSAID induced gastritis  Resolved Problems:    * No resolved hospital problems. *       GI Assessment:    1. GI bleed - EGD 06/26/2020 -erosive duodenum neuropathy with small clean-based ulcer -presumably NSAID induced, moderate gastritis    06/27/2020 - no further signs of GI bleeding. Hemoglobin has remained stable on clear liquid diet. Plan of care:  1. Monitor hemoglobin and hematocrit  2. Advance to low fiber diet  3. Continue PPI twice daily  4. Avoid NSAIDs. If NSAIDs absolutely required, recommend long-term, low-dose, PPI therapy   5. Okay to resume NSAIDs, AP/AC therapy with close monitoring of hemoglobin  6. Recommend outpatient screening colonoscopy. Last colonoscopy 15 - 20 years ago    Please feel free to contact me with any questions or concerns.    Thank you for allowing me to participate in the care of your patient. JUAN Shetty CNP on 6/27/2020 at 11:32 AM   THE [facility] [place] [facility]    Please note that this note was generated using a voice recognition dictation software. Although every effort was made to ensure the accuracy of this automated transcription, some errors in transcription may have occurred.

## 2020-06-27 NOTE — PROGRESS NOTES
[facility]  Internal Medicine Teaching Residency Program  Inpatient Daily Progress Note  ______________________________________________________________________________    Patient: Faina Thurman  YOB: 1955   ONN:[id number]    Acct: [de-identified]     Room: [facility]/7716-63  Admit date: 6/25/2020  Today's date: 06/27/20  Number of days in the hospital: 2    SUBJECTIVE   Admitting Diagnosis: Bleeding duodenal ulcer  CC: GI bleed. Patient seen and examined. No acute issues overnight. Hemodynamically and vitally stable. Afebrile. Labs reviewed. Hgb 11.9  Denies any nausea, vomiting, diarrhea, abdominal pain, worsening of previous symptoms. On clear liquid diet. AC/AP on hold. ROS:  Constitutional:  negative for chills, fevers, sweats  Respiratory:  negative for cough, dyspnea on exertion, hemoptysis, shortness of breath, wheezing  Cardiovascular:  negative for chest pain, chest pressure/discomfort, lower extremity edema, palpitations  Gastrointestinal:  negative for abdominal pain, constipation, diarrhea, nausea, vomiting  Neurological:  negative for dizziness, headache  BRIEF HISTORY     Patient was initially transferred from the different hospital presented with hematemesis and melena. Which is started 3 days before coming to hospital.  He was having massive bleeding so he was transferred to [facility]. On admission was giving 2 units of PRBC and Protonix bolus. Patient was tachycardic and hypotensive so was admitted to the ICU and was given fluid boluses. Patient was alert and oriented x3. Patient was also started on IV ceftriaxone. GI was consulted. Endoscopy was done on 6/26 showed no active bleeding but erosive duodenal fatigue and small 5 mm clean-based ulcer identified in the duodenal bulb presumably NSAIDs induced and likely source of recent hemorrhage while on epi therapy patient was also found to have moderate gastritis and duodenitis.    Past medical history significant for coronary artery disease with stents and peripheral vascular disease and stroke. Patient is on aspirin and Prasugrel at home. Currently AC/AP on hold. Patient was also found to have elevated troponins and cardiology was consulted recommended further work-up possibilities cardiac catheterization as an outpatient.     Previous Testing:      ECHO 2020: EF 55%, moderate AS with mean gradient 30 mmHg, mild AI, grade II DD.      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 EF 43%.      CATH 10/2/15: Severe RCA ostial, proximal and distal stenosis. PCI with MARY ANN to ostial lesion and balloon angioplasty to proximal lesion. Distal RCA lesion had total occlusion after balloon angioplasty.        OBJECTIVE     Vital Signs:  /66   Pulse 90   Temp 98.4 °F (36.9 °C) (Oral)   Resp 14   Ht 6' 2.02\" (1.88 m)   Wt 232 lb (105.2 kg)   SpO2 90%   BMI 29.77 kg/m²     Temp (24hrs), Av.7 °F (36.5 °C), Min:97.3 °F (36.3 °C), Max:98.4 °F (36.9 °C)    In: -   Out: 1300 [Urine:1300]    Physical Exam:  Constitutional: This is a well developed, well nourished, 25-29.9 - Overweight 77y.o. year old male who is alert, oriented, cooperative and in no apparent distress. Head:normocephalic and atraumatic. EENT:  PERRLA. No conjunctival injections. Septum was midline, mucosa was without erythema, exudates or cobblestoning. No thrush was noted. Neck: Supple without thyromegaly. No elevated JVP. Trachea was midline. Respiratory: Chest was symmetrical without dullness to percussion. Breath sounds bilaterally were clear to auscultation. There were no wheezes, rhonchi or rales. There is no intercostal retraction or use of accessory muscles. No egophony noted. Cardiovascular: Regular without murmur, clicks, gallops or rubs.    Abdomen: Slightly rounded and soft without organomegaly. No rebound, rigidity or guarding was appreciated. Lymphatic: No lymphadenopathy. Musculoskeletal: Normal curvature of the spine. No gross muscle weakness. Extremities:  No lower extremity edema, ulcerations, tenderness, varicosities or erythema. Muscle size, tone and strength are normal.  No involuntary movements are noted. Skin:  Warm and dry. Good color, turgor and pigmentation. No lesions or scars. No cyanosis or clubbing  Neurological/Psychiatric: The patient's general behavior, level of consciousness, thought content and emotional status is normal.        Medications:  Scheduled Medications:    sodium chloride flush  10 mL Intravenous 2 times per day    lidocaine PF  1 mL Intradermal Once     Continuous Infusions:    pantoprozole (PROTONIX) infusion 8 mg/hr (06/26/20 1053)    sodium chloride 150 mL/hr (06/25/20 1740)     PRN Medicationsmelatonin, 1 mg, Nightly PRN  sodium chloride flush, 10 mL, PRN  acetaminophen, 650 mg, Q6H PRN    Or  acetaminophen, 650 mg, Q6H PRN  promethazine, 12.5 mg, Q6H PRN    Or  ondansetron, 4 mg, Q6H PRN        Diagnostic Labs:  CBC:   Recent Labs     06/25/20  1042 06/25/20  1338  06/25/20  2355 06/26/20  0425 06/26/20  1309   WBC 11.7* 9.9  --   --  8.4  --    RBC 3.73* 4.02*  --   --  3.67*  --    HGB 12.0* 12.6*   < > 11.3* 11.3* 11.9*   HCT 37.3* 38.9*   < > 34.7* 35.4* 36.4*   .0 96.8  --   --  96.5  --    RDW 13.9 15.5*  --   --  15.9*  --    PLT See Reflexed IPF Result 148  --   --  136*  --     < > = values in this interval not displayed. BMP:   Recent Labs     06/25/20  1042 06/25/20  1755 06/26/20  0425    136 140   K 4.8 4.7 4.3   CL 98 105 108*   CO2 25 19* 20   BUN 64* 50* 32*   CREATININE 1.54* 0.95 0.75     BNP: No results for input(s): BNP in the last 72 hours.   PT/INR:   Recent Labs     06/25/20  1042 06/25/20  1947   PROTIME 13.1 10.2   INR 1.0 1.0     APTT:   Recent Labs 06/25/20  1042 06/26/20  0425   APTT 21.5* 23.7     CARDIAC ENZYMES: No results for input(s): CKMB, CKMBINDEX, TROPONINI in the last 72 hours. Invalid input(s): CKTOTAL;3  FASTING LIPID PANEL:  Lab Results   Component Value Date    CHOL 123 09/10/2018    HDL 53 09/10/2018    TRIG 67 09/10/2018     LIVER PROFILE:   Recent Labs     06/25/20  1042 06/25/20  1755 06/26/20  0425   AST 10 9 10   ALT 6 5 <5*   BILIDIR  --  0.16 0.13   BILITOT 0.36 0.64 0.49   ALKPHOS 56 53 53      MICROBIOLOGY:   Lab Results   Component Value Date/Time    CULTURE NO GROWTH 2 DAYS 06/25/2020 07:22 PM       Imaging:    No results found. ASSESSMENT & PLAN     ASSESSMENT / PLAN:        1. Upper GI bleed. Underwent EGD which showed gastritis and duodenitis. Currently on Protonix  GGT followed by IV twice daily. on clear liquid diet, Hold NSAIDs     2. Coronary artery disease with stents. On aspirin and Prasugrel. Currently on hold due to acute GI bleed. Continue Toprol-XL 25 mg, Imdur 60 mg, Norvasc 10 mg, Ranexa 500 mg twice daily, Crestor 40 mg.     3. Essential hypertension. Controlled. Continue Norvasc, Imdur, Toprol, lisinopril 20 mg, low-salt diet.     4. Hyperlipidemia. Stable     5. Bilateral carotid artery disease. Stable.       6. Peripheral vascular disease. Status post stents 8/21/2019. Follows up with Dr. Teena Holloway.     7. SUREKHA. Resolved. Likely prerenal.     DVT prophylaxis. Not indicated as patient is currently bleeding  GI prophylaxis. On Protonix  PT OT consulted  Discharge planning as per case management    Yunier Man MD  Internal Medicine Resident, PGY-1  [address] [facility]  6/27/2020, 7:12 AM   Attending Physician Statement  I have discussed the care of Marcela Venegas, including pertinent history and exam findings,  with the resident. I have seen and examined the patient and the key elements of all parts of the encounter have been performed by me.   I agree with the assessment, plan and orders as documented by the resident with additions . Treatment plan Discussed with nursing staff in detail , all questions answered . Electronically signed by Julia Shook MD on   6/27/20 at 10:49 AM EDT    Please note that this chart was generated using voice recognition Dragon dictation software. Although every effort was made to ensure the accuracy of this automated transcription, some errors in transcription may have occurred.

## 2020-06-27 NOTE — PROGRESS NOTES
[facility]        Date:   6/27/2020  Patient name: Concetta Vu  Date of admission:  6/25/2020  3:14 PM  MRN:   [id number]  YOB: 1955  PCP: Waldo Dumont DO    Reason for Consult:       Subjective: No new cardiac issues. No overnight events. Chart reviewed. Physical Exam:   Vitals: /81   Pulse 87   Temp 97.8 °F (36.6 °C) (Oral)   Resp 18   Ht 6' 2.02\" (1.88 m)   Wt 232 lb (105.2 kg)   SpO2 96%   BMI 29.77 kg/m²   General appearance: alert and cooperative with exam  HEENT: Head: Normocephalic, no lesions, without obvious abnormality. Neck: no adenopathy, no carotid bruit, no JVD, supple, symmetrical, trachea midline and thyroid not enlarged, symmetric, no tenderness/mass/nodules  Lungs: clear to auscultation bilaterally  Heart: regular rate and rhythm, S1, S2 normal, no murmur, click, rub or gallop  Abdomen: soft, non-tender; bowel sounds normal; no masses,  no organomegaly  Extremities: extremities normal, atraumatic, no cyanosis or edema  Neurologic: Mental status: Alert, oriented, thought content appropriate      Lab work, imaging, nursing, and chart reviewed extensively. Medications:   Scheduled Meds:   pantoprazole  40 mg Intravenous BID    And    sodium chloride (PF)  10 mL Intravenous Daily    sodium chloride flush  10 mL Intravenous 2 times per day    lidocaine PF  1 mL Intradermal Once     Continuous Infusions:   sodium chloride 150 mL/hr (06/25/20 1740)         Labs:     CBC:   Recent Labs     06/25/20  1338  06/26/20  0425 06/26/20  1309 06/27/20  0738 06/27/20  1225   WBC 9.9  --  8.4  --  5.9  --    HGB 12.6*   < > 11.3* 11.9* 11.7* 11.3*     --  136*  --  137*  --     < > = values in this interval not displayed.      BMP:    Recent Labs     06/25/20  1755 06/26/20  0425 06/27/20  0738    140 142   K 4.7 4.3 4.2    108* 109*   CO2 19* 20 22   BUN 50* 32* 10   CREATININE 0.95 0.75 0.62*   GLUCOSE 101* 94 99

## 2020-06-27 NOTE — PROGRESS NOTES
Pt complains of left sided chest pain, radiating to LUE. No c/o SOB. Vitals obtained. EKG obtained. Internal medicine resident and cardiology fellow notified via [address] [place] [address]. See orders. Will continue to monitor.

## 2020-06-28 NOTE — PROGRESS NOTES
Physical Therapy    Facility/Department: [address] STEPDOWN  Initial Assessment    NAME: Azeb Bella  : 1955  MRN: [id number]    Date of Service: 2020  Patient was initially transferred from the different hospital presented with hematemesis and melena. Josie Slocumb is started 3 days before coming to hospital. Helena Mayo was having massive bleeding so he was transferred to [facility] admission was giving 2 units of PRBC and Protonix bolus.  Patient was tachycardic and hypotensive so was admitted to the ICU and was given fluid boluses. Discharge Recommendations:  Patient would benefit from continued therapy after discharge   Assessment   Body structures, Functions, Activity limitations: Decreased functional mobility ; Decreased endurance;Decreased strength  Assessment: The pt ambulated 25 ft with a RW x CGA. There was no c/o chest pain or dizziness with mobilization. He could benefit from a continuation of PT following his DC  Prognosis: Good  Decision Making: Medium Complexity  PT Education: Goals;PT Role;Plan of Care  REQUIRES PT FOLLOW UP: Yes  Activity Tolerance  Activity Tolerance: Patient limited by fatigue   Patient Diagnosis(es): There were no encounter diagnoses. has a past medical history of CAD (coronary artery disease), Carotid artery stenosis, COPD (chronic obstructive pulmonary disease) ([address] H/O cardiac catheterization, H/O cardiovascular stress test, H/O cardiovascular stress test, H/O cardiovascular stress test, H/O echocardiogram, H/O echocardiogram, History of Holter monitoring, Hyperlipidemia, Hypertension, MI (myocardial infarction) ([address] Peripheral vascular disease ([address] S/P angioplasty with stent, and Unspecified cerebral artery occlusion with cerebral infarction. has a past surgical history that includes knee surgery (); Carotid endarterectomy (Bilateral, , ); Diagnostic Cardiac Cath Lab Procedure (08/20/15);  Leg Tendon Surgery; other surgical history (Left, 11/13/2017); Carotid endarterectomy (Left, 11/13/2017); Cardiac catheterization (Left, 8/20/2015); Coronary angioplasty (08/24/15); other surgical history (Right, 07/23/2018); Endoscopy, colon, diagnostic; pr thromboendartectmy neck,neck incis (Right, 7/23/2018); and Upper gastrointestinal endoscopy (06/26/2020). Restrictions  Position Activity Restriction  Other position/activity restrictions:  Activity as tolerated  Vision/Hearing  Vision: Within Functional Limits  Hearing: Within functional limits     Subjective  General  Patient assessed for rehabilitation services?: Yes  Response To Previous Treatment: Not applicable  Family / Caregiver Present: No  Follows Commands: Within Functional Limits  Pain Screening  Patient Currently in Pain: Denies  Pain Assessment  Pain Assessment: 0-10  Pain Level: 0  Vital Signs  Patient Currently in Pain: Denies     Orientation  Orientation  Overall Orientation Status: Within Normal Limits  Social/Functional History  Social/Functional History  Lives With: Spouse, Daughter(Pt has 5 children, lives with one daughter and grandaugher )  Type of Home: House  Home Layout: Two level(bedroom upstairs, bathroom downstairs )  Home Access: Stairs to enter with rails  Entrance Stairs - Number of Steps: 3-4  Entrance Stairs - Rails: Both  Bathroom Shower/Tub: Tub/Shower unit  Bathroom Toilet: Standard  Bathroom Accessibility: Accessible  ADL Assistance: Independent  Homemaking Assistance: Independent  Homemaking Responsibilities: Yes  Ambulation Assistance: Independent  Transfer Assistance: Independent  Active : Yes  Mode of Transportation: Car, Truck  Occupation: Retired  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 [employer]   Cognition      Objective     AROM RLE (degrees)  RLE AROM: WNL  AROM LLE (degrees)  LLE AROM : WNL  AROM RUE (degrees)  RUE AROM : WNL  AROM LUE (degrees)  LUE AROM : WNL  Strength RLE  Strength RLE: WNL  Strength LLE  Strength LLE: WNL  Strength RUE  Strength RUE: WNL  Strength LUE  Strength LUE: WNL     Sensation  Overall Sensation Status: WFL  Bed mobility  Supine to Sit: Contact guard assistance  Sit to Supine: Contact guard assistance  Scooting: Contact guard assistance  Transfers  Sit to Stand: Contact guard assistance  Stand to sit: Contact guard assistance  Ambulation  Ambulation?: Yes  Ambulation 1  Surface: level tile  Device: Rolling Walker  Assistance: Contact guard assistance  Distance: amb 25 ft with a RW x CGA  Balance  Posture: Good  Sitting - Static: Good  Sitting - Dynamic: Good  Standing - Static: Fair  Standing - Dynamic: [address]  Times per week: DC  PT  Current Treatment Recommendations: Strengthening, Transfer Training, Endurance Training, Gait Training, Functional Mobility Training, Stair training, Safety Education & Training  Safety Devices  Type of devices: Left in chair, Call light within reach, Nurse notified    G-Code     OutComes Score       AM-PAC Score  AM-PAC Inpatient Mobility Raw Score : 20 (06/28/20 1002)  AM-PAC Inpatient T-Scale Score : 47.67 (06/28/20 1002)  Mobility Inpatient CMS 0-100% Score: 35.83 (06/28/20 1002)  Mobility Inpatient CMS G-Code Modifier : You Matas (06/28/20 1002)      Goals  Short term goals  Time Frame for Short term goals: 10 visits  Short term goal 1: transfers with SBA  Short term goal 2: amb 150 ft with a RW x SBA  Short term goal 3: ascend/descend 4 steps with SBA  Short term goal 4: exercise program x SBA  Patient Goals   Patient goals : Return home     Therapy Time   Individual Concurrent Group Co-treatment   Time In 6713         Time Out 0950         Minutes 25             1 of [facility], PT

## 2020-06-28 NOTE — PROGRESS NOTES
[facility]  Internal Medicine Teaching Residency Program  Inpatient Daily Progress Note  ______________________________________________________________________________    Patient: Marcela eVnegas  YOB: 1955   UAA:[id number]    Acct: [de-identified]     Room: [facility]9006-54  Admit date: 6/25/2020  Today's date: 06/28/20  Number of days in the hospital: 3    SUBJECTIVE   Admitting Diagnosis: Bleeding duodenal ulcer  CC: GI bleed. Patient seen and examined. Overnight patient complained left-sided chest pain an EKG was done which was consistent with T wave changes and tropes were elevated cardiology started the patient on nitro drip and heparin drip and patient will be going for cardiac cath probably tomorrow. No more episodes of GI bleed, hemoglobin is stable, aspirin and Plavix has been resumed. On low fiber diet. On PPI twice daily. Vitals and hemodynamically stable. Denied any chest pain, shortness of breath, nausea, vomiting, diarrhea, constipation. ROS:  Constitutional:  negative for chills, fevers, sweats  Respiratory:  negative for cough, dyspnea on exertion, hemoptysis, shortness of breath, wheezing  Cardiovascular:  negative for chest pain, chest pressure/discomfort, lower extremity edema, palpitations  Gastrointestinal:  negative for abdominal pain, constipation, diarrhea, nausea, vomiting  Neurological:  negative for dizziness, headache  BRIEF HISTORY     Patient was initially transferred from the different hospital presented with hematemesis and melena. Which is started 3 days before coming to hospital.  He was having massive bleeding so he was transferred to [facility]. On admission was giving 2 units of PRBC and Protonix bolus. Patient was tachycardic and hypotensive so was admitted to the ICU and was given fluid boluses. Patient was alert and oriented x3. Patient was also started on IV ceftriaxone. GI was consulted.   Endoscopy was done on  showed no active bleeding but erosive duodenal fatigue and small 5 mm clean-based ulcer identified in the duodenal bulb presumably NSAIDs induced and likely source of recent hemorrhage while on epi therapy patient was also found to have moderate gastritis and duodenitis.      Past medical history significant for coronary artery disease with stents and peripheral vascular disease and stroke. Patient is on aspirin and Prasugrel at home. Currently AC/AP on hold. Patient was also found to have elevated troponins and cardiology was consulted recommended further work-up possibilities cardiac catheterization as an outpatient.     Previous Testing:      ECHO 2020: EF 55%, moderate AS with mean gradient 30 mmHg, mild AI, grade II DD.      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 EF 43%.      CATH 10/2/15: Severe RCA ostial, proximal and distal stenosis. PCI with MARY ANN to ostial lesion and balloon angioplasty to proximal lesion. Distal RCA lesion had total occlusion after balloon angioplasty.        OBJECTIVE     Vital Signs:  /76   Pulse 79   Temp 98 °F (36.7 °C) (Temporal)   Resp 14   Ht 6' 2.02\" (1.88 m)   Wt 232 lb (105.2 kg)   SpO2 93%   BMI 29.77 kg/m²     Temp (24hrs), Av.9 °F (36.6 °C), Min:97.6 °F (36.4 °C), Max:98.1 °F (36.7 °C)    In: 6536   Out: 2875 [Urine:2875]    Physical Exam:  Constitutional: This is a well developed, well nourished, 25-29.9 - Overweight 77y.o. year old male who is alert, oriented, cooperative and in no apparent distress. Head:normocephalic and atraumatic. EENT:  PERRLA. No conjunctival injections. Septum was midline, mucosa was without erythema, exudates or cobblestoning. No thrush was noted. Neck: Supple without thyromegaly. No elevated JVP. Trachea was midline.   Respiratory: Chest was symmetrical without dullness to percussion. Breath sounds bilaterally were clear to auscultation. There were no wheezes, rhonchi or rales. There is no intercostal retraction or use of accessory muscles. No egophony noted. Cardiovascular: Regular without murmur, clicks, gallops or rubs. Abdomen: Slightly rounded and soft without organomegaly. No rebound, rigidity or guarding was appreciated. Lymphatic: No lymphadenopathy. Musculoskeletal: Normal curvature of the spine. No gross muscle weakness. Extremities:  No lower extremity edema, ulcerations, tenderness, varicosities or erythema. Muscle size, tone and strength are normal.  No involuntary movements are noted. Skin:  Warm and dry. Good color, turgor and pigmentation. No lesions or scars.   No cyanosis or clubbing  Neurological/Psychiatric: The patient's general behavior, level of consciousness, thought content and emotional status is normal.        Medications:  Scheduled Medications:    atorvastatin  80 mg Oral Nightly    sodium chloride (PF)  10 mL Intravenous Daily    aspirin  81 mg Oral Daily    clopidogrel  75 mg Oral Daily    metoprolol tartrate  12.5 mg Oral BID    sodium chloride flush  10 mL Intravenous 2 times per day    lidocaine PF  1 mL Intradermal Once     Continuous Infusions:    nitroGLYCERIN 5 mcg/min (06/27/20 1922)    pantoprozole (PROTONIX) infusion 8 mg/hr (06/28/20 0657)    heparin (porcine) 8 Units/kg/hr (06/28/20 0720)    sodium chloride 50 mL/hr at 06/27/20 1924     PRN Medicationsmelatonin, 1 mg, Nightly PRN  nitroGLYCERIN, 0.4 mg, Q5 Min PRN  heparin (porcine), 4,000 Units, PRN  heparin (porcine), 2,000 Units, PRN  sodium chloride flush, 10 mL, PRN  acetaminophen, 650 mg, Q6H PRN    Or  acetaminophen, 650 mg, Q6H PRN  promethazine, 12.5 mg, Q6H PRN    Or  ondansetron, 4 mg, Q6H PRN        Diagnostic Labs:  CBC:   Recent Labs     06/26/20  0425  06/27/20  0738  06/27/20  2045 06/28/20  0052 06/28/20  0641   WBC 8.4  --  5.9 --   --   --  6.4   RBC 3.67*  --  3.72*  --   --   --  3.26*   HGB 11.3*   < > 11.7*   < > 10.7* 10.5* 10.2*   HCT 35.4*   < > 36.9*   < > 34.0* 34.1* 32.8*   MCV 96.5  --  99.2  --   --   --  100.6   RDW 15.9*  --  15.1*  --   --   --  14.9*   *  --  137*  --   --   --  129*    < > = values in this interval not displayed. BMP:   Recent Labs     06/26/20  0425 06/27/20  0738 06/28/20  0641    142 139   K 4.3 4.2 4.2   * 109* 106   CO2 20 22 22   BUN 32* 10 8   CREATININE 0.75 0.62* 0.63*     BNP: No results for input(s): BNP in the last 72 hours. PT/INR:   Recent Labs     06/25/20  1042 06/25/20  1947   PROTIME 13.1 10.2   INR 1.0 1.0     APTT:   Recent Labs     06/27/20  2045 06/28/20  0052 06/28/20  0641   APTT 23.9 83.2* 76.3*     CARDIAC ENZYMES: No results for input(s): CKMB, CKMBINDEX, TROPONINI in the last 72 hours. Invalid input(s): CKTOTAL;3  FASTING LIPID PANEL:  Lab Results   Component Value Date    CHOL 123 09/10/2018    HDL 53 09/10/2018    TRIG 67 09/10/2018     LIVER PROFILE:   Recent Labs     06/25/20  1042 06/25/20  1755 06/26/20  0425   AST 10 9 10   ALT 6 5 <5*   BILIDIR  --  0.16 0.13   BILITOT 0.36 0.64 0.49   ALKPHOS 56 53 53      MICROBIOLOGY:   Lab Results   Component Value Date/Time    CULTURE NO GROWTH 3 DAYS 06/25/2020 07:22 PM       Imaging:    No results found. ASSESSMENT & PLAN     ASSESSMENT / PLAN:        1. Upper GI bleed. Underwent EGD which showed gastritis and duodenitis. Protonix twice daily. Antiplatelet/anticoagulants has been resumed. On heparin drip as patient had episode of chest pain and tropes were elevated. Monitor hemoglobin. Transfuse if hemoglobin less than 8.    2. Coronary artery disease with stents. On aspirin and Prasugrel. Possible was changed to Plavix due to low risk for bleeding. Resumed AC/AP. On heparin and nitro drip due to episode of chest pain EKG changes. Patient will be going for cardiac cath.  Continue

## 2020-06-28 NOTE — PROGRESS NOTES
Occupational Therapy   Occupational Therapy Initial Assessment  Date: 2020   Patient Name: Stephani Riggs  MRN: [id number]     : 1955    Date of Service: 2020    Discharge Recommendations:  Continue to assess pending progress  OT Equipment Recommendations  Equipment Needed: No    Assessment   Performance deficits / Impairments: Decreased functional mobility ; Decreased ADL status; Decreased endurance;Decreased high-level IADLs  Assessment: Pt completed functional mobility and ADL tasks this visit with CGA, limited by decreased endurance. RW used. Pt would benefit from continued OT services during acute hosptilization to maximize safety and increase endurance and independence in ADL/functional mobility tasks. Pt reports having good home support to assist PRN. Prognosis: Good  Decision Making: Medium Complexity  OT Education: OT Role;Plan of Care;Energy Conservation  Patient Education: energy conservation, purpose of OT services   REQUIRES OT FOLLOW UP: Yes  Activity Tolerance  Activity Tolerance: Patient Tolerated treatment well  Safety Devices  Safety Devices in place: Yes  Type of devices: Nurse notified;Gait belt;Left in bed;Call light within reach  Restraints  Initially in place: No           Patient Diagnosis(es): There were no encounter diagnoses. has a past medical history of CAD (coronary artery disease), Carotid artery stenosis, COPD (chronic obstructive pulmonary disease) ([address] H/O cardiac catheterization, H/O cardiovascular stress test, H/O cardiovascular stress test, H/O cardiovascular stress test, H/O echocardiogram, H/O echocardiogram, History of Holter monitoring, Hyperlipidemia, Hypertension, MI (myocardial infarction) ([address] Peripheral vascular disease ([facility] S/P angioplasty with stent, and Unspecified cerebral artery occlusion with cerebral infarction. has a past surgical history that includes knee surgery (); Carotid endarterectomy (Bilateral, , );  Diagnostic Cardiac Cath Lab Procedure (08/20/15); Leg Tendon Surgery; other surgical history (Left, 11/13/2017); Carotid endarterectomy (Left, 11/13/2017); Cardiac catheterization (Left, 8/20/2015); Coronary angioplasty (08/24/15); other surgical history (Right, 07/23/2018); Endoscopy, colon, diagnostic; pr thromboendartectmy neck,neck incis (Right, 7/23/2018); and Upper gastrointestinal endoscopy (06/26/2020). Restrictions  Restrictions/Precautions  Restrictions/Precautions: General Precautions  Required Braces or Orthoses?: No  Position Activity Restriction  Other position/activity restrictions: Activity as tolerated    Subjective   General  Chart Reviewed: Orders, Progress Notes, History and Physical, Labs, Imaging  Patient assessed for rehabilitation services?: Yes  Family / Caregiver Present: No  General Comment  Comments: Rn ok'd for therapy visit this date. Pt agreeable to session, pleasent/cooperative throughout.    Patient Currently in Pain: Denies  Vital Signs  Patient Currently in Pain: Denies     Social/Functional History  Social/Functional History  Lives With: Spouse, Daughter(Pt has 5 children, lives with one daughter and grandaugher )  Type of Home: House  Home Layout: Two level(bedroom upstairs, bathroom downstairs )  Home Access: Stairs to enter with rails  Entrance Stairs - Number of Steps: 3-4  Entrance Stairs - Rails: Both  Bathroom Shower/Tub: Tub/Shower unit  Bathroom Toilet: Standard  Bathroom Accessibility: Accessible  ADL Assistance: Independent  Homemaking Assistance: Independent  Homemaking Responsibilities: Yes  Ambulation Assistance: Independent  Transfer Assistance: Independent  Active : Yes  Mode of Transportation: Car, Truck  Occupation: Retired  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 [employer]        Objective   Vision: Within Functional Limits  Hearing: Within functional limits    Orientation  Overall Orientation Status: Within Normal Limits       ADL  Feeding: Independent  Grooming: Contact guard assistance; Increased time to complete  UE Bathing: Contact guard assistance; Increased time to complete  LE Bathing: Contact guard assistance; Increased time to complete  UE Dressing: Increased time to complete;Contact guard assistance  LE Dressing: Contact guard assistance; Increased time to complete(Pt donned hospital socks )  Toileting: Contact guard assistance; Increased time to complete  Additional Comments: Pt denied completing furhter ADL activities this date due to increased fatigue and cough.      Coordination  Movements Are Fluid And Coordinated: Yes     Bed mobility  Supine to Sit: Contact guard assistance  Sit to Supine: Contact guard assistance  Scooting: Contact guard assistance  Comment: HOB slightly elevated, no use of bedrails      Transfers  Sit to stand: Contact guard assistance  Stand to sit: Contact guard assistance     Cognition  Overall Cognitive Status: WFL        Sensation  Overall Sensation Status: WFL        LUE AROM (degrees)  LUE AROM : WFL  Left Hand AROM (degrees)  Left Hand AROM: WFL  RUE AROM (degrees)  RUE AROM : WFL  Right Hand AROM (degrees)  Right Hand AROM: WFL  LUE Strength  Gross LUE Strength: WFL  L Hand General: 5/5  LUE Strength Comment: Grossly 5/5   RUE Strength  Gross RUE Strength: WFL  R Hand General: 5/5  RUE Strength Comment: Grossly 5/5            Plan   Plan  Times per week: 1-2x/week  Current Treatment Recommendations: Strengthening, Endurance Training, Patient/Caregiver Education & Training, Equipment Evaluation, Education, & procurement, Self-Care / ADL, Functional Mobility Training, Safety Education & Training      AM-PAC Score        AM-PAC Inpatient Daily Activity Raw Score: 19 (06/28/20 1500)  AM-PAC Inpatient ADL T-Scale Score : 40.22 (06/28/20 1500)  ADL Inpatient CMS 0-100% Score: 42.8 (06/28/20 1500)  ADL Inpatient CMS G-Code Modifier : CK (06/28/20 1500)    Goals  Short term goals  Time Frame for Short term goals: By discharge:  Short term goal 1: Pt will complete all functional mobility/transfers with Mod I, use of AD PRN  Short term goal 2: PT will complete all ADLs with Mod I, use of AE/AD PRN  Short term goal 3: Pt will demonstrate ~20 minutes of dynmaic standing tolerance during ADL/functional tasks   Short term goal 4: Pt will demonstrate good safety awareness during ADL/functional tasks to maximize safety        Therapy Time   Individual Concurrent Group Co-treatment   Time In 0925         Time Out 0950         Minutes 25         Timed Code Treatment Minutes: 9 Minutes       Eddie Jaramillo, OTR/L

## 2020-06-28 NOTE — PROGRESS NOTES
[facility]        Date:   6/28/2020  Patient name: Camila Tierney  Date of admission:  6/25/2020  3:14 PM  MRN:   [id number]  YOB: 1955  PCP: Simon Marion DO    Reason for Consult:       Subjective: No new cardiac issues. No overnight events. Chart reviewed. Physical Exam:   Vitals: /60   Pulse 74   Temp 98.2 °F (36.8 °C) (Oral)   Resp 14   Ht 6' 2.02\" (1.88 m)   Wt 232 lb (105.2 kg)   SpO2 94%   BMI 29.77 kg/m²   General appearance: alert and cooperative with exam  HEENT: Head: Normocephalic, no lesions, without obvious abnormality. Neck: no adenopathy, no carotid bruit, no JVD, supple, symmetrical, trachea midline and thyroid not enlarged, symmetric, no tenderness/mass/nodules  Lungs: clear to auscultation bilaterally  Heart: regular rate and rhythm, S1, S2 normal, no murmur, click, rub or gallop  Abdomen: soft, non-tender; bowel sounds normal; no masses,  no organomegaly  Extremities: extremities normal, atraumatic, no cyanosis or edema  Neurologic: Mental status: Alert, oriented, thought content appropriate      Lab work, imaging, nursing, and chart reviewed extensively.     Medications:   Scheduled Meds:   atorvastatin  80 mg Oral Nightly    sodium chloride (PF)  10 mL Intravenous Daily    aspirin  81 mg Oral Daily    clopidogrel  75 mg Oral Daily    metoprolol tartrate  12.5 mg Oral BID    sodium chloride flush  10 mL Intravenous 2 times per day    lidocaine PF  1 mL Intradermal Once     Continuous Infusions:   nitroGLYCERIN 10 mcg/min (06/28/20 1227)    pantoprozole (PROTONIX) infusion 8 mg/hr (06/28/20 0657)    heparin (porcine) 8 Units/kg/hr (06/28/20 0720)    sodium chloride 50 mL/hr at 06/27/20 1924         Labs:     CBC:   Recent Labs     06/26/20  0425  06/27/20  0738  06/28/20  0052 06/28/20  0641 06/28/20  1207   WBC 8.4  --  5.9  --   --  6.4  --    HGB 11.3*   < > 11.7*   < > 10.5* 10.2* 10.8*   *  --  137*  --   --  129* --     < > = values in this interval not displayed. BMP:    Recent Labs     06/26/20  0425 06/27/20  0738 06/28/20  0641    142 139   K 4.3 4.2 4.2   * 109* 106   CO2 20 22 22   BUN 32* 10 8   CREATININE 0.75 0.62* 0.63*   GLUCOSE 94 99 108*     Hepatic:   Recent Labs     06/25/20  1755 06/26/20  0425   AST 9 10   ALT 5 <5*   BILITOT 0.64 0.49   ALKPHOS 53 53     Troponin: No results for input(s): TROPONINI in the last 72 hours. BNP: No results for input(s): BNP in the last 72 hours. Lipids: No results for input(s): CHOL, HDL in the last 72 hours. Invalid input(s): LDLCALCU  INR:   Recent Labs     06/25/20 1947   INR 1.0       Other active acute problems:  Patient Active Problem List   Diagnosis    Gross hematuria    Benign non-nodular prostatic hyperplasia with lower urinary tract symptoms    Carotid pseudoaneurysm (HCC)    COPD exacerbation (HCC)    CAD (coronary artery disease)    [facility] disease) [facility]    Hypertension    Hyperlipidemia    Peripheral vascular disease ([facility]    Acute respiratory failure with hypoxia (HCC)    Bacterial pneumonia    Hyponatremia    GI bleed    Hypotension due to hypovolemia    Bleeding duodenal ulcer    NSAID induced gastritis         IMPRESSION & Recommendations:      1. Events from yeterday noted. Stable now. Plan for cath Monday. ECG indicate posterior MI, but due to active GI bleed, cardiac work up was on hold. Now on asp/plav/heparin. 2. Acute upper GI bleed- being managed by GI. Asprin and effient were on hold. Effient known to cause more GI bleeds, will switch to plavix once ok with GI.  3. Troponin elevation 34>35, asymptomatic  4. CAD s/p PCI to RCA on 4/35/2430, procedure complicated by dissection, total distal occlusion, inferior wall myocardial infarction and transient heart block in 2015  5. Recurrent angina  6. Abnormal EKG - TWI in lateral leads  7.  Carotid artery disease s/p carotid endarterectomy  8. COPD  9. Active smoker  10. PVD  11. HLD  12. HTN        Discussed with patient, family, and Nurse. Electronically signed by Charlene Leon DO on 6/28/2020 at 1:39 PM    Chalrene Leon, [address] [facility]  [facility]. [employer]  [phone]

## 2020-06-28 NOTE — PROGRESS NOTES
06/25/20 1755   LABIRON 82*   TIBC 237*   IRON 194*       BMP  Recent Labs     06/26/20  0425 06/27/20  0738 06/28/20  0641    142 139   K 4.3 4.2 4.2   * 109* 106   CO2 20 22 22   BUN 32* 10 8   CREATININE 0.75 0.62* 0.63*   GLUCOSE 94 99 108*   CALCIUM 7.4* 7.5* 7.5*       LFTS  Recent Labs     06/25/20  1042 06/25/20 1755 06/26/20 0425   ALKPHOS 56 53 53   ALT 6 5 <5*   AST 10 9 10   BILITOT 0.36 0.64 0.49   BILIDIR  --  0.16 0.13   LABALBU 3.5 3.0* 2.9*       AMYLASE/LIPASE/AMMONIA  No results for input(s): AMYLASE, LIPASE, AMMONIA in the last 72 hours. Acute Hepatitis Panel   No results found for: HEPBSAG, HEPCAB, HEPBIGM, HEPAIGM    HCV Genotype   No results found for: HEPATITISCGENOTYPE    HCV Quantitative   No results found for: HCVQNT    LIVER WORK UP:    AFP  No results found for: AFP    Alpha 1 antitrypsin   No results found for: A1A    Anti - Liver/Kidney Ab  No results found for: LIVER-KIDNEYMICROSOMALAB    JEFFERY  No results found for: JEFFERY    AMA  No results found for: MITOAB    ASMA  No results found for: SMOOTHMUSCAB    Ceruloplasmin  No results found for: CERULOPLSM    Celiac panel  No results found for: TISSTRNTIIGG, TTGIGA, IGA    IgG  No results found for: IGG    IgM  No results found for: IGM    GGT   No results found for: LABGGT    PT/INR  Recent Labs     06/25/20 1042 06/25/20 1947   PROTIME 13.1 10.2   INR 1.0 1.0       Cancer Markers:  CEA:  No results for input(s): CEA in the last 72 hours. Ca 125:  No results for input(s):  in the last 72 hours. Ca 19-9:   No results for input(s):  in the last 72 hours. AFP: No results for input(s): AFP in the last 72 hours.     Lactic acid:  Recent Labs     06/25/20 1755 06/25/20  2355 06/26/20  0400   LACTACIDWB 0.8 0.5* 0.7       Radiology Review:          ENDOSCOPY     EGD     PROCEDURE DATE: 06/26/20     REFERRING PHYSICIAN: Jhonny Gaston MD      PRIMARY CARE PROVIDER: Haleigh Plaza DO     ATTENDING PHYSICIAN: while patient on heparin drip  4. Recommend outpatient screening colonoscopy. Last colonoscopy 15 - 20 years ago  5. Following along. Please feel free to contact me with any questions or concerns. Thank you for allowing me to participate in the care of your patient. JUAN Ernandez - CNP on 6/28/2020 at [place] [address] [facility]    Please note that this note was generated using a voice recognition dictation software. Although every effort was made to ensure the accuracy of this automated transcription, some errors in transcription may have occurred.

## 2020-06-28 NOTE — PLAN OF CARE
Problem: Falls - Risk of:  Goal: Will remain free from falls  Description: Will remain free from falls  6/28/2020 1618 by Keira Conner RN  Outcome: Ongoing  Note: Bed in lowest position. Individual items within reach. Call light within reach. Side rails x2. Environment kept free of clutter. Adequate lighting provided. Pt calls out appropriately. Problem: Bleeding:  Goal: Will show no signs and symptoms of excessive bleeding  Description: Will show no signs and symptoms of excessive bleeding  6/28/2020 1618 by Keira Conner RN  Outcome: Ongoing  Note: Pt denies any bloody stool or vomit. Pt aPTT drawn Q6H. Pt monitored for S&S of bleeding.

## 2020-06-29 NOTE — PROGRESS NOTES
Discussed with GI yesterday about recommendation for cath. Patient is heparin and DAPT stable Hb without recurrence of bleeding. Per GI can proceed with cath with ongoing DAPT with close monitoring of Hb. Patient has intermittent chest pain with NSTEMI and EKG changes and initially prior to presentation and admission was supposed to have out patient cardiac cath. Risk, benefits and alternatives of cardiac catheterization+/- PCI were discussed in detail with patient . Higher risk of bleeding due to recent GI bleed, requiring blood transfusion, vascular complication requiring surgery, renal insufficieny with need of dialysis, CVA, MI, death and anesthesia complications including intubation were discussed. Patient agrees to proceed and verbalizes understanding.       Emily Lowery MD  Fellow, [address]

## 2020-06-29 NOTE — PROGRESS NOTES
Patient admitted from [facility] to [facility] room 2, consent signed and questions answered. Patient ready for procedure. Call light to reach with side rails up 2 of 2. Bilat groin hairs clipped. History and physical complete. States wife on her way.

## 2020-06-29 NOTE — PLAN OF CARE
LMTCB   Problem: Falls - Risk of:  Goal: Will remain free from falls  Description: Will remain free from falls  6/29/2020 0426 by Jeff Cavazos RN  Outcome: Ongoing  Pt calls out appropriately for assistance, bed in low position, wheels locked. Non-skid socks in place & room free of clutter. Nurse will cont. to monitor for remainder of shift. Problem: HEMODYNAMIC STATUS  Goal: Patient has stable vital signs and fluid balance  Outcome: Ongoing   Vital signs within normal range this shift. Problem: Bleeding:  Goal: Will show no signs and symptoms of excessive bleeding  Description: Will show no signs and symptoms of excessive bleeding  6/29/2020 0426 by Jeff Cavazos RN  Outcome: Ongoing   NO s/s of bleeding, no stools/vomit, H & H stable.      Electronically signed by Samantha Houston RN on 6/29/2020 at 4:27 AM

## 2020-06-29 NOTE — PLAN OF CARE
Problem: Falls - Risk of:  Goal: Will remain free from falls  Description: Will remain free from falls  6/29/2020 1243 by Aurora Gomez RN  Outcome: Met This Shift  6/29/2020 0426 by Sylvia Joshi RN  Outcome: Ongoing  Goal: Absence of physical injury  Description: Absence of physical injury  Outcome: Met This Shift     Problem: HEMODYNAMIC STATUS  Goal: Patient has stable vital signs and fluid balance  6/29/2020 1243 by Aurora Gomez RN  Outcome: Met This Shift  6/29/2020 0426 by Sylvia Joshi RN  Outcome: Ongoing     Problem: Bleeding:  Goal: Will show no signs and symptoms of excessive bleeding  Description: Will show no signs and symptoms of excessive bleeding  6/29/2020 1243 by Aurora Gomez RN  Outcome: Met This Shift  6/29/2020 0426 by Sylvia Joshi RN  Outcome: Ongoing     Problem: COMMUNICATION IMPAIRMENT  Goal: Ability to express needs and understand communication  Outcome: Met This Shift

## 2020-06-29 NOTE — PROGRESS NOTES
THE [facility] Progress Note    Pauline Navarro is a 77 y.o. male patient. Hospitalization Day:4      Chief consult reason: GI bleed. Subjective: Patient seen and examined this morning prior to cardiac cath. Patient reports overall doing well. No signs of GI bleeding. No stools overnight. No abdominal pain. Denies any chest pain. Continues on nitro and heparin drip. VITALS:  BP (!) 103/58   Pulse 92   Temp 98.5 °F (36.9 °C) (Temporal)   Resp 13   Ht 6' 2.02\" (1.88 m)   Wt 232 lb (105.2 kg)   SpO2 94%   BMI 29.77 kg/m²   TEMPERATURE:  Current - Temp: 98.5 °F (36.9 °C); Max - Temp  Av.4 °F (36.9 °C)  Min: 98 °F (36.7 °C)  Max: 98.8 °F (37.1 °C)    Physical Assessment:  General appearance:  alert, cooperative and no distress  Mental Status:  oriented to person, place and time and normal affect  Lungs:  clear to auscultation bilaterally, normal effort  Heart:  regular rate and rhythm, no murmur  Abdomen:  soft, nontender, nondistended, normal bowel sounds, no masses  Extremities:  no edema, no redness, No clubbing  Skin:  warm, dry, no gross lesions or rashes    Data Review:  LABS and IMAGING:     CBC  Recent Labs     20  0738  20  0052 20  0641 20  1207 20  2048 20  0652   WBC 5.9  --   --  6.4  --   --  6.3   HGB 11.7*   < > 10.5* 10.2* 10.8* 10.8* 10.7*   HCT 36.9*   < > 34.1* 32.8* 33.8* 34.1* 33.0*   MCV 99.2  --   --  100.6  --   --  97.1   MCHC 31.7  --   --  31.1  --   --  32.4   RDW 15.1*  --   --  14.9*  --   --  14.7*   *  --   --  129*  --   --  143    < > = values in this interval not displayed. Immature PLTs   Lab Results   Component Value Date    PLTFLUORE 194 2020       ANEMIA STUDIES  No results for input(s): LABIRON, TIBC, IRON, FERRITIN, [id number], FOLATE, OCCULTBLD in the last 72 hours.     BMP  Recent Labs     20  0738 20  0641 20  0652    139 137   K 4.2 4.2 3.9   * 106 102   CO2 22 22 28   BUN 10 8 6*   CREATININE 0.62* 0.63* 0.51*   GLUCOSE 99 108* 104*   CALCIUM 7.5* 7.5* 7.8*       LFTS  No results for input(s): ALKPHOS, ALT, AST, BILITOT, BILIDIR, LABALBU in the last 72 hours. AMYLASE/LIPASE/AMMONIA  No results for input(s): AMYLASE, LIPASE, AMMONIA in the last 72 hours. Acute Hepatitis Panel   No results found for: HEPBSAG, HEPCAB, HEPBIGM, HEPAIGM    HCV Genotype   No results found for: HEPATITISCGENOTYPE    HCV Quantitative   No results found for: HCVQNT    LIVER WORK UP:    AFP  No results found for: AFP    Alpha 1 antitrypsin   No results found for: A1A    Anti - Liver/Kidney Ab  No results found for: LIVER-KIDNEYMICROSOMALAB    JEFFERY  No results found for: JEFFERY    AMA  No results found for: MITOAB    ASMA  No results found for: SMOOTHMUSCAB    Ceruloplasmin  No results found for: CERULOPLSM    Celiac panel  No results found for: TISSTRNTIIGG, TTGIGA, IGA    IgG  No results found for: IGG    IgM  No results found for: IGM    GGT   No results found for: LABGGT    PT/INR  No results for input(s): PROTIME, INR in the last 72 hours. Cancer Markers:  CEA:  No results for input(s): CEA in the last 72 hours. Ca 125:  No results for input(s):  in the last 72 hours. Ca 19-9:   No results for input(s):  in the last 72 hours. AFP: No results for input(s): AFP in the last 72 hours. Lactic acid:  No results for input(s): LACTACIDWB in the last 72 hours. Radiology Review:          ENDOSCOPY     EGD     PROCEDURE DATE: 06/26/20     REFERRING PHYSICIAN: Eladia Velez MD      PRIMARY CARE PROVIDER: Myrna Craven DO     ATTENDING PHYSICIAN: Dominique Arteaga MD     PREOPERATIVE DIAGNOSIS: GI bleed.      PROCEDURES:   1) Transoral Upper Endoscopy.      POSTOPERATIVE DIAGNOSIS:      No active bleeding.   Erosive Duodenopathy and Small 5mm clean based ulcer (Fabian III-low risk) identified in the duodenal bulb---presumably NSAID induced      1)Moderate amount of gastritis with erythema scattered through out the gastric body and antrum. The stomach was free of any high risk bleeding lesions and empty on examination. 2)Moderate duodenitis present predominantly in the duodenal bulb, with moderate amount of surrounding erythema with out hyperemia. Small superficial erosion 2mm and 3mm identified in the duodenal bulb with out high risk features. Small 5mm clean based (\"punched out\" appearance) duodenal bulb ulcer(Fabian III), no high risk features, no visible vessel, no clot, appears to be healing. RECOMMENDATIONS:   1) Continue PPI gtt for 24 hrs followed by transition to IV BID. If repeat Hg stable in afternoon, can provide trial of clear liquid diet. Hold NSAID, AP/AC therapy for 24 hrs unless strongly indicated. 2) Return back to medical floor, if patient remains stable for next 12 hrs, can evaluate for transfer out of ICU setting. Will continue to follow.          [facility]     Principal Problem:    Bleeding duodenal ulcer  Active Problems:    GI bleed    Hypotension due to hypovolemia    NSAID induced gastritis  Resolved Problems:    * No resolved hospital problems. *       GI Assessment:    1. GI bleed - EGD 06/26/2020 -erosive duodenum neuropathy with small clean-based ulcer -presumably NSAID induced, moderate gastritis    06/29/2020 - no further signs of GI bleeding. Hemoglobin has remained stable.   -Cardiology planning for cardiac cath today      Plan of care:  1. Monitor hemoglobin and hematocrit  2. Continue PPI gtt while patient on heparin drip  3. Recommend outpatient screening colonoscopy. Last colonoscopy 15 - 20 years ago  4. Following along. Please feel free to contact me with any questions or concerns. Thank you for allowing me to participate in the care of your patient.       Booker Heimlich, APRN - CNP on 6/29/2020 at 7:34 AM   THE [facility]    Please note that this note was generated using a voice recognition dictation software. Although every effort was made to ensure the accuracy of this automated transcription, some errors in transcription may have occurred.

## 2020-06-29 NOTE — CONSULTS
[facility]  History and Physical    Pt Name: Luther Levine  MRN: [id number]  YOB: 1955  Date of evaluation: 6/29/2020  Primary Care Physician: Fernandez Gabriel DO  Patient evaluated at the request of  Dr. Evette Galvez  Reason for evaluation: CAD    History of Present Illness:       Luther Levine is a 77y.o. year old, ,  male who presented to [facility] with several days complaints of vomiting up blood as well as black stools. By report Hgb was in the 5-6 range. He was transfused 2 units of PRBCs and transferred to [facility] for further workup and management. Patient had recent visit to PCP at which he complained of chest pain with exertion becoming more frequent and relieved by NTG. He has extensive known history of PVD requiring previous surgical intervention as well as CAD requiring PCI. EGD on 6/26/20 No active bleeding. Erosive Duodenopathy and Small 5mm clean based ulcer (Fabian III-low risk) identified in the duodenal bulb---presumably NSAID induced   1)Moderate amount of gastritis with erythema scattered through out the gastric body and antrum. The stomach was free of any high risk bleeding lesions and empty on examination. 2)Moderate duodenitis present predominantly in the duodenal bulb, with moderate amount of surrounding erythema with out hyperemia. Small superficial erosion 2mm and 3mm identified in the duodenal bulb with out high risk features. Small 5mm clean based (\"punched out\" appearance) duodenal bulb ulcer(Fabian III), no high risk features, no visible vessel, no clot, appears to be healing. Due to patients history of CAD and need for surgery cardiology was consulted for clearance of which was given. Post operative course included chest pain relieved by nitroglycerin. Cariology revisited patient and performed cardiac catheterization. Cath revealed multivessel coronary artery disease with preserved EF. We've been aske to evaluate for CABG. I'm seeing patient in consult with Dr. Jose Alejandro Rajput. All films and records available have been reviewed. Review of Systems:     General Denies any fever or chills  HEENT Denies any diplopia, tinnitus or vertigo  Resp Denies any shortness of breath, cough or wheezing  Cardiac positive for  chest pain, fatigue  GI Positive for GI bleeding and vomiting   Denies any frequency, urgency, hesitancy or incontinence  Heme Denies bruising or bleeding easily  Endocrine Denies any history of diabetes or thyroid disease  Neuro Denies any focal motor or sensory deficits    Past Medical History         Diagnosis Date    CAD (coronary artery disease)     dr Lola Long cc 7/11/18    Carotid artery stenosis     COPD (chronic obstructive pulmonary disease) [address] [address]     H/O cardiac catheterization 8/20/15    LMCA:Lesion on LMCA: Distal subsection. 40% stenosis. LCx:Lesion on Prox CX: Ostial. 50% stenosis. RCA: Lesion on Prox RCA: Ostial. 95% stenosis. Lesion on Prox RCA: Proximal subsection. 75% stenosis. Lesion on R PDA: Ostial. 705 stenosis. EF 60%.  H/O cardiovascular stress test 8/17/15    Abnormal. Moderate perfusion defect of moderate intensity in the inferolateral, inferior, and inferoapical regions during stress imaging, most consistent with ischemia. Global LV systolic function was abnormal with EF: 42% w/regional wall motion abnormalities.  Results are most consistent with an intermediate to high risk for significant CAD.     H/O cardiovascular stress test 08/03/2016    Abnormal myocardial perfusion study, large perfusion defect of moderate-severe intensity in inferolateral, inferior, inferoseptal, apical and inferoapical regions during stress and rest imaging, most consistant with old myocardial infarction with hayden-infarct ischemia, EF 44%, Overall results most consistent with intermediate-high risk for CAD, Add. testing including cardiac cath maybe indicated     H/O cardiovascular stress test 07/05/2018    Abnormal myocardial perfusion study. There is a moderate perfusion defect of moderaete intensity in the inferolateral, inferior and inferoseptal regions during stress and rest imaging which is most consistent with an old MI with hayden-infact ischema. EF 43%. Results are most consistant with an intermediate risk for reverdaibel CAD    H/O echocardiogram 12/4/15    EF 55%. Mildly increased LV wall thickness. Left atrium is mildly dilated (29-33) left atrial volume index of 32 ml/m2. aortic leaflet calcification with mild aortic stenosis. Moderate aortic regurgitation. Mild (grade l) diastolic dysfunction    H/O echocardiogram 07/05/2018    EF 55%. The LV wall thickness is mildly increased. Mild hopkinesis of the inferolateral wall. The left atrium is mildly dilated (29-33) with a left atrial volume index of 29ml/m2. Mild aortic stenosis, mean gradient 17 mmHg. Mild to moderate aortic regurg. Mild diastiolic dysfucntion.  History of Holter monitoring 10/30/2017    Rare PAC's with 2 atrial runs, the longest being 7 beats in duration at 171 bpm and most consistent with a short run of atrial fibrillation.  Hyperlipidemia     Hypertension     MI (myocardial infarction) ([address]     Peripheral vascular disease ([address]     S/P angioplasty with stent 8/24/15    PCI to ostial lesion and balloon angioplasy to proximal lesion.  Distal RCA lesion had total occlusion after balloon angioplasty    Unspecified cerebral artery occlusion with cerebral infarction     TIA     Past Surgical History         Procedure Laterality Date    CARDIAC CATHETERIZATION Left 8/20/2015    right radial/Kimberly Salazar/Dr Cohen    CARDIAC CATHETERIZATION  06/29/2020    DR Garcia [place]    CAROTID ENDARTERECTOMY Bilateral 1995, 2002    CAROTID ENDARTERECTOMY Left 11/13/2017    LEFT CAROTID PSEUDOANEURYSM REPAIR WITH [id number] AND [id number] VIABAHN STENTS, LEFT CAROTID ANGIOGRAM, SAPHENOUS VEIN GRAFT BYPASS performed by Kirsten Johnson DO at [place] [id number] ANGIOPLASTY  08/24/15    balloon, stentx1 dr Chelsi Ford cardilogist,clearance 7/11/18    DIAGNOSTIC CARDIAC CATH LAB PROCEDURE  08/20/15    ENDOSCOPY, COLON, DIAGNOSTIC      KNEE SURGERY  2013    LEG TENDON SURGERY      R    OTHER SURGICAL HISTORY Left 11/13/2017    REPAIR CAROTID ANEURYSM WITH LT SAPHENOUS VEIN GRAFT    OTHER SURGICAL HISTORY Right 07/23/2018     CAROTID ARTERY RESECTION OF  PSEUDO ANEURISM    IL THROMBOENDARTECTMY Andressa Niño Right 7/23/2018    RSECTION OF RIGHT CAROTID PSEUDOANEURYSM, WITH PATCH GRAFT (Talia Height) ANGIOPLASTY performed by Maricruz Andre DO at [facility]  06/26/2020    UPPER GASTROINTESTINAL ENDOSCOPY Left 6/26/2020    **CASE IN OR WITH GI STAFF** EGD DIAGNOSTIC ONLY performed by Shelly Pérez MD at [facility]       Medications     Prior to Admission medications    Medication Sig Start Date End Date Taking? Authorizing Provider   isosorbide mononitrate (IMDUR) 60 MG extended release tablet Take 30 mg by mouth daily 4/12/19  Yes Historical Provider, MD   metoprolol succinate (TOPROL XL) 25 MG extended release tablet Take 12.5 mg by mouth daily 9/24/18  Yes Historical Provider, MD   rosuvastatin (CRESTOR) 40 MG tablet TAKE ONE TABLET EACH EVENING. 6/16/20   Damon Sousa MD   prasugrel (EFFIENT) 10 MG TABS TAKE ONE TABLET DAILY.  6/16/20   Damon Sousa MD   amLODIPine (NORVASC) 10 MG tablet Take 1 tablet by mouth daily 10/21/19   Damon Sousa MD   lisinopril (PRINIVIL;ZESTRIL) 20 MG tablet Take 1 tablet by mouth daily 10/21/19   Damon Sousa MD   ranolazine (RANEXA) 500 MG extended release tablet Take 1 tablet by mouth 2 times daily 6/12/19   Damon Sousa MD   ranolazine (RANEXA) 500 MG extended release tablet Take 1 tablet by mouth 2 times daily for 28 days 4/11/19 5/9/19  Damon Sousa MD   albuterol (PROVENTIL) (2.5 MG/3ML) 0.083% nebulizer solution Take 3 mLs by nebulization every 6 hours as needed for Wheezing or Shortness scars from previous surgery bilateral bruit  LUNGS: positive findings: prolonged expiration, clear to IPPA  CARDIOVASCULAR: Heart sounds are normal.  Regular rate and rhythm + systolic murmur 2nd RUSB  ABDOMEN: soft, nontender, nondistended, no masses or organomegaly, no hernias palpable, no guarding or peritoneal signs. Bowel sounds are present in all four quadrants Scars are consistent with previous surgical history. NEUROLOGIC: Awake, alert, oriented to name, place and time. Cranial nerves II-XII are grossly intact. Motor is 5 out of 5 bilaterally. Cerebellar finger to nose, heel to shin intact. Sensory is intact. Babinski down going, Romberg negative, and gait is normal.  EXTREMITIES: no cyanosis, clubbing or edema    LABS:     Recent Labs     06/27/20  0738  06/27/20  1433  06/28/20  0641 06/28/20  1207 06/28/20  2048 06/29/20  0652   WBC 5.9  --   --   --  6.4  --   --  6.3   HGB 11.7*   < >  --    < > 10.2* 10.8* 10.8* 10.7*   HCT 36.9*   < >  --    < > 32.8* 33.8* 34.1* 33.0*   *  --   --   --  129*  --   --  143     --   --   --  139  --   --  137   K 4.2  --   --   --  4.2  --   --  3.9   *  --   --   --  106  --   --  102   CO2 22  --   --   --  22  --   --  28   BUN 10  --   --   --  8  --   --  6*   CREATININE 0.62*  --   --   --  0.63*  --   --  0.51*   MG  --   --  1.7  --   --   --   --   --    CALCIUM 7.5*  --   --   --  7.5*  --   --  7.8*    < > = values in this interval not displayed. No results for input(s): ALKPHOS, ALT, AST, BILITOT, BILIDIR, LABALBU, AMYLASE, LIPASE in the last 72 hours.   U/A:    Lab Results   Component Value Date    COLORU YELLOW 03/12/2019    PROTEINU TRACE 03/12/2019    PHUR 6.0 03/12/2019    WBCUA 0 TO 2 03/12/2019    RBCUA None 03/12/2019    MUCUS NOT REPORTED 03/12/2019    TRICHOMONAS NOT REPORTED 03/12/2019    YEAST NOT REPORTED 03/12/2019    BACTERIA NOT REPORTED 03/12/2019    SPECGRAV 1.010 03/12/2019    LEUKOCYTESUR NEGATIVE 03/12/2019 UROBILINOGEN Normal 03/12/2019    BILIRUBINUR NEGATIVE 03/12/2019    GLUCOSEU NEGATIVE 03/12/2019    AMORPHOUS NOT REPORTED 03/12/2019       RADIOLOGY:     Cardiac Cath, 6/29/2020:  LMCA: Very calcified with haziness  IVUS confirmed at lesat 60-70% stenosis in distal and proximal area  LAD: Mild irregularities 20-30%. Ostial area by IVUS was about 60-70%  stenosis   LCx: Dominant  Proximal 80-90% stenosis  OM1: Minimal disease  RCA: Ostial stent appears extending into aorta  Probably 50% stenosis in proximal RCA stent  Distal is very small, with 100% PDA and minimal flow in PL branches    ECHO, 6/16/2020:  Global left ventricular systolic function appears preserved with an  estimated ejection fraction of 55%. The left ventricular cavity size is within normal limits and the left  ventricular wall thickness is mildly increased. The mean aortic valve gradient is 30 mmHg consistent with moderate aortic  stenosis. Mild aortic insufficiency was seen. Mitral valve sclerosis without stenosis. Evidence of moderate (grade II) diastolic dysfunction is seen. Compared to the previous study of 7/5/18, the patients mean aortic valve  gradient has increased from 17 mmHg to 30 mmHg. Based on the patients moderate to severe aortic stenosis, consider repeat  testing in 1 year or sooner if symptoms of severe disease develop. Carotid US, 6/4/2020:  Occluded right ICA, vertebral, known with collaterals. Disease right CCA. Elevated right ECA velocity. Mild 16-49% stenosis of the left internal carotid artery. Patent stent  left ICA. Ligated left ECA. Assessment:     MVCAD  EF preserved  Aortic stenosis   Severe PVD  Htn  Dyslipidemia  H/O TIA      Plan:     R/B/A of coronary artery bypass grafting discussed with patient and family. He expresses understanding and wishes to proceed with surgical pre op workup. Consult vascular and obtain all old records from Dr. Figueroa Schools office.  Consult GI to confirm okay with current findings and large quantity of heparin for CABG. Vein mapping, CTA neck and chest. All other usual pre op studies. Surgery will be planned once that information is obtained.       Cecilio Duke PA-C  Electronically signed 6/29/2020 at 1:08 PM

## 2020-06-29 NOTE — PROGRESS NOTES
[facility]  Internal Medicine Teaching Residency Program  Inpatient Daily Progress Note  ______________________________________________________________________________    Patient: Faina Thurman  YOB: 1955   UZK:[id number]    Acct: [de-identified]     Room: [facility]/3050-21  Admit date: 6/25/2020  Today's date: 06/29/20  Number of days in the hospital: 4    SUBJECTIVE   Admitting Diagnosis: Bleeding duodenal ulcer  CC: GI bleed. Vitals stable, blood pressure slightly on the lower side. On 3 L nasal cannula. No acute issues overnight. Patient is off the nitro drip, on heparin drip. Plan for cardiac cath today. On aspirin, Plavix 70 mg and Lopressor 12.5 mg twice daily. We will continue PPI drip while the patient is on heparin drip. ROS:  Constitutional:  negative for chills, fevers, sweats  Respiratory:  negative for cough, dyspnea on exertion, hemoptysis, shortness of breath, wheezing  Cardiovascular:  negative for chest pain, chest pressure/discomfort, lower extremity edema, palpitations  Gastrointestinal:  negative for abdominal pain, constipation, diarrhea, nausea, vomiting  Neurological:  negative for dizziness, headache  BRIEF HISTORY     Patient was initially transferred from the different hospital presented with hematemesis and melena. Which is started 3 days before coming to hospital.  He was having massive bleeding so he was transferred to [facility]. On admission was giving 2 units of PRBC and Protonix bolus. Patient was tachycardic and hypotensive so was admitted to the ICU and was given fluid boluses. Patient was alert and oriented x3. Patient was also started on IV ceftriaxone. GI was consulted.   Endoscopy was done on 6/26 showed no active bleeding but erosive duodenal fatigue and small 5 mm clean-based ulcer identified in the duodenal bulb presumably NSAIDs induced and likely source of recent hemorrhage while on epi therapy patient was also found to have moderate gastritis and duodenitis.      Past medical history significant for coronary artery disease with stents and peripheral vascular disease and stroke. Patient is on aspirin and Prasugrel at home. Currently AC/AP on hold. Patient was also found to have elevated troponins and cardiology was consulted recommended further work-up possibilities cardiac catheterization as an outpatient.     Previous Testing:      ECHO 2020: EF 55%, moderate AS with mean gradient 30 mmHg, mild AI, grade II DD.      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 EF 43%.      CATH 10/2/15: Severe RCA ostial, proximal and distal stenosis. PCI with MARY ANN to ostial lesion and balloon angioplasty to proximal lesion. Distal RCA lesion had total occlusion after balloon angioplasty.        OBJECTIVE     Vital Signs:  BP (!) 103/58   Pulse 92   Temp 98.5 °F (36.9 °C) (Temporal)   Resp 13   Ht 6' 2.02\" (1.88 m)   Wt 232 lb (105.2 kg)   SpO2 94%   BMI 29.77 kg/m²     Temp (24hrs), Av.4 °F (36.9 °C), Min:98 °F (36.7 °C), Max:98.8 °F (37.1 °C)    In: 1905   Out: 4000 [Urine:4000]    Physical Exam:  Constitutional: This is a well developed, well nourished, 25-29.9 - Overweight 77y.o. year old male who is alert, oriented, cooperative and in no apparent distress. Head:normocephalic and atraumatic. EENT:  PERRLA. Respiratory: Chest was symmetrical without dullness to percussion. Breath sounds bilaterally were clear to auscultation. Cardiovascular: Regular without murmur, clicks, gallops or rubs. Abdomen: Slightly rounded and soft without organomegaly. No rebound, rigidity or guarding was appreciated. Extremities:  No lower extremity edema, ulcerations, tenderness, varicosities or erythema.     Neurological/Psychiatric: The patient's general behavior, made to ensure the accuracy of this automated transcription, some errors in transcription may have occurred.

## 2020-06-29 NOTE — OP NOTE
[facility]        Date:   6/29/2020  Patient name:  Kris Pena  Date of admission:  6/25/2020  3:14 PM  MRN:   [id number]  YOB: 1955    CARDIAC CATHETERIZATION      Operators:    Laith Oliveira MD    Procedure performed:       [x] Left Heart Catheterization. [] Graft Angiography. [x] Left Ventriculography. [] Right Heart Catheterization. [x] Coronary Angiography. [] Aortic Valve Studies. [] PCI:      [] Other:         Pre Procedure Conscious Sedation Data:    ASA Class:    [] I [] II [x] III [] IV    Mallampati Class:  [] I [x] II [] III [] IV        Indication:  [] STEMI      [] + Stress test  [x] ACS      [x] + EKG Changes  [] Non Q MI       [] Significant Risk Factors  [x] Recurrent Angina             [] Diabetes Mellitus    [] New LBBB      [] Uncontrolled HTN. [] CHF / Low EF changes     [] Abnormal CTA / Ca Score  [] Other:       Procedure:  Access:  [] Femoral  [x] Radial  artery       [x] Right  [] Left    After informed consent was obtained with explanation of the risks and benefits, patient was brought to the cath lab. The right wrist was prepped and draped in sterile fashion. 1% lidocaine was used for local block. The  right radial artery was cannulated with 6  Fr sheath with brisk arterial blood return. A premixed injection of Verapamil, Heparin and Nitrogycerin was injected through the side port. The side port was frequently flushed and aspirated with normal saline. Findings:    LMCA: Very calcified with haziness  IVUS confirmed at lesat 60-70% stenosis in distal and proximal area     LAD: Mild irregularities 20-30%. Ostial area by IVUS was about 60-70%  stenosis     LCx: Dominant  Proximal 80-90% stenosis  OM1: Minimal disease     RCA: Ostial stent appears extending into aorta  Probably 50% stenosis in proximal RCA stent  Distal is very small, with 100% PDA and minimal flow in PL branches      Coronary Tree      Dominance: Mixed     LV Analysis  LV function assessed  Ejection Fraction  +----------------------------------------------------------------------+---+  ! Method                                                                ! EF%! +----------------------------------------------------------------------+---+  ! LV gram                                                               !55 !  +----------------------------------------------------------------------+---+      The LV gram was performed in the ELIZONDO 30 position. LVEF:  55%. Estimated blood loss: 5 ml    Conclusions:  1. Left main and multivessel CAD  2. IVUS confirmed the severity of LM disease with calcification about 60%  3. Preserved LV function     Recommendations:  1. CT surgery consult for urgent surgery   2. Medical Therapy. 3. Risk Factors Modification. 4. Post Cath Protocol    History and Risk Factors    [x] Hypertension     [] Family history of CAD  [x] Hyperlipidemia     [] Cerebrovascular Disease   [] Prior MI       [] Peripheral Vascular disease   [] Prior PCI              [] Diabetes Mellitus    [] Left Main PCI. [] Currently on Dialysis. [] Prior CABG. [] Currently smoker. [] Cardiac Arrest outside of healthcare facility. [] Yes    [x] No        Witnessed     [] Yes   [] No     Arrest after arrival of EMS  [] Yes   [] No     [] Cardiac Arrest at other Facility. [] Yes   [x] No    Pre-Procedure Information. Heart Failure       [] Yes    [x] No        Class  [] I      [] II  [] III    [] IV. New Diagnosis    [] Yes  [] No    HF Type      [] Systolic   [] Diastolic          [] Unknown. Diagnostic Test:   EKG       [x] Normal   [] Abnormal    New antiarrhythmia medications:    [] Yes   [] No   New onset atrial fibrillation / Flutter     [] Yes   [] No   ECG Abnormalities:      [] V. Fib   [] Ester V. Tach           [] NS V. T   [] New LBBB           [] T.  Inv  []  ST dev > 0.5 mm         [] PVC's freq  [] PVC's infrequent    Stress Test Performed: [] Yes    [x] No     Type:     [] Stress Echo   [] Exercise Stress Test (no imaging)      [] Stress Nuclear  [] Stress Imaging     Results   [] Negative   [] Positive        [] Indeterminate  [] Unavailable     If Positive/ Risk / Extent of Ischemia:       [] Low  [] Intermediate         [] High  [] Unavailable      Cardiac CTA Performed:  [] Yes    [x] No      Results   [] CAD   [] Non obstructive CAD      [] No CAD   [] Uncertain      [] Unknown   [] Structural Disease. Pre Procedure Medications: [x] Yes    [] No         [x] ASA  [] Beta Blockers      [] Nitrate  [] Ca Channel Blockers      [] Ranolazine  [] Statin       [x] Plavix/Others antiplatelets      Cierra Matos MD  Fellow, [id number] [facility]            I have reviewed the case / procedure with resident / fellow  I have examined the patient personally  Patient agree with treatment plan, correction innotes was made as appropriate, and discussed final arrangement based on  my evaluation and exam.    Risk and benefit of procedure if planned were explained in details. Procedure was performed by me, with all aspect of the procedure being done using standard protocol. Note was modified based on my own assessment and treatment.     Radha Rene MD  [address] [facility]

## 2020-06-29 NOTE — PROGRESS NOTES
Received post cath procedure to [facility] room 2. Assessment obtained. Restrictions reviewed with patient. Post procedure pathway initiated. 2 site soft , wrist dry and intact. No hematoma noted. Family at side. Patient without complaints.

## 2020-06-30 NOTE — PROGRESS NOTES
Discussed TEG results with perfusionist 2 and 4. They plan to run TEG on there machine today.     Chris Tapia

## 2020-06-30 NOTE — PROGRESS NOTES
3.9    102 103   CO2 22 28 28   BUN 8 6* 6*   CREATININE 0.63* 0.51* 0.55*   GLUCOSE 108* 104* 103*   CALCIUM 7.5* 7.8* 8.0*       LFTS  No results for input(s): ALKPHOS, ALT, AST, BILITOT, BILIDIR, LABALBU in the last 72 hours. AMYLASE/LIPASE/AMMONIA  No results for input(s): AMYLASE, LIPASE, AMMONIA in the last 72 hours. Acute Hepatitis Panel   No results found for: HEPBSAG, HEPCAB, HEPBIGM, HEPAIGM    HCV Genotype   No results found for: HEPATITISCGENOTYPE    HCV Quantitative   No results found for: HCVQNT    LIVER WORK UP:    AFP  No results found for: AFP    Alpha 1 antitrypsin   No results found for: A1A    Anti - Liver/Kidney Ab  No results found for: LIVER-KIDNEYMICROSOMALAB    JEFFERY  No results found for: JEFFERY    AMA  No results found for: MITOAB    ASMA  No results found for: SMOOTHMUSCAB    Ceruloplasmin  No results found for: CERULOPLSM    Celiac panel  No results found for: TISSTRNTIIGG, TTGIGA, IGA    IgG  No results found for: IGG    IgM  No results found for: IGM    GGT   No results found for: LABGGT    PT/INR  No results for input(s): PROTIME, INR in the last 72 hours. Cancer Markers:  CEA:  No results for input(s): CEA in the last 72 hours. Ca 125:  No results for input(s):  in the last 72 hours. Ca 19-9:   No results for input(s):  in the last 72 hours. AFP: No results for input(s): AFP in the last 72 hours. Lactic acid:  No results for input(s): LACTACIDWB in the last 72 hours. Radiology Review:          ENDOSCOPY     EGD     PROCEDURE DATE: 06/26/20     REFERRING PHYSICIAN: Janet Caro MD      PRIMARY CARE PROVIDER: Lori Merchant DO     ATTENDING PHYSICIAN: David Swan MD     PREOPERATIVE DIAGNOSIS: GI bleed.      PROCEDURES:   1) Transoral Upper Endoscopy.      POSTOPERATIVE DIAGNOSIS:      No active bleeding.   Erosive Duodenopathy and Small 5mm clean based ulcer (Fabian III-low risk) identified in the duodenal bulb---presumably NSAID induced    1)Moderate amount of gastritis with erythema scattered through out the gastric body and antrum. The stomach was free of any high risk bleeding lesions and empty on examination. 2)Moderate duodenitis present predominantly in the duodenal bulb, with moderate amount of surrounding erythema with out hyperemia. Small superficial erosion 2mm and 3mm identified in the duodenal bulb with out high risk features. Small 5mm clean based (\"punched out\" appearance) duodenal bulb ulcer(Fabian III), no high risk features, no visible vessel, no clot, appears to be healing. RECOMMENDATIONS:   1) Continue PPI gtt for 24 hrs followed by transition to IV BID. If repeat Hg stable in afternoon, can provide trial of clear liquid diet. Hold NSAID, AP/AC therapy for 24 hrs unless strongly indicated. 2) Return back to medical floor, if patient remains stable for next 12 hrs, can evaluate for transfer out of ICU setting. Will continue to follow.          [facility]     Principal Problem:    Bleeding duodenal ulcer  Active Problems:    GI bleed    Hypotension due to hypovolemia    NSAID induced gastritis  Resolved Problems:    * No resolved hospital problems. *       GI Assessment:    1. GI bleed - EGD 06/26/2020 -erosive duodenum neuropathy with small clean-based ulcer -presumably NSAID induced, moderate gastritis    06/30/2020 - no further signs of GI bleeding. Hemoglobin has remained stable.   -Cardiology planning for open heart surgery      Plan of care:  1. Monitor hemoglobin and hematocrit  2. Continue PPI gtt while patient on heparin drip, then BID for at least 8 weeks   3. Recommend outpatient screening colonoscopy. Last colonoscopy 15 - 20 years ago  4. Patient has Fabian III ulcer - low risk of re-bleeding. Patient had no high risk stigmata at of bleeding at time of index endoscopy. No absolute contraindication CABG/DAPT/AC  5.   Please recall GI if patient has any signs of GI bleeding. GI will sign off  6. Patient to follow-up with GI in outpatient setting    Please feel free to contact me with any questions or concerns. Thank you for allowing me to participate in the care of your patient. JUAN Schmidt CNP on 6/30/2020 at 12:35 PM   THE [facility] [place] [facility]    Please note that this note was generated using a voice recognition dictation software. Although every effort was made to ensure the accuracy of this automated transcription, some errors in transcription may have occurred.

## 2020-06-30 NOTE — CARE COORDINATION
Spoke with office staff. Will contact Dr. Jarocho Yadav office to have records faxed ASAP.       Trinity Erickson

## 2020-06-30 NOTE — PROGRESS NOTES
[facility]  Progress Note                   Date:   6/30/2020  Patient name: Manpreet Russ  Date of admission:  6/25/2020  3:14 PM  MRN:   [id number]  YOB: 1955  PCP: Richard Chen DO    Reason for Admission: GI bleed [K92.2]    Subjective:       Clinical Changes /Abnormalities:  Patient seen and examined up in chair. Patient is s/p cardiac cath 6/29/2020 with MVD referred to CTS for CABG. Undergoing pre-op testing and evaluation. Denies chest pain or SOB. SR on tele  On heparin drip    Review of Systems    Medications:   Scheduled Meds:   sodium chloride flush  10 mL Intravenous 2 times per day    atorvastatin  80 mg Oral Nightly    sodium chloride (PF)  10 mL Intravenous Daily    aspirin  81 mg Oral Daily    metoprolol tartrate  12.5 mg Oral BID    sodium chloride flush  10 mL Intravenous 2 times per day    lidocaine PF  1 mL Intradermal Once     Continuous Infusions:   nitroGLYCERIN 5 mcg/min (06/29/20 1922)    pantoprozole (PROTONIX) infusion 8 mg/hr (06/30/20 0000)    heparin (porcine) 1,200 Units/hr (06/30/20 0730)    sodium chloride 50 mL/hr at 06/27/20 1924     CBC:   Recent Labs     06/28/20  0641  06/29/20  0652 06/29/20  1724 06/29/20  2257 06/30/20  0617   WBC 6.4  --  6.3  --   --  6.6   HGB 10.2*   < > 10.7* 10.9* 9.9* 10.3*   *  --  143  --   --  152    < > = values in this interval not displayed. BMP:    Recent Labs     06/28/20  0641 06/29/20  0652 06/30/20  0617    137 140   K 4.2 3.9 3.9    102 103   CO2 22 28 28   BUN 8 6* 6*   CREATININE 0.63* 0.51* 0.55*   GLUCOSE 108* 104* 103*     Hepatic:No results for input(s): AST, ALT, ALB, BILITOT, ALKPHOS in the last 72 hours. Troponin:   Recent Labs     06/27/20  2045 06/28/20  0641 06/28/20  1207   TROPHS 160* 204* 195*     BNP: No results for input(s): BNP in the last 72 hours. Lipids: No results for input(s): CHOL, HDL in the last 72 hours.     Invalid input(s): LDLCALCU  INR: No results for input(s): INR in the last 72 hours. DIAGNOSTIC DATA  CATH 6/29/2020  Findings:     LMCA: Very calcified with haziness  IVUS confirmed at lesat 60-70% stenosis in distal and proximal area     LAD: Mild irregularities 20-30%. Ostial area by IVUS was about 60-70%  stenosis     LCx: Dominant  Proximal 80-90% stenosis  OM1: Minimal disease     RCA: Ostial stent appears extending into aorta  Probably 50% stenosis in proximal RCA stent  Distal is very small, with 100% PDA and minimal flow in PL branches      Coronary Tree      Dominance: Mixed     LV Analysis  LV function assessed  Ejection Fraction  +----------------------------------------------------------------------+---+  ! Method                                                                !EF%! +----------------------------------------------------------------------+---+  ! LV gram                                                               !55 ! +----------------------------------------------------------------------+---+        The LV gram was performed in the ELIZONDO 30 position. LVEF:  55%.      Estimated blood loss: 5 ml     Conclusions:  1. Left main and multivessel CAD  2. IVUS confirmed the severity of LM disease with calcification about 60%  3. Preserved LV function     Recommendations:  1. CT surgery consult for urgent surgery   2. Medical Therapy. 3. Risk Factors Modification. 4. Post Cath Protocol    ECHO 6/16/2020  Summary  Global left ventricular systolic function appears preserved with an  estimated ejection fraction of 55%. The left ventricular cavity size is within normal limits and the left  ventricular wall thickness is mildly increased. The mean aortic valve gradient is 30 mmHg consistent with moderate aortic  stenosis. Mild aortic insufficiency was seen. Mitral valve sclerosis without stenosis.   Evidence of moderate (grade II) diastolic dysfunction is seen.     Compared to the previous study of 7/5/18, the GI  3. Hypertension:  Controlled.   Continue BB    Electronically signed by JUAN Brothers NP on 6/30/2020 at 12:45 PM  [address]  [phone]

## 2020-06-30 NOTE — PROGRESS NOTES
[facility]  Internal Medicine Teaching Residency Program  Inpatient Daily Progress Note  ______________________________________________________________________________    Patient: Che Lo  YOB: 1955   DNA:[id number]    Acct: [de-identified]     Room: 2018/2018-01  Admit date: 6/25/2020  Today's date: 06/30/20  Number of days in the hospital: 5    SUBJECTIVE   Admitting Diagnosis: Bleeding duodenal ulcer  CC: GI bleed. Vitals and hemodynamically stable   no acute issues overnight vitals stable. Underwent cardiac cath which showed multiple blockages consistent with multivessel coronary disease. CT surgery has been consulted patient will be going for open heart surgery. Hemoglobin is stable. Transfuse if less than 8. ROS:  Constitutional:  negative for chills, fevers, sweats  Respiratory:  negative for cough, dyspnea on exertion, hemoptysis, shortness of breath, wheezing  Cardiovascular:  negative for chest pain, chest pressure/discomfort, lower extremity edema, palpitations  Gastrointestinal:  negative for abdominal pain, constipation, diarrhea, nausea, vomiting  Neurological:  negative for dizziness, headache  BRIEF HISTORY     Patient was initially transferred from the different hospital presented with hematemesis and melena. Which is started 3 days before coming to hospital.  He was having massive bleeding so he was transferred to [place] [id number]. On admission was giving 2 units of PRBC and Protonix bolus. Patient was tachycardic and hypotensive so was admitted to the ICU and was given fluid boluses. Patient was alert and oriented x3. Patient was also started on IV ceftriaxone. GI was consulted.   Endoscopy was done on 6/26 showed no active bleeding but erosive duodenal fatigue and small 5 mm clean-based ulcer identified in the duodenal bulb presumably NSAIDs induced and likely source of recent hemorrhage while on epi therapy patient was also found to have moderate gastritis and duodenitis.      Past medical history significant for coronary artery disease with stents and peripheral vascular disease and stroke. Patient is on aspirin and Prasugrel at home. Currently AC/AP on hold. Patient was also found to have elevated troponins and cardiology was consulted recommended further work-up possibilities cardiac catheterization as an outpatient.     Previous Testing:      ECHO 2020: EF 55%, moderate AS with mean gradient 30 mmHg, mild AI, grade II DD.      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 EF 43%.      CATH 10/2/15: Severe RCA ostial, proximal and distal stenosis. PCI with MARY ANN to ostial lesion and balloon angioplasty to proximal lesion. Distal RCA lesion had total occlusion after balloon angioplasty.        OBJECTIVE     Vital Signs:  BP (!) 121/55   Pulse 72   Temp 98.6 °F (37 °C) (Oral)   Resp 16   Ht 6' 2.02\" (1.88 m)   Wt 231 lb 11.3 oz (105.1 kg)   SpO2 98%   BMI 29.74 kg/m²     Temp (24hrs), Av.7 °F (37.1 °C), Min:98.6 °F (37 °C), Max:98.8 °F (37.1 °C)    In: 3244   Out: 1150 [Urine:1150]    Physical Exam:  Constitutional: This is a well developed, well nourished, 25-29.9 - Overweight 77y.o. year old male who is alert, oriented, cooperative and in no apparent distress. Head:normocephalic and atraumatic. EENT:  PERRLA. Respiratory: Chest was symmetrical without dullness to percussion. Breath sounds bilaterally were clear to auscultation. Cardiovascular: Regular without murmur, clicks, gallops or rubs. Abdomen: Slightly rounded and soft without organomegaly. No rebound, rigidity or guarding was appreciated. Extremities:  No lower extremity edema, ulcerations, tenderness, varicosities or erythema.     Neurological/Psychiatric: The patient's general behavior, level of consciousness, thought content and emotional status is normal.        Medications:  Scheduled Medications:    sodium chloride flush  10 mL Intravenous 2 times per day    atorvastatin  80 mg Oral Nightly    sodium chloride (PF)  10 mL Intravenous Daily    aspirin  81 mg Oral Daily    metoprolol tartrate  12.5 mg Oral BID    sodium chloride flush  10 mL Intravenous 2 times per day    lidocaine PF  1 mL Intradermal Once     Continuous Infusions:    nitroGLYCERIN 5 mcg/min (06/29/20 1922)    pantoprozole (PROTONIX) infusion 8 mg/hr (06/30/20 0000)    heparin (porcine) 1,200 Units/hr (06/30/20 0730)    sodium chloride 50 mL/hr at 06/27/20 1924     PRN Medicationssodium chloride flush, 10 mL, PRN  acetaminophen, 650 mg, Q4H PRN  melatonin, 1 mg, Nightly PRN  nitroGLYCERIN, 0.4 mg, Q5 Min PRN  heparin (porcine), 4,000 Units, PRN  heparin (porcine), 2,000 Units, PRN  sodium chloride flush, 10 mL, PRN  acetaminophen, 650 mg, Q6H PRN    Or  acetaminophen, 650 mg, Q6H PRN  promethazine, 12.5 mg, Q6H PRN    Or  ondansetron, 4 mg, Q6H PRN        Diagnostic Labs:  CBC:   Recent Labs     06/28/20  0641  06/29/20  0652 06/29/20  1724 06/29/20  2257 06/30/20  0617   WBC 6.4  --  6.3  --   --  6.6   RBC 3.26*  --  3.40*  --   --  3.30*   HGB 10.2*   < > 10.7* 10.9* 9.9* 10.3*   HCT 32.8*   < > 33.0* 34.1* 31.7* 33.0*   .6  --  97.1  --   --  100.0   RDW 14.9*  --  14.7*  --   --  14.8*   *  --  143  --   --  152    < > = values in this interval not displayed. BMP:   Recent Labs     06/28/20  0641 06/29/20  0652 06/30/20  0617    137 140   K 4.2 3.9 3.9    102 103   CO2 22 28 28   BUN 8 6* 6*   CREATININE 0.63* 0.51* 0.55*     BNP: No results for input(s): BNP in the last 72 hours. PT/INR:   No results for input(s): PROTIME, INR in the last 72 hours.   APTT:   Recent Labs     06/29/20  0109 06/29/20  0652 06/30/20  0617   APTT 56.2* 49.5* 45.8*     CARDIAC ENZYMES: No results for input(s): CKMB, CKMBINDEX, TROPONINI in the last 72 hours. Invalid input(s): CKTOTAL;3  FASTING LIPID PANEL:  Lab Results   Component Value Date    CHOL 123 09/10/2018    HDL 53 09/10/2018    TRIG 67 09/10/2018     LIVER PROFILE:   No results for input(s): AST, ALT, ALB, BILIDIR, BILITOT, ALKPHOS in the last 72 hours. MICROBIOLOGY:   Lab Results   Component Value Date/Time    CULTURE NO GROWTH 5 DAYS 06/25/2020 07:22 PM       Imaging:    No results found. ASSESSMENT & PLAN     ASSESSMENT / PLAN:        1. Upper GI bleed. Resolved, hemoglobin stable at 10.3. Continue Protonix drip until patient is on heparin drip. Monitor H/H.    2.   History of coronary artery disease with stents. EKG indicates posterior MI cardiology resumed aspirin, Plavix and on heparin drip. Cardiac cath done which showed multivessel coronary disease. Patient will be going for CABG consulted CT surgery.     3. Essential hypertension: Currently on Lopressor 12.5 mg twice daily.      4. Hyperlipidemia. Stable     5. Bilateral carotid artery disease. Stable.       6. Peripheral vascular disease. Status post stents 8/21/2019. Follows up with Dr. Yaa Andrade.     7. SUREKHA. Resolved. Likely prerenal.     DVT prophylaxis. On heparin drip  GI prophylaxis. On Protonix  PT OT consulted  Discharge planning as per case management    John Salgado MD  Internal Medicine Resident, PGY-1  [address] [facility]  6/30/2020, 11:25 AM   Attending Physician Statement  I have discussed the care of Chula Watt, including pertinent history and exam findings,  with the resident. I have seen and examined the patient and the key elements of all parts of the encounter have been performed by me. I agree with the assessment, plan and orders as documented by the resident with additions . Treatment plan Discussed with nursing staff in detail , all questions answered .    Electronically signed by Jimy Melvin MD on   6/30/20 at 9:58 PM EDT    Please note that this chart was generated using voice recognition Dragon dictation software. Although every effort was made to ensure the accuracy of this automated transcription, some errors in transcription may have occurred.

## 2020-06-30 NOTE — PLAN OF CARE
Problem: Falls - Risk of:  Goal: Will remain free from falls  Description: Will remain free from falls  6/30/2020 0138 by Nimo Macdonald RN  Outcome: Ongoing  6/29/2020 1243 by Lata Campos RN  Outcome: Met This Shift  Goal: Absence of physical injury  Description: Absence of physical injury  6/30/2020 0138 by Nimo Macdonald RN  Outcome: Ongoing  6/29/2020 1243 by Lata Campos RN  Outcome: Met This Shift     Problem: HEMODYNAMIC STATUS  Goal: Patient has stable vital signs and fluid balance  6/30/2020 0138 by Nimo Macdonald RN  Outcome: Ongoing  6/29/2020 1243 by Lata Campos RN  Outcome: Met This Shift     Problem: Bleeding:  Goal: Will show no signs and symptoms of excessive bleeding  Description: Will show no signs and symptoms of excessive bleeding  6/30/2020 0138 by Nimo Macdonald RN  Outcome: Ongoing  6/29/2020 1243 by Lata Campos RN  Outcome: Met This Shift     Problem: COMMUNICATION IMPAIRMENT  Goal: Ability to express needs and understand communication  6/30/2020 0138 by Nimo Macdonald RN  Outcome: Ongoing  6/29/2020 1243 by Lata Campos RN  Outcome: Met This Shift     Problem: Discharge Planning:  Goal: Discharged to appropriate level of care  Description: Discharged to appropriate level of care  Outcome: Ongoing     Problem: Cardiac Output - Decreased:  Goal: Avoidance of environmental tobacco smoke  Description: Absence of orthostatic hypotension  Outcome: Ongoing  Goal: Cardiac output within specified parameters  Description: Cardiac output within specified parameters  Outcome: Ongoing  Goal: Hemodynamic stability will improve  Description: Hemodynamic stability will improve  Outcome: Ongoing     Problem: Fluid Volume - Imbalance:  Goal: Absence of imbalanced fluid volume signs and symptoms  Description: Absence of imbalanced fluid volume signs and symptoms  Outcome: Ongoing     Problem: Musculor/Skeletal Functional Status  Goal: Highest potential functional level  Outcome: Ongoing

## 2020-07-01 NOTE — PROGRESS NOTES
[facility]  Internal Medicine Teaching Residency Program  Inpatient Daily Progress Note  ______________________________________________________________________________    Patient: Jay Epp  YOB: 1955   MJJ:[id number]    Acct: [de-identified]     Room: 2018/2018-01  Admit date: 6/25/2020  Today's date: 07/01/20  Number of days in the hospital: 6    SUBJECTIVE   Admitting Diagnosis: Bleeding duodenal ulcer  CC: GI bleed. Vitals stable, on 3 L nasal cannula saturating 100%. On nitro drip and heparin drip. Multivessel CAD on cardiac cath, consulted CT surgery for possible CABG. Patient denies any chest pain, shortness of breath or any symptoms at this point of time. Hemoglobin 9.8,.GI signed off, recommend outpatient colonoscopy and continue IV Protonix drip till the patient is on heparin drip and then Protonix twice daily for 8 weeks. CTA head neck with contrast:  Occluded right cervical ICA and proximal right vertebral artery.       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       Severe to critical stenoses of the proximal left vertebral artery. ROS:  Constitutional:  negative for chills, fevers, sweats  Respiratory:  negative for cough, dyspnea on exertion, hemoptysis, shortness of breath, wheezing  Cardiovascular:  negative for chest pain, chest pressure/discomfort, lower extremity edema, palpitations  Gastrointestinal:  negative for abdominal pain, constipation, diarrhea, nausea, vomiting  Neurological:  negative for dizziness, headache  BRIEF HISTORY     Patient was initially transferred from the different hospital presented with hematemesis and melena. Which is started 3 days before coming to hospital.  He was having massive bleeding so he was transferred to [facility]. On admission was giving 2 units of PRBC and Protonix bolus.   Patient was tachycardic and

## 2020-07-01 NOTE — PLAN OF CARE
Problem: Falls - Risk of:  Goal: Will remain free from falls  Description: Will remain free from falls  7/1/2020 0424 by Anita Zuniga RN  Outcome: Ongoing     Problem: Falls - Risk of:  Goal: Absence of physical injury  Description: Absence of physical injury  7/1/2020 0424 by Anita Zuniga RN  Outcome: Ongoing     Problem: HEMODYNAMIC STATUS  Goal: Patient has stable vital signs and fluid balance  Outcome: Ongoing     Problem: Bleeding:  Goal: Will show no signs and symptoms of excessive bleeding  Description: Will show no signs and symptoms of excessive bleeding  Outcome: Ongoing

## 2020-07-01 NOTE — CARE COORDINATION
TRANSITIONAL CARE PLANNING/ 2 Rehab [facility] Day: 6    Reason for Admission: GI bleed [K92.2]     Treatment Plan of Care: IV Heparin Gtt, IV Nitro gtt    Tests/Procedures still needed: CABG    Barriers to Discharge: CABG    Readmission Risk              Risk of Unplanned Readmission:        17            Patient goals/Treatment Preferences/Transitional Plan: talked with patient-plan is to go home with his S.O.  To have CABG    Referrals Made: none    Follow Up needed:

## 2020-07-01 NOTE — PROGRESS NOTES
Physical Examination      Vitals:  BP (!) 132/49   Pulse 77   Temp 97.4 °F (36.3 °C) (Oral)   Resp 15   Ht 6' 2.02\" (1.88 m)   Wt 230 lb 9.6 oz (104.6 kg)   SpO2 96%   BMI 29.59 kg/m²   Temp (24hrs), Av °F (36.7 °C), Min:97.4 °F (36.3 °C), Max:98.3 °F (36.8 °C)      I/O (24Hr): Intake/Output Summary (Last 24 hours) at 2020 0932  Last data filed at 2020 8539  Gross per 24 hour   Intake 1287 ml   Output 2530 ml   Net -1243 ml       CONSTITUTIONAL:  awake, alert, cooperative, no apparent distress, and appears stated age  LUNGS:  No increased work of breathing, good air exchange, clear to auscultation bilaterally, no crackles or wheezing  CARDIOVASCULAR:  normal S1 and S2, murmurs include systolic murmur II/VI located at right upper sternal border without radiation and no edema  ABDOMEN normal bowel sounds, soft, non-distended, non-tender, no masses palpated, no hepatosplenomegally  MUSCULOSKELETAL:  There is no redness, warmth, or swelling of the joints. Full range of motion noted. Motor strength is 5 out of 5 all extremities bilaterally. Tone is normal.    Labs/Imaging/Diagnostics   Labs:  CBC:  Recent Labs     20  0652  20  1804 20  06   WBC 6.3  --  6.6  --  5.7   RBC 3.40*  --  3.30*  --  3.13*   HGB 10.7*   < > 10.3* 11.0* 9.8*   HCT 33.0*   < > 33.0* 34.7* 30.3*   MCV 97.1  --  100.0  --  96.8   RDW 14.7*  --  14.8*  --  14.6*     --  152  --  167    < > = values in this interval not displayed. CHEMISTRIES:  Recent Labs     20  0652 2017 20  0612    140 141   K 3.9 3.9 3.8    103 102   CO2 28 28 29   BUN 6* 6* 9   CREATININE 0.51* 0.55* 0.58*   GLUCOSE 104* 103* 100*     PT/INR:No results for input(s): PROTIME, INR in the last 72 hours.   APTT:  Recent Labs     20  1531 20  2240 20  0612   APTT 47.1* 73.2* 56.1*     LIVER PROFILE:No results for input(s): AST, ALT, BILIDIR, BILITOT, ALKPHOS in the last 72 hours. Imaging Last 24 Hours:  Cta Head Neck W Contrast    Result Date: 6/30/2020  EXAMINATION: CTA OF THE HEAD AND NECK WITH CONTRAST 6/30/2020 1:07 pm: TECHNIQUE: CTA of the head and neck was performed with the administration of intravenous contrast. Multiplanar reformatted images are provided for review. MIP images are provided for review. Stenosis of the internal carotid arteries measured using NASCET criteria. Dose modulation, iterative reconstruction, and/or weight based adjustment of the mA/kV was utilized to reduce the radiation dose to as low as reasonably achievable. COMPARISON: February 9, 2018 HISTORY: ORDERING SYSTEM PROVIDED HISTORY: pre op cabg severe carotid disease h/o bilateral CEA TECHNOLOGIST PROVIDED HISTORY: pre op cabg severe carotid disease h/o bilateral CEA Reason for Exam: pre op cabg sever carotid disease h/o bilateral CEA Acuity: Unknown Type of Exam: Unknown FINDINGS: CTA NECK: AORTIC ARCH/ARCH VESSELS: No dissection or arterial injury. No significant stenosis of the brachiocephalic or subclavian arteries. CAROTID ARTERIES: A left-sided carotid stent is present, without significant intrastent stenosis. The left ICA is normal in course and caliber. There are moderate to severe stenoses of the right common carotid artery. A focal bulge off of the mid right common carotid artery measuring up to 6.0 mm in diameter is consistent with an aneurysm or pseudoaneurysm. The right cervical internal carotid artery is completely occluded. VERTEBRAL ARTERIES: The proximal right vertebral artery is occluded. There is reconstitution of flow within the mid V2 segment, likely filling in retrograde fashion. A bulge off of the V3 segment of the right vertebral artery measuring up to 4.7 mm in diameter is consistent with an aneurysm or pseudoaneurysm. There are severe to critical stenoses of the proximal left vertebral artery.  SOFT TISSUES: There is pulmonary emphysema and Status: In Patient. Conclusions   Summary   No DVT in the bilateral common femoral veins. Vein sizes are listed in the  table above. Signature   ----------------------------------------------------------------  Electronically signed by Vannesa Dang RVT, RDMS(Sonographer) on 06/30/2020 11:56 AM  ----------------------------------------------------------------   ----------------------------------------------------------------  Electronically signed by Luevenia Litten Reyes,Arthur(Interpreting  physician) on 06/30/2020 10:05 PM  ----------------------------------------------------------------  Findings:   Right Impression:                   Left Impression:  Common femoral and femoral veins    Common femoral and femoral veins are  are compressible. compressible. The proximal great saphenous vein is                                      surgically absent. Risk Factors History +----------------+----------+----------------------------------------------+ ! Diagnosis       ! Date      ! Comments                                      ! +----------------+----------+----------------------------------------------+ ! Other           ! !H/O 2 CEA's on both sides; H/O CVA; expressive! !                !          !aphasia; blurred vision                       ! +----------------+----------+----------------------------------------------+ ! Carotid         ! !Patient S/P bilateral carotid endarterectomy  ! ! Endarterectomy  !          !                                              ! +----------------+----------+----------------------------------------------+ ! Other           !11/13/2017! Lt. common carotid artery covered stent       ! !                !          !placement, excision of Lt. carotid artery     ! !                !          !pseudoaneurysm,                               ! !                !          !reconstruction of Lt. common carotid to Lt.    ! ! !          !internal carotid artery; Left GSV used        ! +----------------+----------+----------------------------------------------+ ! Other           !05/29/2018! Lt Carotid wall stent with post dilation. ! +----------------+----------+----------------------------------------------+ ! Other           !07/25/2018! Resection Rt. Carotid pseudoaneurysm, patch   ! !                !          !angioplasty of Rt. carotid with bovine        ! !                !          !periocardial patch. ! +----------------+----------+----------------------------------------------+   - The patient's risk factor(s) include: chronic lung disease,     dyslipidemia, obesity, lack of physical activity, treated arterial     hypertension and prior MI .   - Current - Every day. - The patient's last creatinine was 0.5 mg/dl. Allergies   - Allergy:*No Known Allergies(Miscellaneous). Velocities are measured in cm/s ; Diameters are measured in cm +--------------------------------------++--------+-----+----+--------+-----+ ! Superficial - Great Saphenous Vein    ! ! Right   ! ! Left!        !     ! +--------------------------------------++--------+-----+----+--------+-----+ ! Location                              ! !Diameter! Depth! !Diameter! Depth! +--------------------------------------++--------+-----+----+--------+-----+ ! Sapheno Femoral Junction              ! !0.64    !     !    !        !     ! +--------------------------------------++--------+-----+----+--------+-----+ ! GSV Mid Thigh                         !!0.26    !     !    !0.23    !     ! +--------------------------------------++--------+-----+----+--------+-----+ ! GSV Knee                              !!0.25    !     !    !0.26    !     ! +--------------------------------------++--------+-----+----+--------+-----+ ! GSV High Calf                         !!0.2     !     !    !0.25    !     ! +--------------------------------------++--------+-----+----+--------+-----+ ! GSV Low Calf                          !!0.28    !     !    !0.31    !     ! +--------------------------------------++--------+-----+----+--------+-----+ ! GSV Ankle                             !!0.28    !     !    !0.37    !     ! +--------------------------------------++--------+-----+----+--------+-----+        Assessment        Hospital Problems           Last Modified POA    * (Principal) Bleeding duodenal ulcer 6/26/2020 Yes    GI bleed 6/25/2020 Yes    Hypotension due to hypovolemia 6/25/2020 Yes    NSAID induced gastritis 6/26/2020 Yes          Plan:        Continue pre op workup. Appreciate GI input. Continues to have dark stools. Hgb drop from 11 to 9 watch for now  Still obtaining more records from vascular  Vascular input pending  TEG showed no inhibition  Vein mapping complete good conduit  Surgical time will be determined once cleared by consultants and all studies back  CT scan chest without contrast    Agree with the above   On blood thinner awaiting wash-out period  GI to clear him from GI bleeding risk  Plan for CABG and possible AVR  Risk, benefits, and the intentions were discussed with the patient.         Electronically signed by ROBIN Botello on 7/1/20 at 9:32 AM EDT

## 2020-07-01 NOTE — PROGRESS NOTES
Occupational Therapy  Facility/Department: [facility] 2  Daily Treatment Note  NAME: Kris Pena  : 1955  MRN: [id number]    Date of Service: 2020    Discharge Recommendations: Further therapy recommended at discharge. Assessment   Performance deficits / Impairments: Decreased functional mobility ; Decreased ADL status; Decreased endurance;Decreased high-level IADLs  Assessment: Pt completed ADL tasks with Min-SBA, limited by decreased endurance. RW used. Pt would benefit from continued OT services during acute hosptilization to maximize safety and increase endurance and independence in ADL/functional mobility tasks. Pt reports having good home support to assist PRN. Treatment Diagnosis: Awaiting OHS  Prognosis: Good  REQUIRES OT FOLLOW UP: Yes  Activity Tolerance  Activity Tolerance: Patient Tolerated treatment well  Safety Devices  Safety Devices in place: Yes  Type of devices: Call light within reach; Left in chair;Nurse notified         Patient Diagnosis(es): There were no encounter diagnoses. has a past medical history of CAD (coronary artery disease), Carotid artery stenosis, COPD (chronic obstructive pulmonary disease) ([address] H/O cardiac catheterization, H/O cardiovascular stress test, H/O cardiovascular stress test, H/O cardiovascular stress test, H/O echocardiogram, H/O echocardiogram, History of Holter monitoring, Hyperlipidemia, Hypertension, MI (myocardial infarction) ([address] Peripheral vascular disease ([address] S/P angioplasty with stent, and Unspecified cerebral artery occlusion with cerebral infarction. has a past surgical history that includes knee surgery (); Carotid endarterectomy (Bilateral, , ); Diagnostic Cardiac Cath Lab Procedure (08/20/15); Leg Tendon Surgery; other surgical history (Left, 2017); Carotid endarterectomy (Left, 2017); Cardiac catheterization (Left, 2015);  Coronary angioplasty (08/24/15); other surgical history (Right, 07/23/2018); Endoscopy, colon, diagnostic; pr thromboendartectmy neck,neck incis (Right, 7/23/2018); Upper gastrointestinal endoscopy (06/26/2020); Upper gastrointestinal endoscopy (Left, 6/26/2020); and Cardiac catheterization (06/29/2020). Restrictions  Restrictions/Precautions  Restrictions/Precautions: Cardiac, General Precautions, Up as Tolerated  Required Braces or Orthoses?: No  Position Activity Restriction  Other position/activity restrictions: Activity as tolerated  Subjective   General  Chart Reviewed: Orders, Progress Notes, History and Physical, Labs, Imaging  Patient assessed for rehabilitation services?: Yes  Family / Caregiver Present: No  General Comment  Comments: Rn ok'd for therapy visit this date. Pt agreeable to session, pleasent/cooperative throughout. Vital Signs  Patient Currently in Pain: Denies   Orientation     Objective    ADL  Feeding: Independent  Grooming: Stand by assistance;Minimal assistance;Setup; Increased time to complete(Min-wash hair, SBA-wash face, dry/comb hair, oral care)  UE Bathing: Minimal assistance;Setup; Increased time to complete(w/back)  LE Bathing: Stand by assistance;Setup; Increased time to complete(able to wash BLE to feet while seated in chair)  UE Dressing: Minimal assistance;Setup; Increased time to complete(w/gown)  LE Dressing: Stand by assistance;Setup; Increased time to complete(w/footies)  Toileting: Stand by assistance;Setup; Increased time to complete(using urinal, able to complete hayden care while standing)      Pt setup at tray table for self care using sx soap(see above for LOF). Pt wore O2 thoughout tx. Call light and phone in reach at end of tx.       Balance  Sitting Balance: Stand by assistance(seated in recliner, unsupported)  Standing Balance: Stand by assistance  Standing Balance  Time: Pt stood for approx 5 min for hayden care and to change linen in chair  Comment: No LOB     Transfers  Stand Step Transfers: Stand by assistance  Sit to stand: Stand by assistance  Stand to sit: Stand by assistance  Transfer Comments: No LOB      Plan   Plan  Times per week: 1-2x/week  Current Treatment Recommendations: Strengthening, Endurance Training, Patient/Caregiver Education & Training, Equipment Evaluation, Education, & procurement, Self-Care / ADL, Functional Mobility Training, Safety Education & Training     Goals  Short term goals  Time Frame for Short term goals: By discharge:  Short term goal 1: Pt will complete all functional mobility/transfers with Mod I, use of AD PRN  Short term goal 2: PT will complete all ADLs with Mod I, use of AE/AD PRN  Short term goal 3: Pt will demonstrate ~20 minutes of dynmaic standing tolerance during ADL/functional tasks   Short term goal 4: Pt will demonstrate good safety awareness during ADL/functional tasks to maximize safety      Therapy Time   Individual Concurrent Group Co-treatment   Time In  1335         Time Out  1445         Minutes  70 total tx time                 [address] [place]

## 2020-07-01 NOTE — PROGRESS NOTES
[facility]  Progress Note                   Date:   7/1/2020  Patient name: Concetta Vu  Date of admission:  6/25/2020  3:14 PM  MRN:   [id number]  YOB: 1955  PCP: Waldo Dumont DO    Reason for Admission: GI bleed [K92.2]    Subjective:       Clinical Changes /Abnormalities:  Patient seen and examined up in chair in room after discussion with RN. Patient is s/p cardiac cath 6/29/2020 with MVD referred to CTS for CABG. Undergoing pre-op testing and evaluation. Denies chest pain or SOB. SR on tele  HR 78. On heparin drip and nitroglycerine drip. Awaiting CTS plans and timing. Review of Systems    Medications:   Scheduled Meds:   sodium chloride flush  10 mL Intravenous 2 times per day    atorvastatin  80 mg Oral Nightly    sodium chloride (PF)  10 mL Intravenous Daily    aspirin  81 mg Oral Daily    metoprolol tartrate  12.5 mg Oral BID    sodium chloride flush  10 mL Intravenous 2 times per day    lidocaine PF  1 mL Intradermal Once     Continuous Infusions:   nitroGLYCERIN 5 mcg/min (06/29/20 1922)    heparin (porcine) 12 Units/kg/hr (06/30/20 2311)    sodium chloride 50 mL/hr at 06/27/20 1924     CBC:   Recent Labs     06/29/20  0652  06/30/20  0617 06/30/20  1804 07/01/20  0612   WBC 6.3  --  6.6  --  5.7   HGB 10.7*   < > 10.3* 11.0* 9.8*     --  152  --  167    < > = values in this interval not displayed. BMP:    Recent Labs     06/29/20  0652 06/30/20  0617 07/01/20  0612    140 141   K 3.9 3.9 3.8    103 102   CO2 28 28 29   BUN 6* 6* 9   CREATININE 0.51* 0.55* 0.58*   GLUCOSE 104* 103* 100*     Hepatic:No results for input(s): AST, ALT, ALB, BILITOT, ALKPHOS in the last 72 hours. Troponin:   Recent Labs     06/28/20  1207   TROPHS 195*     BNP: No results for input(s): BNP in the last 72 hours. Lipids: No results for input(s): CHOL, HDL in the last 72 hours.     Invalid input(s): LDLCALCU  INR: No results for input(s): INR in the last 72 hours. DIAGNOSTIC DATA  CATH 6/29/2020  Findings:     LMCA: Very calcified with haziness  IVUS confirmed at lesat 60-70% stenosis in distal and proximal area     LAD: Mild irregularities 20-30%. Ostial area by IVUS was about 60-70%  stenosis     LCx: Dominant  Proximal 80-90% stenosis  OM1: Minimal disease     RCA: Ostial stent appears extending into aorta  Probably 50% stenosis in proximal RCA stent  Distal is very small, with 100% PDA and minimal flow in PL branches      Coronary Tree      Dominance: Mixed     LV Analysis  LV function assessed  Ejection Fraction  +----------------------------------------------------------------------+---+  ! Method                                                                !EF%! +----------------------------------------------------------------------+---+  ! LV gram                                                               !55 ! +----------------------------------------------------------------------+---+        The LV gram was performed in the ELIZONDO 30 position. LVEF:  55%.      Estimated blood loss: 5 ml     Conclusions:  1. Left main and multivessel CAD  2. IVUS confirmed the severity of LM disease with calcification about 60%  3. Preserved LV function     Recommendations:  1. CT surgery consult for urgent surgery   2. Medical Therapy. 3. Risk Factors Modification. 4. Post Cath Protocol    ECHO 6/16/2020  Summary  Global left ventricular systolic function appears preserved with an  estimated ejection fraction of 55%. The left ventricular cavity size is within normal limits and the left  ventricular wall thickness is mildly increased. The mean aortic valve gradient is 30 mmHg consistent with moderate aortic  stenosis. Mild aortic insufficiency was seen. Mitral valve sclerosis without stenosis.   Evidence of moderate (grade II) diastolic dysfunction is seen.     Compared to the previous study of 7/5/18, the patients mean aortic valve  gradient has increased from 17 mmHg to 30 mmHg. Based on the patients moderate to severe aortic stenosis, consider repeat  testing in 1 year or sooner if symptoms of severe disease develop.       Objective:   Vitals: BP (!) 132/49   Pulse 77   Temp 97.4 °F (36.3 °C) (Oral)   Resp 15   Ht 6' 2.02\" (1.88 m)   Wt 230 lb 9.6 oz (104.6 kg)   SpO2 96%   BMI 29.59 kg/m²   General appearance: alert and cooperative with exam  HEENT: Head: Normocephalic, no lesions, without obvious abnormality. Neck:no JVD, trachea midline, no adenopathy  Lungs: clear to ausculation. Heart: Regular rate and rhythm, s1/s2 auscultated, + murmurs  SR on tele HR 78  Abdomen: soft, non-tender, bowel sounds active  Extremities: no edema  Neurologic: not done  Right Radial Artery site:  CDI without ecchymosis or hematoma. +pulse      Assessment / Acute Cardiac Problems:   1. Recurrent Angina  2. Elevated troponin  3. CAD s/p PCI to RCA on 8/24/2015  4. Acute upper GI bleed managed by GI  5. HLD  6. HTN  7. Carotid artery disease s/p carotid endarterectomy  8. Smoker  9. PVD  10. COPD    Patient Active Problem List:     Gross hematuria     Benign non-nodular prostatic hyperplasia with lower urinary tract symptoms     Carotid pseudoaneurysm (HCC)     COPD exacerbation (HCC)     CAD (coronary artery disease)     COPD (chronic obstructive pulmonary disease) [address] [address]     Hypertension     Hyperlipidemia     Peripheral vascular disease (HCC)     Acute respiratory failure with hypoxia (HCC)     Bacterial pneumonia     Hyponatremia     GI bleed     Hypotension due to hypovolemia     Bleeding duodenal ulcer     NSAID induced gastritis      Plan of Treatment:   1. Recurrent angina. S/p cardiac cath with MVD and referral to CTS surgery. Currently undergoing pre-op work up. Denies current chest pain or SOB. Continue ASA, statin, BB nitroglycerine drip, and heparin drip. Awaiting CTS plans and timing. 2. Acute upper GI Bleed managed by GI  3.  Hypertension: Controlled.   Continue BB  4. Keep K >4.0 and Mag >2.0  Will order Magnesium labwork    Electronically signed by JUAN Fernandes NP on 7/1/2020 at 7:36 AM  [address]  [phone]

## 2020-07-02 NOTE — PROGRESS NOTES
[facility]  Internal Medicine Teaching Residency Program  Inpatient Daily Progress Note  ______________________________________________________________________________    Patient: Jay Epp  YOB: 1955   FTJ:[id number]    Acct: [de-identified]     Room: 2018/2018-01  Admit date: 6/25/2020  Today's date: 07/02/20  Number of days in the hospital: 7    SUBJECTIVE   Admitting Diagnosis: Bleeding duodenal ulcer  CC: GI bleed. Afebrile. Hr 90's  Hemodynamically stable. Saturating well on RA    Currently on heparin drip, protonix drip, and nitroglycerin. Currently undergoing pre-op work up for CABG      ROS:  Constitutional:  negative for chills, fevers, sweats  Respiratory:  negative for cough, dyspnea on exertion, hemoptysis, shortness of breath, wheezing  Cardiovascular:  negative for chest pain, chest pressure/discomfort, lower extremity edema, palpitations  Gastrointestinal:  negative for abdominal pain, constipation, diarrhea, nausea, vomiting  Neurological:  negative for dizziness, headache  BRIEF HISTORY     Patient was initially transferred from the different hospital presented with hematemesis and melena. Which is started 3 days before coming to hospital.  He was having massive bleeding so he was transferred to [facility]. On admission was giving 2 units of PRBC and Protonix bolus. Patient was tachycardic and hypotensive so was admitted to the ICU and was given fluid boluses. Patient was alert and oriented x3. Patient was also started on IV ceftriaxone. GI was consulted.   Endoscopy was done on 6/26 showed no active bleeding but erosive duodenal fatigue and small 5 mm clean-based ulcer identified in the duodenal bulb presumably NSAIDs induced and likely source of recent hemorrhage while on epi therapy patient was also found to have moderate gastritis and duodenitis.      Past medical history significant for coronary artery disease with stents and peripheral vascular disease and stroke. Patient is on aspirin and Prasugrel at home. Currently AC/AP on hold. Patient was also found to have elevated troponins and cardiology was consulted recommended further work-up possibilities cardiac catheterization as an outpatient.     Previous Testing:      ECHO 2020: EF 55%, moderate AS with mean gradient 30 mmHg, mild AI, grade II DD.      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 EF 43%.      CATH 10/2/15: Severe RCA ostial, proximal and distal stenosis. PCI with MARY ANN to ostial lesion and balloon angioplasty to proximal lesion. Distal RCA lesion had total occlusion after balloon angioplasty.        OBJECTIVE     Vital Signs:  BP (!) 116/43   Pulse 74   Temp 97.9 °F (36.6 °C) (Oral)   Resp 14   Ht 6' 2.02\" (1.88 m)   Wt 230 lb 9.6 oz (104.6 kg)   SpO2 96%   BMI 29.59 kg/m²     Temp (24hrs), Av.8 °F (36.6 °C), Min:97.5 °F (36.4 °C), Max:97.9 °F (36.6 °C)    In: 1749   Out: 3695 [Urine:3695]    Physical Exam:  Constitutional: This is a well developed, well nourished, 25-29.9 - Overweight 77y.o. year old male who is alert, oriented, cooperative and in no apparent distress. Head:normocephalic and atraumatic. EENT:  PERRLA. Respiratory: Chest was symmetrical without dullness to percussion. Breath sounds bilaterally were clear to auscultation. Cardiovascular: Regular without murmur, clicks, gallops or rubs. Abdomen: Slightly rounded and soft without organomegaly. No rebound, rigidity or guarding was appreciated. Extremities:  No lower extremity edema, ulcerations, tenderness, varicosities or erythema.     Neurological/Psychiatric: The patient's general behavior, level of consciousness, thought content and emotional status is normal.        Medications:  Scheduled Medications:    sodium chloride flush  10 mL Intravenous 2 times per day    atorvastatin  80 mg Oral Nightly    sodium chloride (PF)  10 mL Intravenous Daily    aspirin  81 mg Oral Daily    metoprolol tartrate  12.5 mg Oral BID    sodium chloride flush  10 mL Intravenous 2 times per day    lidocaine PF  1 mL Intradermal Once     Continuous Infusions:    pantoprozole (PROTONIX) infusion 8 mg/hr (07/02/20 0150)    nitroGLYCERIN 5 mcg/min (06/29/20 1922)    heparin (porcine) 1,400 Units/hr (07/01/20 1933)    sodium chloride 50 mL/hr at 06/27/20 1924     PRN Medicationssodium chloride flush, 10 mL, PRN  acetaminophen, 650 mg, Q4H PRN  melatonin, 1 mg, Nightly PRN  nitroGLYCERIN, 0.4 mg, Q5 Min PRN  heparin (porcine), 4,000 Units, PRN  heparin (porcine), 2,000 Units, PRN  sodium chloride flush, 10 mL, PRN  acetaminophen, 650 mg, Q6H PRN    Or  acetaminophen, 650 mg, Q6H PRN  promethazine, 12.5 mg, Q6H PRN    Or  ondansetron, 4 mg, Q6H PRN        Diagnostic Labs:  CBC:   Recent Labs     06/30/20  0617 07/01/20  0612 07/01/20  1732 07/02/20  0702   WBC 6.6  --  5.7  --  5.1   RBC 3.30*  --  3.13*  --  3.57*   HGB 10.3*   < > 9.8* 10.5* 11.0*   HCT 33.0*   < > 30.3* 34.1* 34.7*   .0  --  96.8  --  97.2   RDW 14.8*  --  14.6*  --  14.4     --  167  --  197    < > = values in this interval not displayed. BMP:   Recent Labs     06/30/20 0617 07/01/20  0612 07/02/20  0702    141 140   K 3.9 3.8 4.0    102 100   CO2 28 29 30   BUN 6* 9 7*   CREATININE 0.55* 0.58* 0.56*     BNP: No results for input(s): BNP in the last 72 hours. PT/INR:   No results for input(s): PROTIME, INR in the last 72 hours. APTT:   Recent Labs     07/01/20  1732 07/01/20  2315 07/02/20  0702   APTT 57.1* 61.4* 57.4*     CARDIAC ENZYMES: No results for input(s): CKMB, CKMBINDEX, TROPONINI in the last 72 hours.     Invalid input(s): CKTOTAL;3  FASTING LIPID PANEL:  Lab Results   Component Value Date    CHOL 123 09/10/2018    HDL 53 Statement  I have discussed the care of Sheryle Pearson, including pertinent history and exam findings,  with the resident. I have seen and examined the patient and the key elements of all parts of the encounter have been performed by me. I agree with the assessment, plan and orders as documented by the resident with additions . CC CAD  CHF improving,awaiting CABS. Treatment plan Discussed with nursing staff in detail , all questions answered . Electronically signed by Reji Carter MD on   7/2/20 at 4:34 PM EDT    Please note that this chart was generated using voice recognition Dragon dictation software. Although every effort was made to ensure the accuracy of this automated transcription, some errors in transcription may have occurred.

## 2020-07-02 NOTE — PROGRESS NOTES
[facility]  Progress Note                   Date:   7/2/2020  Patient name: Author Ache  Date of admission:  6/25/2020  3:14 PM  MRN:   [id number]  YOB: 1955  PCP: Semaj Saavedra DO    Reason for Admission: GI bleed [K92.2]    Subjective:       Clinical Changes /Abnormalities:  Patient seen and examined up in chair in room with spouse & daughter at bedside. Patient is s/p cardiac cath 6/29/2020 with MVD referred to CTS for CABG. Denies chest pain or SOB. Tele SR. On heparin drip and nitroglycerine drip. Awaiting CTS plans and timing. Review of Systems    Medications:   Scheduled Meds:   sodium chloride flush  10 mL Intravenous 2 times per day    atorvastatin  80 mg Oral Nightly    sodium chloride (PF)  10 mL Intravenous Daily    aspirin  81 mg Oral Daily    metoprolol tartrate  12.5 mg Oral BID    sodium chloride flush  10 mL Intravenous 2 times per day    lidocaine PF  1 mL Intradermal Once     Continuous Infusions:   pantoprozole (PROTONIX) infusion 8 mg/hr (07/02/20 0150)    nitroGLYCERIN 5 mcg/min (06/29/20 1922)    heparin (porcine) 1,400 Units/hr (07/01/20 1933)    sodium chloride 50 mL/hr at 06/27/20 1924     CBC:   Recent Labs     06/30/20  0617  07/01/20  0612 07/01/20  1732 07/02/20  0702   WBC 6.6  --  5.7  --  5.1   HGB 10.3*   < > 9.8* 10.5* 11.0*     --  167  --  197    < > = values in this interval not displayed. BMP:    Recent Labs     06/30/20  0617 07/01/20  0612 07/02/20  0702    141 140   K 3.9 3.8 4.0    102 100   CO2 28 29 30   BUN 6* 9 7*   CREATININE 0.55* 0.58* 0.56*   GLUCOSE 103* 100* 100*     Hepatic:No results for input(s): AST, ALT, ALB, BILITOT, ALKPHOS in the last 72 hours. Troponin:   No results for input(s): TROPHS in the last 72 hours. BNP: No results for input(s): BNP in the last 72 hours. Lipids: No results for input(s): CHOL, HDL in the last 72 hours.     Invalid input(s): LDLCALCU  INR: No results for input(s): INR in the last 72 hours. DIAGNOSTIC DATA  CATH 6/29/2020  Findings:     LMCA: Very calcified with haziness  IVUS confirmed at lesat 60-70% stenosis in distal and proximal area     LAD: Mild irregularities 20-30%. Ostial area by IVUS was about 60-70%  stenosis     LCx: Dominant  Proximal 80-90% stenosis  OM1: Minimal disease     RCA: Ostial stent appears extending into aorta  Probably 50% stenosis in proximal RCA stent  Distal is very small, with 100% PDA and minimal flow in PL branches      Coronary Tree      Dominance: Mixed     LV Analysis  LV function assessed  Ejection Fraction  +----------------------------------------------------------------------+---+  ! Method                                                                !EF%! +----------------------------------------------------------------------+---+  ! LV gram                                                               !55 ! +----------------------------------------------------------------------+---+        The LV gram was performed in the ELIZONDO 30 position. LVEF:  55%.      Estimated blood loss: 5 ml     Conclusions:  1. Left main and multivessel CAD  2. IVUS confirmed the severity of LM disease with calcification about 60%  3. Preserved LV function     Recommendations:  1. CT surgery consult for urgent surgery   2. Medical Therapy. 3. Risk Factors Modification. 4. Post Cath Protocol    ECHO 6/16/2020  Summary  Global left ventricular systolic function appears preserved with an  estimated ejection fraction of 55%. The left ventricular cavity size is within normal limits and the left  ventricular wall thickness is mildly increased. The mean aortic valve gradient is 30 mmHg consistent with moderate aortic  stenosis. Mild aortic insufficiency was seen. Mitral valve sclerosis without stenosis.   Evidence of moderate (grade II) diastolic dysfunction is seen.     Compared to the previous study of 7/5/18, the patients mean aortic valve  gradient has increased from 17 mmHg to 30 mmHg. Based on the patients moderate to severe aortic stenosis, consider repeat  testing in 1 year or sooner if symptoms of severe disease develop.       Objective:   Vitals: BP (!) 116/43   Pulse 74   Temp 97.9 °F (36.6 °C) (Oral)   Resp 14   Ht 6' 2.02\" (1.88 m)   Wt 230 lb 9.6 oz (104.6 kg)   SpO2 96%   BMI 29.59 kg/m²   General appearance: alert and cooperative with exam  HEENT: Head: Normocephalic, no lesions, without obvious abnormality. Neck:no JVD, trachea midline, no adenopathy  Lungs: clear to ausculation throughout. No distress  Heart: Regular rate and rhythm, s1/s2 auscultated, + murmurs  SR on tele HR 78  Abdomen: soft, non-tender, bowel sounds active  Extremities: no edema  Neurologic: not done  Right Radial Artery site:  CDI without ecchymosis or hematoma. +pulse      Assessment / Acute Cardiac Problems:   1. Recurrent Angina  2. Elevated troponin  3. CAD s/p PCI to RCA on 8/24/2015  4. Acute upper GI bleed managed by GI  5. HLD  6. HTN  7. Carotid artery disease s/p carotid endarterectomy  8. Smoker  9. PVD  10. COPD    Patient Active Problem List:     Gross hematuria     Benign non-nodular prostatic hyperplasia with lower urinary tract symptoms     Carotid pseudoaneurysm (HCC)     COPD exacerbation (HCC)     CAD (coronary artery disease)     COPD (chronic obstructive pulmonary disease) ([facility] [address]     Hypertension     Hyperlipidemia     Peripheral vascular disease (HCC)     Acute respiratory failure with hypoxia (HCC)     Bacterial pneumonia     Hyponatremia     GI bleed     Hypotension due to hypovolemia     Bleeding duodenal ulcer     NSAID induced gastritis      Plan of Treatment:   1. Recurrent angina - now stable. S/p cardiac cath with MVD and referral to CTS surgery. Currently undergoing pre-op work up and surgical timnig pending. Denies current chest pain or SOB. Continue ASA, statin, BB nitroglycerine drip, and heparin drip. 2. Acute upper GI Bleed managed by GI. States dark stools but no active bleeding/BRB noted. 3. Hypertension:  Controlled. Continue BB  4. Keep K >4.0 and Mag >2.0  .  Stable today    Electronically signed by JUAN Diaz CNP on 7/2/2020 at 11:54 0771 [facility].  [phone]

## 2020-07-02 NOTE — PROGRESS NOTES
Progress Note    Date:7/2/2020       Room:2018/2018-01  Patient Name:Erik Sanabria     Date of Birth:[birth year]     Age:65 y.o. Subjective   Interval History Status: not changed. Continued to have dark stools    No chest pain    Review of Systems   Respiratory ROS: no cough, shortness of breath, or wheezing    Medications   Scheduled Meds:    sodium chloride flush  10 mL Intravenous 2 times per day    atorvastatin  80 mg Oral Nightly    sodium chloride (PF)  10 mL Intravenous Daily    aspirin  81 mg Oral Daily    metoprolol tartrate  12.5 mg Oral BID    sodium chloride flush  10 mL Intravenous 2 times per day    lidocaine PF  1 mL Intradermal Once     Continuous Infusions:    pantoprozole (PROTONIX) infusion 8 mg/hr (07/02/20 0150)    nitroGLYCERIN 5 mcg/min (06/29/20 1922)    heparin (porcine) 1,400 Units/hr (07/01/20 1933)    sodium chloride 50 mL/hr at 06/27/20 1924     PRN Meds: sodium chloride flush, acetaminophen, melatonin, nitroGLYCERIN, heparin (porcine), heparin (porcine), sodium chloride flush, acetaminophen **OR** acetaminophen, promethazine **OR** ondansetron    Past History    Past Medical History:   has a past medical history of CAD (coronary artery disease), Carotid artery stenosis, COPD (chronic obstructive pulmonary disease) ([facility] [address] H/O cardiac catheterization, H/O cardiovascular stress test, H/O cardiovascular stress test, H/O cardiovascular stress test, H/O echocardiogram, H/O echocardiogram, History of Holter monitoring, Hyperlipidemia, Hypertension, MI (myocardial infarction) ([facility] [address] Peripheral vascular disease ([facility] [address] S/P angioplasty with stent, and Unspecified cerebral artery occlusion with cerebral infarction. Social History:   reports that he has been smoking. He started smoking about 49 years ago. He has a 22.50 pack-year smoking history. He has never used smokeless tobacco. He reports that he does not drink alcohol or use drugs.      Family History:   Family History Adopted: Yes   Problem Relation Age of Onset    Cancer Brother        Physical Examination      Vitals:  BP (!) 116/43   Pulse 74   Temp 97.9 °F (36.6 °C) (Oral)   Resp 14   Ht 6' 2.02\" (1.88 m)   Wt 230 lb 9.6 oz (104.6 kg)   SpO2 96%   BMI 29.59 kg/m²   Temp (24hrs), Av.8 °F (36.6 °C), Min:97.5 °F (36.4 °C), Max:97.9 °F (36.6 °C)      I/O (24Hr): Intake/Output Summary (Last 24 hours) at 2020 1144  Last data filed at 2020 0700  Gross per 24 hour   Intake 2049 ml   Output 3695 ml   Net -1646 ml       CONSTITUTIONAL:  awake, alert, cooperative, no apparent distress, and appears stated age  LUNGS:  No increased work of breathing, good air exchange, clear to auscultation bilaterally, no crackles or wheezing  CARDIOVASCULAR:  normal S1 and S2, murmurs include systolic murmur II/VI located at right upper sternal border without radiation and no edema  ABDOMEN normal bowel sounds, soft, non-distended, non-tender, no masses palpated, no hepatosplenomegally  MUSCULOSKELETAL:  There is no redness, warmth, or swelling of the joints. Full range of motion noted. Motor strength is 5 out of 5 all extremities bilaterally. Tone is normal.    Labs/Imaging/Diagnostics   Labs:  CBC:  Recent Labs     20  1732 20  0702   WBC 6.6  --  5.7  --  5.1   RBC 3.30*  --  3.13*  --  3.57*   HGB 10.3*   < > 9.8* 10.5* 11.0*   HCT 33.0*   < > 30.3* 34.1* 34.7*   .0  --  96.8  --  97.2   RDW 14.8*  --  14.6*  --  14.4     --  167  --  197    < > = values in this interval not displayed. CHEMISTRIES:  Recent Labs     20  0620  0612 20  0702    141 140   K 3.9 3.8 4.0    102 100   CO2 28 29 30   BUN 6* 9 7*   CREATININE 0.55* 0.58* 0.56*   GLUCOSE 103* 100* 100*   MG  --  1.8  --      PT/INR:No results for input(s): PROTIME, INR in the last 72 hours.   APTT:  Recent Labs     20  1732 20  2315 20  0702   APTT 57.1* the proximal left vertebral artery. SOFT TISSUES: There is pulmonary emphysema and there are partially visualized pleural effusions, worse on the left hand side. BONES: No acute osseous abnormality. CTA HEAD: ANTERIOR CIRCULATION: There is reconstitution of flow within the distal right cavernous ICA, likely filling in retrograde fashion, and across the anterior communicating artery. The anterior and the middle cerebral arteries demonstrate normal arborization patterns. POSTERIOR CIRCULATION: The vertebrobasilar system is grossly within normal limits. There are mild-to-moderate stenoses of the right posterior cerebral artery. OTHER: No dural venous sinus thrombosis on this non-dedicated study. BRAIN: A noncontrast head CT was not performed as part of this exam limiting evaluation. No obvious intracranial mass is visualized. Occluded right cervical ICA and proximal right vertebral artery. 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 Severe to critical stenoses of the proximal left vertebral artery.      Vl Vein Mapping Lower Bilateral    Result Date: 6/30/2020    [facility]  Vascular Lower Extremity Vein Mapping Procedure   Patient Name  [facility]    Date of Study         06/30/2020                Marvin Fernández   Date of Birth 1955  Gender                Male   Age           77 year(s)  Race                     Room Number   2018        Height:               74 inch, 187.96 cm   Corporate ID  [id number]    Weight:               232 pounds, 105.2 kg  #   Patient Acct  [de-identified]   BSA:       2.31 m^2   BMI:         29.79 kg/m^2  #   MR #          [id number]     Sonographer           Mary Gardner RVT, [facility]   Accession #   [id number]  Interpreting          [address] [place] [address]                            Physician   Referring                 Referring Physician   ROBIN Treviño  Nurse  Practitioner  Procedure Type of Study: Veins: Lower Extremity Vein Mapping. Indications for Study:Pre-op OHS. Patient Status: In Patient. Conclusions   Summary   No DVT in the bilateral common femoral veins. Vein sizes are listed in the  table above. Signature   ----------------------------------------------------------------  Electronically signed by Chrystal Quinones RVT, RDMS(Sonographer) on 06/30/2020 11:56 AM  ----------------------------------------------------------------   ----------------------------------------------------------------  Electronically signed by Chandler Rede Reyes,Arthur(Interpreting  physician) on 06/30/2020 10:05 PM  ----------------------------------------------------------------  Findings:   Right Impression:                   Left Impression:  Common femoral and femoral veins    Common femoral and femoral veins are  are compressible. compressible. The proximal great saphenous vein is                                      surgically absent. Risk Factors History +----------------+----------+----------------------------------------------+ ! Diagnosis       ! Date      ! Comments                                      ! +----------------+----------+----------------------------------------------+ ! Other           ! !H/O 2 CEA's on both sides; H/O CVA; expressive! !                !          !aphasia; blurred vision                       ! +----------------+----------+----------------------------------------------+ ! Carotid         ! !Patient S/P bilateral carotid endarterectomy  ! ! Endarterectomy  !          !                                              ! +----------------+----------+----------------------------------------------+ ! Other           !11/13/2017! Lt.  common carotid artery covered stent       ! !                !          !placement, excision of Lt. carotid artery     ! !                !          !pseudoaneurysm,                               ! ! !          !reconstruction of Lt. common carotid to Lt. ! !                !          !internal carotid artery; Left GSV used        ! +----------------+----------+----------------------------------------------+ ! Other           !05/29/2018! Lt Carotid wall stent with post dilation. ! +----------------+----------+----------------------------------------------+ ! Other           !07/25/2018! Resection Rt. Carotid pseudoaneurysm, patch   ! !                !          !angioplasty of Rt. carotid with bovine        ! !                !          !periocardial patch. ! +----------------+----------+----------------------------------------------+   - The patient's risk factor(s) include: chronic lung disease,     dyslipidemia, obesity, lack of physical activity, treated arterial     hypertension and prior MI .   - Current - Every day. - The patient's last creatinine was 0.5 mg/dl. Allergies   - Allergy:*No Known Allergies(Miscellaneous). Velocities are measured in cm/s ; Diameters are measured in cm +--------------------------------------++--------+-----+----+--------+-----+ ! Superficial - Great Saphenous Vein    ! ! Right   ! ! Left!        !     ! +--------------------------------------++--------+-----+----+--------+-----+ ! Location                              ! !Diameter! Depth! !Diameter! Depth! +--------------------------------------++--------+-----+----+--------+-----+ ! Sapheno Femoral Junction              ! !0.64    !     !    !        !     ! +--------------------------------------++--------+-----+----+--------+-----+ ! GSV Mid Thigh                         !!0.26    !     !    !0.23    !     ! +--------------------------------------++--------+-----+----+--------+-----+ ! GSV Knee                              !!0.25    !     !    !0.26    !     ! +--------------------------------------++--------+-----+----+--------+-----+ ! GSV High Calf                         !!0.2     !     ! !0.25    !     ! +--------------------------------------++--------+-----+----+--------+-----+ ! GSV Low Calf                          !!0.28    !     !    !0.31    !     ! +--------------------------------------++--------+-----+----+--------+-----+ ! GSV Ankle                             !!0.28    !     !    !0.37    !     ! +--------------------------------------++--------+-----+----+--------+-----+        Assessment        Hospital Problems           Last Modified POA    * (Principal) Bleeding duodenal ulcer 6/26/2020 Yes    GI bleed 6/25/2020 Yes    Hypotension due to hypovolemia 6/25/2020 Yes    NSAID induced gastritis 6/26/2020 Yes          Plan:        Continue pre op workup.    Hgb back up to 11? yday error  Reconsult vascular    [employer]

## 2020-07-02 NOTE — PROGRESS NOTES
Nutrition Assessment    Type and Reason for Visit: Initial    Nutrition Recommendations:   - Continue current cardiac diet  - Start Ensure ONS BID   - Monitor weight, labs, and bowel function    Nutrition Assessment: Pt admitted d/t GI bleed. Writer attempted to see pt due to LOS, pt was off the floor - MUSA po intake at this time. No significant wt loss noted in EMR. Will add supplements d/t predicted sub-optimal intake 2/2 GI bleed. Will fully assess during time of follow-up. Malnutrition Assessment:  · Malnutrition Status: Insufficient data  · Context: Acute illness or injury  · Findings of the 6 clinical characteristics of malnutrition (Minimum of 2 out of 6 clinical characteristics is required to make the diagnosis of moderate or severe Protein Calorie Malnutrition based on AND/ASPEN Guidelines):  1. Energy Intake-Unable to assess, Unable to assess    2. Weight Loss-Unable to assess, unable to assess  3. Fat Loss-Unable to assess,    4. Muscle Loss-Unable to assess,    5. Fluid Accumulation-No significant fluid accumulation,    6.  Strength-Not measured    Nutrition Risk Level: High    Nutrient Needs:  · Estimated Daily Total Kcal: 8058-0668  · Estimated Daily Protein (g): 105-123(1.2-1.4)    Nutrition Diagnosis:   · Problem: Predicted suboptimal energy intake  · Etiology: related to Alteration in GI function(current medical condition)     Signs and symptoms:  as evidenced by (Need for ONS )    Objective Information:  · Nutrition-Focused Physical Findings: labs and meds reviewed.   · Wound Type: None  · Current Nutrition Therapies:  · Oral Diet Orders: Cardiac   · Oral Diet intake: Unable to assess  · Anthropometric Measures:  · Ht: 6' 2.02\" (188 cm)   · Current Body Wt: 230 lb (104.3 kg)  · Ideal Body Wt: 194 lb (88 kg), % Ideal Body 118% adm/ideal  · BMI Classification: BMI 25.0 - 29.9 Overweight    Nutrition Interventions:   Continue current diet, Start ONS  Continued Inpatient Monitoring, Education Not Indicated    Nutrition Evaluation:   · Evaluation: Goals set   · Goals: Intake of 75% or greater recommended nutritional needs    · Monitoring: Meal Intake, Supplement Intake, Weight, Pertinent Labs, Monitor Bowel Function, Skin Integrity, I&O    Dietetic Intern: Pedro Avalos  Electronically signed by Monica Barnes RD, LD on 7/2/20 at 2:47 PM EDT    Contact Number: [phone]

## 2020-07-02 NOTE — PLAN OF CARE
Problem: Nutrition  Goal: Optimal nutrition therapy  Outcome: Ongoing  Note: Nutrition Problem: Predicted suboptimal energy intake  Intervention: Food and/or Nutrient Delivery: Continue current diet, Start ONS  Nutritional Goals: Intake of 75% or greater recommended nutritional needs

## 2020-07-02 NOTE — PLAN OF CARE
Problem: Falls - Risk of:  Goal: Will remain free from falls  Description: Will remain free from falls  Outcome: Ongoing  Goal: Absence of physical injury  Description: Absence of physical injury  Outcome: Ongoing     Problem: Bleeding:  Goal: Will show no signs and symptoms of excessive bleeding  Description: Will show no signs and symptoms of excessive bleeding  Outcome: Ongoing     Problem: HEMODYNAMIC STATUS  Goal: Patient has stable vital signs and fluid balance  Outcome: Ongoing     Problem: COMMUNICATION IMPAIRMENT  Goal: Ability to express needs and understand communication  Outcome: Ongoing

## 2020-07-02 NOTE — CARE COORDINATION
TRANSITIONAL CARE PLANNING/ 2 Rehab Mark Day: 7    Reason for Admission: GI bleed [K92.2]     Treatment Plan of Care: IV heparin, IV nitro, IV protonix    Tests/Procedures still needed:     Barriers to Discharge: needs CABG    Readmission Risk              Risk of Unplanned Readmission:        17            Patient goals/Treatment Preferences/Transitional Plan: plan is to go home with his S.O. to have CABG    Referrals Made:     Follow Up needed:

## 2020-07-02 NOTE — PLAN OF CARE
Problem: Falls - Risk of:  Goal: Will remain free from falls  Description: Will remain free from falls  7/2/2020 0037 by Dalia Hamilton RN  Outcome: Ongoing  7/1/2020 1410 by Luca Vidal RN  Outcome: Ongoing  Goal: Absence of physical injury  Description: Absence of physical injury  7/1/2020 1410 by Luca Vidal RN  Outcome: Ongoing     Problem: HEMODYNAMIC STATUS  Goal: Patient has stable vital signs and fluid balance  Outcome: Ongoing     Problem: Bleeding:  Goal: Will show no signs and symptoms of excessive bleeding  Description: Will show no signs and symptoms of excessive bleeding  7/1/2020 1410 by Luca Vidal RN  Outcome: Ongoing

## 2020-07-03 NOTE — CARE COORDINATION
TRANSITIONAL CARE PLANNING/ 2 Rehab Mark Day: 8    Reason for Admission: GI bleed [K92.2]     Treatment Plan of Care: IV heparin, IV nitro, IV protonix    Tests/Procedures still needed:     Barriers to Discharge: needs CABG    Readmission Risk              Risk of Unplanned Readmission:        18            Patient goals/Treatment Preferences/Transitional Plan: talked with Yared Read is to go home with S.O. CABG on Monday. Not wanting any home care at this time.     Referrals Made: none    Follow Up needed: none

## 2020-07-03 NOTE — PROGRESS NOTES
[facility]  Internal Medicine Teaching Residency Program  Inpatient Daily Progress Note  ______________________________________________________________________________    Patient: Kellee Chen  YOB: 1955   MWS:[id number]    Acct: [de-identified]     Room: 2018/2018-01  Admit date: 6/25/2020  Today's date: 07/03/20  Number of days in the hospital: 8    SUBJECTIVE   Admitting Diagnosis: Bleeding duodenal ulcer  CC: GI Bleed. Pt examined at bedside. Chart & results reviewed. Vitals reviewed,   Labs reviewed, Hemoglobin remains stable despite heparin gtt. Will continue protonix gtt while heparin gtt is running. Overnight patient denies acute issues. On examination patient resting comfortably in the chair at bedside. Currently on 3 L NC. States he is not on home o2. Plan for CABG per CT surgery however asking for pulmonary clearance due to patients COPD/Emphysema. Denies chest pain, shortness of breath, nausea or vomiting. No signs of actively bleeding. Hemoglobin remains stable at 10.8.  Patient remains on heparin gtt with plan for protonix gtt while on heparin per GI.     ROS:  Constitutional:  negative for chills, fevers, sweats  Respiratory:  negative for cough, dyspnea on exertion, hemoptysis, shortness of breath, wheezing  Cardiovascular:  negative for chest pain, chest pressure/discomfort, lower extremity edema, palpitations  Gastrointestinal:  negative for abdominal pain, constipation, diarrhea, nausea, vomiting  Neurological:  negative for dizziness, headache  BRIEF HISTORY     Patient was initially transferred from the different hospital presented with hematemesis and melena. Sophie Jacqueline is started 3 days before coming to hospital. [facility] was having massive bleeding so he was transferred to [facility] admission was giving 2 units of PRBC and Protonix bolus.  Patient was tachycardic and hypotensive so was admitted to the ICU and was given fluid boluses.  Patient was alert and oriented x3.  Patient was also started on IV ceftriaxone.  GI was consulted.  Endoscopy was done on  showed no active bleeding but erosive duodenal fatigue and small 5 mm clean-based ulcer identified in the duodenal bulb presumably NSAIDs induced and likely source of recent hemorrhage while on epi therapy patient was also found to have moderate gastritis and duodenitis.      Past medical history significant for coronary artery disease with stents and peripheral vascular disease and stroke.  Patient is on aspirin and Prasugrel at home.  Currently AC/AP on hold.  Patient was also found to have elevated troponins and cardiology was consulted recommended further work-up possibilities cardiac catheterization as an outpatient.     Previous Testing:      ECHO 2020: EF 55%, moderate AS with mean gradient 30 mmHg, mild AI, grade II DD.      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 EF 43%.      CATH 10/2/15: Severe RCA ostial, proximal and distal stenosis. PCI with MARY ANN to ostial lesion and balloon angioplasty to proximal lesion. Distal RCA lesion had total occlusion after balloon angioplasty.     OBJECTIVE     Vital Signs:  BP (!) 133/57   Pulse 69   Temp 98 °F (36.7 °C) (Oral)   Resp 14   Ht 6' 2.02\" (1.88 m)   Wt 229 lb 3.2 oz (104 kg)   SpO2 96%   BMI 29.41 kg/m²     Temp (24hrs), Av.9 °F (36.6 °C), Min:97.7 °F (36.5 °C), Max:98 °F (36.7 °C)    In: 2300   Out: 2350 [Urine:2350]    Physical Exam:  Constitutional: This is a well developed, well nourished, 25-29.9 - Overweight 77y.o. year old male who is alert, oriented, cooperative and in no apparent distress. Head: normocephalic and atraumatic. EENT:  PERRLA. No conjunctival injections. Neck: Supple without thyromegaly. No elevated JVP. Trachea was midline.   Respiratory: 07/02/20  1738 07/03/20  0554   WBC 5.7  --  5.1  --  5.1   RBC 3.13*  --  3.57*  --  3.48*   HGB 9.8*   < > 11.0* 11.0* 10.8*   HCT 30.3*   < > 34.7* 36.0* 34.7*   MCV 96.8  --  97.2  --  99.7   RDW 14.6*  --  14.4  --  14.7*     --  197  --  203    < > = values in this interval not displayed. BMP:   Recent Labs     07/01/20  0612 07/02/20  0702 07/03/20  0554    140 140   K 3.8 4.0 4.5    100 102   CO2 29 30 29   BUN 9 7* 10   CREATININE 0.58* 0.56* 0.88     BNP: No results for input(s): BNP in the last 72 hours. PT/INR: No results for input(s): PROTIME, INR in the last 72 hours. APTT:   Recent Labs     07/01/20  2315 07/02/20  0702 07/03/20  0554   APTT 61.4* 57.4* 66.2*     CARDIAC ENZYMES: No results for input(s): CKMB, CKMBINDEX, TROPONINI in the last 72 hours. Invalid input(s): CKTOTAL;3  FASTING LIPID PANEL:  Lab Results   Component Value Date    CHOL 123 09/10/2018    HDL 53 09/10/2018    TRIG 67 09/10/2018     LIVER PROFILE: No results for input(s): AST, ALT, ALB, BILIDIR, BILITOT, ALKPHOS in the last 72 hours. MICROBIOLOGY:   Lab Results   Component Value Date/Time    CULTURE NO GROWTH 6 DAYS 06/25/2020 07:22 PM       Imaging:    Ct Chest Wo Contrast    Result Date: 7/1/2020  Underlying emphysematous change with small bilateral effusions and adjacent atelectasis. Extensive coronary artery calcifications and calcifications involving the aortic valve and mitral valve. Cta Head Neck W Contrast    Result Date: 6/30/2020  Occluded right cervical ICA and proximal right vertebral artery. 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 Severe to critical stenoses of the proximal left vertebral artery. ASSESSMENT & PLAN     ASSESSMENT / PLAN:     1. Acute upper GI bleed. - EGD 6/26: Gastritis with erythema scattered. Moderate duodenitis. Small superficial erosion 2 mm and 3 mm identified in the duodenal bulb. Small 5 mm clean based (punched out) [facility] II duodenal bulb ulcer with no high risk features. - GI recommended PPI drip while patient is on heparin gtt. Transition to protonix BID x 8 weeks following discharge. - GI signed off with plan for outpatient colonoscopy. 2. Multivessel Disease  - Heart cath 6/29/20: Multivessel disease with preserved EF.   - CT surgery consulted. Currently undergoing pre-op testing for CABG  - Currently on ASA, heparin gtt, Lopressor 12.5 mg BID, & nitro gtt     3. Essential hypertension:   - Lopressor 12.5 mg BID  - Nitroglycerin gtt. 4. Hyperlipidemia. - Lipitor     5. Bilateral carotid artery disease.    - Occluded right ICA, vertebral, known with collaterals. Disease right CCA. Mild 16-49% stenosis of the left internal carotid artery. Patent left ICA. - CTA: occluded right cervical ICA and proximal right vertebral artery. 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 Severe stenosis of the proximal left vertebral artery. 6. Peripheral vascular disease.    - Status post stents 8/21/2019.    - Follows up with Dr. Melissa Chamberlain. 7. SUREKHA, resolved. - Likely Prerenal in nature. DVT prophylaxis: Heparin gtt. GI prophylaxis: Protonix gtt while on heparin will transition to 40 mg BID when heparin gtt discontinued. PT/OT: Ongoing. Discharge Planning: Case management to assist with discharge planning. Christen Faye DO  Internal Medicine Resident, PGY-2  [facility];  [place], [place]  7/3/2020, 8:09 AM

## 2020-07-03 NOTE — PLAN OF CARE
Problem: Falls - Risk of:  Goal: Will remain free from falls  Description: Will remain free from falls  7/3/2020 0209 by La Bernal RN  Outcome: Ongoing  7/2/2020 1632 by Santo Corcoran RN  Outcome: Ongoing     Problem: HEMODYNAMIC STATUS  Goal: Patient has stable vital signs and fluid balance  7/3/2020 0209 by La Bernal RN  Outcome: Ongoing  7/2/2020 1632 by Santo Corcoran RN  Outcome: Ongoing     Problem: Bleeding:  Goal: Will show no signs and symptoms of excessive bleeding  Description: Will show no signs and symptoms of excessive bleeding  7/3/2020 0209 by La Bernal RN  Outcome: Ongoing  7/2/2020 1632 by Santo Corcoran RN  Outcome: Ongoing

## 2020-07-03 NOTE — PROCEDURES
[address]                               PULMONARY FUNCTION    PATIENT NAME: Benjamin Maldonado                  :        1955  MED REC NO:   [id number]                             ROOM:       2018  ACCOUNT NO:   [de-identified]                           ADMIT DATE: 2020  PROVIDER:     Carlos Manuel Aaron    DATE OF PROCEDURE:  2020    Spirometry shows FVC is 3.05, 61% predicted. FEV1 is 1.62, 42%  predicted, consistent with moderate obstructive ventilatory impairment. FEV1/FVC ratio and flow-volume loop are consistent with obstructive  ventilatory impairment. Post bronchodilator, there is significant  improvement in FEV1 and FVC, consistent with bronchospasticity and  airway reversibility. Post-bronchodilator FEV1 was 49% predicted,  consistent with moderate obstructive ventilatory impairment. Lung  volumes show the residual volume is 4.01, 153% predicted, consistent  airway trapping and hyperinflation. Total lung capacity is 7.48, 95%  predicted, which is within normal limit. Diffusion capacity is 11.38,  30% predicted, consistent with severe reduction in diffusion capacity  secondary to reduced gas exchange surface area from emphysema and/or  concomitant pulmonary vascular disease or anemia. Clinical correlation  is recommended. IMPRESSION:  This pulmonary function test is consistent with  moderate-to-severe obstructive ventilatory impairment with significant  response to bronchodilator, suggestive of concomitant airway  reversibility/bronchospasticity. Lung volume shows increase in residual  volume, consistent with hyperinflation and airway trapping. There is  severe reduction in diffusion capacity, which is likely secondary to  emphysema and/or concomitant pulmonary vascular disease. Clinical  correlation is recommended.         Yon Hernandez    D: 2020 22:17:42       T: 2020 95:18:32     [email or web address]  Job#: [id number]     Doc#: [id number]    CC:

## 2020-07-03 NOTE — PROGRESS NOTES
Progress Note    Date:7/3/2020       Room:2018/2018-01  Patient Name:Erik Sanabria     Date of Birth:[birth year]     Age:65 y.o. Subjective   Interval History Status: not changed. Continued to have dark stools    No chest pain    Review of Systems   Respiratory ROS: no cough, shortness of breath, or wheezing    Medications   Scheduled Meds:    sodium chloride flush  10 mL Intravenous 2 times per day    atorvastatin  80 mg Oral Nightly    sodium chloride (PF)  10 mL Intravenous Daily    aspirin  81 mg Oral Daily    metoprolol tartrate  12.5 mg Oral BID    sodium chloride flush  10 mL Intravenous 2 times per day    lidocaine PF  1 mL Intradermal Once     Continuous Infusions:    pantoprozole (PROTONIX) infusion 8 mg/hr (07/02/20 2113)    nitroGLYCERIN 5 mcg/min (07/02/20 2119)    heparin (porcine) 1,400 Units/hr (07/02/20 2132)    sodium chloride 50 mL/hr at 06/27/20 1924     PRN Meds: sodium chloride flush, acetaminophen, melatonin, nitroGLYCERIN, heparin (porcine), heparin (porcine), sodium chloride flush, acetaminophen **OR** acetaminophen, promethazine **OR** ondansetron    Past History    Past Medical History:   has a past medical history of CAD (coronary artery disease), Carotid artery stenosis, COPD (chronic obstructive pulmonary disease) ([facility] [address] H/O cardiac catheterization, H/O cardiovascular stress test, H/O cardiovascular stress test, H/O cardiovascular stress test, H/O echocardiogram, H/O echocardiogram, History of Holter monitoring, Hyperlipidemia, Hypertension, MI (myocardial infarction) ([facility] [address] Peripheral vascular disease ([facility] [address] S/P angioplasty with stent, and Unspecified cerebral artery occlusion with cerebral infarction. Social History:   reports that he has been smoking. He started smoking about 49 years ago. He has a 22.50 pack-year smoking history. He has never used smokeless tobacco. He reports that he does not drink alcohol or use drugs.      Family History:   Family History LIVER PROFILE:No results for input(s): AST, ALT, BILIDIR, BILITOT, ALKPHOS in the last 72 hours. Imaging Last 24 Hours:  Cta Head Neck W Contrast    Result Date: 6/30/2020  EXAMINATION: CTA OF THE HEAD AND NECK WITH CONTRAST 6/30/2020 1:07 pm: TECHNIQUE: CTA of the head and neck was performed with the administration of intravenous contrast. Multiplanar reformatted images are provided for review. MIP images are provided for review. Stenosis of the internal carotid arteries measured using NASCET criteria. Dose modulation, iterative reconstruction, and/or weight based adjustment of the mA/kV was utilized to reduce the radiation dose to as low as reasonably achievable. COMPARISON: February 9, 2018 HISTORY: ORDERING SYSTEM PROVIDED HISTORY: pre op cabg severe carotid disease h/o bilateral CEA TECHNOLOGIST PROVIDED HISTORY: pre op cabg severe carotid disease h/o bilateral CEA Reason for Exam: pre op cabg sever carotid disease h/o bilateral CEA Acuity: Unknown Type of Exam: Unknown FINDINGS: CTA NECK: AORTIC ARCH/ARCH VESSELS: No dissection or arterial injury. No significant stenosis of the brachiocephalic or subclavian arteries. CAROTID ARTERIES: A left-sided carotid stent is present, without significant intrastent stenosis. The left ICA is normal in course and caliber. There are moderate to severe stenoses of the right common carotid artery. A focal bulge off of the mid right common carotid artery measuring up to 6.0 mm in diameter is consistent with an aneurysm or pseudoaneurysm. The right cervical internal carotid artery is completely occluded. VERTEBRAL ARTERIES: The proximal right vertebral artery is occluded. There is reconstitution of flow within the mid V2 segment, likely filling in retrograde fashion. A bulge off of the V3 segment of the right vertebral artery measuring up to 4.7 mm in diameter is consistent with an aneurysm or pseudoaneurysm.   There are severe to critical stenoses of the proximal ! +--------------------------------------++--------+-----+----+--------+-----+ ! GSV Low Calf                          !!0.28    !     !    !0.31    !     ! +--------------------------------------++--------+-----+----+--------+-----+ ! GSV Ankle                             !!0.28    !     !    !0.37    !     ! +--------------------------------------++--------+-----+----+--------+-----+        Assessment        Hospital Problems           Last Modified POA    * (Principal) Bleeding duodenal ulcer 6/26/2020 Yes    GI bleed 6/25/2020 Yes    Hypotension due to hypovolemia 6/25/2020 Yes    NSAID induced gastritis 6/26/2020 Yes          Plan:        Consult pulmonary for pre op buffering  IS hourly  Ambulate    Tara Pan

## 2020-07-03 NOTE — PROGRESS NOTES
[facility]  Progress Note                   Date:   7/3/2020  Patient name: Tulio Wilson  Date of admission:  6/25/2020  3:14 PM  MRN:   [id number]  YOB: 1955  PCP: Mike Anders DO    Reason for Admission: GI bleed [K92.2]    Subjective:       Clinical Changes /Abnormalities:  Patient seen and examined up in chair in room with RN at bedside. Patient is s/p cardiac cath 6/29/2020 with MVD referred to CTS for CABG. Denies chest pain or SOB. Tele SR. On heparin drip and nitroglycerin drip. Awaiting CTS plans and timing. Review of Systems    Medications:   Scheduled Meds:   tiotropium  2 puff Inhalation Daily    sodium chloride flush  10 mL Intravenous 2 times per day    atorvastatin  80 mg Oral Nightly    sodium chloride (PF)  10 mL Intravenous Daily    aspirin  81 mg Oral Daily    metoprolol tartrate  12.5 mg Oral BID    sodium chloride flush  10 mL Intravenous 2 times per day    lidocaine PF  1 mL Intradermal Once     Continuous Infusions:   pantoprozole (PROTONIX) infusion 8 mg/hr (07/02/20 2113)    nitroGLYCERIN 5 mcg/min (07/02/20 2119)    heparin (porcine) 1,400 Units/hr (07/02/20 2132)    sodium chloride 50 mL/hr at 06/27/20 1924     CBC:   Recent Labs     07/01/20  0612  07/02/20  0702 07/02/20  1738 07/03/20  0554   WBC 5.7  --  5.1  --  5.1   HGB 9.8*   < > 11.0* 11.0* 10.8*     --  197  --  203    < > = values in this interval not displayed. BMP:    Recent Labs     07/01/20  0612 07/02/20  0702 07/03/20  0554    140 140   K 3.8 4.0 4.5    100 102   CO2 29 30 29   BUN 9 7* 10   CREATININE 0.58* 0.56* 0.88   GLUCOSE 100* 100* 101*     Hepatic:No results for input(s): AST, ALT, ALB, BILITOT, ALKPHOS in the last 72 hours. Troponin:   No results for input(s): TROPHS in the last 72 hours. BNP: No results for input(s): BNP in the last 72 hours. Lipids: No results for input(s): CHOL, HDL in the last 72 hours.     Invalid input(s): LDLCALCU  INR: No results for input(s): INR in the last 72 hours. DIAGNOSTIC DATA  CATH 6/29/2020  Findings:     LMCA: Very calcified with haziness  IVUS confirmed at lesat 60-70% stenosis in distal and proximal area     LAD: Mild irregularities 20-30%. Ostial area by IVUS was about 60-70%  stenosis     LCx: Dominant  Proximal 80-90% stenosis  OM1: Minimal disease     RCA: Ostial stent appears extending into aorta  Probably 50% stenosis in proximal RCA stent  Distal is very small, with 100% PDA and minimal flow in PL branches      Coronary Tree      Dominance: Mixed     LV Analysis  LV function assessed  Ejection Fraction  +----------------------------------------------------------------------+---+  ! Method                                                                !EF%! +----------------------------------------------------------------------+---+  ! LV gram                                                               !55 ! +----------------------------------------------------------------------+---+        The LV gram was performed in the ELIZONDO 30 position. LVEF:  55%.      Estimated blood loss: 5 ml     Conclusions:  1. Left main and multivessel CAD  2. IVUS confirmed the severity of LM disease with calcification about 60%  3. Preserved LV function     Recommendations:  1. CT surgery consult for urgent surgery   2. Medical Therapy. 3. Risk Factors Modification. 4. Post Cath Protocol    ECHO 6/16/2020  Summary  Global left ventricular systolic function appears preserved with an  estimated ejection fraction of 55%. The left ventricular cavity size is within normal limits and the left  ventricular wall thickness is mildly increased. The mean aortic valve gradient is 30 mmHg consistent with moderate aortic  stenosis. Mild aortic insufficiency was seen. Mitral valve sclerosis without stenosis.   Evidence of moderate (grade II) diastolic dysfunction is seen.     Compared to the previous study of heparin drip. 2. Acute upper GI Bleed managed by GI. States dark stools but no active bleeding/BRB noted. 3. Hypertension:  Controlled.   Continue BB  4. Keep K >4.0 and Mag >2.0  Will order add on magnesium this am.      Electronically signed by JUAN Avila NP on 7/3/2020 at 9:32 AM  [id number]  [phone]

## 2020-07-04 NOTE — PROGRESS NOTES
history significant for coronary artery disease with stents and peripheral vascular disease and stroke.  Patient is on aspirin and Prasugrel at home.  Currently AC/AP on hold.  Patient was also found to have elevated troponins and cardiology was consulted recommended further work-up possibilities cardiac catheterization as an outpatient.     Previous Testing:      ECHO 2020: EF 55%, moderate AS with mean gradient 30 mmHg, mild AI, grade II DD.      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 EF 43%.      CATH 10/2/15: Severe RCA ostial, proximal and distal stenosis. PCI with MARY ANN to ostial lesion and balloon angioplasty to proximal lesion. Distal RCA lesion had total occlusion after balloon angioplasty.     OBJECTIVE     Vital Signs:  BP (!) 124/56   Pulse 77   Temp 97.5 °F (36.4 °C) (Oral)   Resp 18   Ht 6' 2.02\" (1.88 m)   Wt 229 lb 3.2 oz (104 kg)   SpO2 94%   BMI 29.41 kg/m²     Temp (24hrs), Av.1 °F (36.7 °C), Min:97.5 °F (36.4 °C), Max:98.6 °F (37 °C)    In: 1559.8   Out: 2400 [Urine:2400]    Physical Exam:  Constitutional: This is a well developed, well nourished, 25-29.9 - Overweight 77y.o. year old male who is alert, oriented, cooperative and in no apparent distress. Head: normocephalic and atraumatic. EENT:  PERRLA. No conjunctival injections. Neck: Supple without thyromegaly. No elevated JVP. Trachea was midline. Respiratory: Chest was symmetrical without dullness to percussion. Breath sounds bilaterally were clear to auscultation. There were no wheezes, rhonchi or rales. There is no intercostal retraction or use of accessory muscles. Saturating at 96% on 3 L NC. Cardiovascular: Regular without murmur, clicks, gallops or rubs. Abdomen: Slightly rounded and soft without organomegaly. No rebound, rigidity or guarding was appreciated. Musculoskeletal: No gross muscle weakness. Extremities:  No lower extremity edema, ulcerations, tenderness, varicosities or erythema. Muscle size, tone and strength are normal.  No involuntary movements are noted. Skin:  Warm and dry. Good color, turgor and pigmentation. No lesions or scars. No cyanosis or clubbing  Neurological/Psychiatric: The patient's general behavior, level of consciousness, thought content and emotional status is normal.        Medications:  Scheduled Medications:    tiotropium  2 puff Inhalation Daily    sodium chloride flush  10 mL Intravenous 2 times per day    atorvastatin  80 mg Oral Nightly    sodium chloride (PF)  10 mL Intravenous Daily    aspirin  81 mg Oral Daily    metoprolol tartrate  12.5 mg Oral BID    sodium chloride flush  10 mL Intravenous 2 times per day    lidocaine PF  1 mL Intradermal Once     Continuous Infusions:    pantoprozole (PROTONIX) infusion 8 mg/hr (07/02/20 2113)    nitroGLYCERIN 5 mcg/min (07/02/20 2119)    heparin (porcine) 1,400 Units/hr (07/02/20 2132)    sodium chloride 50 mL/hr at 06/27/20 1924     PRN Medicationsalbuterol sulfate HFA, 2 puff, Q6H PRN  albuterol, 2.5 mg, Q6H PRN  sodium chloride flush, 10 mL, PRN  acetaminophen, 650 mg, Q4H PRN  melatonin, 1 mg, Nightly PRN  nitroGLYCERIN, 0.4 mg, Q5 Min PRN  heparin (porcine), 4,000 Units, PRN  heparin (porcine), 2,000 Units, PRN  sodium chloride flush, 10 mL, PRN  acetaminophen, 650 mg, Q6H PRN    Or  acetaminophen, 650 mg, Q6H PRN  promethazine, 12.5 mg, Q6H PRN    Or  ondansetron, 4 mg, Q6H PRN        Diagnostic Labs:  CBC:   Recent Labs     07/02/20  0702  07/03/20  0554 07/03/20  1752 07/04/20  0534   WBC 5.1  --  5.1  --  6.0   RBC 3.57*  --  3.48*  --  3.59*   HGB 11.0*   < > 10.8* 10.9* 11.2*   HCT 34.7*   < > 34.7* 35.2* 35.4*   MCV 97.2  --  99.7  --  98.6   RDW 14.4  --  14.7*  --  14.5*     --  203  --  225    < > = values in this interval not displayed.      BMP: Recent Labs     07/02/20  0702 07/03/20  0554 07/04/20  0534    140 139   K 4.0 4.5 4.4    102 100   CO2 30 29 29   BUN 7* 10 10   CREATININE 0.56* 0.88 0.71     BNP: No results for input(s): BNP in the last 72 hours. PT/INR: No results for input(s): PROTIME, INR in the last 72 hours. APTT:   Recent Labs     07/02/20  0702 07/03/20  0554 07/04/20  0534   APTT 57.4* 66.2* 53.4*     CARDIAC ENZYMES: No results for input(s): CKMB, CKMBINDEX, TROPONINI in the last 72 hours. Invalid input(s): CKTOTAL;3  FASTING LIPID PANEL:  Lab Results   Component Value Date    CHOL 123 09/10/2018    HDL 53 09/10/2018    TRIG 67 09/10/2018     LIVER PROFILE: No results for input(s): AST, ALT, ALB, BILIDIR, BILITOT, ALKPHOS in the last 72 hours. MICROBIOLOGY:   Lab Results   Component Value Date/Time    CULTURE NO GROWTH 6 DAYS 06/25/2020 07:22 PM       Imaging:    Ct Chest Wo Contrast    Result Date: 7/1/2020  Underlying emphysematous change with small bilateral effusions and adjacent atelectasis. Extensive coronary artery calcifications and calcifications involving the aortic valve and mitral valve. Cta Head Neck W Contrast    Result Date: 6/30/2020  Occluded right cervical ICA and proximal right vertebral artery. 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 Severe to critical stenoses of the proximal left vertebral artery. ASSESSMENT & PLAN     ASSESSMENT / PLAN:     Acute upper GI bleed. - EGD 6/26: Gastritis with erythema scattered. Moderate duodenitis. Small superficial erosion 2 mm and 3 mm identified in the duodenal bulb. Small 5 mm clean based (punched out) [facility] II duodenal bulb ulcer with no high risk features. - GI recommended PPI drip while patient is on heparin gtt. Transition to protonix BID x 8 weeks following discharge. - GI signed off with plan for outpatient colonoscopy.       Multivessel Disease  - Heart cath 6/29/20: Multivessel disease with preserved EF.   - CT surgery consulted. Currently undergoing pre-op testing for CABG  - Currently on ASA, heparin gtt, Lopressor 12.5 mg BID, & nitro gtt (off and on)     Essential hypertension:   - Lopressor 12.5 mg BID  - Nitroglycerin gtt. Hyperlipidemia. - Lipitor     Bilateral carotid artery disease.    - Occluded right ICA, vertebral, known with collaterals. Disease right CCA. Mild 16-49% stenosis of the left internal carotid artery. Patent left ICA. - CTA: occluded right cervical ICA and proximal right vertebral artery. 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 Severe stenosis of the proximal left vertebral artery. -Vascular re consulted. Emphysema  -No acute exacerbation  -PFT's in pre-testing showed mod-severe obstructive ventilatory impairment with significant response to bronchodilator. Decreased diffusion capacity.   -Follow up pulmonology consult  -bronchodilators as needed. Peripheral vascular disease.    - Status post stents 8/21/2019.    - Follows up with Dr. [address] SUREKHA, resolved. - Likely Prerenal in nature. DVT prophylaxis: Heparin gtt. GI prophylaxis: Protonix gtt while on heparin will transition to 40 mg BID when heparin gtt discontinued. PT/OT: Ongoing. Discharge Planning: Case management to assist with discharge planning. Varsha Cuevas MD  Internal Medicine Resident, PGY-2  [facility]; [facility]  7/4/2020, 9:57 AM   Attending Physician Statement  I have discussed the care of Luther Levine, including pertinent history and exam findings,  with the resident. I have seen and examined the patient and the key elements of all parts of the encounter have been performed by me. I agree with the assessment, plan and orders as documented by the resident with additions . CC CAD,COPD  Clinically stable. Awaiting CABS on Monday. PFT reviewed. He does have severe COPD but we can proceed with CABS. He will lokely require post op vent support for short period but should benefit from the surgery. Will start cardio pulm. Rehab. Post op. Will continue present Rx. Treatment plan Discussed with nursing staff in detail , all questions answered . Electronically signed by Jessica Earl MD on   7/4/20 at 1:14 PM EDT    Please note that this chart was generated using voice recognition Dragon dictation software. Although every effort was made to ensure the accuracy of this automated transcription, some errors in transcription may have occurred.

## 2020-07-04 NOTE — PROGRESS NOTES
Perfect serve sent to dr craft regarding consult originally  placed on 6/29, had not been notified until this AM. Electronically signed by James Hassan RN on 7/4/2020 at 8:21 AM

## 2020-07-04 NOTE — PLAN OF CARE
Problem: Falls - Risk of:  Goal: Will remain free from falls  Description: Will remain free from falls  Outcome: Ongoing  Goal: Absence of physical injury  Description: Absence of physical injury  Outcome: Ongoing     Problem: HEMODYNAMIC STATUS  Goal: Patient has stable vital signs and fluid balance  Outcome: Ongoing     Problem: Bleeding:  Goal: Will show no signs and symptoms of excessive bleeding  Description: Will show no signs and symptoms of excessive bleeding  Outcome: Ongoing     Problem: COMMUNICATION IMPAIRMENT  Goal: Ability to express needs and understand communication  Outcome: Ongoing     Problem: Discharge Planning:  Goal: Discharged to appropriate level of care  Description: Discharged to appropriate level of care  Outcome: Ongoing     Problem: Cardiac Output - Decreased:  Goal: Avoidance of environmental tobacco smoke  Description: Absence of orthostatic hypotension  Outcome: Ongoing  Goal: Cardiac output within specified parameters  Description: Cardiac output within specified parameters  Outcome: Ongoing  Goal: Hemodynamic stability will improve  Description: Hemodynamic stability will improve  Outcome: Ongoing     Problem: Fluid Volume - Imbalance:  Goal: Absence of imbalanced fluid volume signs and symptoms  Description: Absence of imbalanced fluid volume signs and symptoms  Outcome: Ongoing     Problem: Musculor/Skeletal Functional Status  Goal: Highest potential functional level  Outcome: Ongoing     Problem: Nutrition  Goal: Optimal nutrition therapy  Outcome: Ongoing

## 2020-07-04 NOTE — PROGRESS NOTES
Progress Note    Date:7/4/2020       Room:2018/2018-01  Patient Name:Erik Sanabria     Date of Birth:[birth year]     Age:65 y.o. Subjective   Interval History Status: not changed. Continued to have dark stools    No chest pain    Review of Systems   Respiratory ROS: no cough, shortness of breath, or wheezing    Medications   Scheduled Meds:    tiotropium  2 puff Inhalation Daily    sodium chloride flush  10 mL Intravenous 2 times per day    atorvastatin  80 mg Oral Nightly    sodium chloride (PF)  10 mL Intravenous Daily    aspirin  81 mg Oral Daily    metoprolol tartrate  12.5 mg Oral BID    sodium chloride flush  10 mL Intravenous 2 times per day    lidocaine PF  1 mL Intradermal Once     Continuous Infusions:    pantoprozole (PROTONIX) infusion 8 mg/hr (07/02/20 2113)    nitroGLYCERIN 5 mcg/min (07/02/20 2119)    heparin (porcine) 1,400 Units/hr (07/02/20 2132)    sodium chloride 50 mL/hr at 06/27/20 1924     PRN Meds: albuterol sulfate HFA, albuterol, sodium chloride flush, acetaminophen, melatonin, nitroGLYCERIN, heparin (porcine), heparin (porcine), sodium chloride flush, acetaminophen **OR** acetaminophen, promethazine **OR** ondansetron    Past History    Past Medical History:   has a past medical history of CAD (coronary artery disease), Carotid artery stenosis, COPD (chronic obstructive pulmonary disease) ([facility] [address] H/O cardiac catheterization, H/O cardiovascular stress test, H/O cardiovascular stress test, H/O cardiovascular stress test, H/O echocardiogram, H/O echocardiogram, History of Holter monitoring, Hyperlipidemia, Hypertension, MI (myocardial infarction) ([facility] [address] Peripheral vascular disease ([facility] [address] S/P angioplasty with stent, and Unspecified cerebral artery occlusion with cerebral infarction. Social History:   reports that he has been smoking. He started smoking about 49 years ago. He has a 22.50 pack-year smoking history.  He has never used smokeless tobacco. He reports that he does not drink alcohol or use drugs. Family History:   Family History   Adopted: Yes   Problem Relation Age of Onset    Cancer Brother        Physical Examination      Vitals:  BP (!) 124/56   Pulse 77   Temp 97.5 °F (36.4 °C) (Oral)   Resp 17   Ht 6' 2.02\" (1.88 m)   Wt 229 lb 3.2 oz (104 kg)   SpO2 96%   BMI 29.41 kg/m²   Temp (24hrs), Av.1 °F (36.7 °C), Min:97.5 °F (36.4 °C), Max:98.6 °F (37 °C)      I/O (24Hr): Intake/Output Summary (Last 24 hours) at 2020 0756  Last data filed at 2020 5480  Gross per 24 hour   Intake 1559.75 ml   Output 2400 ml   Net -840.25 ml       CONSTITUTIONAL:  awake, alert, cooperative, no apparent distress, and appears stated age  LUNGS:  No increased work of breathing, good air exchange, clear to auscultation bilaterally, no crackles or wheezing  CARDIOVASCULAR:  normal S1 and S2, murmurs include systolic murmur II/VI located at right upper sternal border without radiation and no edema  ABDOMEN normal bowel sounds, soft, non-distended, non-tender, no masses palpated, no hepatosplenomegally  MUSCULOSKELETAL:  There is no redness, warmth, or swelling of the joints. Full range of motion noted. Motor strength is 5 out of 5 all extremities bilaterally. Tone is normal.    Labs/Imaging/Diagnostics   Labs:  CBC:  Recent Labs     20  0702  20  0554 20  1752 20  0534   WBC 5.1  --  5.1  --  6.0   RBC 3.57*  --  3.48*  --  3.59*   HGB 11.0*   < > 10.8* 10.9* 11.2*   HCT 34.7*   < > 34.7* 35.2* 35.4*   MCV 97.2  --  99.7  --  98.6   RDW 14.4  --  14.7*  --  14.5*     --  203  --  225    < > = values in this interval not displayed. CHEMISTRIES:  Recent Labs     20  0720  0554 20  0534    140 139   K 4.0 4.5 4.4    102 100   CO2 30 29 29   BUN 7* 10 10   CREATININE 0.56* 0.88 0.71   GLUCOSE 100* 101* 103*   MG  --  2.1  --      PT/INR:No results for input(s): PROTIME, INR in the last 72 hours.   APTT:  Recent Labs     07/02/20  0702 07/03/20  0554 07/04/20  0534   APTT 57.4* 66.2* 53.4*     LIVER PROFILE:No results for input(s): AST, ALT, BILIDIR, BILITOT, ALKPHOS in the last 72 hours. Imaging Last 24 Hours:  Cta Head Neck W Contrast    Result Date: 6/30/2020  EXAMINATION: CTA OF THE HEAD AND NECK WITH CONTRAST 6/30/2020 1:07 pm: TECHNIQUE: CTA of the head and neck was performed with the administration of intravenous contrast. Multiplanar reformatted images are provided for review. MIP images are provided for review. Stenosis of the internal carotid arteries measured using NASCET criteria. Dose modulation, iterative reconstruction, and/or weight based adjustment of the mA/kV was utilized to reduce the radiation dose to as low as reasonably achievable. COMPARISON: February 9, 2018 HISTORY: ORDERING SYSTEM PROVIDED HISTORY: pre op cabg severe carotid disease h/o bilateral CEA TECHNOLOGIST PROVIDED HISTORY: pre op cabg severe carotid disease h/o bilateral CEA Reason for Exam: pre op cabg sever carotid disease h/o bilateral CEA Acuity: Unknown Type of Exam: Unknown FINDINGS: CTA NECK: AORTIC ARCH/ARCH VESSELS: No dissection or arterial injury. No significant stenosis of the brachiocephalic or subclavian arteries. CAROTID ARTERIES: A left-sided carotid stent is present, without significant intrastent stenosis. The left ICA is normal in course and caliber. There are moderate to severe stenoses of the right common carotid artery. A focal bulge off of the mid right common carotid artery measuring up to 6.0 mm in diameter is consistent with an aneurysm or pseudoaneurysm. The right cervical internal carotid artery is completely occluded. VERTEBRAL ARTERIES: The proximal right vertebral artery is occluded. There is reconstitution of flow within the mid V2 segment, likely filling in retrograde fashion.   A bulge off of the V3 segment of the right vertebral artery measuring up to 4.7 mm in diameter is consistent with an aneurysm or pseudoaneurysm. There are severe to critical stenoses of the proximal left vertebral artery. SOFT TISSUES: There is pulmonary emphysema and there are partially visualized pleural effusions, worse on the left hand side. BONES: No acute osseous abnormality. CTA HEAD: ANTERIOR CIRCULATION: There is reconstitution of flow within the distal right cavernous ICA, likely filling in retrograde fashion, and across the anterior communicating artery. The anterior and the middle cerebral arteries demonstrate normal arborization patterns. POSTERIOR CIRCULATION: The vertebrobasilar system is grossly within normal limits. There are mild-to-moderate stenoses of the right posterior cerebral artery. OTHER: No dural venous sinus thrombosis on this non-dedicated study. BRAIN: A noncontrast head CT was not performed as part of this exam limiting evaluation. No obvious intracranial mass is visualized. Occluded right cervical ICA and proximal right vertebral artery. 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 Severe to critical stenoses of the proximal left vertebral artery.      Vl Vein Mapping Lower Bilateral    Result Date: 6/30/2020    [facility]  Vascular Lower Extremity Vein Mapping Procedure   Patient Name  [facility]    Date of Study         06/30/2020                Ascencion Iverson   Date of Birth 1955  Gender                Male   Age           77 year(s)  Race                     Room Number   2018        Height:               74 inch, 187.96 cm   Corporate ID  [id number]    Weight:               232 pounds, 105.2 kg  #   Patient Acct  [de-identified]   BSA:       2.31 m^2   BMI:         29.79 kg/m^2  #   MR #          [id number]     Sonographer           Itzel Garcia RVT, [facility]   Accession #   [id number]  Interpreting          [address] [facility]                            Physician   Referring                 Referring Physician ROBIN Mercado  Nurse  Practitioner  Procedure Type of Study:   Veins: Lower Extremity Vein Mapping. Indications for Study:Pre-op [facility]. Patient Status: In Patient. Conclusions   Summary   No DVT in the bilateral common femoral veins. Vein sizes are listed in the  table above. Signature   ----------------------------------------------------------------  Electronically signed by Emanuel Danielson RVT, RDMS(Sonographer) on 06/30/2020 11:56 AM  ----------------------------------------------------------------   ----------------------------------------------------------------  Electronically signed by Tharon Rudder Reyes,Arthur(Interpreting  physician) on 06/30/2020 10:05 PM  ----------------------------------------------------------------  Findings:   Right Impression:                   Left Impression:  Common femoral and femoral veins    Common femoral and femoral veins are  are compressible. compressible. The proximal great saphenous vein is                                      surgically absent. Risk Factors History +----------------+----------+----------------------------------------------+ ! Diagnosis       ! Date      ! Comments                                      ! +----------------+----------+----------------------------------------------+ ! Other           ! !H/O 2 CEA's on both sides; H/O CVA; expressive! !                !          !aphasia; blurred vision                       ! +----------------+----------+----------------------------------------------+ ! Carotid         ! !Patient S/P bilateral carotid endarterectomy  ! ! Endarterectomy  !          !                                              ! +----------------+----------+----------------------------------------------+ ! Other           !11/13/2017! Lt.  common carotid artery covered stent       ! !                !          !placement, excision of Lt. carotid artery     ! ! !          !pseudoaneurysm,                               ! !                !          !reconstruction of Lt. common carotid to Lt. ! !                !          !internal carotid artery; Left GSV used        ! +----------------+----------+----------------------------------------------+ ! Other           !05/29/2018! Lt Carotid wall stent with post dilation. ! +----------------+----------+----------------------------------------------+ ! Other           !07/25/2018! Resection Rt. Carotid pseudoaneurysm, patch   ! !                !          !angioplasty of Rt. carotid with bovine        ! !                !          !periocardial patch. ! +----------------+----------+----------------------------------------------+   - The patient's risk factor(s) include: chronic lung disease,     dyslipidemia, obesity, lack of physical activity, treated arterial     hypertension and prior MI .   - Current - Every day. - The patient's last creatinine was 0.5 mg/dl. Allergies   - Allergy:*No Known Allergies(Miscellaneous). Velocities are measured in cm/s ; Diameters are measured in cm +--------------------------------------++--------+-----+----+--------+-----+ ! Superficial - Great Saphenous Vein    ! ! Right   ! ! Left!        !     ! +--------------------------------------++--------+-----+----+--------+-----+ ! Location                              ! !Diameter! Depth! !Diameter! Depth! +--------------------------------------++--------+-----+----+--------+-----+ ! Sapheno Femoral Junction              ! !0.64    !     !    !        !     ! +--------------------------------------++--------+-----+----+--------+-----+ ! GSV Mid Thigh                         !!0.26    !     !    !0.23    !     ! +--------------------------------------++--------+-----+----+--------+-----+ ! GSV Knee                              !!0.25    !     !    !0.26    !     ! +--------------------------------------++--------+-----+----+--------+-----+ ! GSV High Calf                         !!0.2     !     !    !0.25    !     ! +--------------------------------------++--------+-----+----+--------+-----+ ! GSV Low Calf                          !!0.28    !     !    !0.31    !     ! +--------------------------------------++--------+-----+----+--------+-----+ ! GSV Ankle                             !!0.28    !     !    !0.37    !     ! +--------------------------------------++--------+-----+----+--------+-----+        Assessment        Hospital Problems           Last Modified POA    * (Principal) Bleeding duodenal ulcer 6/26/2020 Yes    GI bleed 6/25/2020 Yes    Hypotension due to hypovolemia 6/25/2020 Yes    NSAID induced gastritis 6/26/2020 Yes          Plan:        Hgb remains stable  CABG/AVR Monday 8:15  Vascular and Pulmonary input pending    Agree with the above  Plan for surgery on Monday  Risk, benefits, and the intentions were discussed with the patient.         Robbie Polanco

## 2020-07-04 NOTE — PROGRESS NOTES
[facility]  Progress Note                   Date:   7/4/2020  Patient name: Azeb Bella  Date of admission:  6/25/2020  3:14 PM  MRN:   [id number]  YOB: 1955  PCP: Luz Elena Davies DO    Reason for Admission: GI bleed [K92.2]    Subjective:       Clinical Changes /Abnormalities:  Patient seen and examined up in chair in room. Patient is s/p cardiac cath 6/29/2020 with MVD referred to CTS for CABG. Report episode of chest pain that resolved. Denies SOB. SR on tele  HR 89. On heparin drip and nitroglycerin drip. Plans for CABG/AVR Monday  Patient up ambulating and working with IS. Review of Systems    Medications:   Scheduled Meds:   tiotropium  2 puff Inhalation Daily    sodium chloride flush  10 mL Intravenous 2 times per day    atorvastatin  80 mg Oral Nightly    sodium chloride (PF)  10 mL Intravenous Daily    aspirin  81 mg Oral Daily    metoprolol tartrate  12.5 mg Oral BID    sodium chloride flush  10 mL Intravenous 2 times per day    lidocaine PF  1 mL Intradermal Once     Continuous Infusions:   nitroGLYCERIN 5 mcg/min (07/02/20 2119)    heparin (porcine) 1,400 Units/hr (07/02/20 2132)    sodium chloride 50 mL/hr at 06/27/20 1924     CBC:   Recent Labs     07/02/20  0702  07/03/20  0554 07/03/20  1752 07/04/20  0534   WBC 5.1  --  5.1  --  6.0   HGB 11.0*   < > 10.8* 10.9* 11.2*     --  203  --  225    < > = values in this interval not displayed. BMP:    Recent Labs     07/02/20  0702 07/03/20  0554 07/04/20  0534    140 139   K 4.0 4.5 4.4    102 100   CO2 30 29 29   BUN 7* 10 10   CREATININE 0.56* 0.88 0.71   GLUCOSE 100* 101* 103*     Hepatic:No results for input(s): AST, ALT, ALB, BILITOT, ALKPHOS in the last 72 hours. Troponin:   No results for input(s): TROPHS in the last 72 hours. BNP: No results for input(s): BNP in the last 72 hours. Lipids: No results for input(s): CHOL, HDL in the last 72 hours.     Invalid input(s): LDLCALCU  INR: No results for input(s): INR in the last 72 hours. DIAGNOSTIC DATA  CATH 6/29/2020  Findings:     LMCA: Very calcified with haziness  IVUS confirmed at lesat 60-70% stenosis in distal and proximal area     LAD: Mild irregularities 20-30%. Ostial area by IVUS was about 60-70%  stenosis     LCx: Dominant  Proximal 80-90% stenosis  OM1: Minimal disease     RCA: Ostial stent appears extending into aorta  Probably 50% stenosis in proximal RCA stent  Distal is very small, with 100% PDA and minimal flow in PL branches      Coronary Tree      Dominance: Mixed     LV Analysis  LV function assessed  Ejection Fraction  +----------------------------------------------------------------------+---+  ! Method                                                                !EF%! +----------------------------------------------------------------------+---+  ! LV gram                                                               !55 ! +----------------------------------------------------------------------+---+        The LV gram was performed in the ELIZONDO 30 position. LVEF:  55%.      Estimated blood loss: 5 ml     Conclusions:  1. Left main and multivessel CAD  2. IVUS confirmed the severity of LM disease with calcification about 60%  3. Preserved LV function     Recommendations:  1. CT surgery consult for urgent surgery   2. Medical Therapy. 3. Risk Factors Modification. 4. Post Cath Protocol    ECHO 6/16/2020  Summary  Global left ventricular systolic function appears preserved with an  estimated ejection fraction of 55%. The left ventricular cavity size is within normal limits and the left  ventricular wall thickness is mildly increased. The mean aortic valve gradient is 30 mmHg consistent with moderate aortic  stenosis. Mild aortic insufficiency was seen. Mitral valve sclerosis without stenosis.   Evidence of moderate (grade II) diastolic dysfunction is seen.     Compared to the previous study of stools but no active bleeding/BRB noted. 3. Hypertension:  Controlled.   Continue BB  4. Keep K >4.0 and Mag >2.0   Stable this am    Electronically signed by JUAN Wahl NP on 7/4/2020 at [address] [facility].  [phone]

## 2020-07-04 NOTE — CONSULTS
06/29/2020    DR Caleb Tapia    CAROTID ENDARTERECTOMY Bilateral 1995, 2002    CAROTID ENDARTERECTOMY Left 11/13/2017    LEFT CAROTID PSEUDOANEURYSM REPAIR WITH [id number] AND [id number] VIABAHN STENTS, LEFT CAROTID ANGIOGRAM, SAPHENOUS VEIN GRAFT BYPASS performed by Mark Saab DO at [place] [address]  08/24/15    balloon, stentx1 dr Gabriela Estes cardilogist,clearance 7/11/18    DIAGNOSTIC CARDIAC CATH LAB PROCEDURE  08/20/15    ENDOSCOPY, COLON, DIAGNOSTIC      KNEE SURGERY  2013    LEG TENDON SURGERY      R    OTHER SURGICAL HISTORY Left 11/13/2017    REPAIR CAROTID ANEURYSM WITH LT SAPHENOUS VEIN GRAFT    OTHER SURGICAL HISTORY Right 07/23/2018     CAROTID ARTERY RESECTION OF  PSEUDO ANEURISM    [place] Right 7/23/2018    RSECTION OF RIGHT CAROTID PSEUDOANEURYSM, WITH PATCH GRAFT (Yordan Cabrera) ANGIOPLASTY performed by Mark Saab DO at [facility]  06/26/2020    UPPER GASTROINTESTINAL ENDOSCOPY Left 6/26/2020    **CASE IN OR WITH GI STAFF** EGD DIAGNOSTIC ONLY performed by Augustine Hayes MD at [facility]       Social History:  reports that he has been smoking. He started smoking about 49 years ago. He has a 22.50 pack-year smoking history. He has never used smokeless tobacco. He reports that he does not drink alcohol or use drugs. Family History: family history includes Cancer in his brother. He was adopted. Allergies: Patient has no known allergies.     Current Meds:  Current Facility-Administered Medications:     albuterol sulfate  (90 Base) MCG/ACT inhaler 2 puff, 2 puff, Inhalation, Q6H PRN, ROBIN Farias    albuterol (PROVENTIL) nebulizer solution 2.5 mg, 2.5 mg, Nebulization, Q6H PRN, ROBIN Farias    tiotropium (SPIRIVA RESPIMAT) 2.5 MCG/ACT inhaler 2 puff, 2 puff, Inhalation, Daily, ROBIN Farias, 2 puff at 07/04/20 0942    sodium chloride flush 0.9 % injection 10 mL, 10 mL, Intravenous, 2 times per day, Devin Smith MD    sodium chloride flush 0.9 % injection 10 mL, 10 mL, Intravenous, PRN, Devin Smith MD    acetaminophen (TYLENOL) tablet 650 mg, 650 mg, Oral, Q4H PRN, Devin Smith MD    atorvastatin (LIPITOR) tablet 80 mg, 80 mg, Oral, Nightly, Tania Desai MD, 80 mg at 07/03/20 2010    melatonin tablet 1 mg, 1 mg, Oral, Nightly PRN, Devin Smith MD, 1 mg at 07/03/20 2013    [DISCONTINUED] pantoprazole (PROTONIX) injection 40 mg, 40 mg, Intravenous, BID **AND** sodium chloride (PF) 0.9 % injection 10 mL, 10 mL, Intravenous, Daily, Tania Desai MD    nitroGLYCERIN (NITROSTAT) SL tablet 0.4 mg, 0.4 mg, Sublingual, Q5 Min PRN, Devin Smith MD, 0.4 mg at 06/27/20 1407    nitroGLYCERIN 50 mg in dextrose 5% 250 mL infusion, 5 mcg/min, Intravenous, Continuous, Tania Desai MD, Last Rate: 1.5 mL/hr at 07/02/20 2119, 5 mcg/min at 07/02/20 2119    aspirin EC tablet 81 mg, 81 mg, Oral, Daily, Devin Smith MD, 81 mg at 07/04/20 0945    heparin (porcine) injection 4,000 Units, 4,000 Units, Intravenous, PRN, Devin Smith MD, 4,000 Units at 06/27/20 2149    heparin (porcine) injection 2,000 Units, 2,000 Units, Intravenous, PRN, Devin Smith MD, 2,000 Units at 07/01/20 1538    heparin 25,000 units in dextrose 5% 250 mL infusion, 1,000 Units/hr, Intravenous, Continuous, Tania Desai MD, Last Rate: 14 mL/hr at 07/02/20 2132, 1,400 Units/hr at 07/02/20 2132    metoprolol tartrate (LOPRESSOR) tablet 12.5 mg, 12.5 mg, Oral, BID, Tania Desai MD, 12.5 mg at 07/04/20 0945    sodium chloride flush 0.9 % injection 10 mL, 10 mL, Intravenous, 2 times per day, Brittney Christine MD, 10 mL at 06/28/20 1948    sodium chloride flush 0.9 % injection 10 mL, 10 mL, Intravenous, PRN, Brittney Christine MD    acetaminophen (TYLENOL) tablet 650 mg, 650 mg, Oral, Q6H PRN **OR** acetaminophen (TYLENOL) suppository 650 mg, 650 mg, Rectal, Q6H PRN, Lucas Piedra Carolyne Hudson MD    promethazine [facility]) tablet 12.5 mg, 12.5 mg, Oral, Q6H PRN **OR** ondansetron (ZOFRAN) injection 4 mg, 4 mg, Intravenous, Q6H PRN, Lalo Abdul MD    0.9 % sodium chloride infusion, , Intravenous, Continuous, Lalo Abdul MD, Last Rate: 50 mL/hr at 06/27/20 1924    lidocaine PF 1 % injection 1 mL, 1 mL, Intradermal, Once, Lalo Abdul MD    REVIEW OF SYSTEMS:      Review of Systems - General ROS: negative for - chills, fatigue, fever or night sweats  Psychological ROS: negative for - anxiety, behavioral disorder or depression  Ophthalmic ROS: negative for - blurry vision, dry eyes or eye pain  ENT ROS: negative for - epistaxis, headaches or nasal congestion  Hematological and Lymphatic ROS: negative for - bleeding problems, blood clots or bruising  Endocrine ROS: negative for - malaise/lethargy, mood swings or palpitations  Respiratory ROS: negative for - cough, hemoptysis or shortness of breath  Cardiovascular ROS: Negative -chest pain  Gastrointestinal ROS: Negative for- abdominal pain, constipation, hematochezia  Genito-Urinary ROS: no dysuria, trouble voiding, or hematuria  Musculoskeletal ROS: negative for - joint pain, joint swelling or muscle pain  Neurological ROS: TIA or stroke like symptom  Dermatological ROS: negative for dry skin and eczema          PHYSICAL EXAM:    VITALS:  BP (!) 124/56   Pulse 77   Temp 97.5 °F (36.4 °C) (Oral)   Resp 18   Ht 6' 2.02\" (1.88 m)   Wt 229 lb 3.2 oz (104 kg)   SpO2 94%   BMI 29.41 kg/m²   INTAKE/OUTPUT:     Intake/Output Summary (Last 24 hours) at 7/4/2020 1218  Last data filed at 7/4/2020 5768  Gross per 24 hour   Intake 1559.75 ml   Output 2400 ml   Net -840.25 ml         CONSTITUTIONAL:  Alert and oriented, no acute distress  HEAD: normocephalic, atraumatic  EYES: Pupils equal and reactive to light, Extraocular muscles intact, sclera non icteric  ENT: Mucus membranes moist, No otorrhea, no rhinorrhea  NECK:  supple, symmetrical, trachea midline   LUNGS:  Good air movement bilaterally, unlabored respirations, no wheezes or rhonchi  CARDIOVASCULAR: Regular rate and rhythm   ABDOMEN: soft, non tender, non distended, no rebound or guarding, no hernias, no hepatomegaly, no splenomegly  MUSCULOSKELETAL:  Equal strength bilaterally, normal muscle tone  SKIN: No abscess or rash  NEUROLOGIC:  no focal deficits  PSYCH: affect appropriate      Labs:   Lab Results   Component Value Date    WBC 6.0 07/04/2020    HGB 11.2 07/04/2020    HCT 35.4 07/04/2020    MCV 98.6 07/04/2020     07/04/2020     03/28/2012     Lab Results   Component Value Date     07/04/2020    K 4.4 07/04/2020     07/04/2020    CO2 29 07/04/2020    BUN 10 07/04/2020    CREATININE 0.71 07/04/2020    GLUCOSE 103 07/04/2020    GLUCOSE 97 03/29/2012    CALCIUM 8.6 07/04/2020     Lab Results   Component Value Date    INR 1.0 06/25/2020       Imaging:        Impression:  Patient Active Problem List   Diagnosis    Gross hematuria    Benign non-nodular prostatic hyperplasia with lower urinary tract symptoms    Carotid pseudoaneurysm ([address]    COPD exacerbation (HCC)    CAD (coronary artery disease)    Stage 3 severe COPD by GOLD classification ([address]    Hypertension    Hyperlipidemia    Peripheral vascular disease ([address]    Acute respiratory failure with hypoxia (HCC)    Bacterial pneumonia    Hyponatremia    GI bleed    Hypotension due to hypovolemia    Bleeding duodenal ulcer    NSAID induced gastritis    Smoking greater than 40 pack years       3. 78 yo male with significant carotid artery disease, multivessel CAD, PVD presents with stable angina who is scheduled for a CABG. Recommendation:    1. Patient is at moderate risk for perioperative stroke- no surgical intervention is required for carotid disease prior to CABG  2. Continue supportive care per primary. 3. Monitor carotid disease as outpatient   4.  Vascular surgery service will sign off at this time.  Thank you for the consult. Please call/page us if you have any questions/concerns. We appreciate being involved in the care of your patient.       Sabiha Mckeon MD  PGY-1 General Surgery      7/4/2020

## 2020-07-05 NOTE — PROGRESS NOTES
Progress Note    Date:7/5/2020       Room:2018/2018-01  Patient Name:Erik Sanabria     Date of Birth:[birth year]     Age:65 y.o. Subjective   Interval History Status: not changed. Continued to have dark stools    No chest pain    Review of Systems   Respiratory ROS: no cough, shortness of breath, or wheezing    Medications   Scheduled Meds:    tiotropium  2 puff Inhalation Daily    sodium chloride flush  10 mL Intravenous 2 times per day    atorvastatin  80 mg Oral Nightly    sodium chloride (PF)  10 mL Intravenous Daily    aspirin  81 mg Oral Daily    metoprolol tartrate  12.5 mg Oral BID    sodium chloride flush  10 mL Intravenous 2 times per day    lidocaine PF  1 mL Intradermal Once     Continuous Infusions:    pantoprozole (PROTONIX) infusion 8 mg/hr (07/04/20 2331)    nitroGLYCERIN 5 mcg/min (07/02/20 2119)    heparin (porcine) 1,400 Units/hr (07/05/20 0556)    sodium chloride 50 mL/hr at 06/27/20 1924     PRN Meds: albuterol sulfate HFA, albuterol, sodium chloride flush, acetaminophen, melatonin, nitroGLYCERIN, heparin (porcine), heparin (porcine), sodium chloride flush, acetaminophen **OR** acetaminophen, promethazine **OR** ondansetron    Past History    Past Medical History:   has a past medical history of CAD (coronary artery disease), Carotid artery stenosis, COPD (chronic obstructive pulmonary disease) ([facility] H/O cardiac catheterization, H/O cardiovascular stress test, H/O cardiovascular stress test, H/O cardiovascular stress test, H/O echocardiogram, H/O echocardiogram, History of Holter monitoring, Hyperlipidemia, Hypertension, MI (myocardial infarction) ([facility] [address] Peripheral vascular disease ([facility] [address] S/P angioplasty with stent, and Unspecified cerebral artery occlusion with cerebral infarction. Social History:   reports that he has been smoking. He started smoking about 49 years ago. He has a 22.50 pack-year smoking history.  He has never used smokeless tobacco. He reports that he does not drink alcohol or use drugs. Family History:   Family History   Adopted: Yes   Problem Relation Age of Onset    Cancer Brother        Physical Examination      Vitals:  BP (!) 119/53   Pulse 77   Temp 97.9 °F (36.6 °C) (Oral)   Resp 17   Ht 6' 2.02\" (1.88 m)   Wt 232 lb 9.6 oz (105.5 kg)   SpO2 95%   BMI 29.85 kg/m²   Temp (24hrs), Av °F (36.7 °C), Min:97.8 °F (36.6 °C), Max:98.4 °F (36.9 °C)      I/O (24Hr): Intake/Output Summary (Last 24 hours) at 2020 6946  Last data filed at 2020 6030  Gross per 24 hour   Intake 2946 ml   Output 2750 ml   Net 196 ml       CONSTITUTIONAL:  awake, alert, cooperative, no apparent distress, and appears stated age  LUNGS:  No increased work of breathing, good air exchange, clear to auscultation bilaterally, no crackles or wheezing  CARDIOVASCULAR:  normal S1 and S2, murmurs include systolic murmur II/VI located at right upper sternal border without radiation and no edema  ABDOMEN normal bowel sounds, soft, non-distended, non-tender, no masses palpated, no hepatosplenomegally  MUSCULOSKELETAL:  There is no redness, warmth, or swelling of the joints. Full range of motion noted. Motor strength is 5 out of 5 all extremities bilaterally. Tone is normal.    Labs/Imaging/Diagnostics   Labs:  CBC:  Recent Labs     20  0554  20  0534 20  1825 20  0637   WBC 5.1  --  6.0  --  7.2   RBC 3.48*  --  3.59*  --  3.65*   HGB 10.8*   < > 11.2* 11.0* 11.2*   HCT 34.7*   < > 35.4* 35.9* 36.1*   MCV 99.7  --  98.6  --  98.9   RDW 14.7*  --  14.5*  --  14.6*     --  225  --  239    < > = values in this interval not displayed. CHEMISTRIES:  Recent Labs     20  0554 20  0534 20  0637    139 139   K 4.5 4.4 4.8    100 100   CO2 29 29 29   BUN 10 10 11   CREATININE 0.88 0.71 0.78   GLUCOSE 101* 103* 97   MG 2.1  --   --      PT/INR:No results for input(s): PROTIME, INR in the last 72 hours.   APTT:  Recent Labs 07/03/20  0554 07/04/20  0534 07/05/20  0637   APTT 66.2* 53.4* 75.5*     LIVER PROFILE:No results for input(s): AST, ALT, BILIDIR, BILITOT, ALKPHOS in the last 72 hours. Imaging Last 24 Hours:  Cta Head Neck W Contrast    Result Date: 6/30/2020  EXAMINATION: CTA OF THE HEAD AND NECK WITH CONTRAST 6/30/2020 1:07 pm: TECHNIQUE: CTA of the head and neck was performed with the administration of intravenous contrast. Multiplanar reformatted images are provided for review. MIP images are provided for review. Stenosis of the internal carotid arteries measured using NASCET criteria. Dose modulation, iterative reconstruction, and/or weight based adjustment of the mA/kV was utilized to reduce the radiation dose to as low as reasonably achievable. COMPARISON: February 9, 2018 HISTORY: ORDERING SYSTEM PROVIDED HISTORY: pre op cabg severe carotid disease h/o bilateral CEA TECHNOLOGIST PROVIDED HISTORY: pre op cabg severe carotid disease h/o bilateral CEA Reason for Exam: pre op cabg sever carotid disease h/o bilateral CEA Acuity: Unknown Type of Exam: Unknown FINDINGS: CTA NECK: AORTIC ARCH/ARCH VESSELS: No dissection or arterial injury. No significant stenosis of the brachiocephalic or subclavian arteries. CAROTID ARTERIES: A left-sided carotid stent is present, without significant intrastent stenosis. The left ICA is normal in course and caliber. There are moderate to severe stenoses of the right common carotid artery. A focal bulge off of the mid right common carotid artery measuring up to 6.0 mm in diameter is consistent with an aneurysm or pseudoaneurysm. The right cervical internal carotid artery is completely occluded. VERTEBRAL ARTERIES: The proximal right vertebral artery is occluded. There is reconstitution of flow within the mid V2 segment, likely filling in retrograde fashion.   A bulge off of the V3 segment of the right vertebral artery measuring up to 4.7 mm in diameter is consistent with an aneurysm Cami Adams [place]  Nurse  Practitioner  Procedure Type of Study:   Veins: Lower Extremity Vein Mapping. Indications for Study:Pre-op OHS. Patient Status: In Patient. Conclusions   Summary   No DVT in the bilateral common femoral veins. Vein sizes are listed in the  table above. Signature   ----------------------------------------------------------------  Electronically signed by Senia Alonso RVT, RDMS(Sonographer) on 06/30/2020 11:56 AM  ----------------------------------------------------------------   ----------------------------------------------------------------  Electronically signed by Myrla Mercury Reyes,Arthur(Interpreting  physician) on 06/30/2020 10:05 PM  ----------------------------------------------------------------  Findings:   Right Impression:                   Left Impression:  Common femoral and femoral veins    Common femoral and femoral veins are  are compressible. compressible. The proximal great saphenous vein is                                      surgically absent. Risk Factors History +----------------+----------+----------------------------------------------+ ! Diagnosis       ! Date      ! Comments                                      ! +----------------+----------+----------------------------------------------+ ! Other           ! !H/O 2 CEA's on both sides; H/O CVA; expressive! !                !          !aphasia; blurred vision                       ! +----------------+----------+----------------------------------------------+ ! Carotid         ! !Patient S/P bilateral carotid endarterectomy  ! ! Endarterectomy  !          !                                              ! +----------------+----------+----------------------------------------------+ ! Other           !11/13/2017! Lt.  common carotid artery covered stent       ! !                !          !placement, excision of Lt. carotid artery     ! !                ! !pseudoaneurysm,                               ! !                !          !reconstruction of Lt. common carotid to Lt. ! !                !          !internal carotid artery; Left GSV used        ! +----------------+----------+----------------------------------------------+ ! Other           !05/29/2018! Lt Carotid wall stent with post dilation. ! +----------------+----------+----------------------------------------------+ ! Other           !07/25/2018! Resection Rt. Carotid pseudoaneurysm, patch   ! !                !          !angioplasty of Rt. carotid with bovine        ! !                !          !periocardial patch. ! +----------------+----------+----------------------------------------------+   - The patient's risk factor(s) include: chronic lung disease,     dyslipidemia, obesity, lack of physical activity, treated arterial     hypertension and prior MI .   - Current - Every day. - The patient's last creatinine was 0.5 mg/dl. Allergies   - Allergy:*No Known Allergies(Miscellaneous). Velocities are measured in cm/s ; Diameters are measured in cm +--------------------------------------++--------+-----+----+--------+-----+ ! Superficial - Great Saphenous Vein    ! ! Right   ! ! Left!        !     ! +--------------------------------------++--------+-----+----+--------+-----+ ! Location                              ! !Diameter! Depth! !Diameter! Depth! +--------------------------------------++--------+-----+----+--------+-----+ ! Sapheno Femoral Junction              ! !0.64    !     !    !        !     ! +--------------------------------------++--------+-----+----+--------+-----+ ! GSV Mid Thigh                         !!0.26    !     !    !0.23    !     ! +--------------------------------------++--------+-----+----+--------+-----+ ! GSV Knee                              !!0.25    !     !    !0.26    !     ! +--------------------------------------++--------+-----+----+--------+-----+ ! GSV High Calf                         !!0.2     !     !    !0.25    !     ! +--------------------------------------++--------+-----+----+--------+-----+ ! GSV Low Calf                          !!0.28    !     !    !0.31    !     ! +--------------------------------------++--------+-----+----+--------+-----+ ! GSV Ankle                             !!0.28    !     !    !0.37    !     ! +--------------------------------------++--------+-----+----+--------+-----+        Assessment        Hospital Problems           Last Modified POA    * (Principal) Bleeding duodenal ulcer 6/26/2020 Yes    Stage 3 severe COPD by GOLD classification ([address] (Chronic) 7/4/2020 Yes    GI bleed 6/25/2020 Yes    Hypotension due to hypovolemia 6/25/2020 Yes    NSAID induced gastritis 6/26/2020 Yes    Smoking greater than 40 pack years 7/4/2020 Yes          Plan:        OR tomorrow 7:15 AM. CABG and possible AVR  Pre op orders placed   Make sure heparin off at midnight.       Samantha Adams

## 2020-07-05 NOTE — PROGRESS NOTES
spo2 was 82 percent when patient returned from the restroom. 3 liters nasal cannula placed on patient.  Current SPO2 is 92%

## 2020-07-05 NOTE — PROGRESS NOTES
[facility]  Progress Note                   Date:   7/5/2020  Patient name: Ethel Vyas  Date of admission:  6/25/2020  3:14 PM  MRN:   [id number]  YOB: 1955  PCP: Lisa Tovar DO    Reason for Admission: GI bleed [K92.2]    Subjective:       Clinical Changes /Abnormalities:  Patient seen and examined up in chair in room. Patient is s/p cardiac cath 6/29/2020 with MVD referred to CTS for CABG. Denies chest pain and SOB. SR on tele. On heparin drip and nitroglycerin drip. Plans for CABG/AVR Monday  Patient up ambulating and working with IS. Review of Systems    Medications:   Scheduled Meds:   sodium chloride flush  10 mL Intravenous 2 times per day    aspirin  81 mg Oral On Call to OR    tiotropium  2 puff Inhalation Daily    sodium chloride flush  10 mL Intravenous 2 times per day    atorvastatin  80 mg Oral Nightly    sodium chloride (PF)  10 mL Intravenous Daily    aspirin  81 mg Oral Daily    metoprolol tartrate  12.5 mg Oral BID    sodium chloride flush  10 mL Intravenous 2 times per day    lidocaine PF  1 mL Intradermal Once     Continuous Infusions:   [START ON 7/6/2020] sodium chloride      pantoprozole (PROTONIX) infusion 8 mg/hr (07/05/20 1018)    nitroGLYCERIN 5 mcg/min (07/02/20 2119)    heparin (porcine) 1,400 Units/hr (07/05/20 0556)    sodium chloride 50 mL/hr at 06/27/20 1924     CBC:   Recent Labs     07/03/20  0554  07/04/20  0534 07/04/20  1825 07/05/20  0637   WBC 5.1  --  6.0  --  7.2   HGB 10.8*   < > 11.2* 11.0* 11.2*     --  225  --  239    < > = values in this interval not displayed. BMP:    Recent Labs     07/03/20  0554 07/04/20  0534 07/05/20  0637    139 139   K 4.5 4.4 4.8    100 100   CO2 29 29 29   BUN 10 10 11   CREATININE 0.88 0.71 0.78   GLUCOSE 101* 103* 97     Hepatic:No results for input(s): AST, ALT, ALB, BILITOT, ALKPHOS in the last 72 hours.   Troponin:   No results for input(s): SHARON was seen. Mitral valve sclerosis without stenosis. Evidence of moderate (grade II) diastolic dysfunction is seen.     Compared to the previous study of 7/5/18, the patients mean aortic valve  gradient has increased from 17 mmHg to 30 mmHg. Based on the patients moderate to severe aortic stenosis, consider repeat  testing in 1 year or sooner if symptoms of severe disease develop.       Objective:   Vitals: BP (!) 119/53   Pulse 77   Temp 97.9 °F (36.6 °C) (Oral)   Resp 17   Ht 6' 2.02\" (1.88 m)   Wt 232 lb 9.6 oz (105.5 kg)   SpO2 95%   BMI 29.85 kg/m²   General appearance: alert and cooperative with exam  HEENT: Head: Normocephalic, no lesions, without obvious abnormality. Neck:no JVD, trachea midline, no adenopathy  Lungs: expiratory wheezing noted bilaterally  Heart: Regular rate and rhythm, s1/s2 auscultated, + murmurs  SR on tele  Abdomen: soft, non-tender, bowel sounds active  Extremities: no edema  Neurologic: not done      Assessment / Acute Cardiac Problems:   1. Recurrent Angina  2. Elevated troponin  3. CAD s/p PCI to RCA on 8/24/2015  4. Acute upper GI bleed managed by GI  5. HLD  6. HTN  7. Carotid artery disease s/p carotid endarterectomy  8. Smoker  9. PVD  10. COPD    Patient Active Problem List:     Gross hematuria     Benign non-nodular prostatic hyperplasia with lower urinary tract symptoms     Carotid pseudoaneurysm (HCC)     COPD exacerbation (HCC)     CAD (coronary artery disease)     COPD (chronic obstructive pulmonary disease) ([facility]     Hypertension     Hyperlipidemia     Peripheral vascular disease (HCC)     Acute respiratory failure with hypoxia (HCC)     Bacterial pneumonia     Hyponatremia     GI bleed     Hypotension due to hypovolemia     Bleeding duodenal ulcer     NSAID induced gastritis      Plan of Treatment:   1. Recurrent angina - now stable. S/p cardiac cath with MVD and referral to CTS surgery. Plans for CABG/AVR on Monday.   Continue ASA, statin, BB nitroglycerin drip, and heparin drip. 2. Acute upper GI Bleed managed by GI. States dark stools but no active bleeding/BRB noted. 3. Hypertension:  Controlled.   Continue BB  4. Keep K >4.0 and Mag >2.0   Stable this am    Electronically signed by JUAN Saucedo NP on 7/5/2020 at 11:05 [address] [facility].  [phone]

## 2020-07-05 NOTE — PROGRESS NOTES
therapy patient was also found to have moderate gastritis and duodenitis.      Past medical history significant for coronary artery disease with stents and peripheral vascular disease and stroke.  Patient is on aspirin and Prasugrel at home.  Currently AC/AP on hold.  Patient was also found to have elevated troponins and cardiology was consulted recommended further work-up possibilities cardiac catheterization as an outpatient.     Previous Testing:      ECHO 2020: EF 55%, moderate AS with mean gradient 30 mmHg, mild AI, grade II DD.      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 EF 43%.      CATH 10/2/15: Severe RCA ostial, proximal and distal stenosis. PCI with MARY ANN to ostial lesion and balloon angioplasty to proximal lesion. Distal RCA lesion had total occlusion after balloon angioplasty.     OBJECTIVE     Vital Signs:  BP (!) 119/53   Pulse 77   Temp 97.9 °F (36.6 °C) (Oral)   Resp 16   Ht 6' 2.02\" (1.88 m)   Wt 232 lb 9.6 oz (105.5 kg)   SpO2 94%   BMI 29.85 kg/m²     Temp (24hrs), Av °F (36.7 °C), Min:97.8 °F (36.6 °C), Max:98.4 °F (36.9 °C)    In: 2946   Out: 2750 [Urine:2750]    Physical Exam:  Constitutional: This is a well developed, well nourished, 25-29.9 - Overweight 77y.o. year old male who is alert, oriented, cooperative and in no apparent distress. Head: normocephalic and atraumatic. EENT:  PERRLA. No conjunctival injections. Neck: Supple without thyromegaly. No elevated JVP. Trachea was midline. Respiratory: Chest was symmetrical without dullness to percussion. Breath sounds bilaterally were clear to auscultation. There were no wheezes, rhonchi or rales. There is no intercostal retraction or use of accessory muscles. Saturating at 96% on 3 L NC. Cardiovascular: Regular without murmur, clicks, gallops or rubs.    Abdomen: Slightly rounded and soft without organomegaly. No rebound, rigidity or guarding was appreciated. Musculoskeletal: No gross muscle weakness. Extremities:  No lower extremity edema, ulcerations, tenderness, varicosities or erythema. Muscle size, tone and strength are normal.  No involuntary movements are noted. Skin:  Warm and dry. Good color, turgor and pigmentation. No lesions or scars.   No cyanosis or clubbing  Neurological/Psychiatric: The patient's general behavior, level of consciousness, thought content and emotional status is normal.        Medications:  Scheduled Medications:    tiotropium  2 puff Inhalation Daily    sodium chloride flush  10 mL Intravenous 2 times per day    atorvastatin  80 mg Oral Nightly    sodium chloride (PF)  10 mL Intravenous Daily    aspirin  81 mg Oral Daily    metoprolol tartrate  12.5 mg Oral BID    sodium chloride flush  10 mL Intravenous 2 times per day    lidocaine PF  1 mL Intradermal Once     Continuous Infusions:    pantoprozole (PROTONIX) infusion 8 mg/hr (07/04/20 2331)    nitroGLYCERIN 5 mcg/min (07/02/20 2119)    heparin (porcine) 1,400 Units/hr (07/05/20 0556)    sodium chloride 50 mL/hr at 06/27/20 1924     PRN Medicationsalbuterol sulfate HFA, 2 puff, Q6H PRN  albuterol, 2.5 mg, Q6H PRN  sodium chloride flush, 10 mL, PRN  acetaminophen, 650 mg, Q4H PRN  melatonin, 1 mg, Nightly PRN  nitroGLYCERIN, 0.4 mg, Q5 Min PRN  heparin (porcine), 4,000 Units, PRN  heparin (porcine), 2,000 Units, PRN  sodium chloride flush, 10 mL, PRN  acetaminophen, 650 mg, Q6H PRN    Or  acetaminophen, 650 mg, Q6H PRN  promethazine, 12.5 mg, Q6H PRN    Or  ondansetron, 4 mg, Q6H PRN        Diagnostic Labs:  CBC:   Recent Labs     07/03/20  0554  07/04/20  0534 07/04/20  1825 07/05/20  0637   WBC 5.1  --  6.0  --  7.2   RBC 3.48*  --  3.59*  --  3.65*   HGB 10.8*   < > 11.2* 11.0* 11.2*   HCT 34.7*   < > 35.4* 35.9* 36.1*   MCV 99.7  --  98.6  --  98.9   RDW 14.7*  --  14.5*  -- 14.6*     --  225  --  239    < > = values in this interval not displayed. BMP:   Recent Labs     07/03/20  0554 07/04/20  0534 07/05/20  0637    139 139   K 4.5 4.4 4.8    100 100   CO2 29 29 29   BUN 10 10 11   CREATININE 0.88 0.71 0.78     BNP: No results for input(s): BNP in the last 72 hours. PT/INR: No results for input(s): PROTIME, INR in the last 72 hours. APTT:   Recent Labs     07/03/20  0554 07/04/20  0534 07/05/20  0637   APTT 66.2* 53.4* 75.5*     CARDIAC ENZYMES: No results for input(s): CKMB, CKMBINDEX, TROPONINI in the last 72 hours. Invalid input(s): CKTOTAL;3  FASTING LIPID PANEL:  Lab Results   Component Value Date    CHOL 123 09/10/2018    HDL 53 09/10/2018    TRIG 67 09/10/2018     LIVER PROFILE: No results for input(s): AST, ALT, ALB, BILIDIR, BILITOT, ALKPHOS in the last 72 hours. MICROBIOLOGY:   Lab Results   Component Value Date/Time    CULTURE NO GROWTH 6 DAYS 06/25/2020 07:22 PM       Imaging:    Ct Chest Wo Contrast    Result Date: 7/1/2020  Underlying emphysematous change with small bilateral effusions and adjacent atelectasis. Extensive coronary artery calcifications and calcifications involving the aortic valve and mitral valve. Cta Head Neck W Contrast    Result Date: 6/30/2020  Occluded right cervical ICA and proximal right vertebral artery. 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 Severe to critical stenoses of the proximal left vertebral artery. ASSESSMENT & PLAN     ASSESSMENT / PLAN:     1. Acute upper GI bleed. - EGD 6/26: Gastritis with erythema scattered. Moderate duodenitis. Small superficial erosion 2 mm and 3 mm identified in the duodenal bulb. Small 5 mm clean based (punched out) [facility] II duodenal bulb ulcer with no high risk features. - GI recommended PPI drip while patient is on heparin gtt. Transition to protonix BID x 8 weeks following discharge.    - GI signed off with plan for outpatient colonoscopy. 2. Multivessel Disease  - Heart cath 6/29/20: Multivessel disease with preserved EF.   - CT surgery consulted. - Currently on ASA, heparin gtt, Lopressor 12.5 mg BID, & nitro gtt  - CABG planned for tomorrow. 3. Essential hypertension:   - Lopressor 12.5 mg BID  - Nitroglycerin gtt. 4. Hyperlipidemia. - Lipitor     5. Bilateral carotid artery disease.    - Occluded right ICA, vertebral, known with collaterals. Disease right CCA. Mild 16-49% stenosis of the left internal carotid artery. Patent left ICA. - CTA: occluded right cervical ICA and proximal right vertebral artery. 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 Severe stenosis of the proximal left vertebral artery. 6. Peripheral vascular disease.    - Status post stents 8/21/2019.    - Follows up with Dr. Yaa Andrade. 7. SUREKHA, resolved. - Likely Prerenal in nature. DVT prophylaxis: Heparin gtt. GI prophylaxis: Protonix gtt while on heparin will transition to 40 mg BID when heparin gtt discontinued. PT/OT: Ongoing. Discharge Planning: Case management to assist with discharge planning. Nathen Villagomez DO  Internal Medicine Resident, PGY-2  [facility]; [place], [place]  7/5/2020, 8:41 AM   Attending Physician Statement  I have discussed the care of Chula Watt, including pertinent history and exam findings,  with the resident. I have seen and examined the patient and the key elements of all parts of the encounter have been performed by me. I agree with the assessment, plan and orders as documented by the resident with additions . Treatment plan Discussed with nursing staff in detail , all questions answered . Electronically signed by Jimy Melvin MD on   7/5/20 at 12:57 PM EDT    Please note that this chart was generated using voice recognition Dragon dictation software.

## 2020-07-06 NOTE — ANESTHESIA PROCEDURE NOTES
Central Venous Line:    A central venous line was placed using surface landmarks, in the OR for the following indication(s): central venous access and CVP monitoring. Sterility preparation included the following: hand hygiene performed prior to procedure, maximum sterile barriers used and sterile technique used to drape from head to toe. The patient was placed in Trendelenburg position. The left subclavian vein was prepped. The site was prepped with Chloraprep. introducer     During the procedure, the following specific steps were taken: target vein identified, needle advanced into vein and blood aspirated and guidewire advanced into vein. Intravenous verification was obtained by venous blood return and VINCE. Post insertion care included: all ports aspirated, all ports flushed easily, guidewire removed intact, Biopatch applied, line sutured in place and dressing applied. During the procedure the patient experienced: patient tolerated procedure well with no complications. Anesthesia type: general 7.5 (size) Pulmonary Artery Catheter (PAC) was placed through the Introducer CVL in the left subclavian vein. The PAC placement was confirmed by pressure tracing changes and VINCE. The patient experienced the following events during the procedure: patient tolerated procedure well with no complications. PA Cath placed?: Yes    Additional notes:        Staffing  Anesthesiologist: Lily Sims MD  Preanesthetic Checklist  Completed: patient identified, timeout performed and patient being monitored

## 2020-07-06 NOTE — ANESTHESIA PROCEDURE NOTES
Procedure Performed: VINCE       Start Time:  7/6/2020 9:00 AM       End Time:   7/6/2020 9:25 AM    Preanesthesia Checklist:  Patient identified, IV assessed, risks and benefits discussed, monitors and equipment assessed, procedure being performed at surgeon's request and anesthesia consent obtained. General Procedure Information  Diagnostic Indications for Echo:  assessment of surgical repair, hemodynamic monitoring and assessment of valve function  Physician Requesting Echo: Chad Dunham MD  Location performed:  OR  Intubated  Heart visualized  Probe Type:  3D  Modalities:  2D only, continuous wave Doppler and color flow mapping    Echocardiographic and Doppler Measurements    Ventricles    Right Ventricle:  Cavity size normal.  Hypertrophy not present. Thrombus not present. Global function normal.    Left Ventricle:  Cavity size normal.  Hypertrophy present. Thrombus not present. Global Function normal.  Ejection Fraction 55%. Valves    Aortic Valve: Annulus calcified. Stenosis moderate. Area: .825 cm². Mean Gradient: 22 mmHg. Regurgitation mild. Leaflets calcified. Leaflet motions restricted. Mitral Valve: Annulus normal.  Stenosis not present. Regurgitation mild. Leaflets normal.  Leaflet motions normal.      Tricuspid Valve: Annulus normal.  Stenosis not present. Regurgitation none. Leaflets normal.  Leaflet motions normal.    Pulmonic Valve: Annulus normal.  Stenosis not present. Regurgitation none. Aorta    Ascending Aorta:  Size normal.  Dissection not present. Aortic Arch:  Size normal.  Dissection not present. Descending Aorta:  Size normal.  Dissection not present. Atria    Right Atrium:  Size normal.  Spontaneous echo contrast not present. Thrombus not present. Tumor not present. Device not present. Left Atrium:  Size normal.  Spontaneous echo contrast not present. Thrombus not present. Tumor not present. Device not present.

## 2020-07-06 NOTE — ANESTHESIA PROCEDURE NOTES
Arterial Line:    An arterial line was placed using surface landmarks, in the pre-op for the following indication(s): continuous blood pressure monitoring. A 20 gauge (size), 1 and 3/4 inch (length), Arrow (type) catheter was placed, Seldinger technique used, into the left radial artery, secured by Tegaderm and tape. Anesthesia type: General    Events:  patient tolerated procedure well with no complications.   7/6/2020 8:13 AM7/6/2020 8:19 AM  Resident/CRNA: JUAN aB CRNA  Performed: Resident/CRNA and Anesthesiologist   Preanesthetic Checklist  Completed: patient identified, site marked, surgical consent, pre-op evaluation, timeout performed, IV checked, risks and benefits discussed, monitors and equipment checked, anesthesia consent given, oxygen available and patient being monitored

## 2020-07-06 NOTE — ANESTHESIA PROCEDURE NOTES
Central Venous Line:    A central venous line was placed using surface landmarks, in the OR for the following indication(s): central venous access and CVP monitoring. Sterility preparation included the following: hand hygiene performed prior to procedure, maximum sterile barriers used and sterile technique used to drape from head to toe. The patient was placed in Trendelenburg position. The right subclavian vein was prepped. The site was prepped with Chloraprep.double lumen was placed. Intravenous verification was obtained by venous blood return and VINCE. Post insertion care included: all ports aspirated, all ports flushed easily, guidewire removed intact, Biopatch applied, line sutured in place and dressing applied. During the procedure the patient experienced: patient tolerated procedure well with no complications. Anesthesia type: general.. No    Additional notes:        Staffing  Anesthesiologist: Mya Shipley MD  Preanesthetic Checklist  Completed: patient identified, timeout performed and patient being monitored

## 2020-07-06 NOTE — PROGRESS NOTES
Updated Dr. Mason Cornea on patient's hemodynamic status, vitals, medication status.  Order rec'd to initiate vasopressin gtt, and wean Epinephrine gtt as tolerated

## 2020-07-06 NOTE — PLAN OF CARE
Problem: OXYGENATION/RESPIRATORY FUNCTION  Goal: Patient will maintain patent airway  Outcome: Ongoing  Goal: Patient will achieve/maintain normal respiratory rate/effort  Description: Respiratory rate and effort will be within normal limits for the patient  Outcome: Ongoing     Problem: MECHANICAL VENTILATION  Goal: Ability to express needs and understand communication  7/6/2020 1417 by Naheed Salguero RCP  Outcome: Ongoing  7/6/2020 0446 by Giselle Arvizu RN  Outcome: Ongoing  Goal: Patient will maintain patent airway  Outcome: Ongoing  Goal: Oral health is maintained or improved  Outcome: Ongoing  Goal: ET tube will be managed safely  Outcome: Ongoing  Goal: Mobility/activity is maintained at optimum level for patient  Outcome: Ongoing     Problem: SKIN INTEGRITY  Goal: Skin integrity is maintained or improved  Outcome: Ongoing

## 2020-07-06 NOTE — ANESTHESIA PRE PROCEDURE
Department of Anesthesiology  Preprocedure Note       Name:  Austen Campos   Age:  77 y.o.  :  1955                                          MRN:  [id number]         Date:  2020      Surgeon: Angelo Chance):  Zurdo Fernandes MD    Procedure: Procedure(s):  CORONARY ARTERY DISEASE ON PUMP, SWAN, VINCE, POSSIBLE AVR    Medications prior to admission:   Prior to Admission medications    Medication Sig Start Date End Date Taking? Authorizing Provider   isosorbide mononitrate (IMDUR) 60 MG extended release tablet Take 30 mg by mouth daily 19  Yes Historical Provider, MD   metoprolol succinate (TOPROL XL) 25 MG extended release tablet Take 12.5 mg by mouth daily 18  Yes Historical Provider, MD   rosuvastatin (CRESTOR) 40 MG tablet TAKE ONE TABLET EACH EVENING. 20   Holley Knight MD   prasugrel (EFFIENT) 10 MG TABS TAKE ONE TABLET DAILY.  20   Holley Knight MD   amLODIPine [facility]) 10 MG tablet Take 1 tablet by mouth daily 10/21/19   Holley Knight MD   lisinopril (PRINIVIL;ZESTRIL) 20 MG tablet Take 1 tablet by mouth daily 10/21/19   Holley Knight MD   ranolazine (RANEXA) 500 MG extended release tablet Take 1 tablet by mouth 2 times daily 19   Holley Knight MD   ranolazine (RANEXA) 500 MG extended release tablet Take 1 tablet by mouth 2 times daily for 28 days 19  Holley Knight MD   albuterol (PROVENTIL) (2.5 MG/3ML) 0.083% nebulizer solution Take 3 mLs by nebulization every 6 hours as needed for Wheezing or Shortness of Breath 3/15/19   Ovi Craig PA-C   guaiFENesin (MUCINEX) 600 MG extended release tablet Take 1 tablet by mouth 2 times daily 3/15/19   Ovi Craig PA-C   ranolazine (RANEXA) 500 MG extended release tablet Take 500 mg by mouth 2 times daily SAMPLES Ranexa 500 mg Lot #[id number] 4/2021 x 4 18  Historical Provider, MD   tiotropium (SPIRIVA) 18 MCG inhalation capsule Inhale 18 mcg into the lungs daily    Historical Provider, MD  acetaminophen (TYLENOL) tablet 650 mg  650 mg Oral Q4H PRN Tania Desai MD        atorvastatin (LIPITOR) tablet 80 mg  80 mg Oral Nightly Tania Desai MD   80 mg at 07/04/20 2106    melatonin tablet 1 mg  1 mg Oral Nightly PRN Kelsi Duran MD   1 mg at 07/04/20 2106    sodium chloride (PF) 0.9 % injection 10 mL  10 mL Intravenous Daily Kelsi Duran MD        nitroGLYCERIN (NITROSTAT) SL tablet 0.4 mg  0.4 mg Sublingual Q5 Min PRN Kelsi Duran MD   0.4 mg at 06/27/20 1407    nitroGLYCERIN 50 mg in dextrose 5% 250 mL infusion  5 mcg/min Intravenous Continuous Kelsi Duran MD 1.5 mL/hr at 07/02/20 2119 5 mcg/min at 07/02/20 2119    aspirin EC tablet 81 mg  81 mg Oral Daily Kelsi Duran MD   81 mg at 07/05/20 1022    heparin (porcine) injection 4,000 Units  4,000 Units Intravenous PRN Kelsi Duran MD   4,000 Units at 06/27/20 2149    heparin (porcine) injection 2,000 Units  2,000 Units Intravenous PRN Kelsi Duran MD   2,000 Units at 07/01/20 1538    heparin 25,000 units in dextrose 5% 250 mL infusion  1,000 Units/hr Intravenous Continuous Gloriaa ROBIN Duke 14 mL/hr at 07/05/20 0556 1,400 Units/hr at 07/05/20 0556    metoprolol tartrate (LOPRESSOR) tablet 12.5 mg  12.5 mg Oral BID Kelsi Duran MD   12.5 mg at 07/05/20 1021    sodium chloride flush 0.9 % injection 10 mL  10 mL Intravenous 2 times per day Joe Dawson MD   10 mL at 06/28/20 1948    sodium chloride flush 0.9 % injection 10 mL  10 mL Intravenous PRN Joe Dawson MD        acetaminophen (TYLENOL) tablet 650 mg  650 mg Oral Q6H PRN Joe Dawson MD        Or   [facility] acetaminophen (TYLENOL) suppository 650 mg  650 mg Rectal Q6H PRN Joe Dawson MD        promethazine (PHENERGAN) tablet 12.5 mg  12.5 mg Oral Q6H PRN Joe Dawson MD        Or    ondansetron [facility]) injection 4 mg  4 mg Intravenous Q6H PRN Joe Dawson MD        0.9 % sodium chloride infusion   Intravenous Continuous Lupe Martin MD 50 mL/hr at 06/27/20 1924      lidocaine PF 1 % injection 1 mL  1 mL Intradermal Once Lupe Martin MD           Allergies:  No Known Allergies    Problem List:    Patient Active Problem List   Diagnosis Code    Gross hematuria R31.0    Benign non-nodular prostatic hyperplasia with lower urinary tract symptoms N40.1    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    COPD exacerbation ([facility]) J44.1    CAD (coronary artery disease) I25.10    Stage 3 severe COPD by GOLD classification ([facility] J44.9    Hypertension I10    Hyperlipidemia E78.5    Peripheral vascular disease ([facility] I73.9    Acute respiratory failure with hypoxia ([facility]) J96.01    Bacterial pneumonia J15.9    Hyponatremia E87.1    GI bleed K92.2    Hypotension due to hypovolemia I95.89, E86.1    Bleeding duodenal ulcer K26.4    NSAID induced gastritis K29.60, T39.395A    Smoking greater than 40 pack years F17.210       Past Medical History:        Diagnosis Date    CAD (coronary artery disease)     dr Cricket Michaud cc 7/11/18    Carotid artery stenosis     COPD (chronic obstructive pulmonary disease) ([facility]     H/O cardiac catheterization 8/20/15    LMCA:Lesion on LMCA: Distal subsection. 40% stenosis. LCx:Lesion on Prox CX: Ostial. 50% stenosis. RCA: Lesion on Prox RCA: Ostial. 95% stenosis. Lesion on Prox RCA: Proximal subsection. 75% stenosis. Lesion on R PDA: Ostial. 705 stenosis. EF 60%.  H/O cardiovascular stress test 8/17/15    Abnormal. Moderate perfusion defect of moderate intensity in the inferolateral, inferior, and inferoapical regions during stress imaging, most consistent with ischemia. Global LV systolic function was abnormal with EF: 42% w/regional wall motion abnormalities.  Results are most consistent with an intermediate to high risk for significant CAD.     H/O cardiovascular stress test 08/03/2016    Abnormal myocardial perfusion study, large perfusion defect of moderate-severe intensity in inferolateral, inferior, inferoseptal, apical and inferoapical regions during stress and rest imaging, most consistant with old myocardial infarction with hayden-infarct ischemia, EF 44%, Overall results most consistent with intermediate-high risk for CAD, Add. testing including cardiac cath maybe indicated     H/O cardiovascular stress test 07/05/2018    Abnormal myocardial perfusion study. There is a moderate perfusion defect of moderaete intensity in the inferolateral, inferior and inferoseptal regions during stress and rest imaging which is most consistent with an old MI with hayden-infact ischema. EF 43%. Results are most consistant with an intermediate risk for reverdaibel CAD    H/O echocardiogram 12/4/15    EF 55%. Mildly increased LV wall thickness. Left atrium is mildly dilated (29-33) left atrial volume index of 32 ml/m2. aortic leaflet calcification with mild aortic stenosis. Moderate aortic regurgitation. Mild (grade l) diastolic dysfunction    H/O echocardiogram 07/05/2018    EF 55%. The LV wall thickness is mildly increased. Mild hopkinesis of the inferolateral wall. The left atrium is mildly dilated (29-33) with a left atrial volume index of 29ml/m2. Mild aortic stenosis, mean gradient 17 mmHg. Mild to moderate aortic regurg. Mild diastiolic dysfucntion.  History of Holter monitoring 10/30/2017    Rare PAC's with 2 atrial runs, the longest being 7 beats in duration at 171 bpm and most consistent with a short run of atrial fibrillation.  Hyperlipidemia     Hypertension     MI (myocardial infarction) ([address]     Peripheral vascular disease ([address]     S/P angioplasty with stent 8/24/15    PCI to ostial lesion and balloon angioplasy to proximal lesion.  Distal RCA lesion had total occlusion after balloon angioplasty    Unspecified cerebral artery occlusion with cerebral infarction     TIA       Past Surgical History:        Procedure Laterality Date    CARDIAC CATHETERIZATION Left 8/20/2015    right radial/[facility]/Dr Cohen    CARDIAC CATHETERIZATION  06/29/2020    DR Nicci Garcia    CAROTID ENDARTERECTOMY Bilateral 1995, 2002    CAROTID ENDARTERECTOMY Left 11/13/2017    LEFT CAROTID PSEUDOANEURYSM REPAIR WITH [id number] AND [id number] VIABAHN STENTS, LEFT CAROTID ANGIOGRAM, SAPHENOUS VEIN GRAFT BYPASS performed by Rose Kaur DO at [place] [address]  08/24/15    balloon, stentx1 dr Desiree Pichardo cardilogist,clearance 7/11/18    DIAGNOSTIC CARDIAC CATH LAB PROCEDURE  08/20/15    ENDOSCOPY, COLON, DIAGNOSTIC      KNEE SURGERY  2013    LEG TENDON SURGERY      R    OTHER SURGICAL HISTORY Left 11/13/2017    REPAIR CAROTID ANEURYSM WITH LT SAPHENOUS VEIN GRAFT    OTHER SURGICAL HISTORY Right 07/23/2018     CAROTID ARTERY RESECTION OF  PSEUDO ANEURISM    [facility] Right 7/23/2018    RSECTION OF RIGHT CAROTID PSEUDOANEURYSM, WITH PATCH GRAFT ([facility]) ANGIOPLASTY performed by Rose Kaur DO at [address]  06/26/2020    UPPER GASTROINTESTINAL ENDOSCOPY Left 6/26/2020    **CASE IN OR WITH GI STAFF** EGD DIAGNOSTIC ONLY performed by Victoria Prado MD at [facility]       Social History:    Social History     Tobacco Use    Smoking status: Current Every Day Smoker     Packs/day: 0.50     Years: 45.00     Pack years: 22.50     Start date: 8/17/1970    Smokeless tobacco: Never Used   Substance Use Topics    Alcohol use: No     Alcohol/week: 0.0 standard drinks                                Ready to quit: Not Answered  Counseling given: Not Answered      Vital Signs (Current):   Vitals:    07/05/20 0749 07/05/20 0908 07/05/20 1118 07/05/20 1615   BP: (!) 119/53  (!) 106/45 110/62   Pulse: 77  87 74   Resp: 16 17 19 14   Temp: 97.9 °F (36.6 °C)  98.3 °F (36.8 °C) 98.3 °F (36.8 °C)   TempSrc: Oral      SpO2: 94% 95% (!) 88% (!) 84%   Weight:       Height:                                                  BP Readings from Last 3 Encounters:   07/05/20 Anesthesia Evaluation  Patient summary reviewed no history of anesthetic complications:   Airway: Mallampati: II  TM distance: >3 FB   Neck ROM: full  Mouth opening: > = 3 FB Dental:    (+) upper dentures and lower dentures      Pulmonary:   (+) COPD: no interval change,  decreased breath sounds,      (-) pneumonia                           Cardiovascular:    (+) hypertension: no interval change, past MI: < 1 month, CAD: obstructive,       ECG reviewed  Rhythm: regular  Rate: normal  Echocardiogram reviewed                  Neuro/Psych:   (+) CVA: no interval change,             GI/Hepatic/Renal:   (+) PUD,           Endo/Other: Negative Endo/Other ROS                    Abdominal:           Vascular: negative vascular ROS. Anesthesia Plan      general     ASA 4       Induction: intravenous. arterial line, BIS and central line  MIPS: Postoperative trial extubation. Anesthetic plan and risks discussed with patient. Use of blood products discussed with patient whom consented to blood products. Plan discussed with CRNA.                   Kevin Long MD   7/5/2020

## 2020-07-06 NOTE — PROGRESS NOTES
Occupational Therapy    Occupational Therapy Not Seen Note    DATE: 2020  Name: India Renner  : 1955  MRN: [id number]    Patient not available for Occupational Therapy due to:    Surgery/Procedure: CABG     Next Scheduled Treatment: OTR to Re-eval when medically stable.       Electronically signed by PAT Connor on 2020 at 2:45 PM

## 2020-07-06 NOTE — ANESTHESIA POSTPROCEDURE EVALUATION
Department of Anesthesiology  Postprocedure Note    Patient: Erin Mcgraw  MRN: [id number]  YOB: 1955  Date of evaluation: 7/6/2020  Time:  1:59 PM     Procedure Summary     Date:  07/06/20 Room / Location:  [address] [place] [address] 20 / 483 [facility]    Anesthesia Start:  3509 Anesthesia Stop:  8283    Procedure:  CORONARY ARTERY BYPASS GRAFT X2, ON PUMP; SWAN, VINCE PER ANESTHESIA,CHEST LEFT OPENED- TWO RAYTEC,ONE LAP, ONE KERLEX ROLL SOAKED IN BETADINE,1010 DRAPE, WOUND VAC DRESSING RETAINED (N/A ) Diagnosis:  (CAD)    Surgeon:  Fartun Lam MD Responsible Provider:  Sarah Blanc MD    Anesthesia Type:  general ASA Status:  4          Anesthesia Type: general    Matt Phase I: Matt Score: 8    Matt Phase II:      Last vitals: Reviewed and per EMR flowsheets.        Anesthesia Post Evaluation    Patient location during evaluation: ICU  Patient participation: complete - patient cannot participate  Level of consciousness: sedated and ventilated  Pain score: 0  Airway patency: patent  Nausea & Vomiting: no vomiting and no nausea  Complications: no  Cardiovascular status: hemodynamically stable  Respiratory status: acceptable  Hydration status: stable

## 2020-07-06 NOTE — PROGRESS NOTES
Nutrition Assessment    Type and Reason for Visit: Reassess    Nutrition Recommendations:   -Continue NPO status   -Start diet vs nutrition support as able   -Recommend initiating general oral diet as able   -If tube feeding is desired, recommend Vital High Protein (low calorie, high protein) @ 75 mL/hr x 24 hrs ~> 1800 kcals, 157 gms protein (w/out propofol running)  -Will monitor nutrition progression    Nutrition Assessment: Pt is currently NPO and off the floor for CABG today. Weights have been stable. No intake/output documented in chart prior to NPO status. Pt is currently intubated, tube feed rec's above if desired, will monitor. Malnutrition Assessment:  · Malnutrition Status: Insufficient data  · Context: Acute illness or injury  · Findings of the 6 clinical characteristics of malnutrition (Minimum of 2 out of 6 clinical characteristics is required to make the diagnosis of moderate or severe Protein Calorie Malnutrition based on AND/ASPEN Guidelines):  1. Energy Intake-Unable to assess, Unable to assess    2. Weight Loss-Unable to assess, unable to assess  3. Fat Loss-Unable to assess,    4. Muscle Loss-Unable to assess,    5. Fluid Accumulation-No significant fluid accumulation,    6.  Strength-Not measured    Nutrition Risk Level: High    Nutrient Needs:  · Estimated Daily Total Kcal: 16-18 ~> 0957-8052 kcals/d   · Estimated Daily Protein (g): 1.5-1.8 gm/kg ~> 132-158 gms/d     Nutrition Diagnosis:   · Problem: Inadequate oral intake  · Etiology: related to Impaired respiratory function-inability to consume food(sx today)     Signs and symptoms:  as evidenced by NPO status due to medical condition, Intubation    Objective Information:  · Nutrition-Focused Physical Findings: labs and meds reviewed.   · Wound Type: None  · Current Nutrition Therapies:  · Oral Diet Orders: NPO   · Oral Diet intake: Unable to assess   · Additional calories: None  · Anthropometric Measures:  · Ht: 6' 2.02\" (188 cm) · Current Body Wt: 232 lb (105.2 kg)  · Admission Body Wt: 231 lb (104.8 kg)  · % Weight Change:  ,  226-232 lbs over x 9 mo per EMR   · Ideal Body Wt: 194 lb (88 kg), % Ideal Body 118% adm/ideal  · BMI Classification: BMI 25.0 - 29.9 Overweight    Nutrition Interventions:   (Start diet vs nutrition support )  Continued Inpatient Monitoring, Education Not Indicated    Nutrition Evaluation:   · Evaluation: Progress towards goals declining   · Goals: Intake of 75% or greater recommended nutritional needs    · Monitoring: Weight, Pertinent Labs, Monitor Bowel Function, Nutrition Progression, Skin Integrity, I&O    Dietetic Intern: Yesica Pimentel  Electronically signed by Gina Chappell RD, LD on 7/6/20 at 1:45 PM EDT    Contact Number: [phone]

## 2020-07-06 NOTE — BRIEF OP NOTE
Brief Postoperative Note      Patient: Pauline Navarro  YOB: 1955  MRN: [id number]    Date of Procedure: 7/6/2020    Pre-Op Diagnosis: MVCAD, h/o NONSTEMI at the time of previous stent placement, unstable angina on heparin and Nitro     Post-Op Diagnosis: Same       Procedure(s):  CORONARY ARTERY BYPASS GRAFT X2, ON PUMP; SWAN, VINCE PER ANESTHESIA, TEMPORARY STERNAL CLOSURE (w/ TWO RAYTEC,ONE LAP, ONE CURLEX ROLL SOAKED IN BETADINE,1010 DRAPE, WOUND VAC DRESSING RETAINED)    Surgeon(s):  MD Chad Mendes MD    Assistant:  First Assistant: Lavon Benavidez    Anesthesia: General    Estimated Blood Loss (mL): Minimal    Complications: None    Specimens:   ID Type Source Tests Collected by Time Destination   1 : URINE- INITIAL CASTILLO INSERTION Urine Bladder CULTURE, SINAN Hopper RN 7/6/2020 4365        Implants:  * No implants in log *      Drains:   Chest Tube 1 Anterior Mediastinal 24 Citizen of [place] (Active)       Chest Tube 2 Anterior Pericardial 24 Citizen of [place] (Active)       Urethral Catheter Temperature probe 16 fr (Active)       Findings: Good quality RSVG, good quality skelatinized LIMA, CABG X 2 w/ LIMA to LAD, RSVG1 to OM, the distal RCA was inspected and too small for grafting as was indicated on the cath report, good palpable pulses in both grafts. After protamine and decannulation, bleeding started at the site of the OM \"scratch out\" site. This stopped with packing but everytime we tried to remove it it started again. Ultimately I simply left the packing and performed temporary chest closure with no further bleeding.     Electronically signed by Chad Dunham MD on 7/6/2020 at 1:42 PM

## 2020-07-06 NOTE — ANESTHESIA PROCEDURE NOTES
Arterial Line:    An arterial line was placed using ultrasound guidance and surface landmarks, in the OR for the following indication(s): continuous blood pressure monitoring and blood sampling needed. A 20 gauge (size), (length), Arrow (type) catheter was placed, Seldinger technique used, into the left radial artery, secured by Tegaderm and tape. Anesthesia type: General    Events:  patient tolerated procedure well with no complications.   Anesthesiologist: Kerri Encinas MD  Preanesthetic Checklist  Completed: patient identified, site marked, surgical consent, pre-op evaluation, timeout performed, IV checked, risks and benefits discussed, monitors and equipment checked, anesthesia consent given, oxygen available and patient being monitored

## 2020-07-07 PROBLEM — J96.01 ACUTE RESPIRATORY FAILURE WITH HYPOXIA (HCC): Status: RESOLVED | Noted: 2019-03-11 | Resolved: 2020-01-01

## 2020-07-07 PROBLEM — T81.11XA POSTOPERATIVE CARDIOGENIC SHOCK (HCC): Status: ACTIVE | Noted: 2020-01-01

## 2020-07-07 NOTE — CARE COORDINATION
TRANSITIONAL CARE PLANNING/ 2 Rehab Mark Day: 12    Reason for Admission: GI bleed [K92.2]     Treatment Plan of Care: return to OR for chest closure today    Tests/Procedures still needed:  return to OR for chest closure today      Barriers to Discharge:  return to OR for chest closure today      Readmission Risk              Risk of Unplanned Readmission:        28            Patient goals/Treatment Preferences/Transitional Plan:   Home with wife  Referrals Made:     Follow Up needed:      return to OR for chest closure today, goal is home with wife

## 2020-07-07 NOTE — PLAN OF CARE
Problem: Falls - Risk of:  Goal: Will remain free from falls  Description: Will remain free from falls  Outcome: Met This Shift  Goal: Absence of physical injury  Description: Absence of physical injury  Outcome: Met This Shift     Problem: MECHANICAL VENTILATION  Goal: Oral health is maintained or improved  7/7/2020 1929 by Leonela Lynn RN  Outcome: Met This Shift    Goal: ET tube will be managed safely  7/7/2020 1929 by Leonela Lynn RN  Outcome: Met This Shift       Problem: HEMODYNAMIC STATUS  Goal: Patient has stable vital signs and fluid balance  Outcome: Ongoing     Problem: Bleeding:  Goal: Will show no signs and symptoms of excessive bleeding  Description: Will show no signs and symptoms of excessive bleeding  Outcome: Ongoing       Problem: OXYGENATION/RESPIRATORY FUNCTION  Goal: Patient will maintain patent airway  7/7/2020 1929 by Leonela Lynn RN  Outcome: Ongoing    Goal: Patient will achieve/maintain normal respiratory rate/effort  Description: Respiratory rate and effort will be within normal limits for the patient  7/7/2020 1929 by Leonela Lynn RN  Outcome: Ongoing    Problem: MECHANICAL VENTILATION  Goal: Patient will maintain patent airway  7/7/2020 1929 by Leonela Lynn RN  Outcome: Ongoing  Problem: SKIN INTEGRITY  Goal: Skin integrity is maintained or improved  7/7/2020 1929 by Leonela Lynn RN  Outcome: Ongoing

## 2020-07-07 NOTE — ANESTHESIA PRE PROCEDURE
Department of Anesthesiology  Preprocedure Note       Name:  Kellee Cehn   Age:  77 y.o.  :  1955                                          MRN:  [id number]         Date:  2020      Surgeon: Jo Carr):  Christi Canseco MD    Procedure: Procedure(s):  STERNUM WOUND CLOSURE    Medications prior to admission:   Prior to Admission medications    Medication Sig Start Date End Date Taking? Authorizing Provider   isosorbide mononitrate (IMDUR) 60 MG extended release tablet Take 30 mg by mouth daily 19  Yes Historical Provider, MD   metoprolol succinate (TOPROL XL) 25 MG extended release tablet Take 12.5 mg by mouth daily 18  Yes Historical Provider, MD   rosuvastatin (CRESTOR) 40 MG tablet TAKE ONE TABLET EACH EVENING. 20   Carol Metz MD   prasugrel (EFFIENT) 10 MG TABS TAKE ONE TABLET DAILY.  20   Carol Metz MD   amLODIPine [facility]) 10 MG tablet Take 1 tablet by mouth daily 10/21/19   Carol Metz MD   lisinopril (PRINIVIL;ZESTRIL) 20 MG tablet Take 1 tablet by mouth daily 10/21/19   Carol Metz MD   ranolazine (RANEXA) 500 MG extended release tablet Take 1 tablet by mouth 2 times daily 19   Carol Metz MD   ranolazine (RANEXA) 500 MG extended release tablet Take 1 tablet by mouth 2 times daily for 28 days 19  Carol Metz MD   albuterol (PROVENTIL) (2.5 MG/3ML) 0.083% nebulizer solution Take 3 mLs by nebulization every 6 hours as needed for Wheezing or Shortness of Breath 3/15/19   Abelardo Craig PA-C   guaiFENesin (MUCINEX) 600 MG extended release tablet Take 1 tablet by mouth 2 times daily 3/15/19   Abelardo Craig PA-C   ranolazine (RANEXA) 500 MG extended release tablet Take 500 mg by mouth 2 times daily SAMPLES Ranexa 500 mg Lot #[id number] Exp 4/2021 x 4 18  Historical Provider, MD   tiotropium (SPIRIVA) 18 MCG inhalation capsule Inhale 18 mcg into the lungs daily    Historical Provider, MD   fluticasone-vilanterol [facility] (BENADRYL) tablet 25 mg  25 mg Oral Nightly PRN Nathaneil Octave, APRN - NP        polyethylene glycol (GLYCOLAX) packet 17 g  17 g Oral Daily Nathaneil Octave, APRN - NP        bisacodyl (DULCOLAX) EC tablet 5 mg  5 mg Oral Daily PRN Nathaneil Octave, APRN - NP        fleet rectal enema 1 enema  1 enema Rectal Daily PRN Nathaneil Octave, APRN - NP        ondansetron [facility]) injection 4 mg  4 mg Intravenous Q8H PRN Nathaneil Octave, APRN - NP        pantoprazole (PROTONIX) tablet 40 mg  40 mg Oral Daily Nathaneil Octave, APRN - NP        pantoprazole (PROTONIX) injection 40 mg  40 mg Intravenous Daily Nathaneil Octave, APRN - NP   40 mg at 07/06/20 1601    And    sodium chloride (PF) 0.9 % injection 10 mL  10 mL Intravenous Daily Nathaneil Octave, APRN - NP   10 mL at 07/06/20 1602    metoprolol tartrate (LOPRESSOR) tablet 25 mg  25 mg Oral BID Nathaneil Octave, APRN - NP        amiodarone (CORDARONE) tablet 200 mg  200 mg Oral TID Nathaneil Octave, APRN - NP   200 mg at 07/06/20 2046    hydrALAZINE (APRESOLINE) injection 5 mg  5 mg Intravenous Q5 Min PRN Nathaneil Octave, APRN - NP        metoprolol (LOPRESSOR) injection 2.5 mg  2.5 mg Intravenous Q10 Min PRN Nathaneil Octave, APRN - NP        mupirocin (BACTROBAN) 2 % ointment   Nasal BID Nathaneil Octave, APRN - NP        propofol injection  10 mcg/kg/min Intravenous Continuous Nathaneil Octave, APRN - NP 19 mL/hr at 07/07/20 0512 30 mcg/kg/min at 07/07/20 0512    sodium bicarbonate 8.4 % injection 50 mEq  50 mEq Intravenous Q30 Min PRN Nathaneil Octave, APRN - NP        atorvastatin (LIPITOR) tablet 20 mg  20 mg Oral Nightly Nathaneil Octave, APRN - NP        aspirin EC tablet 81 mg  81 mg Oral Daily Nathaneil Octave, APRN - DAGOBERTO        clopidogrel (PLAVIX) tablet 75 mg  75 mg Oral Daily JUAN Alicia NP        ipratropium-albuterol (DUONEB) nebulizer solution 1 ampule  1 ampule Inhalation Q4H WA JUAN Alicia NP   1 ampule Acute respiratory failure with hypoxia (HCC) J96.01    Bacterial pneumonia J15.9    Hyponatremia E87.1    GI bleed K92.2    Hypotension due to hypovolemia I95.89, E86.1    Bleeding duodenal ulcer K26.4    NSAID induced gastritis K29.60, T39.395A    Smoking greater than 40 pack years F17.210       Past Medical History:        Diagnosis Date    CAD (coronary artery disease)     dr May Mandel cc 7/11/18    Carotid artery stenosis     COPD (chronic obstructive pulmonary disease) [address] [address]     H/O cardiac catheterization 8/20/15    LMCA:Lesion on LMCA: Distal subsection. 40% stenosis. LCx:Lesion on Prox CX: Ostial. 50% stenosis. RCA: Lesion on Prox RCA: Ostial. 95% stenosis. Lesion on Prox RCA: Proximal subsection. 75% stenosis. Lesion on R PDA: Ostial. 705 stenosis. EF 60%.  H/O cardiovascular stress test 8/17/15    Abnormal. Moderate perfusion defect of moderate intensity in the inferolateral, inferior, and inferoapical regions during stress imaging, most consistent with ischemia. Global LV systolic function was abnormal with EF: 42% w/regional wall motion abnormalities. Results are most consistent with an intermediate to high risk for significant CAD.     H/O cardiovascular stress test 08/03/2016    Abnormal myocardial perfusion study, large perfusion defect of moderate-severe intensity in inferolateral, inferior, inferoseptal, apical and inferoapical regions during stress and rest imaging, most consistant with old myocardial infarction with hayden-infarct ischemia, EF 44%, Overall results most consistent with intermediate-high risk for CAD, Add. testing including cardiac cath maybe indicated     H/O cardiovascular stress test 07/05/2018    Abnormal myocardial perfusion study. There is a moderate perfusion defect of moderaete intensity in the inferolateral, inferior and inferoseptal regions during stress and rest imaging which is most consistent with an old MI with hayden-infact ischema. EF 43%.  Results are most consistant with an intermediate risk for reverdaibel CAD    H/O echocardiogram 12/4/15    EF 55%. Mildly increased LV wall thickness. Left atrium is mildly dilated (29-33) left atrial volume index of 32 ml/m2. aortic leaflet calcification with mild aortic stenosis. Moderate aortic regurgitation. Mild (grade l) diastolic dysfunction    H/O echocardiogram 07/05/2018    EF 55%. The LV wall thickness is mildly increased. Mild hopkinesis of the inferolateral wall. The left atrium is mildly dilated (29-33) with a left atrial volume index of 29ml/m2. Mild aortic stenosis, mean gradient 17 mmHg. Mild to moderate aortic regurg. Mild diastiolic dysfucntion.  History of Holter monitoring 10/30/2017    Rare PAC's with 2 atrial runs, the longest being 7 beats in duration at 171 bpm and most consistent with a short run of atrial fibrillation.  Hyperlipidemia     Hypertension     MI (myocardial infarction) ([address]     Peripheral vascular disease ([address]     S/P angioplasty with stent 8/24/15    PCI to ostial lesion and balloon angioplasy to proximal lesion.  Distal RCA lesion had total occlusion after balloon angioplasty    Unspecified cerebral artery occlusion with cerebral infarction     TIA       Past Surgical History:        Procedure Laterality Date    CARDIAC CATHETERIZATION Left 8/20/2015    right radial/Kimberly Salazar/Dr Cohen    CARDIAC CATHETERIZATION  06/29/2020    DR Alex Case    CAROTID ENDARTERECTOMY Bilateral 1995, 2002    CAROTID ENDARTERECTOMY Left 11/13/2017    LEFT CAROTID PSEUDOANEURYSM REPAIR WITH [id number] AND 1KPP5QG VIABAHN STENTS, LEFT CAROTID ANGIOGRAM, SAPHENOUS VEIN GRAFT BYPASS performed by Naima Smith DO at [place] [address]  08/24/15    balloon, stentx1 dr Melissa Jones 7/11/18    DIAGNOSTIC CARDIAC CATH LAB PROCEDURE  08/20/15    ENDOSCOPY, COLON, DIAGNOSTIC      KNEE SURGERY  2013    LEG TENDON SURGERY      R    OTHER SURGICAL HISTORY Left 11/13/2017 REPAIR CAROTID ANEURYSM WITH LT SAPHENOUS VEIN GRAFT    OTHER SURGICAL HISTORY Right 07/23/2018     CAROTID ARTERY RESECTION OF  PSEUDO ANEURISM    CT Lukas Mayer INCIS Right 7/23/2018    RSECTION OF RIGHT CAROTID PSEUDOANEURYSM, WITH PATCH GRAFT ([place]) ANGIOPLASTY [facility]  06/26/2020    UPPER GASTROINTESTINAL ENDOSCOPY Left 6/26/2020    **CASE IN OR WITH GI STAFF** EGD DIAGNOSTIC ONLY performed by Victoria Prado MD at [facility]       Social History:    Social History     Tobacco Use    Smoking status: Current Every Day Smoker     Packs/day: 0.50     Years: 45.00     Pack years: 22.50     Start date: 8/17/1970    Smokeless tobacco: Never Used   Substance Use Topics    Alcohol use: No     Alcohol/week: 0.0 standard drinks                                Ready to quit: Not Answered  Counseling given: Not Answered      Vital Signs (Current):   Vitals:    07/07/20 0415 07/07/20 0430 07/07/20 0445 07/07/20 0500   BP:       Pulse: 79 79 79 78   Resp:       Temp:       TempSrc:       SpO2: 97% 97% 98% 97%   Weight:       Height:                                                  BP Readings from Last 3 Encounters:   07/06/20 (!) 90/51   07/06/20 (!) 87/57   06/26/20 102/64       NPO Status: Time of last liquid consumption: 0900                        Time of last solid consumption: 1900                        Date of last liquid consumption: 06/25/20                        Date of last solid food consumption: 06/24/20    BMI:   Wt Readings from Last 3 Encounters:   07/05/20 232 lb 9.6 oz (105.5 kg)   06/25/20 232 lb (105.2 kg)   06/16/20 232 lb 12.8 oz (105.6 kg)     Body mass index is 29.85 kg/m².     CBC:   Lab Results   Component Value Date    WBC 9.5 07/07/2020    RBC 2.67 07/07/2020    RBC 4.44 03/28/2012    HGB 8.3 07/07/2020    HCT 26.6 07/07/2020    MCV 99.6 07/07/2020    RDW 14.8 07/07/2020    PLT See Reflexed IPF Result 07/07/2020     03/28/2012       CMP:   Lab Results   Component Value Date     07/07/2020    K 5.5 07/07/2020     07/07/2020    CO2 21 07/07/2020    BUN 13 07/07/2020    CREATININE 0.71 07/07/2020    GFRAA >60 07/07/2020    LABGLOM >60 07/07/2020    GLUCOSE 99 07/07/2020    GLUCOSE 97 03/29/2012    PROT 5.0 06/26/2020    CALCIUM 7.2 07/07/2020    BILITOT 0.49 06/26/2020    ALKPHOS 53 06/26/2020    AST 10 06/26/2020    ALT <5 06/26/2020       POC Tests:   Recent Labs     07/06/20  1357  07/07/20  0306   POCGLU 152*   < > 94   POCNA 141  --   --    POCK 4.1  --   --    POCCL 105  --   --    POCHEMO 8.6*  --   --    POCHCT 25*  --   --     < > = values in this interval not displayed.        Coags:   Lab Results   Component Value Date    PROTIME 10.7 07/07/2020    PROTIME 10.6 03/29/2012    INR 1.0 07/07/2020    APTT 41.6 07/06/2020       HCG (If Applicable): No results found for: PREGTESTUR, PREGSERUM, HCG, HCGQUANT     ABGs:   Lab Results   Component Value Date    PHART 7.380 03/12/2019    PO2ART 85.4 03/12/2019    QTH2JDA 43.3 03/12/2019    KYN1CMZ 25.0 03/12/2019    V8EXLUFA 96.3 03/12/2019        Type & Screen (If Applicable):  No results found for: LABABO, LABRH    Drug/Infectious Status (If Applicable):  No results found for: HIV, HEPCAB    COVID-19 Screening (If Applicable):   Lab Results   Component Value Date    COVID19 Not Detected 06/25/2020         Anesthesia Evaluation  Patient summary reviewed no history of anesthetic complications:   Airway: Mallampati: Unable to assess / NA       Comment: Orally intubated   Dental:    (+) lower dentures and upper dentures      Pulmonary:   (+) COPD: no interval change,  rhonchi,      (-) pneumonia                           Cardiovascular:    (+) hypertension: no interval change, past MI: < 1 month, CAD: non-obstructive,       ECG reviewed  Rhythm: regular  Rate: normal  Echocardiogram reviewed                  Neuro/Psych:   (+) CVA: no interval change,             GI/Hepatic/Renal:   (+) PUD,           Endo/Other: Negative Endo/Other ROS                    Abdominal:           Vascular: negative vascular ROS. Anesthesia Plan      general     ASA 4       Induction: intravenous. Use of blood products discussed with whom consented to blood products. Plan discussed with CRNA.                   Consuelo Angel MD   7/7/2020

## 2020-07-07 NOTE — PROGRESS NOTES
[facility]   Progress Note                    Date:   7/7/2020  Patient name:  Tulio Wilson  Date of admission:  6/25/2020  3:14 PM  MRN:   [id number]  YOB: 1955  PCP:    Mike Anders DO    Reason for Admission:  GI bleed [K92.2]    Subjective:      S/P: CORONARY ARTERY BYPASS GRAFT X2,   LIMA to LAD, RSVG1 to OM TEMPORARY STERNAL CLOSURE due to bleeding at the site of the OM  Day#:1  EF:50 %    Clinical Changes / Abnormalities:    Patient seen and examined at bedside. No acute events overnight.    On levophed 0.07 and Vasopressin 0.03 per CT surgery  CVP 12  In NSR  Cardiac index 1.6  Potassium 5.8  Hemoglobin 8.3      Medications:   Scheduled Meds:   pantoprazole  40 mg Intravenous BID    And    sodium chloride (PF)  10 mL Intravenous BID    sodium chloride flush  10 mL Intravenous 2 times per day    polyethylene glycol  17 g Oral Daily    pantoprazole  40 mg Oral Daily    metoprolol tartrate  25 mg Oral BID    amiodarone  200 mg Oral TID    mupirocin   Nasal BID    atorvastatin  20 mg Oral Nightly    aspirin  81 mg Oral Daily    clopidogrel  75 mg Oral Daily    ipratropium-albuterol  1 ampule Inhalation Q4H WA    insulin lispro  0-12 Units Subcutaneous TID WC    insulin lispro  0-6 Units Subcutaneous Nightly    ceFAZolin  2 g Intravenous Q8H    [MAR Hold] tiotropium  2 puff Inhalation Daily    [MAR Hold] atorvastatin  80 mg Oral Nightly    [MAR Hold] lidocaine PF  1 mL Intradermal Once     Continuous Infusions:   propofol 30 mcg/kg/min (07/07/20 0512)    norepinephrine      insulin 3.9 Units/hr (07/07/20 0535)    dextrose      EPINEPHrine infusion Stopped (07/06/20 1957)    norepinephrine 0.07 mcg/kg/min (07/06/20 2126)    cisatracurium (NIMBEX) infusion 2 mcg/kg/min (07/07/20 0531)    phenylephrine (NIRAV-SYNEPHRINE) 50mg/250mL infusion Stopped (07/06/20 1956)    vasopressin (Septic Shock) infusion 0.03 Units/min (07/06/20 2058)    [MAR Hold] nitroGLYCERIN 5 mcg/min (07/02/20 2119)     CBC:   Recent Labs     07/06/20  0345  07/06/20  1412 07/06/20  1820 07/07/20 0447   WBC 6.6  --  11.9*  --  9.5   HGB 10.9*   < > 8.3* 8.3* 8.3*      < > 169 215 See Reflexed IPF Result    < > = values in this interval not displayed. BMP:    Recent Labs     07/06/20  0345 07/06/20  1357 07/06/20  1412 07/06/20  1820 07/07/20 0447 07/07/20  0530     --  139  --  138  --    K 4.2  --  4.4 4.6 5.5* 5.8*     --  108*  --  107  --    CO2 25  --  24  --  21  --    BUN 13  --  9  --  13  --    CREATININE 0.88 0.88 0.68*  --  0.71  --    GLUCOSE 100*  --  151*  --  99  --      INR:   Recent Labs     07/06/20  1412 07/06/20 1820 07/07/20 0447   INR 1.0 1.0 1.0       Objective:   Vitals: BP (!) 90/51   Pulse 79   Temp 95 °F (35 °C)   Resp 12   Ht 6' 2.02\" (1.88 m)   Wt 232 lb 9.6 oz (105.5 kg)   SpO2 97%   BMI 29.85 kg/m²    Last Recorded Weight:  [unfilled]    Chest: pacing wires: yes, chest tubes:yes  CV: no murmur noted, Normal S1, S2, Sinus  Lungs: mild rales heard diffusely throughout both lungs  Abd:  Soft  Lower Extremities:  No edema   · Sternal Incison: covered    Diagnostic Studies:     LMCA: Very calcified with haziness  IVUS confirmed at lesat 60-70% stenosis in distal and proximal area     LAD: Mild irregularities 20-30%. Ostial area by IVUS was about 60-70%  stenosis     LCx: Dominant  Proximal 80-90% stenosis  OM1: Minimal disease     RCA: Ostial stent appears extending into aorta  Probably 50% stenosis in proximal RCA stent  Distal is very small, with 100% PDA and minimal flow in PL branches      Coronary Tree      Dominance: Mixed     LV Analysis  LV function assessed  Ejection Fraction  +----------------------------------------------------------------------+---+  ! Method                                                                !EF%! +----------------------------------------------------------------------+---+  ! LV gram                                                               !41 ! +----------------------------------------------------------------------+---+        The LV gram was performed in the ELIZONDO 30 position. LVEF:  55%.      Estimated blood loss: 5 ml     Conclusions:  1. Left main and multivessel CAD  2. IVUS confirmed the severity of LM disease with calcification about 60%  3. Preserved LV function     Recommendations:  1. CT surgery consult for urgent surgery   2. Medical Therapy. 3. Risk Factors Modification. 4. Post Cath Protocol     ECHO 6/16/2020  Summary  Global left ventricular systolic function appears preserved with an  estimated ejection fraction of 55%. The left ventricular cavity size is within normal limits and the left  ventricular wall thickness is mildly increased. The mean aortic valve gradient is 30 mmHg consistent with moderate aortic  stenosis. Mild aortic insufficiency was seen. Mitral valve sclerosis without stenosis. Evidence of moderate (grade II) diastolic dysfunction is seen.     Compared to the previous study of 7/5/18, the patients mean aortic valve  gradient has increased from 17 mmHg to 30 mmHg. Based on the patients moderate to severe aortic stenosis, consider repeat  testing in 1 year or sooner if symptoms of severe disease develop.       Patient Active Problem List:     Gross hematuria     Benign non-nodular prostatic hyperplasia with lower urinary tract symptoms     Carotid pseudoaneurysm (HCC)     COPD exacerbation (HCC)     CAD (coronary artery disease)     Stage 3 severe COPD by GOLD classification ([address]     Hypertension     Hyperlipidemia     Peripheral vascular disease ([address]     Acute respiratory failure with hypoxia (HCC)     Bacterial pneumonia     Hyponatremia     GI bleed     Hypotension due to hypovolemia     Bleeding duodenal ulcer     NSAID induced gastritis     Smoking greater than 40 pack years      Assessment / Acute Cardiac Problems:     1.  NSTEMI  s/p CABG X 2 7/6/2020 w/ LIMA to LAD, RSVG1 to OM with temporary chest closure due to bleeding from OM. Distal RCA was too small for grafting. 2. CAD s/p PCI to RCA on 8/24/2015  3. Moderate aortic stenosis. 4. Acute upper GI bleed managed by GI  5. HLD  6. HTN  7. Carotid artery disease s/p carotid endarterectomy  8. Smoker  9. PVD  10. COPD  6. Hyperkalemia per primary        Plan of Treatment:       1. To the OR again today due bleeding from the OM. 2. On aspirin and Plavix ? per CT surgery  3. Wean pressors as tolerated  4. Monitor H&H. Recheck K   5. Change amiodarone to 200 mg BID and statin to high intensity   6. Continue routine postoperative care per CT surgery. 7. Continue to monitor I/O with a goal of even/negative fluid balance.          Rico Castro MD  Fellow, [id number] [facility]

## 2020-07-07 NOTE — PLAN OF CARE
Problem: MECHANICAL VENTILATION  Goal: Ability to express needs and understand communication  7/7/2020 0858 by Jaya Villa RCP  Outcome: Ongoing  7/7/2020 0517 by Thomas Bond RN  Outcome: Ongoing  7/7/2020 0008 by Bree Norris RCP  Outcome: Ongoing  Goal: Patient will maintain patent airway  7/7/2020 0858 by ROSA SilvaP  Outcome: Ongoing  7/7/2020 0517 by Thomas Bond RN  Outcome: Ongoing  7/7/2020 0008 by Bree Norris RCP  Outcome: Ongoing  Goal: Oral health is maintained or improved  7/7/2020 0858 by Jaya Villa RCP  Outcome: Ongoing  7/7/2020 0517 by Thomas Bond RN  Outcome: Ongoing  7/7/2020 0008 by Bree Norris RCP  Outcome: Ongoing  Goal: ET tube will be managed safely  7/7/2020 0858 by ROSA SilvaP  Outcome: Ongoing  7/7/2020 0517 by Thomas Bond RN  Outcome: Ongoing  7/7/2020 0008 by Bree Norris RCP  Outcome: Ongoing  Goal: Mobility/activity is maintained at optimum level for patient  7/7/2020 0858 by Jaya Villa RCP  Outcome: Ongoing  7/7/2020 0517 by Thomas Bond RN  Outcome: Ongoing  7/7/2020 0008 by Bree Norris RCP  Outcome: Ongoing     Problem: SKIN INTEGRITY  Goal: Skin integrity is maintained or improved  7/7/2020 0858 by ROSA SilvaP  Outcome: Ongoing  7/7/2020 0517 by Thomas Bond RN  Outcome: Ongoing  7/7/2020 0008 by Bree Norris RCP  Outcome: Ongoing

## 2020-07-07 NOTE — PLAN OF CARE
Problem: OXYGENATION/RESPIRATORY FUNCTION  Goal: Patient will maintain patent airway  7/7/2020 0008 by Mary Romano RCP  Outcome: Ongoing  Goal: Patient will achieve/maintain normal respiratory rate/effort  Description: Respiratory rate and effort will be within normal limits for the patient  7/7/2020 0008 by Mray Romano RCP  Outcome: Ongoing     Problem: MECHANICAL VENTILATION  Goal: Ability to express needs and understand communication  7/7/2020 0008 by Mary Romano RCP  Outcome: Ongoing  Goal: Patient will maintain patent airway  7/7/2020 0008 by Mary Romano RCP  Outcome: Ongoing  Goal: Oral health is maintained or improved  7/7/2020 0008 by Mary Romano RCP  Outcome: Ongoing  Goal: ET tube will be managed safely  7/7/2020 0008 by Mary Romano RCP  Outcome: Ongoing  Goal: Mobility/activity is maintained at optimum level for patient  7/7/2020 0008 by Mary Romano RCP  Outcome: Ongoing     Problem: SKIN INTEGRITY  Goal: Skin integrity is maintained or improved  7/7/2020 0008 by Mary Romano RCP  Outcome: Ongoing

## 2020-07-07 NOTE — PROGRESS NOTES
Physical Therapy  DATE: 2020    NAME: Concetta Vu  MRN: [id number]   : 1955    Patient not seen this date for Physical Therapy due to:  [] Blood transfusion in progress  [] Hemodialysis  []  Patient Declined  [] Spine Precautions   [] Strict Bedrest  [x] Surgery/ Procedure: Plan to return to OR today for sternal closure. PT will check back 20. [] Testing      [] Other        [] PT being discontinued at this time. Patient independent. No further needs. [] PT being discontinued at this time as the patient has been transferred to palliative care. No further needs.     Kasandra Russ, PT

## 2020-07-07 NOTE — PROGRESS NOTES
Occupational Therapy Not Seen Note    DATE: 2020  Name: Ashley Khanna  : 1955  MRN: [id number]    Patient not available for Occupational Therapy due to:     Other: Per RN to return to OR for chest closure today    Next Scheduled Treatment: re-check 2020    Electronically signed by BIRDIE Greer on 2020 at 9:07 AM

## 2020-07-07 NOTE — PROGRESS NOTES
07/06/20 2008   ETT (adult)   Placement Date/Time: 07/06/20 0823   Preoxygenation: Yes  Mask Ventilation: Ventilated by mask (1)  Technique: Direct laryngoscopy  Type: Cuffed  Tube Size: 8 mm  Laryngoscope: Smalls  Blade Size: 3  Location: Oral  Grade View: Partial view of the betty. .. Secured at 24 cm  (CXR ordered )     Throughout the day, tube has been charted [email or web address]. Upon assessment, tube is [email or web address]. CXR earlier in the day shows ETT 6.4cm above ozzie. Unsure where tube was positioned at that time. RN ordered repeat CXR to verify proper tube placement.

## 2020-07-07 NOTE — PROGRESS NOTES
[facility]  Internal Medicine Teaching Residency Program  Inpatient Daily Progress Note  ______________________________________________________________________________    Patient: Steven Griffith  YOB: 1955   ZMA:[id number]    Acct: [de-identified]     Room: CVOR POOL RM/NONE  Admit date: 2020  Today's date: 20  Number of days in the hospital: 12    SUBJECTIVE   Admitting Diagnosis: Bleeding duodenal ulcer  CC: GI Bleed. Pt examined at bedside. Chart & results reviewed. S/p CABG x 2 : LIMA->LAD & RSSV->OM1  Currently with temp sternal closure as OM1 with continued bleeding, chest was packed with wound vac. Patient to OR today for closure. Currently intubated and sedated with pressor support. Levo 0.6 and vasopressin 0.03 (weaned from levo, vaso, norepi, and epi)    SIMV: rate 12, Vt 820, PEEP 5, 50% FiO2. Remains on paralytic and propofol 30   AB.35/47.2/67.7/21    Two chest tubes in place: mediastinal (30 cc out overnight) and pericardial/wound vac (675 cc out overnight)    On insulin drip. D5 in pressors. ROS:  Constitutional:  negative for chills, fevers, sweats  Respiratory:  negative for cough, dyspnea on exertion, hemoptysis, shortness of breath, wheezing  Cardiovascular:  negative for chest pain, chest pressure/discomfort, lower extremity edema, palpitations  Gastrointestinal:  negative for abdominal pain, constipation, diarrhea, nausea, vomiting  Neurological:  negative for dizziness, headache  BRIEF HISTORY     Patient was initially transferred from the different hospital presented with hematemesis and melena. Hildegarde Levee is started 3 days before coming to hospital. Sherice Garcia was having massive bleeding so he was transferred to [facility] admission was giving 2 units of PRBC and Protonix bolus.  Patient was tachycardic and hypotensive so was admitted to the ICU and was given fluid boluses.  Patient was alert and oriented x3.  Patient was also started on IV ceftriaxone.  GI was consulted.  Endoscopy was done on  showed no active bleeding but erosive duodenal fatigue and small 5 mm clean-based ulcer identified in the duodenal bulb presumably NSAIDs induced and likely source of recent hemorrhage while on epi therapy patient was also found to have moderate gastritis and duodenitis.      Past medical history significant for coronary artery disease with stents and peripheral vascular disease and stroke.  Patient is on aspirin and Prasugrel at home.  Currently AC/AP on hold.  Patient was also found to have elevated troponins and cardiology was consulted recommended further work-up possibilities cardiac catheterization as an outpatient.     Previous Testing:      ECHO 2020: EF 55%, moderate AS with mean gradient 30 mmHg, mild AI, grade II DD.      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 EF 43%.      CATH 10/2/15: Severe RCA ostial, proximal and distal stenosis. PCI with MARY ANN to ostial lesion and balloon angioplasty to proximal lesion. Distal RCA lesion had total occlusion after balloon angioplasty.     OBJECTIVE     Vital Signs:  BP (!) 93/55   Pulse 85   Temp 95 °F (35 °C)   Resp 12   Ht 6' 2.02\" (1.88 m)   Wt 232 lb 9.6 oz (105.5 kg)   SpO2 96%   BMI 29.85 kg/m²     Temp (24hrs), Av.3 °F (35.2 °C), Min:90.4 °F (32.4 °C), Max:99.1 °F (37.3 °C)    In: 6562.1   Out: 1275 [Urine:780; Drains:495]    Physical Exam:  Constitutional: Intubated, sedated, paralyzed. Head: normocephalic and atraumatic. EENT:  PERRLA. No conjunctival injections. Neck: Supple without thyromegaly. No elevated JVP. Trachea was midline. Respiratory: Breath sounds bilateral. Current mechanical ventilation. Sternal wound vac in place. Cardiovascular: Regular without murmur, clicks, gallops or rubs. Abdomen: Slightly rounded and soft without organomegaly. No rebound, rigidity or guarding was appreciated. Musculoskeletal: No gross muscle weakness. Extremities:  No lower extremity edema, ulcerations, tenderness, varicosities or erythema. Muscle size, tone and strength are normal.  No involuntary movements are noted. Skin:  Warm and dry. Good color, turgor and pigmentation. No lesions or scars.   No cyanosis or clubbing  Neurological/Psychiatric: sedated    Medications:  Scheduled Medications:    [MAR Hold] pantoprazole  40 mg Intravenous BID    And    [MAR Hold] sodium chloride (PF)  10 mL Intravenous BID    [MAR Hold] amiodarone  200 mg Oral BID    [MAR Hold] atorvastatin  40 mg Oral Nightly    [MAR Hold] sodium chloride flush  10 mL Intravenous 2 times per day    [MAR Hold] polyethylene glycol  17 g Oral Daily    [MAR Hold] pantoprazole  40 mg Oral Daily    [MAR Hold] metoprolol tartrate  25 mg Oral BID    [MAR Hold] mupirocin   Nasal BID    [MAR Hold] aspirin  81 mg Oral Daily    [MAR Hold] clopidogrel  75 mg Oral Daily    [MAR Hold] ipratropium-albuterol  1 ampule Inhalation Q4H WA    [MAR Hold] insulin lispro  0-12 Units Subcutaneous TID WC    [MAR Hold] insulin lispro  0-6 Units Subcutaneous Nightly    [MAR Hold] ceFAZolin  2 g Intravenous Q8H    [MAR Hold] tiotropium  2 puff Inhalation Daily    [MAR Hold] atorvastatin  80 mg Oral Nightly    [MAR Hold] lidocaine PF  1 mL Intradermal Once     Continuous Infusions:    sodium chloride      [MAR Hold] propofol 30 mcg/kg/min (07/07/20 0512)    [MAR Hold] norepinephrine      [MAR Hold] insulin 3.9 Units/hr (07/07/20 0918)    [MAR Hold] dextrose      [MAR Hold] EPINEPHrine infusion Stopped (07/06/20 1957)    [MAR Hold] norepinephrine 0.08 mcg/kg/min (07/07/20 0919)    [MAR Hold] cisatracurium (NIMBEX) infusion 2 mcg/kg/min (07/07/20 0531)    [MAR Hold] phenylephrine (NIRAV-SYNEPHRINE) 50mg/250mL infusion Stopped (07/06/20 1956)  [MAR Hold] vasopressin (Septic Shock) infusion 0.03 Units/min (07/07/20 0919)    [MAR Hold] nitroGLYCERIN 5 mcg/min (07/02/20 2119)     PRN Medications[MAR Hold] dextrose, 25 g, PRN  bacitracin irrigation, , PRN  sodium chloride, , Continuous PRN  bacitracin, , PRN  [MAR Hold] sodium chloride flush, 10 mL, PRN  [MAR Hold] calcium chloride IVPB, 1 g, PRN  [MAR Hold] magnesium sulfate, 1 g, PRN  [MAR Hold] potassium chloride, 20 mEq, PRN  [MAR Hold] acetaminophen, 650 mg, Q4H PRN  [MAR Hold] oxyCODONE-acetaminophen, 1 tablet, Q4H PRN    Or  [MAR Hold] oxyCODONE-acetaminophen, 2 tablet, Q4H PRN  [MAR Hold] fentanNYL, 25 mcg, Q1H PRN    Or  [MAR Hold] fentanNYL, 50 mcg, Q1H PRN  [MAR Hold] diphenhydrAMINE, 25 mg, Nightly PRN  [MAR Hold] bisacodyl, 5 mg, Daily PRN  [MAR Hold] fleet, 1 enema, Daily PRN  [MAR Hold] ondansetron, 4 mg, Q8H PRN  [MAR Hold] hydrALAZINE, 5 mg, Q5 Min PRN  [MAR Hold] metoprolol, 2.5 mg, Q10 Min PRN  [MAR Hold] sodium bicarbonate, 50 mEq, Q30 Min PRN  [MAR Hold] albumin human, 25 g, PRN  [MAR Hold] norepinephrine, 0.2 mcg/kg/min, Continuous PRN  [MAR Hold] glucose, 15 g, PRN  [MAR Hold] dextrose, 12.5 g, PRN  [MAR Hold] glucagon (rDNA), 1 mg, PRN  [MAR Hold] dextrose, 100 mL/hr, PRN  [MAR Hold] albuterol sulfate HFA, 2 puff, Q6H PRN  [MAR Hold] albuterol, 2.5 mg, Q6H PRN  [MAR Hold] melatonin, 1 mg, Nightly PRN  [MAR Hold] nitroGLYCERIN, 0.4 mg, Q5 Min PRN  [MAR Hold] heparin (porcine), 4,000 Units, PRN  [MAR Hold] heparin (porcine), 2,000 Units, PRN        Diagnostic Labs:  CBC:   Recent Labs     07/06/20  0345  07/06/20  1412 07/06/20  1820 07/07/20  0447   WBC 6.6  --  11.9*  --  9.5   RBC 3.49*  --  2.66*  --  2.67*   HGB 10.9*   < > 8.3* 8.3* 8.3*   HCT 34.4*   < > 26.1* 27.2* 26.6*   MCV 98.6  --  98.1  --  99.6   RDW 14.7*  --  14.9*  --  14.8*      < > 169 215 See Reflexed IPF Result    < > = values in this interval not displayed.      BMP:   Recent Labs     07/06/20  0340 colonoscopy. 2. Multivessel Disease  - Heart cath 20: Multivessel disease with preserved EF.   - S/p CABG x 2 : LIMA->LAD & RSSV->OM1  -Currently with temp sternal closure as OM1 with continued bleeding, chest was packed with wound vac. Patient to OR today for closure.   -Wean pressors as able. -SBT/SAT after sternal closure and extubation. -SIMV: rate 12, Vt 820, PEEP 5, 50% FiO2. Remains on paralytic and propofol 30   -AB.35/47.2/67.  -Wean insulin drip after sternal closure. 3. Essential hypertension:   -Currently hypotensive on Levophed and Vasopressin after CABG  -Wean as able. 4. Hyperlipidemia. - Lipitor     5. Bilateral carotid artery disease.    - Occluded right ICA, vertebral, known with collaterals. Disease right CCA. Mild 16-49% stenosis of the left internal carotid artery. Patent left ICA. - CTA: occluded right cervical ICA and proximal right vertebral artery. 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 Severe stenosis of the proximal left vertebral artery. -Vascular follow up outpatient. 6. Peripheral vascular disease.    - Status post stents 2019.    - Follows up with Dr. Edouard Torres. 7. SUREKHA, resolved. - Likely Prerenal in nature. DVT prophylaxis:   GI prophylaxis: Protonix gtt while on heparin will transition to 40 mg BID when heparin gtt discontinued. PT/OT: Ongoing. Discharge Planning: Case management to assist with discharge planning. Benny Lr MD  Internal Medicine Resident, PGY-3  [address], [place]  2020, 10:30 AM Attending Physician Statement  I have discussed the care of Pauline Draft, including pertinent history and exam findings,  with the resident. I have seen and examined the patient and the key elements of all parts of the encounter have been performed by me.   I agree with the assessment, plan and orders as documented by the resident with additions . CC POst CABG  Bleed. Back inm OR to control bleed. Discussed with CT surgery. Continue remaining Rx. Treatment plan Discussed with nursing staff in detail , all questions answered . Electronically signed by Doris Ahmadi MD on   7/7/20 at 11:55 AM EDT  CC 30 minutes  Please note that this chart was generated using voice recognition Dragon dictation software. Although every effort was made to ensure the accuracy of this automated transcription, some errors in transcription may have occurred.

## 2020-07-08 NOTE — PROGRESS NOTES
Dr Denson Salts updated via telephone on pt status including but not limited to current hemodynamics, outputs, rate at which pressors infusing. No new orders received.

## 2020-07-08 NOTE — PLAN OF CARE
Problem: MECHANICAL VENTILATION  Goal: Ability to express needs and understand communication  7/7/2020 2128 by Devin Betancur RCP  Outcome: Ongoing     Problem: MECHANICAL VENTILATION  Goal: Patient will maintain patent airway  7/7/2020 2128 by Devin Betancur RCP  Outcome: Ongoing       Problem: MECHANICAL VENTILATION  Goal: Oral health is maintained or improved  7/7/2020 2128 by Devin Betancur RCP  Outcome: Ongoing     Problem: MECHANICAL VENTILATION  Goal: ET tube will be managed safely  7/7/2020 2128 by Devin Betancur RCP  Outcome: Ongoing     Problem: MECHANICAL VENTILATION  Goal: Mobility/activity is maintained at optimum level for patient  7/7/2020 2128 by Devin Betancur RCP  Outcome: Ongoing     Problem: SKIN INTEGRITY  Goal: Skin integrity is maintained or improved  7/7/2020 2128 by Devin Betancur RCP  Outcome: Ongoing

## 2020-07-08 NOTE — ANESTHESIA PROCEDURE NOTES
Airway  Date/Time: 7/7/2020 10:35 PM  Urgency: urgent      General Information and Staff    Patient location during procedure: ICU  Anesthesiologist: Shwetha William MD  Performed: anesthesiologist     Consent for Airway (if performed for an anesthetic, see related documentation for consents)  Patient identity confirmed: per hospital policy  Consent: The procedure was performed in an emergent situation. Verbal consent not obtained. Written consent not obtained. Risks and benefits: risks, benefits and alternatives were not discussed      Indications and Patient Condition  Indications for airway management: hypoxemia, hypercapnia and cardiovascular instability (Existing ETT with ruptured cuff.)  Spontaneous ventilation: present  Sedation level: moderate (conscious sedation)  Preoxygenated: yes  Patient position: sniffing      Final Airway Details  Final airway type: endotracheal airway      Successful airway: ETT    Successful intubation technique: video laryngoscopy  Facilitating devices/methods: intubating stylet  Endotracheal tube insertion site: oral  Blade: Shadi  Blade size: #4  ETT size (mm): 8.0  Cormack-Lehane Classification: grade IIa - partial view of glottis  Placement verified by: chest auscultation and capnometry   Measured from: teeth  ETT to teeth (cm): 25  Number of attempts at approach: 1  Number of other approaches attempted: 0    Additional Comments  Called to bedside to be present for trial of extubation per primary surgeon. The existing ETT had a ruptured cuff. Patient had elevated peak pressures poor oxygenation. SBP was in the 70s with PA pressures in the 50s. Decision made to exchange ETT. Patient was pre-oxygenated on 100% FiO2. A 14 fr Cook catheter was placed via the ETT and the existing ETT was removed. Patient had a coughing spell with tube replacement and the [employer] Harris catheter was dislodged from the airway. The patient desaturated and dropped his blood pressure.  A new ETT was placed immediately via glidescope. One round of CPR with epinephrine was performed with successful glidescope intubation during compressions per above. Patient had ROSC evident with resumed ventilation and CPR was stopped. Vital signs stable and surgeon notified.

## 2020-07-08 NOTE — PROGRESS NOTES
Dr Gómez Apt notified via telephone of pt decreased urine output x 2 hours, barely 30ml/hr. Also updated physician at this time on pt current hemodynamics and rates of pressors currently infusing. No new orders received, will just continue to monitor at this time.

## 2020-07-08 NOTE — PROGRESS NOTES
[facility] called spoke to Fausto and updated on past medical history and next of kin. Referral number received and placed in death packet.

## 2020-07-08 NOTE — ADDENDUM NOTE
Addendum  created 07/08/20 0507 by Patria Lake MD    Clinical Note Signed, Intraprocedure Blocks edited, Pend clinical note

## 2020-07-08 NOTE — PROGRESS NOTES
Dr Renate Morse at pt bedside. Pt hemodynamics, outputs, pressors reviewed. Verbal order received for dobutamine infusion at this time, may titrate to a CI greater than 2.

## 2020-07-08 NOTE — ANESTHESIA PRE PROCEDURE
Department of Anesthesiology  Preprocedure Note       Name:  Tarah Calvin   Age:  77 y.o.  :  1955                                          MRN:  [id number]         Date:  2020      Surgeon: Loc Cadena):  Mariela Low MD    Procedure: Procedure(s):  STERNUM WOUND CLOSURE    Medications prior to admission:   Prior to Admission medications    Medication Sig Start Date End Date Taking? Authorizing Provider   isosorbide mononitrate (IMDUR) 60 MG extended release tablet Take 30 mg by mouth daily 19   Historical Provider, MD   metoprolol succinate (TOPROL XL) 25 MG extended release tablet Take 12.5 mg by mouth daily 18   Historical Provider, MD   rosuvastatin (CRESTOR) 40 MG tablet TAKE ONE TABLET EACH EVENING. 20   Leo Mesa MD   prasugrel (EFFIENT) 10 MG TABS TAKE ONE TABLET DAILY.  20   Leo Mesa MD   [facility]) 10 MG tablet Take 1 tablet by mouth daily 10/21/19   Leo Mesa MD   lisinopril (PRINIVIL;ZESTRIL) 20 MG tablet Take 1 tablet by mouth daily 10/21/19   Leo Mesa MD   ranolazine (RANEXA) 500 MG extended release tablet Take 1 tablet by mouth 2 times daily 19   Leo Mesa MD   ranolazine (RANEXA) 500 MG extended release tablet Take 1 tablet by mouth 2 times daily for 28 days 19  Leo Mesa MD   albuterol (PROVENTIL) (2.5 MG/3ML) 0.083% nebulizer solution Take 3 mLs by nebulization every 6 hours as needed for Wheezing or Shortness of Breath 3/15/19   Matthew Craig PA-C   guaiFENesin (MUCINEX) 600 MG extended release tablet Take 1 tablet by mouth 2 times daily 3/15/19   Matthew Craig PA-C   ranolazine (RANEXA) 500 MG extended release tablet Take 500 mg by mouth 2 times daily SAMPLES Ranexa 500 mg Lot #[id number] 4/2021 x 4 18  Historical Provider, MD   tiotropium (SPIRIVA) 18 MCG inhalation capsule Inhale 18 mcg into the lungs daily    Historical Provider, MD   fluticasone-vilanterol (BREO ELLIPTA) 100-25 Ana Lyons MD 30 mL/hr at 07/07/20 2225 100 mcg/min at 07/07/20 2225    sodium chloride flush 0.9 % injection 10 mL  10 mL Intravenous 2 times per day JUAN Santana NP   10 mL at 07/07/20 2128    sodium chloride flush 0.9 % injection 10 mL  10 mL Intravenous PRN JUAN Santana NP        calcium chloride 1 g in sodium chloride 0.9 % 100 mL IVPB  1 g Intravenous PRN JUAN Santana - NP        magnesium sulfate 1 g in dextrose 5% 100 mL IVPB  1 g Intravenous PRN Dawit Alberto APRN - NP        potassium chloride 20 mEq/50 mL IVPB (Central Line)  20 mEq Intravenous PRN JUAN Santana NP 25 mL/hr at 07/06/20 1453 20 mEq at 07/06/20 1453    acetaminophen (TYLENOL) tablet 650 mg  650 mg Oral Q4H PRN JUAN Santana - NP        oxyCODONE-acetaminophen (PERCOCET) 5-325 MG per tablet 1 tablet  1 tablet Oral Q4H PRN JUAN Santana NP        Or    oxyCODONE-acetaminophen (PERCOCET) 5-325 MG per tablet 2 tablet  2 tablet Oral Q4H PRN JUAN Santana - NP   2 tablet at 07/07/20 2132    fentaNYL (SUBLIMAZE) injection 25 mcg  25 mcg Intravenous Q1H PRN JUAN Santana NP        Or    fentaNYL (SUBLIMAZE) injection 50 mcg  50 mcg Intravenous Q1H PRN JUAN Santana NP   50 mcg at 07/07/20 1232    diphenhydrAMINE (BENADRYL) tablet 25 mg  25 mg Oral Nightly PRN Dawit Alberto APRN - NP        polyethylene glycol (GLYCOLAX) packet 17 g  17 g Oral Daily JUAN Santana - NP        bisacodyl (DULCOLAX) EC tablet 5 mg  5 mg Oral Daily PRN Dawit Alberto APRN - NP        fleet rectal enema 1 enema  1 enema Rectal Daily PRN JUAN Santana - DAGOBERTO        on[facility]etron [facility]) injection 4 mg  4 mg Intravenous Q8H PRN JUAN Santana NP        pantoprazole (PROTONIX) tablet 40 mg  40 mg Oral Daily JUAN Santana NP        metoprolol tartrate (LOPRESSOR) tablet 25 mg  25 mg Oral BID Dawit Alberto APRN - NP        hydrALAZINE (APRESOLINE) injection 5 mg  5 mg Intravenous Q5 Min PRN Franceen Spray, APRN - NP        metoprolol (LOPRESSOR) injection 2.5 mg  2.5 mg Intravenous Q10 Min PRN Franceen Spray, APRN - NP        mupirocin (BACTROBAN) 2 % ointment   Nasal BID Franceen Spray, APRN - NP        propofol injection  10 mcg/kg/min Intravenous Continuous Franceen Spray, APRN - NP   Stopped at 07/07/20 1330    sodium bicarbonate 8.4 % injection 50 mEq  50 mEq Intravenous Q30 Min PRN Franceen Spray, APRN - NP   50 mEq at 07/07/20 1123    aspirin EC tablet 81 mg  81 mg Oral Daily Franceen Spray, APRN - NP        clopidogrel (PLAVIX) tablet 75 mg  75 mg Oral Daily Franceen Spray, APRN - NP        ipratropium-albuterol (DUONEB) nebulizer solution 1 ampule  1 ampule Inhalation Q4H WA Franceen Spray, APRN - NP   1 ampule at 07/07/20 2042    albumin human 5 % IV solution 25 g  25 g Intravenous PRN Franceen Spray, APRN - NP   Stopped at 07/06/20 2206    norepinephrine (LEVOPHED) 16 mg in sodium chloride 0.9 % 250 mL infusion  0.2 mcg/kg/min Intravenous Continuous PRN Franceen Spray, APRN - NP        insulin regular (HUMULIN R;NOVOLIN R) 100 Units in sodium chloride 0.9 % 100 mL infusion  1 Units/hr Intravenous Continuous Franceen Spray, APRN - NP 2.7 mL/hr at 07/07/20 2000 2.7 Units/hr at 07/07/20 2000    insulin lispro (HUMALOG) injection vial 0-12 Units  0-12 Units Subcutaneous TID WC Franceen Spray, APRN - NP        insulin lispro (HUMALOG) injection vial 0-6 Units  0-6 Units Subcutaneous Nightly Franceen Spray, APRN - NP        glucose (GLUTOSE) 40 % oral gel 15 g  15 g Oral PRN Franceen Spray, APRN - NP        dextrose 50 % IV solution  12.5 g Intravenous PRN Franceen Spray, APRN - NP        glucagon (rDNA) injection 1 mg  1 mg Intramuscular PRN Franceen Spray, APRN - NP        dextrose 5 % solution  100 mL/hr Intravenous PRN Franceen Spray, APRN - NP       Roman Rivas norepinephrine (LEVOPHED) 16 mg in sodium chloride 0.9 % 250 mL infusion  0.02 mcg/kg/min Intravenous Continuous Kristin Toni, APRN - NP   Stopped at 07/07/20 1300    vasopressin 20 Units in dextrose 5 % 100 mL infusion  0.02 Units/min Intravenous Continuous Kristin Toni, APRN - NP 7.5 mL/hr at 07/07/20 2000 0.025 Units/min at 07/07/20 2000    albuterol sulfate  (90 Base) MCG/ACT inhaler 2 puff  2 puff Inhalation Q6H PRN Kristin Toni, APRN - NP        albuterol (PROVENTIL) nebulizer solution 2.5 mg  2.5 mg Nebulization Q6H PRN Kristin Toni, APRN - NP   2.5 mg at 07/04/20 1721    tiotropium (SPIRIVA RESPIMAT) 2.5 MCG/ACT inhaler 2 puff  2 puff Inhalation Daily Kristin Toni, APRN - NP   2 puff at 07/06/20 0755    atorvastatin (LIPITOR) tablet 80 mg  80 mg Oral Nightly Kristin Toni, APRN - NP   80 mg at 07/07/20 2125    melatonin tablet 1 mg  1 mg Oral Nightly PRN Kristin Toni, APRN - NP   1 mg at 07/05/20 2202    nitroGLYCERIN (NITROSTAT) SL tablet 0.4 mg  0.4 mg Sublingual Q5 Min PRN Kristin Toni, APRN - NP   0.4 mg at 06/27/20 1407    nitroGLYCERIN 50 mg in dextrose 5% 250 mL infusion  5 mcg/min Intravenous Continuous Kristin Toni, APRN - NP 1.5 mL/hr at 07/02/20 2119 5 mcg/min at 07/02/20 2119    lidocaine PF 1 % injection 1 mL  1 mL Intradermal Once Kristin Toni, APRN - NP           Allergies:  No Known Allergies    Problem List:    Patient Active Problem List   Diagnosis Code    Gross hematuria R31.0    Benign non-nodular prostatic hyperplasia with lower urinary tract symptoms N40.1    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 J44.9    Hypertension I10    Hyperlipidemia E78.5    Peripheral vascular disease [facility] I73.9    Bacterial pneumonia J15.9    Hyponatremia E87.1    GI bleed K92.2    Hypotension due to hypovolemia I95.89, CATH LAB PROCEDURE  08/20/15    ENDOSCOPY, COLON, DIAGNOSTIC      KNEE SURGERY  2013    LEG TENDON SURGERY      R    OTHER SURGICAL HISTORY Left 11/13/2017    REPAIR CAROTID ANEURYSM WITH LT SAPHENOUS VEIN GRAFT    OTHER SURGICAL HISTORY Right 07/23/2018     CAROTID ARTERY RESECTION OF  PSEUDO ANEURISM    MN THROMBOENDARTECTMY Noris Henry Right 7/23/2018    RSECTION OF RIGHT CAROTID PSEUDOANEURYSM, WITH PATCH GRAFT (Lisbet Schneider) ANGIOPLASTY performed by Nicolette Parry DO at [address] [place] [address]  06/26/2020    UPPER GASTROINTESTINAL ENDOSCOPY Left 6/26/2020    **CASE IN OR WITH GI STAFF** EGD DIAGNOSTIC ONLY performed by David Swan MD at [facility]       Social History:    Social History     Tobacco Use    Smoking status: Current Every Day Smoker     Packs/day: 0.50     Years: 45.00     Pack years: 22.50     Start date: 8/17/1970    Smokeless tobacco: Never Used   Substance Use Topics    Alcohol use: No     Alcohol/week: 0.0 standard drinks                                Ready to quit: Not Answered  Counseling given: Not Answered      Vital Signs (Current): There were no vitals filed for this visit.                                            BP Readings from Last 3 Encounters:   07/07/20 88/65   07/07/20 90/70   07/06/20 (!) 87/57       NPO Status:                                                                                 BMI:   Wt Readings from Last 3 Encounters:   07/05/20 232 lb 9.6 oz (105.5 kg)   06/25/20 232 lb (105.2 kg)   06/16/20 232 lb 12.8 oz (105.6 kg)     There is no height or weight on file to calculate BMI.    CBC:   Lab Results   Component Value Date    WBC 10.5 07/07/2020    RBC 2.65 07/07/2020    RBC 4.44 03/28/2012    HGB 8.2 07/07/2020    HCT 27.1 07/07/2020    .3 07/07/2020    RDW 15.2 07/07/2020     07/07/2020     03/28/2012       CMP:   Lab Results   Component Value Date     07/07/2020    K 5.7 07/07/2020     07/07/2020    CO2 21 07/07/2020    BUN 15 07/07/2020    CREATININE 0.78 07/07/2020    GFRAA >60 07/07/2020    LABGLOM >60 07/07/2020    GLUCOSE 111 07/07/2020    GLUCOSE 97 03/29/2012    PROT 5.0 06/26/2020    CALCIUM 9.4 07/07/2020    BILITOT 0.49 06/26/2020    ALKPHOS 53 06/26/2020    AST 10 06/26/2020    ALT <5 06/26/2020       POC Tests:   Recent Labs     07/07/20  1107  07/07/20 2050 07/07/20  2352   POCGLU 114*   < > 82   < > 113*   POCNA 139  --   --   --   --    POCK 5.8*  --  5.0*  --   --    POCCL 109*  --   --   --   --    POCHEMO 8.9*  --   --   --   --    POCHCT 26*  --   --   --   --     < > = values in this interval not displayed. Coags:   Lab Results   Component Value Date    PROTIME 10.7 07/07/2020    PROTIME 10.6 03/29/2012    INR 1.0 07/07/2020    APTT 29.0 07/07/2020       HCG (If Applicable): No results found for: PREGTESTUR, PREGSERUM, HCG, HCGQUANT     ABGs:   Lab Results   Component Value Date    PHART 7.380 03/12/2019    PO2ART 85.4 03/12/2019    QZM8ITZ 43.3 03/12/2019    ZUC5YIC 25.0 03/12/2019    F4HWESEO 96.3 03/12/2019        Type & Screen (If Applicable):  No results found for: LABABO, LABRH    Drug/Infectious Status (If Applicable):  No results found for: HIV, HEPCAB    COVID-19 Screening (If Applicable):   Lab Results   Component Value Date    COVID19 Not Detected 06/25/2020         Anesthesia Evaluation  Patient summary reviewed no history of anesthetic complications:   Airway: Mallampati: Unable to assess / NA       Comment: Orally intubated   Dental:    (+) lower dentures and upper dentures      Pulmonary:   (+) COPD: no interval change,  rhonchi,      (-) pneumonia                           Cardiovascular:    (+) hypertension: no interval change, past MI: < 1 month, CAD: non-obstructive,       ECG reviewed  Rhythm: regular  Rate: abnormal  Echocardiogram reviewed               ROS comment: Hemodynamic instability s/p CABG 7/6 and chest closure 7/7.      Neuro/Psych:   (+) CVA: no interval change,             GI/Hepatic/Renal:   (+) PUD,           Endo/Other: Negative Endo/Other ROS                    Abdominal:           Vascular: negative vascular ROS. Anesthesia Plan      general     ASA 4       Induction: intravenous. Anesthetic plan and risks discussed with Unable to obtain due to emergent nature. Plan discussed with CRNA.                   Herber Gomes MD   7/8/2020

## 2020-07-08 NOTE — PROGRESS NOTES
Pt had a CABG x2 on 7/6/20 and chest left open secondary to bleeding. Sternum was closed today and he was taken to the ICU. He did not tolerate weaning from the vent and an ETT exchange was done at 10 PM leading to a brief cycle of CPR. He continued to require increasing pressors with dobutamine and vaso and norepi. He coded again at 12 PM and CPR was again preformed. At this time I was notified and upon arrival his BP was 70/40 and HR of 124. He again deteriorated and CPR was started and he was taken to the OR for exploration.   Neither a pulse or BP was recovered and after more than 1 hour of CPR the code was terminated at 3:20 AM.

## 2020-07-08 NOTE — PROGRESS NOTES
@2202 Pt began coughing, Pt suctioned by Alexander Skill,  DRU RN arrived in pt room,suctioning/coughing caused a vagel in BP, Pt BP rebounded back previous levels. An air leak was heard in Pt ETT cuff. Verified by RT Dulce Maria Carty and BJ RN. Anesthesia was paged, Dr Del Steele came to bedside and evaluated pt. A tube exchange was done by Dr Fredy Hernández and RT Dulce Maria Carty, pt began coughing and BP began to drop, Dr Fredy Hernández then asked for the glydescope to intubate, upon intubation Pt PEA coded.  CPR was started, 1 epi given, ROSC achieved in Sinus Tach

## 2020-07-08 NOTE — PROGRESS NOTES
Dr Daphney Kanner notified via telephone of pt increased PAP in 70s, increased SVR [id number], sats 85% on 60% FiO2, recent lab and ABG results, and \"roz\"out chest xray. Order received to adjust vent settings and increase FiO2. RN will continue to monitor and update physician as needed.

## 2020-07-08 NOTE — PROGRESS NOTES
Wife called updated on pt's vital, including ETT complications/code.  All questions answered, instructed to call if she has any additional questions or concerns

## 2020-07-08 NOTE — PROGRESS NOTES
Pt taken to OR for chest closure at this time, pt was placed on CVORs transport monitor and accompanied by RN and CRNA.

## 2020-07-08 NOTE — BRIEF OP NOTE
Brief Postoperative Note      Patient: Rosalba Campos  YOB: 1955  MRN: [id number]    Date of Procedure: 7/7/2020    Pre-Op Diagnosis: OPEN STERNUM, STATUS POST CABG    Post-Op Diagnosis: Same       Procedure(s):  STERNUM WOUND CLOSURE WITH Delbert Sam. Surgeon(s):  Val Parker MD    Assistant:  First Assistant: ROBIN Quintana    Anesthesia: General    Estimated Blood Loss (mL): Minimal    Complications: None    Specimens:   * No specimens in log *    Implants:  * No implants in log *      Drains:   Chest Tube 1 Anterior Mediastinal 24 Amharic (Active)   Suction -20 cm H2O 7/7/2020  3:30 PM   Chest Tube Airleak No 7/8/2020 12:00 AM   Drainage Description Serosanguinous 7/8/2020 12:00 AM   Dressing Status Clean;Dry; Intact 7/8/2020 12:00 AM   Dressing Type Dry dressing 7/8/2020 12:00 AM   Dressing Change Due 07/08/20 7/8/2020 12:00 AM   Site Assessment Not assessed 7/8/2020 12:00 AM   Surrounding Skin Unable to view 7/8/2020 12:00 AM   Patency Intervention Milked;Stripped; Tip/Tilt 7/8/2020  2:00 AM   Output (ml) 10 ml 7/8/2020  2:00 AM       Chest Tube 2 Anterior Pericardial 24 Amharic (Active)   Suction -20 cm H2O 7/7/2020  3:30 PM   Chest Tube Airleak No 7/8/2020 12:00 AM   Drainage Description Serosanguinous 7/8/2020 12:00 AM   Dressing Status Clean;Dry; Intact 7/8/2020 12:00 AM   Dressing Type Dry dressing 7/8/2020 12:00 AM   Dressing Change Due 07/08/20 7/8/2020 12:00 AM   Site Assessment Not assessed 7/8/2020 12:00 AM   Surrounding Skin Unable to view 7/8/2020 12:00 AM   Patency Intervention Milked;Stripped; Tip/Tilt 7/8/2020  2:00 AM   Output (ml) 10 ml 7/8/2020  2:00 AM       NG/OG/NJ/NE Tube Orogastric (Active)   Surrounding Skin Dry; Intact; Non reddened 7/8/2020 12:00 AM   Securement device Yes 7/8/2020 12:00 AM   Status Suction-low intermittent 7/7/2020  3:30 PM   Placement Verified by X-Ray (repeat);by Respiratory Status;by External Catheter Length 7/7/2020  3:30 PM NG/OG/NJ/NE External Measurement (cm) 57 cm 7/7/2020  8:00 PM   Drainage Appearance Bile;Green 7/8/2020 12:00 AM   Output (mL) 150 ml 7/7/2020  6:43 PM       Urethral Catheter Temperature probe 16 fr (Active)   Catheter Indications Perioperative use in selected surgeries including but not limited to urologic, pelvic or need for intraoperative monitoring of urinary output due to prolonged surgery, large volume infusion or need for diuretic therapy in surgery; Need for fluid management in critically ill patients in a critical care setting not able to be managed by other means such as BSC with hat, bedpan, urinal, condom catheter, or short term intermittent urethral catherization 7/8/2020 12:00 AM   Securement Device Date Changed 07/07/20 7/7/2020  8:00 AM   Site Assessment No urethral drainage 7/8/2020 12:00 AM   Urine Color Yellow 7/8/2020 12:00 AM   Urine Appearance Clear 7/8/2020 12:00 AM   Output (mL) 20 mL 7/8/2020  2:00 AM       [REMOVED] Negative Pressure Wound Therapy Chest Mid (Removed)   Wound Type Surgical 7/7/2020  8:00 AM   Unit Type Vac Ultra 7/7/2020  8:00 AM   Dressing Type Black foam 7/7/2020  8:00 AM   Number of pieces used 1 7/7/2020  8:00 AM   Cycle Continuous 7/7/2020  8:00 AM   Target Pressure (mmHg) Other (Comment) 7/7/2020  8:00 AM   Canister changed? Yes 7/7/2020  3:00 AM   Dressing Status Intact; New drainage; Old drainage 7/7/2020  8:00 AM   Drainage Amount Moderate 7/7/2020  8:00 AM   Drainage Description Sanguinous 7/7/2020  8:00 AM   Output (ml) 25 ml 7/7/2020  6:00 AM   Wound Assessment Other (Comment) 7/7/2020  8:00 AM   Lisa-wound Assessment Clean;Dry; Intact; Pink 7/7/2020  8:00 AM   Odor None 7/7/2020  8:00 AM       Findings: There was no further bleeding with the 2 Raytechs now removed. There was essentially no blood in the mediastinum or pleural spaces. The sternum closed up well with stable hemodynamics.   His SVR does continue to measure low and so I added ashwini with good effect and

## 2020-07-08 NOTE — PROGRESS NOTES
Pt wife telephoned unit for an update on pt status. RN informed wife that pt was in OR for chest closure and was taken there at 0915. RN will notify physician to call her with an update when he is finished in surgery.

## 2020-07-08 NOTE — PROGRESS NOTES
Writer responded to vent alarm and when entering the room RN heard cuff leak and writer confirmed finding. RN called anesthesia to perform a tube exchange. Tube was attempted to be exchanged with a cook catheter. Patient began to code, compressions started, intubation successful by anesthesia.

## 2020-07-08 NOTE — FLOWSHEET NOTE
06/27/20 1520   Provider Notification   Reason for Communication Critical Value (comment)  (trop)   Provider Name Dr Ivett Calvin   Provider Notification Resident   Method of Communication Page   Response Waiting for response   Notification Time [phone]     Electronically signed by Gaye Givens RN on 6/27/2020 at 3:22 PM
DATE: 2020    NAME: Saulo Moore  MRN: [id number]   : 1955    Patient not seen this date for Physical Therapy due to:  [] Blood transfusion in progress  [] Hemodialysis  []  Patient Declined  [] Spine Precautions   [] Strict Bedrest  [] Surgery/ Procedure  [] Testing      [x] Other: Checked back in pm and pt working with OT, per RN pt is ambulating in halls independently, Planned surgery on Monday        [] PT being discontinued at this time. Patient independent. No further needs. [] PT being discontinued at this time as the patient has been transferred to palliative care. No further needs.     Norval Reebcca, PTA
Dr. Keys notified of BP in the low 80s, 1L bolus of normal saline ordered.  Will continue to monitor
Pts daughter updated on current status, all questions and concerns addresses, will notify when EGD has a specific time
will contact [facility] office with chosen  home.      Electronically signed by April Child, on 2020 at [address] [phone]

## 2020-07-08 NOTE — PROGRESS NOTES
Dr Jean-Pierre Armijo and CT surg team at pt bedside. Plan to take pt to OR for chest closure around 9am today. Reviewed pt hemodynamics, outputs, and current drips infusing. Pt remains in nimbex with current TOF 4 of 4 at 40, per DiBardino do not increase paralytic as pt remains unresponsive and synchronous with ventilator, plan to discontinue after chest closure in OR. Verbal order also received to change propofol to precedex for sedation after pt returns to unit from OR, and plan to wean vent and extubate if able.

## 2020-07-08 NOTE — OP NOTE
[facility]                                OPERATIVE REPORT    PATIENT NAME: Emma Mayen                  :        1955  MED REC NO:   [id number]                             ROOM:       1024  ACCOUNT NO:   [de-identified]                           ADMIT DATE: 2020  PROVIDER:     Juli Simmons MD    DATE OF PROCEDURE:  2020    PREOPERATIVE DIAGNOSIS:  Postop cardiac tamponade. POSTOPERATIVE DIAGNOSES:  PEA and cardiac arrest.    PROCEDURE: Emergency mediastinal exploration    Removal of clot    Internal chest compressions    Left femoral a-line placement    SURGEON:  Juli Simmons MD    ASSISTANT:  Dori.    EBL:  250 mL. SPECIMEN:  Clot. DRAINS:  2 Black Drains    FINDINGS:  No hard activity. Minimal clot. INDICATIONS:  The patient had a double bypass done by Dr. Dalia Moe on   and had bleeding postoperatively. The patient had significant  amount out the chest tubes, and the chest was left open. The wound VAC  was placed after the first procedure. The patient was then taken back  to the OR the following day (), when the chest was closed, and Pelon  drains were left in place. The patient continued to struggle  postoperatively on the evening of the chest closure. The patient  had ET tube exchanged and coded at about 10 p.m. The patient again  coded at midnight, with CPR being done both times. The patient was  resuscitated with increasing drips. About 1:30 a.m., I was called and  notified that the patient was again decompensating with decreased blood  pressure, elevated PA pressures, and tachycardia in the 120s. The heart   team was called in, because the patient was thought to be  in tamponade. Chest tubes were draining minimally, and the heart sounds  were muffled with tachycardia and severe hypotension.   The patient again  coded before the OR was readied, and CPR was started in the ICU. At  this time, once the heart team had assembled, we proceeded back to the  operating room for the an exploratory sternotomy was performed. DESCRIPTION OF PROCEDURE:  The patient was taken to the OR, placed in  supine position. CPR was continued throughout the entire process. The  patient's chest was washed, prepped, and draped. At this time, the  sutures were cut. The wires were cut, and CPR was continued. The CPR  at this point had been ongoing for over half an hour. The  patient had the wires removed and the clot removed from the heart. There was no activity of the heart. Internal CPR was started. Internal  compressions were performed. 3 rounds of intraaortic epinephrine were  given. The patient did have some fibrillation and was defibrillated  three times with asystole and no activity noted. At this time, after  more than an hour and 20 minutes of CPR, the patient had no discernible  activity, and it was felt by Anesthesia, myself, and the team that there  was no meaningful recovery to be gained. The patient certainly had no  neurologic or cardiac function at 3:20 and the code was called. The chest was closed using sternal wires. The suprasternal fascia was  closed using #1 Vicryl suture. The skin was closed with 4-0 Vicryl  subcuticular stitch. The  was notified and they informed us of  not being a 's case. The family was notified. Dr. Bradly Gardiner was  also informed that the patient  at 3:20. All tubes and lines  were left in place, and the patient was taken back to his room.         Jarvis Fabry, MD    D: 2020 4:03:05       T: 2020 5:36:29     [email or web address]  Job#: [id number]     Doc#: [id number]    CC:

## 2020-07-08 NOTE — ANESTHESIA POSTPROCEDURE EVALUATION
Department of Anesthesiology  Postprocedure Note    Patient: Steven Griffith  MRN: [id number]  YOB: 1955  Date of evaluation: 2020  Time:  4:46 AM     Procedure Summary     Date:  20 Room / Location:  [address] [place] [address]    Anesthesia Start:  227 Anesthesia Stop:  4    Procedure:  HEART POST OP HEMORRHAGE CONTROL (N/A ) Diagnosis:  (POST -OP BLEEDING)    Surgeon:  Aroldo May MD Responsible Provider:  Jasper Rico MD    Anesthesia Type:  general ASA Status:  4          Anesthesia Type: general    Matt Phase I: Matt Score: 3    Matt Phase II:      Last vitals: Reviewed and per EMR flowsheets. Anesthesia Post Evaluation    Comments: Patient , intraoperative death.

## 2020-07-09 LAB
ABO/RH: NORMAL
ANTIBODY SCREEN: NEGATIVE
ARM BAND NUMBER: NORMAL
BLD PROD TYP BPU: NORMAL
CROSSMATCH RESULT: NORMAL
DISPENSE STATUS BLOOD BANK: NORMAL
EXPIRATION DATE: NORMAL
TRANSFUSION STATUS: NORMAL
UNIT DIVISION: 0
UNIT NUMBER: NORMAL

## 2020-07-09 NOTE — OP NOTE
[facility] [address]                                OPERATIVE REPORT    PATIENT NAME: Cheyenne Justice                  :        1955  MED REC NO:   [id number]                             ROOM:       1024  ACCOUNT NO:   [de-identified]                           ADMIT DATE: 2020  PROVIDER:     Kevin Coon MD    DATE OF PROCEDURE:  2020    PRIMARY ATTENDING SURGEON:  Kevin Coon MD    OTHER ASSISTANTS:  Included Sandra Weeks CSA    PREOPERATIVE DIAGNOSES:  Urgent consult for multivessel coronary artery  disease; NSTEMI at the time of previous stent placement; occluded PDA  coronary artery (chronic); unstable angina, on nitroglycerin and heparin  drip. POSTOPERATIVE DIAGNOSES:  Urgent consult for multivessel coronary artery  disease; NSTEMI at the time of previous stent placement; occluded PDA  coronary artery (chronic); unstable angina, on nitroglycerin and heparin  drip. PROCEDURES:  Median sternotomy, aorto-right atrial cardiopulmonary  bypass, endoscopic vein harvest, VINCE, CABG x2 with skeletonized LIMA to  LAD and reversed saphenous vein graft-1 to OM, PDA exploration (small  and ungraftable), bleeding from distal OM anastomosis necessitating  packing with temporary VAC sternal closure. COMPLICATIONS:  None. CONDITION:  Stable. DISPOSITION:  To CVICU. ESTIMATED BLOOD LOSS:  Not applicable. ANESTHESIA:  General endotracheal.    INDICATIONS FOR SURGERY:  The patient is a 69-year-old man who was  admitted to [place] and placed on heparin and nitroglycerin  for what was felt to be a presentation of unstable angina with  multivessel coronary artery disease in the setting of a non-STEMI during  a previous RCA stent placement, which left a stent hanging FDC out  of the orifice of his right coronary artery.   His PDA at this point was  essentially occluded, and the runoff of his right coronary artery was  extremely poor. Unfortunately, he was on powerful antiplatelet agents  and had a history of multiple severe vascular complications requiring  surgery. We thus obtained platelet studies and waited the appropriate  amount of time for a safe operation while he remained stable on heparin  and nitroglycerin. We also utilized this time to obtain vascular  consultation as well as pulmonary consultation. The patient's lungs  were extremely dysfunctional given his relatively young age at 77, and  we thus were careful to consent the family that pulmonary complications  including tracheostomy were a very realistic part of this higher risk  procedure for this gentleman. I explained to the family that this was,  in fact, considered a high risk type of revascularization, but it was  unavoidable given the circumstances. He was thus taken to surgery with  the consent of he and his family on 07/06/2020. FINDINGS AT SURGERY:  There was reasonable quality reversed saphenous  vein graft and good quality skeletonized left internal mammary artery. I went to the ER with the idea of possibly doing 3 bypasses, including  to the PDA despite its occlusion since there was some evidence of  reconstitution and minor filling on the catheterization. I did scratch  out and explore the PDA and found that it was, in fact, too small to  graft and was fed only by collaterals and could not be bypassed. Given  this, I focused on providing a reversed saphenous vein graft to the OM-1  and skeletonized THEODORE to LAD. There were palpable pulses in both of  these  grafts at the conclusion of the operation. The ejection fraction  was preserved at 55%, and there were no wall motion abnormalities at the  conclusion of the operation, which was an improvement from preoperative  VINCE findings.   To my surprise, after confirming no bleeding at the  anastomosis, after protamine administration, I decannulated and was  preparing to close. At this time, I noticed a new finding of blood  accumulating around the inferior surface of the heart. I thus  reinspected the rSVG to OM graft and found that there was bleeding which  was new since decannulation at the distal anastomosis. Despite multiple  attempts to pack this with only topical hemostatic agents, this did not  work, and ultimately when I packed it with a Ray-Lucian sponges, the  bleeding abated. Given that the only other option, besides leaving this  packed, was to re-heparinize and re-cannulate, I felt that the lesser of  two evils was simply to leave the Ray-Lucian sponge in place overnight with  a temporary chest closure and remove it tomorrow when hemostasis had  been achieved. I continued to observe and demonstrate that the Ray-Lucian  had indeed stopped the bleeding during the chest closure process with a  temporary chest closure using a VAC dressing. There was no further  bleeding at the conclusion of the operation. The patient was in stable  condition and transported to the CVICU with the above-mentioned echo  findings. SURGERY IN DETAIL:  The patient was identified in his bed in the CVICU  and was transported to the operating room, where he was induced for  general endotracheal anesthesia without difficulty. This included an  uneventful endotracheal intubation and the appropriate placement of  lines and access for cardiac surgery. He was thus prepped and draped in  a normal sterile fashion while a time-out was performed and documented. The operation then began with the simultaneous performance of an  endoscopic vein harvest while I simultaneously opened his chest through  a median sternotomy and took down a skeletonized left internal mammary  artery. Once all of the conduit was taken and deemed appropriate, then  a pericardial well was created.   A preliminary dissection was performed  in order to achieve aorto-right atrial cardiopulmonary bypass, which was  achieved with excellent flows and drainage after appropriate  heparinization. I placed an ascending aorta cardioplegia needle in his  ascending aorta. A cross-clamp was applied, and a dose of cold del Nido  solution was given to achieve an adequate diastolic arrest.  I placed  topical ice and I looked about the heart. I began by exploring the PDA  as described above, and the findings were as described above. It would  not have made sense to graft the right coronary artery proper since both  the PDA and the posterior ventricular branches were severely diseased  with poor runoff, and there was no way that such a graft would have  stayed patent. I thus focused my attention on providing what I could  provide, which was reversed saphenous vein graft anastomosis to the OM  and the skeletonized left internal mammary artery to the LAD. I  conducted this by first performing the distal anastomosis of the  reversed saphenous vein graft to the OM, then the LIMA to the LAD. I  lastly connected the proximal portion of the reversed saphenous vein  graft to its takeoff on the ascending aorta. The heart was de-aired,  the cross-clamp was removed, and there was the eventual return of normal  sinus rhythm. A ventricular wire was placed. I was able to wean the  patient from cardiopulmonary bypass with good hemodynamics on Levophed  for inotropic support. He remained off bypass in stable condition and I  therefore decannulated and administered protamine. Hemostasis was  achieved and verified. I did verify there was no bleeding at any of the  anastomoses, and protamine was completed. As I was placing chest tubes  and preparing to close, the above-mentioned findings were found with  bleeding around the inferior surface of the heart. This abated with  packing of a Ray-Lucian sponge and I therefore left the sponge in place and  performed a temporary chest closure as described above.   My plan was to  simply bring him back to the operating room in the morning and remove  the sponge and close him properly with delayed sternal closure. We  could then begin the process of waking and weaning him to extubate  should his lungs cooperate. There were no adequately trained or available residents to assist in  this operation, and the presence of Temo Haskins CSA, was critical to  the necessary first assistance to complete the operation and also for  the independent performance of the endoscopic vein harvest.        DAVID Yanes MD    D: 07/09/2020 9:39:02       T: 07/09/2020 14:23:45     [email or web address]  Job#: [id number]     Doc#: [id number]    CC:

## 2020-07-09 NOTE — OP NOTE
89 [facility]                  [id number] [place] [address]                                OPERATIVE REPORT    PATIENT NAME: Lorna Gifford                  :        1955  MED REC NO:   [id number]                             ROOM:       1024  ACCOUNT NO:   [de-identified]                           ADMIT DATE: 2020  PROVIDER:     Yudy Cherry MD    DATE OF PROCEDURE:  2020    PRIMARY ATTENDING SURGEON:  Yudy Cherry MD    OTHER ASSISTANTS:  Included Robbin Rodríguez PA-C    PREOPERATIVE DIAGNOSIS:  Temporary chest closure, status post successful  CABG x2 the day prior. POSTOPERATIVE DIAGNOSIS:  Temporary chest closure, status post  successful CABG x2 the day prior. PROCEDURES:  Sternal re-exploration with removal of Ray-Lucian sponge,  mediastinal irrigation and Pulsavac irrigation of the sternal bone,  delayed sternal closure with my double stainless steel wire technique. COMPLICATIONS:  None. CONDITION:  Stable. DISPOSITION:  To CVICU. ESTIMATED BLOOD LOSS:  Not applicable. ANESTHESIA:  General endotracheal.    INDICATIONS FOR SURGERY:  The patient is a 57-year-old gentleman who the  day prior had undergone a successful CABG x2, but had the finding of  bleeding from one of his anastomoses as we were getting ready to close. I thus abated this with a Ray-Lucian sponge packing and temporary sternal  closure. I thus made plans to bring him back the next morning on  2020, which I did, to remove the sponge and perform uneventful  delayed sternal closure. FINDINGS AT SURGERY:  There was no further bleeding and very minimal  clot in the mediastinum or pleural spaces. There was no bleeding  anywhere upon removal of the sponge. I thus irrigated everything  carefully and performed chest closure with my double stainless steel  wire technique.   At the conclusion of the operation, he was still  requiring quite a bit of alpha-agent inotropic support with vasopressin  of 0.3 and Levophed at 0.08. We thus tried a little bit of  Babatunde-Synephrine and he appeared to respond much better to this. We,  therefore, initiated a Babatunde-Synephrine drip and started weaning off the  Levophed. My plan was to get off the Levophed through the remainder of  the day and switch this out to a primarily Babatunde-Synephrine managed drip  plan. We did note on his Hermenia Lev numbers that he did still have some  vasoplegia with SVR numbers that were consistently less than 800 in  terms of his SVR. He was otherwise very stable from a blood pressure  and cardiac index and output standpoint and was transported to the CVICU  in stable condition. SURGERY IN DETAIL:  The patient was identified in his bed in the CVICU  and was transported to the operating room, where he was induced for  general endotracheal anesthesia without difficulty. He had already had  line placements and endotracheal intubation from his previous surgery  and thus we prepped and draped in a normal sterile fashion while a  time-out was performed and documented. I began the operation with  removal of the previously placed temporary chest closure arrangement,  which included a VAC sponge and Kerlix. I then explored the mediastinum  and removed the previously placed Ray-Lucian sponges. There was no further  bleeding anywhere, and there was very minimal blood clot noted anywhere  in the mediastinum during the procedure. I thus was satisfied to  Pulsavac irrigate him for good measure and closed him with my double  stainless steel wire technique in the usual fashion. The remainder of  the incision was closed in the usual layered fashion. There were no adequately trained or available residents for assistance  in this operation, and the presence of Akanksha Hummel was critical for  the first assistance necessary to complete this surgery. DAVID Arguello MD    D: 07/09/2020 9:44:11 T: 07/09/2020 13:35:50     [email or web address]  Job#: [id number]     Doc#: [id number]    CC:

## 2020-07-14 LAB
POC ANGLE TEG W HEP: 60.6 DEG (ref 59–74)
POC ANGLE TEG: 67.1 DEG (ref 59–74)
POC EPL TEG W/HEP: 0.2 % (ref 0–15)
POC EPL TEG: 1.3 % (ref 0–15)
POC KINETICS TEG W HEP: 1.1 MIN (ref 1–3)
POC KINETICS TEG: 1 MIN (ref 1–3)
POC LY30(LYSIS) TEG W HEP: 0.2 % (ref 0–8)
POC LY30(LYSIS) TEG: 1.3 % (ref 0–8)
POC MA(MAX CLOT) TEG: 83 MM (ref 55–74)
POC MAX CLOT TEG W HEP: 82.5 MM (ref 55–74)
POC REACTION TIME TEG W HEP: 6.2 MIN (ref 4–9)
POC REACTION TIME TEG: 6.1 MIN (ref 4–9)
TEG COMMENT: ABNORMAL
TEG COMMENT: ABNORMAL

## 2020-07-16 NOTE — PROGRESS NOTES
Physician Progress Note      Cassie Duenas  CSN #:                  [id number]  :                       1955  ADMIT DATE:       2020 3:14 PM  [address] DATE:        2020 3:47 PM  RESPONDING  PROVIDER #:        Silver Brewer MD          QUERY TEXT:    Pt s/p CABG. Pt noted to require multiple pressors. If possible, please   document in the progress notes and discharge summary if you are evaluating   and/or treating any of the following    The medical record reflects the following:  Risk Factors: s/p CABG  Clinical Indicators: hypotension, tachycardia, per prog notes \"post CABG   bleed, back in OR to control bleed\", return to OR today for sternal   closure-documented EBL less than 50, EBL per CABG op note \"minimal\"  Treatment: IV Levophed, Vasopressin, and Epi, return to OR sternal wound   closure with pulsevac irrigation  Options provided:  -- Cardiogenic Shock  -- Hemorrhagic Shock  -- Hypovolemic Shock  -- Hypovolemia without Shock  -- Hypotension without Shock  -- Refer to Clinical Documentation Reviewer    PROVIDER RESPONSE TEXT:    This patient has Cardiogenic Shock.     Query created by: Leslie Finch on 2020 12:46 PM      Electronically signed by:  Silver Brewer MD 2020 12:30 PM

## 2020-07-22 NOTE — DISCHARGE SUMMARY
89 [facility]     Department of Internal Medicine - Staff Internal Medicine Teaching Service    INPATIENT DISCHARGE SUMMARY      Patient Identification:  Odalys Mas is a 77 y.o. male. :  1955  MRN: [id number]     Acct: [de-identified]   PCP: Victor Hugo Brasher DO  Admit Date:  2020  Discharge date and time: 2020  3:47 PM   Attending Provider: No att. providers found                                     [address] Problem Lists:  Principal Problem:    Bleeding duodenal ulcer  Active Problems:    Stage 3 severe COPD by GOLD classification ([facility]    Hypertension    Hyperlipidemia    Peripheral vascular disease ([facility]    GI bleed    Hypotension due to hypovolemia    NSAID induced gastritis    Smoking greater than 40 pack years    Postoperative cardiogenic shock ([facility]  Resolved Problems:    Acute respiratory failure with hypoxia [facility])      [address] course:   Patient was initially transferred from the different hospital presented with hematemesis and melena. Which is started 3 days before coming to hospital.  He was having massive bleeding so he was transferred to [facility]. On admission was giving 2 units of PRBC and Protonix bolus. Patient was tachycardic and hypotensive so was admitted to the ICU and was given fluid boluses. Patient was alert and oriented x3. Patient was also started on IV ceftriaxone. GI was consulted. Endoscopy was done on  showed no active bleeding but erosive duodenal fatigue and small 5 mm clean-based ulcer identified in the duodenal bulb presumably NSAIDs induced and likely source of recent hemorrhage while on epi therapy patient was also found to have moderate gastritis and duodenitis. He underwent cath which demonstrated multivessel CAD and was set up for CABG. He had the CABG x 2 done on 2020 and chest left open secondary to bleeding.  Sternum was closed 2020 and he was taken to the ICU. He did not tolerate weaning from the vent and an ETT exchange was done at 10 PM leading to a brief cycle of CPR. He continued to require increasing pressors with dobutamine and vaso and norepi. He coded again at 12 PM and CPR was again preformed. At this time I was notified and upon arrival his BP was 70/40 and HR of 124. He again deteriorated and CPR was started and he was taken to the OR for exploration. Neither a pulse or BP was recovered and after more than 1 hour of CPR the code was terminated at 3:20 AM.       Procedures/ Significant Interventions:    EGD  CATH  CABG x 2    Consults:     Consults:     Final Specialist Recommendations/Findings:   IP CONSULT TO CASE MANAGEMENT  IP CONSULT TO GI  IP CONSULT TO CARDIOLOGY  IP CONSULT TO IV TEAM  IP CONSULT TO CARDIOTHORACIC SURGERY  IP CONSULT TO VASCULAR SURGERY  IP CONSULT TO PULMONOLOGY  IP CONSULT TO IV TEAM  IP CONSULT TO CARDIOLOGY      Any Hospital Acquired Infections: none    Discharge Functional Status:  stable    DISCHARGE PLAN     Disposition: . Patient Instructions:   Discharge Medication List as of 2020  3:47 PM      CONTINUE these medications which have NOT CHANGED    Details   isosorbide mononitrate (IMDUR) 60 MG extended release tablet Take 30 mg by mouth dailyHistorical Med      metoprolol succinate (TOPROL XL) 25 MG extended release tablet Take 12.5 mg by mouth dailyHistorical Med      rosuvastatin (CRESTOR) 40 MG tablet TAKE ONE TABLET EACH EVENING., Disp-90 tablet, R-3Normal      prasugrel (EFFIENT) 10 MG TABS TAKE ONE TABLET DAILY. , Disp-90 tablet, R-3Normal      amLODIPine (NORVASC) 10 MG tablet Take 1 tablet by mouth daily, Disp-90 tablet, R-3Normal      lisinopril (PRINIVIL;ZESTRIL) 20 MG tablet Take 1 tablet by mouth daily, Disp-90 tablet, R-3Normal      ranolazine (RANEXA) 500 MG extended release tablet Take 1 tablet by mouth 2 times daily, Disp-180 tablet, R-3Normal      albuterol (PROVENTIL) (2.5 MG/3ML) 0.083% nebulizer solution Take 3 mLs by nebulization every 6 hours as needed for Wheezing or Shortness of Breath, Disp-120 each, R-0Dx: J44.1, J44.1Normal      guaiFENesin (MUCINEX) 600 MG extended release tablet Take 1 tablet by mouth 2 times dailyOTC      tiotropium (SPIRIVA) 18 MCG inhalation capsule Inhale 18 mcg into the lungs dailyHistorical Med      fluticasone-vilanterol (BREO ELLIPTA) 100-25 MCG/INH AEPB inhaler Inhale 1 puff into the lungs dailyHistorical Med      albuterol sulfate  (90 Base) MCG/ACT inhaler Inhale 2 puffs into the lungs every 6 hours as needed for Wheezing or Shortness of Breath, Disp-1 Inhaler, R-11Normal      DiphenhydrAMINE HCl (BENADRYL ALLERGY PO) Take 50 mg by mouth nightly Pt takes at night for his sinuses Historical Med      nitroGLYCERIN (NITROSTAT) 0.4 MG SL tablet Place 1 tablet under the tongue every 5 minutes as needed for Chest pain, Disp-25 tablet, R-3      aspirin 81 MG tablet Take 81 mg by mouth daily             Activity: . Diet: . Note that over 30 minutes was spent in preparing discharge papers, discussing discharge with patient, medication review, etc.      Christen Faye DO  Internal Medicine Resident, PGY-2  [address] [place], [place]  2020, 3:46 PM

## 2020-12-30 NOTE — ANESTHESIA POSTPROCEDURE EVALUATION
Department of Anesthesiology  Postprocedure Note    Patient: Jodee Be  MRN: [id number]  YOB: 1955  Date of evaluation: 7/7/2020  Time:  11:07 AM     Procedure Summary     Date:  07/07/20 Room / Location:  [address]    Anesthesia Start:  6996 Anesthesia Stop:      Procedure:  STERNUM WOUND CLOSURE WITH [place] Eng. (N/A ) Diagnosis:  (OPEN STERNUM, STATUS POST CABG)    Surgeon:  Rolanda Jacobson MD Responsible Provider:  Christian Munoz MD    Anesthesia Type:  general ASA Status:  4          Anesthesia Type: general    Matt Phase I: Matt Score: 3    Matt Phase II:      Last vitals: Reviewed and per EMR flowsheets.        Anesthesia Post Evaluation    Patient location during evaluation: ICU  Patient participation: complete - patient participated  Level of consciousness: sedated and ventilated  Pain score: 0  Airway patency: patent  Nausea & Vomiting: no vomiting and no nausea  Complications: no  Cardiovascular status: hemodynamically stable  Respiratory status: acceptable, ventilator and intubated  Hydration status: stable Vitamin D levels of 29   Goal is 30 - 32  Start prescription dose ergo 50,000 units once a week for 3 months -- completed this course.   Daily dose  2000 IUs daily       Elevated PTH with normal calcium and phosphorus. Recent mild calcium elevation may be spurious (BUN also mildly elevated) but has had higher calcium levels in the past.   Will repeat with normal vitamin D, with PHOS and creat/bun in three months.

## 2022-05-11 NOTE — TELEPHONE ENCOUNTER
Mr. Brandi Gomes came into the office today for a BP check his left arm was 114/70 HR 79, his right arm was 129/75 and his HR was 86. Thanks! Provider Sherry YANCEY/NICOLASA    Date form sent to provider: 5/ 11/ 2022   Comments REVIEW,SIGN AND DATE FORM   RETURN TO DISABILITY DEPT

## 2022-12-19 NOTE — PLAN OF CARE
Problem: Falls - Risk of:  Goal: Will remain free from falls  Description: Will remain free from falls  Outcome: Ongoing  Note: Bed in lowest position. Individual items within reach. Call light within reach. Side rails up x2. Environment kept free of clutter. Adequate lighting provided. Pt calls out appropriately. Increased frequency of nursing rounds. Problem: Bleeding:  Goal: Will show no signs and symptoms of excessive bleeding  Description: Will show no signs and symptoms of excessive bleeding  Outcome: Ongoing  Note: Pt denies experiencing any bloody stools or emesis. H&H monitored Q6H. Bleeding precautions in place will continue to monitor. Detail Level: Simple Price (Do Not Change): 0.00 Instructions: This plan will send the code FBSE to the PM system.  DO NOT or CHANGE the price.

## (undated) DEVICE — WIRE TEMP PACE SZ 0 L24IN LIGHT/DARK BLU S STL

## (undated) DEVICE — YANKAUER,BULB TIP,W/O VENT,RIGID,STERILE: Brand: MEDLINE

## (undated) DEVICE — SUTURE NONABSORBABLE MONOFILAMENT 6-0 C-1 1X30 IN PROLENE 8706H

## (undated) DEVICE — SUTURE PROL SZ 4-0 L36IN NONABSORBABLE BLU L26MM SH 1/2 CIR 8521H

## (undated) DEVICE — PAD GEN USE BORDERED ADH 14IN 2IN AND 12IN 4IN GZ UNIV ST

## (undated) DEVICE — DRESSING NEG PRESSURE WND VAC

## (undated) DEVICE — SUTURE NONABSORBABLE MONOFILAMENT 6-0 BV-1 1X30 IN PROLENE 8709H

## (undated) DEVICE — Device: Brand: VIRTUOSAPH PLUS WITH RADIAL INDICATION

## (undated) DEVICE — TUBING, SUCTION, 9/32" X 20', STRAIGHT: Brand: MEDLINE INDUSTRIES, INC.

## (undated) DEVICE — SUTURE VCRL + SZ 0 L27IN ABSRB UD CT-1 L36MM 1/2 CIR TAPR VCP260H

## (undated) DEVICE — SUTURE ETHIB EXCL BR GRN TAPR PT 2-0 30 X563H X563H

## (undated) DEVICE — 3M™ STERI-STRIP™ REINFORCED ADHESIVE SKIN CLOSURES, R1549, 1/2 IN X 2 IN (12 MM X 50 MM), 6 STRIPS/ENVELOPE: Brand: 3M™ STERI-STRIP™

## (undated) DEVICE — GOWN,AURORA,NONREINFORCED,LARGE: Brand: MEDLINE

## (undated) DEVICE — SUTURE PROL SZ 5-0 L36IN NONABSORBABLE BLU L13MM C-1 3/8 8720H

## (undated) DEVICE — SUTURE ETHLN SZ 3-0 L18IN NONABSORBABLE BLK L24MM PS-1 3/8 1663G

## (undated) DEVICE — SUTURE PERMA-HAND SZ 0 L18IN NONABSORBABLE BLK CT-2 L26MM C027D

## (undated) DEVICE — DONUT HEADREST - 7" DIAMETER X 2" HEIGHT: Brand: SOULE MEDICAL

## (undated) DEVICE — AVID DUAL STAGE VENOUS DRAINAGE CANNULA: Brand: AVID DUAL STAGE VENOUS DRAINAGE CANNULA

## (undated) DEVICE — PRESSURE MONITORING LINES 4FT. (122CM) M/F: Brand: PRESSURE MONITORING LINES

## (undated) DEVICE — PLEDGET SURG W3.5XL7MM THK1.5MM WHT PTFE RECT FIRM TFE

## (undated) DEVICE — SUTURE PROL SZ 6-0 L18IN NONABSORBABLE BLU RB-2 L13MM 1/2 8714H

## (undated) DEVICE — 3M™ STERI-STRIP™ COMPOUND BENZOIN TINCTURE 40 BAGS/CARTON 4 CARTONS/CASE C1544: Brand: 3M™ STERI-STRIP™

## (undated) DEVICE — SUTURE VCRL SZ 3-0 L27IN ABSRB UD L26MM SH 1/2 CIR J416H

## (undated) DEVICE — BLADE SAW W6.35XL32MM STRNM CUT STRNOTMY

## (undated) DEVICE — MPS® DELIVERY SET W/ARREST AGENT AND ADDITIVE CASSETTES, HEAT EXCHANGER & 10 FT. DELIVERY TUBING: Brand: MPS

## (undated) DEVICE — CHLORAPREP 26ML ORANGE

## (undated) DEVICE — YANKAUER,FLEXIBLE HANDLE,REGLR CAPACITY: Brand: MEDLINE INDUSTRIES, INC.

## (undated) DEVICE — PACK PROCEDURE SURG SVMMC THORACOTOMY

## (undated) DEVICE — GLOVE SURG SZ 7 L12IN FNGR THK79MIL GRN LTX FREE

## (undated) DEVICE — PROTECTOR ULN NRV PUR FOAM HK LOOP STRP ANATOMICALLY

## (undated) DEVICE — SUTURE MCRYL SZ 4-0 L18IN ABSRB UD L16MM PC-3 3/8 CIR PRIM Y845G

## (undated) DEVICE — BLADE OPHTH D5MM 15DEG GRN W/ RND KNURLED HNDL MICRO-SHARP

## (undated) DEVICE — NON-ADHERENT DRESSING: Brand: TELFA

## (undated) DEVICE — SUTURE VCRL + SZ 2-0 L27IN ABSRB CLR CT-1 1/2 CIR TAPERCUT VCP259H

## (undated) DEVICE — 1/4 FORCE SURGICAL SPRING CLIP: Brand: STEALTH® SPRING CLIP

## (undated) DEVICE — DRAPE,UTILITY,XL,4/PK,STERILE: Brand: MEDLINE

## (undated) DEVICE — WAX SURG 2.5GM HEMSTAT BNE BEESWAX PARAFFIN ISO PALMITATE

## (undated) DEVICE — CAROTID ARTERY SHUNT KIT,RADIOPAQUE LINE, STRAIGHT: Brand: ARGYLE

## (undated) DEVICE — KIT SURG PREP POVIDONE IOD PRESATURATED PAINT WET FOR UNIV

## (undated) DEVICE — 1.5MM HYDRO LEMAITRE VALVULOTOME (98 CM): Brand: HYDRO LEMAITRE VALVULOTOME

## (undated) DEVICE — TUBE IRRIG HNDPC HI FLO TP INTRPULS W/SUCTION TUBE

## (undated) DEVICE — .: Brand: PERFECTCUT AORTOTOMY SYSTEM

## (undated) DEVICE — GLOVE SURG SZ 7.5 L11.73IN FNGR THK9.8MIL STRW LTX POLYMER

## (undated) DEVICE — DRESSING TRNSPAR W5XL4.5IN FLM SHT SEMIPERMEABLE WIND

## (undated) DEVICE — SUTURE NONABSORBABLE MONOFILAMENT 4-0 RB-1 36 IN BLU PROLENE 8557H

## (undated) DEVICE — GAUZE,SPONGE,4"X4",16PLY,XRAY,STRL,LF: Brand: MEDLINE

## (undated) DEVICE — SUTURE PDS II SZ 0 L27IN ABSRB VLT L36MM CT-1 1/2 CIR Z340H

## (undated) DEVICE — AGENT HEMSTAT W2XL14IN OXIDIZED REGENERATED CELOS ABSRB FOR

## (undated) DEVICE — GEL US 20GM NONIRRITATING OVERWRAPPED FILE PCH TRNSMIT

## (undated) DEVICE — SYRINGE 10CC LUER LOCK: Brand: CARDINAL HEALTH

## (undated) DEVICE — 1010 S-DRAPE TOWEL DRAPE 10/BX: Brand: STERI-DRAPE™

## (undated) DEVICE — SS SUTURE, 3 PER SLEEVE: Brand: MYO/WIRE II

## (undated) DEVICE — GLOVE SURG SZ 65 THK91MIL LTX FREE SYN POLYISOPRENE

## (undated) DEVICE — GLOVE ORANGE PI 7   MSG9070

## (undated) DEVICE — DRAPE SLUSH DISC W44XL66IN ST FOR RND BSIN HUSH SLUSH SYS

## (undated) DEVICE — CANISTER NEG PRSS 500ML WND THER W/ TBNG NO PRSS RANG W/

## (undated) DEVICE — SUTURE VCRL + SZ 3-0 L27IN ABSRB UD L26MM SH 1/2 CIR VCP416H

## (undated) DEVICE — TTL1LYR 16FR10ML 100%SIL TMPST TR: Brand: MEDLINE

## (undated) DEVICE — KIT SURG PREP POVIDONE IOD WET FOR UNIV USE SPNG STK

## (undated) DEVICE — INSUFFLATION TUBING SET WITH FILTER, FUNNEL CONNECTOR AND LUER LOCK: Brand: JOSNOE MEDICAL INC

## (undated) DEVICE — CATH TRAY FOLEY 16FR LTX ANTIMICROB 350ML

## (undated) DEVICE — GLOVE SURG SZ 7 CRM LTX FREE POLYISOPRENE POLYMER BEAD ANTI

## (undated) DEVICE — INTENDED FOR TISSUE SEPARATION, AND OTHER PROCEDURES THAT REQUIRE A SHARP SURGICAL BLADE TO PUNCTURE OR CUT.: Brand: BARD-PARKER ® CARBON RIB-BACK BLADES

## (undated) DEVICE — PAD,NON-ADHERENT,3X8,STERILE,LF,1/PK: Brand: MEDLINE

## (undated) DEVICE — KIT BLWR MISTER 5P 15L W/ TBNG SET IRRIG MIST TO IMPROVE

## (undated) DEVICE — Z DISCONTINUED NO SUB IDED DRAIN SURG 2 COLL PT TB FOR ATS BG OASIS

## (undated) DEVICE — EZ GLIDE AORTIC CANNULA: Brand: EDWARDS LIFESCIENCES EZ GLIDE AORTIC CANNULA

## (undated) DEVICE — TUBE CARDIAC SUCTION 6FR SOFT TIP 10FR

## (undated) DEVICE — SUTURE PROL SZ 7-0 L24IN NONABSORBABLE BLU L8MM BV175-6 3/8 8735H

## (undated) DEVICE — Z INACTIVE USE 2540311 LEAD PACE L475MM CHN A OR V MYOCARDIAL STEROID ELUT SIL

## (undated) DEVICE — COVER,LIGHT HANDLE,FLX,2/PK: Brand: MEDLINE INDUSTRIES, INC.

## (undated) DEVICE — DRAIN,WOUND,ROUND,24FR,5/16",FULL-FLUTED: Brand: MEDLINE

## (undated) DEVICE — MEDI-TRACE CADENCE ADULT, DEFIBRILLATION ELECTRODE -RTS  (10 PR/PK) - PHYSIO-CONTROL: Brand: MEDI-TRACE CADENCE

## (undated) DEVICE — GLOVE SURG SZ 75 CRM LTX FREE POLYISOPRENE POLYMER BEAD ANTI

## (undated) DEVICE — COVER LT HNDL BLU PLAS

## (undated) DEVICE — CORD,CAUTERY,BIPOLAR,STERILE: Brand: MEDLINE

## (undated) DEVICE — MITT PREP W575XL775IN POVIDONE IOD HAIR REMV

## (undated) DEVICE — GLOVE SURG SZ 8 L11.77IN FNGR THK9.8MIL STRW LTX POLYMER

## (undated) DEVICE — AGENT HEMSTAT W2XL4IN OXIDIZED REGENERATED CELOS ABSRB SFT

## (undated) DEVICE — SUTURE NONABSORBABLE MONOFILAMENT 7-0 BV-1 1X24 IN PROLENE 8702H

## (undated) DEVICE — POSITIONER,HEAD,MULTIRING,36CS: Brand: MEDLINE

## (undated) DEVICE — SUTURE NONABSORBABLE MONOFILAMENT 5-0 C-1 1X24 IN PROLENE 8725H

## (undated) DEVICE — FOGARTY - HYDRAGRIP SURGICAL - CLAMP INSERTS: Brand: FOGARTY HYDRAJAW

## (undated) DEVICE — CONNECTOR TBNG WHT PLAS SUCT STR 5IN1 LTWT W/ M CONN

## (undated) DEVICE — TEMP PACING WIRE: Brand: MYO/WIRE

## (undated) DEVICE — SUTURE VCRL + SZ 4-0 L27IN ABSRB UD L26MM SH 1/2 CIR VCP415H

## (undated) DEVICE — TOWEL,OR,DSP,ST,BLUE,DLX,XR,4/PK,20PK/CS: Brand: MEDLINE

## (undated) DEVICE — TOWEL,OR,DSP,ST,NATURAL,DLX,4/PK,20PK/CS: Brand: MEDLINE

## (undated) DEVICE — SUTURE PROL SZ 6-0 L24IN NONABSORBABLE BLU L13MM C-1 3/8 8726H

## (undated) DEVICE — SUTURE PERMAHAND SZ 4-0 L18IN NONABSORBABLE BLK SILK BRAID A183H

## (undated) DEVICE — SUTURE VCRL SZ 2-0 L27IN ABSRB UD L26MM SH 1/2 CIR J417H

## (undated) DEVICE — Z INACTIVE USE 2535480 CLIP LIG M BLU TI HRT SHP WIRE HORZ 180 PER BX

## (undated) DEVICE — SPONGE LAP W18XL18IN WHT COT 4 PLY FLD STRUNG RADPQ DISP ST

## (undated) DEVICE — RETRACTOR SURG INSRT SUT HLD OCTOBASE

## (undated) DEVICE — STRIP,CLOSURE,WOUND,MEDI-STRIP,1/2X4: Brand: MEDLINE

## (undated) DEVICE — GOWN,AURORA,NONRNF,XL,30/CS: Brand: MEDLINE

## (undated) DEVICE — CANNULA PERFUSION 5.5IN 9FR AORTIC ROOT

## (undated) DEVICE — GLOVE ORANGE PI 8   MSG9080

## (undated) DEVICE — RETROGRADE CARDIOPLEGIA CATHETER: Brand: EDWARDS LIFESCIENCES RETROGRADE CARDIOPLEGIA CATHETER

## (undated) DEVICE — GLOVE SURG SZ 65 CRM LTX FREE POLYISOPRENE POLYMER BEAD ANTI

## (undated) DEVICE — SUMP INTCARD W/ 1/4INCH CONN 20FRENCH 15INCH DLP

## (undated) DEVICE — BLADE CLIPPER GEN PURP NS

## (undated) DEVICE — DRESSING WND VAC MED GRANUFOAM SENSATRAC

## (undated) DEVICE — CLIP SM RED INTERN HMOCLP TITAN LIGATING

## (undated) DEVICE — BLADE ES L6IN ELASTOMERIC COAT EXT DURABLE BEND UPTO 90DEG

## (undated) DEVICE — SUTURE MCRYL SZ 4-0 L18IN ABSRB UD L19MM PS-2 3/8 CIR PRIM Y496G

## (undated) DEVICE — Device

## (undated) DEVICE — NEEDLE HYPO 18GA L1.5IN PNK S STL HUB POLYPR SHLD REG BVL

## (undated) DEVICE — TRAY CATHETER 16FR F INCLUDE BARDX IC COMPLT CARE DRNGE BG

## (undated) DEVICE — ADHESIVE SKIN CLSR 0.7ML TOP DERMBND ADV

## (undated) DEVICE — SUTURE SZ 7 L18IN NONABSORBABLE SIL CCS L48MM 1/2 CIR STRNM M655G

## (undated) DEVICE — PACK PROCEDURE SURG OPN HRT

## (undated) DEVICE — SPONGE,PEANUT,XRAY,ST,SM,3/8",5/CARD: Brand: MEDLINE INDUSTRIES, INC.

## (undated) DEVICE — SENSOR OXMTR SM AD DISP FOR INVOS SYS

## (undated) DEVICE — Z INACTIVE OBSOLETE PER MEDTRONIC CUSTOM PK HY8C67R11 VAVD PK

## (undated) DEVICE — DECANTER BAG 9": Brand: MEDLINE INDUSTRIES, INC.

## (undated) DEVICE — SUTURE NONABSORBABLE MONOFILAMENT 3-0 PS-1 18 IN BLK ETHILON 1663H

## (undated) DEVICE — BLADE OPHTH ORNG GRINDLESS SMALLER ALTERNATIVE TO NO15 GEN

## (undated) DEVICE — DRAPE C ARM UNIV W41XL74IN CLR PLAS XR VELC CLSR POLY STRP

## (undated) DEVICE — CANNULA PERF L2IN BLNT TIP 2MM VES CLR RADPQ BODY FEM LUER

## (undated) DEVICE — STERNUM BLADE, OFFSET (31.7 X 0.64 X 6.3MM)

## (undated) DEVICE — DRAPE IRRIG FLD WRM W44XL66IN W/ AORN STD PRTBL INTRATEMP

## (undated) DEVICE — PACK PROCEDURE SURG OPN HRT ADD ON

## (undated) DEVICE — GLOVE ORANGE PI 7 1/2   MSG9075

## (undated) DEVICE — CONTAINER,SPECIMEN,4OZ,OR STRL: Brand: MEDLINE

## (undated) DEVICE — ZINACTIVE USE 2539609 APPLICATOR MEDICATED 10.5 CC SOLUTION HI LT ORNG CHLORAPREP

## (undated) DEVICE — CONNECTOR STR 3/8IN ST 050506000 375STRCONN

## (undated) DEVICE — Z DISCONTINUED BY MEDLINE USE 2711682 TRAY SKIN PREP DRY W/ PREM GLV

## (undated) DEVICE — APPLICATOR MEDICATED 26 CC SOLUTION HI LT ORNG CHLORAPREP

## (undated) DEVICE — AGENT HEMSTAT W2XL4IN OXIDIZED REGENERATED CELOS ABSRB

## (undated) DEVICE — GLOVE SURG SZ 8 CRM LTX FREE POLYISOPRENE POLYMER BEAD ANTI

## (undated) DEVICE — GOWN,SIRUS,POLYRNF,BRTHSLV,2XL,18/CS: Brand: MEDLINE

## (undated) DEVICE — PREVENA INCISION MANAGEMENT SYSTEM- PEEL & PLACE DRESSING: Brand: PREVENA™ PEEL & PLACE™

## (undated) DEVICE — GLOVE SURG SZ 65 L12IN FNGR THK79MIL GRN LTX FREE